# Patient Record
Sex: MALE | Race: WHITE | NOT HISPANIC OR LATINO
[De-identification: names, ages, dates, MRNs, and addresses within clinical notes are randomized per-mention and may not be internally consistent; named-entity substitution may affect disease eponyms.]

---

## 2017-02-06 ENCOUNTER — APPOINTMENT (OUTPATIENT)
Dept: RADIOLOGY | Facility: CLINIC | Age: 22
End: 2017-02-06

## 2017-02-06 ENCOUNTER — OUTPATIENT (OUTPATIENT)
Dept: OUTPATIENT SERVICES | Facility: HOSPITAL | Age: 22
LOS: 1 days | End: 2017-02-06
Payer: COMMERCIAL

## 2017-02-06 DIAGNOSIS — Z98.89 OTHER SPECIFIED POSTPROCEDURAL STATES: Chronic | ICD-10-CM

## 2017-02-06 DIAGNOSIS — Z00.8 ENCOUNTER FOR OTHER GENERAL EXAMINATION: ICD-10-CM

## 2017-02-06 PROCEDURE — 71046 X-RAY EXAM CHEST 2 VIEWS: CPT

## 2017-02-06 PROCEDURE — 71100 X-RAY EXAM RIBS UNI 2 VIEWS: CPT

## 2017-02-10 ENCOUNTER — OUTPATIENT (OUTPATIENT)
Dept: OUTPATIENT SERVICES | Age: 22
LOS: 1 days | Discharge: HOME | End: 2017-02-10

## 2017-02-10 DIAGNOSIS — Z98.89 OTHER SPECIFIED POSTPROCEDURAL STATES: Chronic | ICD-10-CM

## 2017-02-13 ENCOUNTER — APPOINTMENT (OUTPATIENT)
Dept: PEDIATRIC CARDIOLOGY | Facility: CLINIC | Age: 22
End: 2017-02-13

## 2017-02-13 VITALS
SYSTOLIC BLOOD PRESSURE: 106 MMHG | DIASTOLIC BLOOD PRESSURE: 68 MMHG | OXYGEN SATURATION: 89 % | HEIGHT: 70.08 IN | RESPIRATION RATE: 16 BRPM | HEART RATE: 80 BPM | WEIGHT: 110.23 LBS | BODY MASS INDEX: 15.78 KG/M2

## 2017-02-23 ENCOUNTER — APPOINTMENT (OUTPATIENT)
Dept: DERMATOLOGY | Facility: CLINIC | Age: 22
End: 2017-02-23

## 2017-02-23 VITALS
WEIGHT: 110 LBS | HEIGHT: 70 IN | DIASTOLIC BLOOD PRESSURE: 70 MMHG | SYSTOLIC BLOOD PRESSURE: 110 MMHG | BODY MASS INDEX: 15.75 KG/M2

## 2017-02-23 DIAGNOSIS — Z84.0 FAMILY HISTORY OF DISEASES OF THE SKIN AND SUBCUTANEOUS TISSUE: ICD-10-CM

## 2017-02-23 DIAGNOSIS — L02.92 FURUNCLE, UNSPECIFIED: ICD-10-CM

## 2017-02-23 DIAGNOSIS — Z86.79 PERSONAL HISTORY OF OTHER DISEASES OF THE CIRCULATORY SYSTEM: ICD-10-CM

## 2017-02-23 DIAGNOSIS — Z09 ENCOUNTER FOR FOLLOW-UP EXAMINATION AFTER COMPLETED TREATMENT FOR CONDITIONS OTHER THAN MALIGNANT NEOPLASM: ICD-10-CM

## 2017-08-08 ENCOUNTER — OUTPATIENT (OUTPATIENT)
Dept: OUTPATIENT SERVICES | Age: 22
LOS: 1 days | Discharge: HOME | End: 2017-08-08

## 2017-08-08 DIAGNOSIS — Z98.89 OTHER SPECIFIED POSTPROCEDURAL STATES: Chronic | ICD-10-CM

## 2017-08-14 ENCOUNTER — APPOINTMENT (OUTPATIENT)
Dept: PEDIATRIC CARDIOLOGY | Facility: CLINIC | Age: 22
End: 2017-08-14
Payer: COMMERCIAL

## 2017-08-14 VITALS
DIASTOLIC BLOOD PRESSURE: 70 MMHG | OXYGEN SATURATION: 91 % | BODY MASS INDEX: 15.47 KG/M2 | HEIGHT: 70.08 IN | WEIGHT: 108.03 LBS | RESPIRATION RATE: 16 BRPM | SYSTOLIC BLOOD PRESSURE: 122 MMHG | HEART RATE: 84 BPM

## 2017-08-14 PROCEDURE — 99215 OFFICE O/P EST HI 40 MIN: CPT | Mod: 25

## 2017-08-14 PROCEDURE — 93000 ELECTROCARDIOGRAM COMPLETE: CPT

## 2017-09-05 ENCOUNTER — LABORATORY RESULT (OUTPATIENT)
Age: 22
End: 2017-09-05

## 2017-11-21 ENCOUNTER — FORM ENCOUNTER (OUTPATIENT)
Age: 22
End: 2017-11-21

## 2017-11-22 ENCOUNTER — APPOINTMENT (OUTPATIENT)
Dept: CARDIOLOGY | Facility: CLINIC | Age: 22
End: 2017-11-22

## 2017-11-22 ENCOUNTER — OUTPATIENT (OUTPATIENT)
Dept: OUTPATIENT SERVICES | Facility: HOSPITAL | Age: 22
LOS: 1 days | End: 2017-11-22
Payer: COMMERCIAL

## 2017-11-22 DIAGNOSIS — Q24.8 OTHER SPECIFIED CONGENITAL MALFORMATIONS OF HEART: ICD-10-CM

## 2017-11-22 DIAGNOSIS — R07.9 CHEST PAIN, UNSPECIFIED: ICD-10-CM

## 2017-11-22 DIAGNOSIS — Z98.89 OTHER SPECIFIED POSTPROCEDURAL STATES: Chronic | ICD-10-CM

## 2017-11-22 DIAGNOSIS — Q21.1 ATRIAL SEPTAL DEFECT: ICD-10-CM

## 2017-11-22 DIAGNOSIS — Q26.2 TOTAL ANOMALOUS PULMONARY VENOUS CONNECTION: ICD-10-CM

## 2017-11-22 DIAGNOSIS — Q20.1 DOUBLE OUTLET RIGHT VENTRICLE: ICD-10-CM

## 2017-11-22 PROCEDURE — 75574 CT ANGIO HRT W/3D IMAGE: CPT

## 2017-11-22 PROCEDURE — 75574 CT ANGIO HRT W/3D IMAGE: CPT | Mod: 26

## 2017-12-07 ENCOUNTER — APPOINTMENT (OUTPATIENT)
Dept: VASCULAR SURGERY | Facility: CLINIC | Age: 22
End: 2017-12-07
Payer: COMMERCIAL

## 2017-12-07 VITALS
TEMPERATURE: 97.5 F | HEIGHT: 70 IN | SYSTOLIC BLOOD PRESSURE: 110 MMHG | DIASTOLIC BLOOD PRESSURE: 73 MMHG | BODY MASS INDEX: 15.75 KG/M2 | HEART RATE: 83 BPM | WEIGHT: 110 LBS

## 2017-12-07 VITALS — SYSTOLIC BLOOD PRESSURE: 114 MMHG | HEART RATE: 87 BPM | DIASTOLIC BLOOD PRESSURE: 77 MMHG

## 2017-12-07 DIAGNOSIS — I83.891 VARICOSE VEINS OF RIGHT LOWER EXTREMITY WITH OTHER COMPLICATIONS: ICD-10-CM

## 2017-12-07 PROCEDURE — 93970 EXTREMITY STUDY: CPT

## 2017-12-07 PROCEDURE — 99212 OFFICE O/P EST SF 10 MIN: CPT | Mod: 25

## 2017-12-11 ENCOUNTER — OTHER (OUTPATIENT)
Age: 22
End: 2017-12-11

## 2017-12-22 ENCOUNTER — MESSAGE (OUTPATIENT)
Age: 22
End: 2017-12-22

## 2017-12-26 ENCOUNTER — OTHER (OUTPATIENT)
Age: 22
End: 2017-12-26

## 2018-02-08 ENCOUNTER — OUTPATIENT (OUTPATIENT)
Dept: OUTPATIENT SERVICES | Age: 23
LOS: 1 days | Discharge: HOME | End: 2018-02-08

## 2018-02-08 DIAGNOSIS — Z98.89 OTHER SPECIFIED POSTPROCEDURAL STATES: Chronic | ICD-10-CM

## 2018-02-12 ENCOUNTER — APPOINTMENT (OUTPATIENT)
Dept: PEDIATRIC CARDIOLOGY | Facility: CLINIC | Age: 23
End: 2018-02-12
Payer: COMMERCIAL

## 2018-02-12 VITALS
HEART RATE: 92 BPM | RESPIRATION RATE: 20 BRPM | SYSTOLIC BLOOD PRESSURE: 112 MMHG | OXYGEN SATURATION: 92 % | BODY MASS INDEX: 15.96 KG/M2 | HEIGHT: 69.69 IN | WEIGHT: 110.23 LBS | DIASTOLIC BLOOD PRESSURE: 72 MMHG

## 2018-02-12 PROCEDURE — 99215 OFFICE O/P EST HI 40 MIN: CPT | Mod: 25

## 2018-02-12 PROCEDURE — 93000 ELECTROCARDIOGRAM COMPLETE: CPT

## 2018-03-06 PROBLEM — I83.219: Status: ACTIVE | Noted: 2018-03-06

## 2018-03-06 RX ORDER — SODIUM BICARBONATE 84 MG/ML
8.4 INJECTION, SOLUTION INTRAVENOUS
Qty: 5 | Refills: 0 | Status: COMPLETED | COMMUNITY
Start: 2018-03-06 | End: 2018-03-10

## 2018-03-06 RX ORDER — LIDOCAINE HYDROCHLORIDE 10 MG/ML
1 INJECTION, SOLUTION INFILTRATION; PERINEURAL
Qty: 50 | Refills: 0 | Status: COMPLETED | COMMUNITY
Start: 2018-03-06 | End: 2018-03-10

## 2018-03-06 RX ORDER — SODIUM CHLORIDE 9 G/ML
0.9 INJECTION, SOLUTION INTRAVENOUS
Qty: 500 | Refills: 0 | Status: COMPLETED | COMMUNITY
Start: 2018-03-06 | End: 2018-03-10

## 2018-03-06 RX ORDER — ALPRAZOLAM 0.5 MG/1
0.5 TABLET ORAL
Qty: 1 | Refills: 0 | Status: COMPLETED | COMMUNITY
Start: 2018-03-06 | End: 2018-03-07

## 2018-03-08 ENCOUNTER — INPATIENT (INPATIENT)
Facility: HOSPITAL | Age: 23
LOS: 2 days | Discharge: HOME | End: 2018-03-11
Attending: INTERNAL MEDICINE | Admitting: INTERNAL MEDICINE
Payer: COMMERCIAL

## 2018-03-08 VITALS
TEMPERATURE: 98 F | HEART RATE: 94 BPM | OXYGEN SATURATION: 100 % | DIASTOLIC BLOOD PRESSURE: 73 MMHG | SYSTOLIC BLOOD PRESSURE: 118 MMHG | RESPIRATION RATE: 18 BRPM

## 2018-03-08 DIAGNOSIS — Q89.3 SITUS INVERSUS: ICD-10-CM

## 2018-03-08 DIAGNOSIS — Z29.9 ENCOUNTER FOR PROPHYLACTIC MEASURES, UNSPECIFIED: ICD-10-CM

## 2018-03-08 DIAGNOSIS — R04.2 HEMOPTYSIS: ICD-10-CM

## 2018-03-08 DIAGNOSIS — Z98.89 OTHER SPECIFIED POSTPROCEDURAL STATES: Chronic | ICD-10-CM

## 2018-03-08 DIAGNOSIS — Q26.2 TOTAL ANOMALOUS PULMONARY VENOUS CONNECTION: ICD-10-CM

## 2018-03-08 DIAGNOSIS — E80.6 OTHER DISORDERS OF BILIRUBIN METABOLISM: ICD-10-CM

## 2018-03-08 LAB
ALBUMIN SERPL ELPH-MCNC: 4.7 G/DL — SIGNIFICANT CHANGE UP (ref 3.3–5)
ALP SERPL-CCNC: 109 U/L — SIGNIFICANT CHANGE UP (ref 40–120)
ALT FLD-CCNC: 30 U/L — SIGNIFICANT CHANGE UP (ref 4–41)
ANISOCYTOSIS BLD QL: SIGNIFICANT CHANGE UP
APTT BLD: 36.6 SEC — SIGNIFICANT CHANGE UP (ref 27.5–37.4)
AST SERPL-CCNC: 33 U/L — SIGNIFICANT CHANGE UP (ref 4–40)
BASOPHILS # BLD AUTO: 0.17 K/UL — SIGNIFICANT CHANGE UP (ref 0–0.2)
BASOPHILS NFR BLD AUTO: 2.8 % — HIGH (ref 0–2)
BASOPHILS NFR SPEC: 6.3 % — HIGH (ref 0–2)
BILIRUB SERPL-MCNC: 2.7 MG/DL — HIGH (ref 0.2–1.2)
BUN SERPL-MCNC: 15 MG/DL — SIGNIFICANT CHANGE UP (ref 7–23)
CALCIUM SERPL-MCNC: 9.7 MG/DL — SIGNIFICANT CHANGE UP (ref 8.4–10.5)
CHLORIDE SERPL-SCNC: 100 MMOL/L — SIGNIFICANT CHANGE UP (ref 98–107)
CO2 SERPL-SCNC: 24 MMOL/L — SIGNIFICANT CHANGE UP (ref 22–31)
CREAT SERPL-MCNC: 1.01 MG/DL — SIGNIFICANT CHANGE UP (ref 0.5–1.3)
ELLIPTOCYTES BLD QL SMEAR: SLIGHT — SIGNIFICANT CHANGE UP
EOSINOPHIL # BLD AUTO: 0.63 K/UL — HIGH (ref 0–0.5)
EOSINOPHIL NFR BLD AUTO: 10.6 % — HIGH (ref 0–6)
EOSINOPHIL NFR FLD: 4.5 % — SIGNIFICANT CHANGE UP (ref 0–6)
GIANT PLATELETS BLD QL SMEAR: PRESENT — SIGNIFICANT CHANGE UP
GLUCOSE SERPL-MCNC: 81 MG/DL — SIGNIFICANT CHANGE UP (ref 70–99)
HCT VFR BLD CALC: 49.6 % — SIGNIFICANT CHANGE UP (ref 39–50)
HGB BLD-MCNC: 16.2 G/DL — SIGNIFICANT CHANGE UP (ref 13–17)
IMM GRANULOCYTES # BLD AUTO: 0.01 # — SIGNIFICANT CHANGE UP
IMM GRANULOCYTES NFR BLD AUTO: 0.2 % — SIGNIFICANT CHANGE UP (ref 0–1.5)
INR BLD: 1.15 — SIGNIFICANT CHANGE UP (ref 0.88–1.17)
LYMPHOCYTES # BLD AUTO: 1.72 K/UL — SIGNIFICANT CHANGE UP (ref 1–3.3)
LYMPHOCYTES # BLD AUTO: 28.8 % — SIGNIFICANT CHANGE UP (ref 13–44)
LYMPHOCYTES NFR SPEC AUTO: 23.4 % — SIGNIFICANT CHANGE UP (ref 13–44)
MACROCYTES BLD QL: SLIGHT — SIGNIFICANT CHANGE UP
MAGNESIUM SERPL-MCNC: 2 MG/DL — SIGNIFICANT CHANGE UP (ref 1.6–2.6)
MCHC RBC-ENTMCNC: 27.1 PG — SIGNIFICANT CHANGE UP (ref 27–34)
MCHC RBC-ENTMCNC: 32.7 % — SIGNIFICANT CHANGE UP (ref 32–36)
MCV RBC AUTO: 82.9 FL — SIGNIFICANT CHANGE UP (ref 80–100)
MONOCYTES # BLD AUTO: 0.83 K/UL — SIGNIFICANT CHANGE UP (ref 0–0.9)
MONOCYTES NFR BLD AUTO: 13.9 % — SIGNIFICANT CHANGE UP (ref 2–14)
MONOCYTES NFR BLD: 8.1 % — SIGNIFICANT CHANGE UP (ref 2–9)
NEUTROPHIL AB SER-ACNC: 48.7 % — SIGNIFICANT CHANGE UP (ref 43–77)
NEUTROPHILS # BLD AUTO: 2.61 K/UL — SIGNIFICANT CHANGE UP (ref 1.8–7.4)
NEUTROPHILS NFR BLD AUTO: 43.7 % — SIGNIFICANT CHANGE UP (ref 43–77)
NRBC # FLD: 0 — SIGNIFICANT CHANGE UP
PHOSPHATE SERPL-MCNC: 4.5 MG/DL — SIGNIFICANT CHANGE UP (ref 2.5–4.5)
PLATELET # BLD AUTO: 210 K/UL — SIGNIFICANT CHANGE UP (ref 150–400)
PLATELET COUNT - ESTIMATE: NORMAL — SIGNIFICANT CHANGE UP
PMV BLD: SIGNIFICANT CHANGE UP FL (ref 7–13)
POIKILOCYTOSIS BLD QL AUTO: SIGNIFICANT CHANGE UP
POTASSIUM SERPL-MCNC: 4.5 MMOL/L — SIGNIFICANT CHANGE UP (ref 3.5–5.3)
POTASSIUM SERPL-SCNC: 4.5 MMOL/L — SIGNIFICANT CHANGE UP (ref 3.5–5.3)
PROT SERPL-MCNC: 7.9 G/DL — SIGNIFICANT CHANGE UP (ref 6–8.3)
PROTHROM AB SERPL-ACNC: 13.3 SEC — HIGH (ref 9.8–13.1)
RBC # BLD: 5.98 M/UL — HIGH (ref 4.2–5.8)
RBC # FLD: 22.2 % — HIGH (ref 10.3–14.5)
SODIUM SERPL-SCNC: 139 MMOL/L — SIGNIFICANT CHANGE UP (ref 135–145)
TARGETS BLD QL SMEAR: SLIGHT — SIGNIFICANT CHANGE UP
VARIANT LYMPHS # BLD: 9 % — SIGNIFICANT CHANGE UP
WBC # BLD: 5.97 K/UL — SIGNIFICANT CHANGE UP (ref 3.8–10.5)
WBC # FLD AUTO: 5.97 K/UL — SIGNIFICANT CHANGE UP (ref 3.8–10.5)

## 2018-03-08 PROCEDURE — 71046 X-RAY EXAM CHEST 2 VIEWS: CPT | Mod: 26

## 2018-03-08 RX ORDER — DIGOXIN 250 MCG
0.25 TABLET ORAL DAILY
Qty: 0 | Refills: 0 | Status: DISCONTINUED | OUTPATIENT
Start: 2018-03-08 | End: 2018-03-11

## 2018-03-08 RX ORDER — DIGOXIN 250 MCG
0.25 TABLET ORAL DAILY
Qty: 0 | Refills: 0 | Status: DISCONTINUED | OUTPATIENT
Start: 2018-03-08 | End: 2018-03-08

## 2018-03-08 NOTE — H&P ADULT - NSHPREVIEWOFSYSTEMS_GEN_ALL_CORE
CONSTITUTIONAL: No fever, no chills  EYES: No eye pain, no visual disturbance  Mouth: no pain in mouth, no cuts  RESPIRATORY: hemoptysis+, no sob  CARDIOVASCULAR: CP only with cough, no palpitations  GASTROINTESTINAL: no abdominal pain, no n/v/d  GENITOURINARY: No dysuria, no hematuria  NEUROLOGICAL: No headaches, no weakness  SKIN: No itching, no rashes  MUSCULOSKELETAL: No joint pain, no joint swelling

## 2018-03-08 NOTE — ED ADULT NURSE NOTE - OBJECTIVE STATEMENT
Pt c/o hemoptysis for the past 2 days, congenital defect. Pt sent in for possible bronchoscopy. Pt denies sob, breathing even and unlabored. Pt denies cp/discomfort, denies headache/dizziness. Abdomen soft, non-tender, non-distended. Skin is cool dry and intact. IVL placed, labs sent. Will continue to monitor.

## 2018-03-08 NOTE — ED ADULT TRIAGE NOTE - CHIEF COMPLAINT QUOTE
pt arrives c/o coughing up large amounts of blood, pt has congential heart defect and is followed by congential specialist, told to come to ED, pt on ASA. pt arrives c/o coughing up large amounts of blood, pt has congential heart defect and is followed by congential specialist, told to come to ED, pt on ASA sent in for bronchoscopy.

## 2018-03-08 NOTE — ED PROVIDER NOTE - MEDICAL DECISION MAKING DETAILS
22yom complex congenital heart hx p/w hemoptysis, no acute distress, protecting airway, saturating well. Will check basic labs, CXR, pulm consult for admission.

## 2018-03-08 NOTE — H&P ADULT - PROBLEM SELECTOR PLAN 2
Noted extensive surgical intervention as noted in HPI  - Ped Cards Fellow called and made aware of patient's admission  - will continue with home digoxin dosing, aspirin held for active hemoptysis

## 2018-03-08 NOTE — ED ADULT NURSE REASSESSMENT NOTE - NS ED NURSE REASSESS COMMENT FT1
report received at shift change, pt remains AAOx3, VS as noted, pt in NAD, pt assigned bed, awaiting PCU to take report, will continue to monitor pt.

## 2018-03-08 NOTE — ED PROVIDER NOTE - PROGRESS NOTE DETAILS
Gabi: Pulm fellow recommending admission to PCU for observation and quantification of hemoptysis overnight. Possible bronch tomorrow. Pt and mother agreeable to plan. Gabi: Had approx 40ml flori hemoptysis. Pulm fellow updated. Admitted to PCU but may need upgrade to ICU for more emergent bronchoscopy. Pulm fellow will make MICU aware.

## 2018-03-08 NOTE — ED PROVIDER NOTE - ATTENDING CONTRIBUTION TO CARE
I performed a face-to-face evaluation of the patient and performed a history and physical examination. I agree with the history and physical examination.    22 M, single ventricle, f/b [facility] and [facility]. C/o hemoptysis x yrs. Appears well. Appears well. NAD. Afebrile. Vital signs unremarkable. Strong pulse. Respirations unlabored. No LE edema. Will discuss w/ Pulm regarding admission for bronch.

## 2018-03-08 NOTE — ED PROVIDER NOTE - OBJECTIVE STATEMENT
22yom w/ single ventricle, heterotaxy w/ asplenia p/w hemoptysis. Has been having episodic hemoptysis on and off for years. Was told if it happens again, to go to the ED for bronchoscopy. Had onset of hemoptysis 2 days ago, approximately tablespoon sized clots. Endorses pain with coughing but denies any pain at rest or shortness of breath. No other bleeding. No fevers, chills or other infectious symptoms. No oxygen use at baseline.

## 2018-03-08 NOTE — H&P ADULT - PROBLEM SELECTOR PLAN 1
Hemoptysis x2d, active in the ED  - MICU consulted and discussed that patient will be NPO after midnight. If patient worsens or has continued hemoptysis will inform MICU  - Type and screened, consent for transfusion obtained. monitor clinically and w/ cbc daily.  - Requires urgent bronchoscopy for evaluation.  - Holding Aspirin 81mg given active bleed

## 2018-03-08 NOTE — H&P ADULT - HISTORY OF PRESENT ILLNESS
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, Right AV valve atresia, b/l SVC(non-communicating), pulmonary valve atresia & TAPVR who is s/p classic BT shunt and repair of TAPVR at birth followed by bilateral bi-directional Narinder shunts, who underwent re-repair of the TAPVR secondary to stenosis of the anastomosis at the time of the BDG shunts, underwent takedown of the Narinder shunt and underwent a fenestrated Fontan procedure which was subsequently managed by attempted cath lab closure of the fenestration, hx of thrombosis of the right femoral vein and subsequent development of venous stasis in that leg presents to [facility] with 2d of hemoptysis. Patient reports that he has had coughed up blood before in periods of 2-3d >6mo ago but this time there is more volume and reports 5 episodes in last 2d. Per outpatient notation there was suspicion that enalapril may have led to hemoptysis and therefore it was discontinued w/ coughing resolved at that time. He was recently seen by his congenital heart cardiologist who instructed to be evaluated in hospital for bronchoscopy should hemoptysis be persistent which has led to his presentation today. On review he has chest pain only with coughing and not with deep inspiration, no palpitations, no sob, no fever/chills no n/v/d or abdominal pain, no dysuria, or rash.

## 2018-03-08 NOTE — ED ADULT NURSE NOTE - CHIEF COMPLAINT QUOTE
pt arrives c/o coughing up large amounts of blood, pt has congential heart defect and is followed by congential specialist, told to come to ED, pt on ASA sent in for bronchoscopy.

## 2018-03-08 NOTE — H&P ADULT - NSHPLABSRESULTS_GEN_ALL_CORE
Personally reviewed available labs, imaging and ekg     Labs  CBC Full  -  ( 08 Mar 2018 17:27 )  WBC Count : 5.97 K/uL  Hemoglobin : 16.2 g/dL  Hematocrit : 49.6 %  Platelet Count - Automated : 210 K/uL  Mean Cell Volume : 82.9 fL  Mean Cell Hemoglobin : 27.1 pg  Mean Cell Hemoglobin Concentration : 32.7 %  Auto Neutrophil # : 2.61 K/uL  Auto Lymphocyte # : 1.72 K/uL  Auto Monocyte # : 0.83 K/uL  Auto Eosinophil # : 0.63 K/uL  Auto Basophil # : 0.17 K/uL  Auto Neutrophil % : 43.7 %  Auto Lymphocyte % : 28.8 %  Auto Monocyte % : 13.9 %  Auto Eosinophil % : 10.6 %  Auto Basophil % : 2.8 %    03-08    139  |  100  |  15  ----------------------------<  81  4.5   |  24  |  1.01    Ca    9.7      08 Mar 2018 17:27  Phos  4.5     03-08  Mg     2.0     03-08    TPro  7.9  /  Alb  4.7  /  TBili  2.7<H>  /  DBili  x   /  AST  33  /  ALT  30  /  AlkPhos  109  03-08  PT/INR - ( 08 Mar 2018 17:27 )   PT: 13.3 SEC;   INR: 1.15      PTT - ( 08 Mar 2018 17:27 )  PTT:36.6 SEC  CAPILLARY BLOOD GLUCOSE    Imaging  < from: Xray Chest 2 Views PA/Lat (03.08.18 @ 17:52) >  IMPRESSION:  Clear lungs. No pleural effusions or pneumothorax.  Stable fragmented sternal wire configuration, mediastinal surgical clips,   and cardiac and mediastinal silhouettes.   Trachea midline.  Bell-shaped chest wall deformity and anterior chest wall pectus deformity   again noted.  < end of copied text >

## 2018-03-08 NOTE — H&P ADULT - PROBLEM SELECTOR PLAN 3
Per chart patient no longer requires daily antibiotic prophylaxis  - will need coverage during bronchoscpy

## 2018-03-08 NOTE — H&P ADULT - NSHPPHYSICALEXAM_GEN_ALL_CORE
Vital Signs Last 24 Hrs  T(C): 36.9 (08 Mar 2018 20:33), Max: 36.9 (08 Mar 2018 20:33)  T(F): 98.4 (08 Mar 2018 20:33), Max: 98.4 (08 Mar 2018 20:33)  HR: 92 (08 Mar 2018 20:33) (88 - 94)  BP: 119/70 (08 Mar 2018 20:33) (118/73 - 120/75)  BP(mean): --  RR: 16 (08 Mar 2018 20:33) (16 - 18)  SpO2: 100% (08 Mar 2018 20:33) (100% - 100%)    GENERAL: NAD, thin, well-developed  HEAD:  Atraumatic, Normocephalic  EYES: EOMI, PERRLA, conjunctiva and sclera clear  Mouth: MMM, no lesions  NECK: Supple, no appreciable masses  Lung: normal work of breathing, cta b/l  Chest: S1&S2+, rrr, systolic murmur over LMSB w/ radiation to left back (III/VI)  ABDOMEN: bs+, soft, nt, nd, no appreciable masses  : No lion catheter, no CVA tenderness  EXTREMITIES:  radial pulse present b/l, PT present b/l, no pitting edema present  Neuro: A&Ox3, no focal deficits  SKIN: warm and dry, no visible rashes

## 2018-03-08 NOTE — ED ADULT NURSE NOTE - PMH
Heterotaxy syndrome with asplenia    Single ventricle    Spleen absent    TAPVR (total anomalous pulmonary venous return)

## 2018-03-08 NOTE — H&P ADULT - ASSESSMENT
22M w/ congenital heart disease w/ noted heterotaxy, Right AV valve atresia, b/l SVC(non-communicating), pulmonary valve atresia & TAPVR who is s/p surgical repair including Fontan procedure presents to [facility] w/ hemoptysis and is admitted to PCU for further management and evaluation with plan for bronchoscopy

## 2018-03-09 ENCOUNTER — APPOINTMENT (OUTPATIENT)
Dept: VASCULAR SURGERY | Facility: CLINIC | Age: 23
End: 2018-03-09

## 2018-03-09 DIAGNOSIS — Q89.01 ASPLENIA (CONGENITAL): ICD-10-CM

## 2018-03-09 DIAGNOSIS — I83.219 VARICOSE VEINS OF RIGHT LOWER EXTREMITY WITH BOTH ULCER OF UNSPECIFIED SITE AND INFLAMMATION: ICD-10-CM

## 2018-03-09 LAB
ALBUMIN SERPL ELPH-MCNC: 4 G/DL — SIGNIFICANT CHANGE UP (ref 3.3–5)
ALP SERPL-CCNC: 93 U/L — SIGNIFICANT CHANGE UP (ref 40–120)
ALT FLD-CCNC: 26 U/L — SIGNIFICANT CHANGE UP (ref 4–41)
APTT BLD: 36.5 SEC — SIGNIFICANT CHANGE UP (ref 27.5–37.4)
AST SERPL-CCNC: 29 U/L — SIGNIFICANT CHANGE UP (ref 4–40)
BASOPHILS # BLD AUTO: 0.25 K/UL — HIGH (ref 0–0.2)
BASOPHILS NFR BLD AUTO: 2.9 % — HIGH (ref 0–2)
BILIRUB SERPL-MCNC: 2.4 MG/DL — HIGH (ref 0.2–1.2)
BUN SERPL-MCNC: 18 MG/DL — SIGNIFICANT CHANGE UP (ref 7–23)
CALCIUM SERPL-MCNC: 9.4 MG/DL — SIGNIFICANT CHANGE UP (ref 8.4–10.5)
CHLORIDE SERPL-SCNC: 107 MMOL/L — SIGNIFICANT CHANGE UP (ref 98–107)
CO2 SERPL-SCNC: 24 MMOL/L — SIGNIFICANT CHANGE UP (ref 22–31)
CREAT SERPL-MCNC: 1.03 MG/DL — SIGNIFICANT CHANGE UP (ref 0.5–1.3)
EOSINOPHIL # BLD AUTO: 1.31 K/UL — HIGH (ref 0–0.5)
EOSINOPHIL NFR BLD AUTO: 15.2 % — HIGH (ref 0–6)
GLUCOSE SERPL-MCNC: 95 MG/DL — SIGNIFICANT CHANGE UP (ref 70–99)
HCT VFR BLD CALC: 47.5 % — SIGNIFICANT CHANGE UP (ref 39–50)
HGB BLD-MCNC: 15.1 G/DL — SIGNIFICANT CHANGE UP (ref 13–17)
IMM GRANULOCYTES # BLD AUTO: 0.02 # — SIGNIFICANT CHANGE UP
IMM GRANULOCYTES NFR BLD AUTO: 0.2 % — SIGNIFICANT CHANGE UP (ref 0–1.5)
INR BLD: 1.26 — HIGH (ref 0.88–1.17)
LYMPHOCYTES # BLD AUTO: 2.65 K/UL — SIGNIFICANT CHANGE UP (ref 1–3.3)
LYMPHOCYTES # BLD AUTO: 30.7 % — SIGNIFICANT CHANGE UP (ref 13–44)
MAGNESIUM SERPL-MCNC: 2 MG/DL — SIGNIFICANT CHANGE UP (ref 1.6–2.6)
MCHC RBC-ENTMCNC: 25.9 PG — LOW (ref 27–34)
MCHC RBC-ENTMCNC: 31.8 % — LOW (ref 32–36)
MCV RBC AUTO: 81.6 FL — SIGNIFICANT CHANGE UP (ref 80–100)
MONOCYTES # BLD AUTO: 1.27 K/UL — HIGH (ref 0–0.9)
MONOCYTES NFR BLD AUTO: 14.7 % — HIGH (ref 2–14)
NEUTROPHILS # BLD AUTO: 3.13 K/UL — SIGNIFICANT CHANGE UP (ref 1.8–7.4)
NEUTROPHILS NFR BLD AUTO: 36.3 % — LOW (ref 43–77)
NRBC # FLD: 0 — SIGNIFICANT CHANGE UP
PHOSPHATE SERPL-MCNC: 5.6 MG/DL — HIGH (ref 2.5–4.5)
PLATELET # BLD AUTO: 187 K/UL — SIGNIFICANT CHANGE UP (ref 150–400)
PMV BLD: SIGNIFICANT CHANGE UP FL (ref 7–13)
POTASSIUM SERPL-MCNC: 5 MMOL/L — SIGNIFICANT CHANGE UP (ref 3.5–5.3)
POTASSIUM SERPL-SCNC: 5 MMOL/L — SIGNIFICANT CHANGE UP (ref 3.5–5.3)
PROT SERPL-MCNC: 6.8 G/DL — SIGNIFICANT CHANGE UP (ref 6–8.3)
PROTHROM AB SERPL-ACNC: 14.5 SEC — HIGH (ref 9.8–13.1)
RBC # BLD: 5.82 M/UL — HIGH (ref 4.2–5.8)
RBC # FLD: 21.9 % — HIGH (ref 10.3–14.5)
RH IG SCN BLD-IMP: NEGATIVE — SIGNIFICANT CHANGE UP
SODIUM SERPL-SCNC: 144 MMOL/L — SIGNIFICANT CHANGE UP (ref 135–145)
WBC # BLD: 8.63 K/UL — SIGNIFICANT CHANGE UP (ref 3.8–10.5)
WBC # FLD AUTO: 8.63 K/UL — SIGNIFICANT CHANGE UP (ref 3.8–10.5)

## 2018-03-09 PROCEDURE — 99255 IP/OBS CONSLTJ NEW/EST HI 80: CPT

## 2018-03-09 PROCEDURE — 99291 CRITICAL CARE FIRST HOUR: CPT | Mod: 25

## 2018-03-09 PROCEDURE — 99152 MOD SED SAME PHYS/QHP 5/>YRS: CPT | Mod: GC

## 2018-03-09 PROCEDURE — 71275 CT ANGIOGRAPHY CHEST: CPT | Mod: 26

## 2018-03-09 PROCEDURE — 31624 DX BRONCHOSCOPE/LAVAGE: CPT | Mod: GC

## 2018-03-09 RX ORDER — CHLORHEXIDINE GLUCONATE 213 G/1000ML
1 SOLUTION TOPICAL DAILY
Qty: 0 | Refills: 0 | Status: DISCONTINUED | OUTPATIENT
Start: 2018-03-09 | End: 2018-03-11

## 2018-03-09 RX ORDER — SODIUM CHLORIDE 9 MG/ML
1000 INJECTION, SOLUTION INTRAVENOUS
Qty: 0 | Refills: 0 | Status: DISCONTINUED | OUTPATIENT
Start: 2018-03-09 | End: 2018-03-11

## 2018-03-09 RX ORDER — MIDAZOLAM HYDROCHLORIDE 1 MG/ML
1 INJECTION, SOLUTION INTRAMUSCULAR; INTRAVENOUS ONCE
Qty: 0 | Refills: 0 | Status: DISCONTINUED | OUTPATIENT
Start: 2018-03-09 | End: 2018-03-09

## 2018-03-09 RX ORDER — LIDOCAINE HCL 20 MG/ML
20 VIAL (ML) INJECTION ONCE
Qty: 0 | Refills: 0 | Status: COMPLETED | OUTPATIENT
Start: 2018-03-09 | End: 2018-03-09

## 2018-03-09 RX ORDER — MIDAZOLAM HYDROCHLORIDE 1 MG/ML
2 INJECTION, SOLUTION INTRAMUSCULAR; INTRAVENOUS ONCE
Qty: 0 | Refills: 0 | Status: DISCONTINUED | OUTPATIENT
Start: 2018-03-09 | End: 2018-03-09

## 2018-03-09 RX ORDER — CEFAZOLIN SODIUM 1 G
1000 VIAL (EA) INJECTION ONCE
Qty: 0 | Refills: 0 | Status: COMPLETED | OUTPATIENT
Start: 2018-03-09 | End: 2018-03-09

## 2018-03-09 RX ORDER — MIDAZOLAM HYDROCHLORIDE 1 MG/ML
12 INJECTION, SOLUTION INTRAMUSCULAR; INTRAVENOUS ONCE
Qty: 0 | Refills: 0 | Status: DISCONTINUED | OUTPATIENT
Start: 2018-03-09 | End: 2018-03-09

## 2018-03-09 RX ORDER — PROPOFOL 10 MG/ML
15 INJECTION, EMULSION INTRAVENOUS ONCE
Qty: 0 | Refills: 0 | Status: DISCONTINUED | OUTPATIENT
Start: 2018-03-09 | End: 2018-03-09

## 2018-03-09 RX ORDER — FENTANYL CITRATE 50 UG/ML
50 INJECTION INTRAVENOUS ONCE
Qty: 0 | Refills: 0 | Status: DISCONTINUED | OUTPATIENT
Start: 2018-03-09 | End: 2018-03-09

## 2018-03-09 RX ORDER — KETAMINE HYDROCHLORIDE 100 MG/ML
100 INJECTION INTRAMUSCULAR; INTRAVENOUS ONCE
Qty: 0 | Refills: 0 | Status: DISCONTINUED | OUTPATIENT
Start: 2018-03-09 | End: 2018-03-09

## 2018-03-09 RX ADMIN — FENTANYL CITRATE 50 MICROGRAM(S): 50 INJECTION INTRAVENOUS at 11:27

## 2018-03-09 RX ADMIN — Medication 20 MILLILITER(S): at 11:28

## 2018-03-09 RX ADMIN — CHLORHEXIDINE GLUCONATE 1 APPLICATION(S): 213 SOLUTION TOPICAL at 20:18

## 2018-03-09 RX ADMIN — MIDAZOLAM HYDROCHLORIDE 1 MILLIGRAM(S): 1 INJECTION, SOLUTION INTRAMUSCULAR; INTRAVENOUS at 12:29

## 2018-03-09 RX ADMIN — SODIUM CHLORIDE 75 MILLILITER(S): 9 INJECTION, SOLUTION INTRAVENOUS at 20:18

## 2018-03-09 RX ADMIN — MIDAZOLAM HYDROCHLORIDE 1 MILLIGRAM(S): 1 INJECTION, SOLUTION INTRAMUSCULAR; INTRAVENOUS at 11:28

## 2018-03-09 RX ADMIN — SODIUM CHLORIDE 75 MILLILITER(S): 9 INJECTION, SOLUTION INTRAVENOUS at 17:46

## 2018-03-09 RX ADMIN — FENTANYL CITRATE 50 MICROGRAM(S): 50 INJECTION INTRAVENOUS at 11:28

## 2018-03-09 RX ADMIN — MIDAZOLAM HYDROCHLORIDE 2 MILLIGRAM(S): 1 INJECTION, SOLUTION INTRAMUSCULAR; INTRAVENOUS at 11:29

## 2018-03-09 RX ADMIN — Medication 0.25 MILLIGRAM(S): at 05:41

## 2018-03-09 RX ADMIN — Medication 100 MILLIGRAM(S): at 13:24

## 2018-03-09 NOTE — CONSULT NOTE ADULT - ASSESSMENT
23 yo old male with complex congenital heart disease with heterotaxy and single ventricle physiology s/p Fontan completion now admitted with concerns for hemoptysis. ENT and pulmonary evaluations without clear etiology thus far. Pending GI work up. Baseline desaturation likely secondary to venovenous collaterals and/or residual Fontan fenestration flow that we cannot clearly visualize on transthoracic imaging. At this point, with no clear ENT or pulmonary source of bleeding, prudent to explore potential upper GI sources of bleeding in light of the change in frequency and quantity of blood with this episode. If this were bleed related to bleeding aortopulmonary collateral vessels, would have expected blood on bronchoscopy.  As discussed with the MICU team, patients with Fontan physiology are preload dependent so would maintain adequate hydration i.e. start IVFs when NPO for prolonged periods.      - start maintenance IVFs when NPO  - okay to hold aspirin with active bleeding  - f/u GI recommendations re endoscopy  - okay to hold digoxin while NPO, monitor on telemetry  - okay for baseline saturations to be >85% on room air  - requires SBE prophylaxis for indicated procedures given cyanosis

## 2018-03-09 NOTE — PROCEDURE NOTE - ADDITIONAL PROCEDURE DETAILS
Indication: Hemoptysis  Preop medications: 3 mg Versed, 100 microgram Fentanyl. Lidocaine was used to anesthetize the pharyngeal and nasal mucosa  Findings:  Bronchoscope inserted through left naris. Small area of mucosal denudation in inferior turbinate. Vocal cords normal. Small amount of blood in upper trachea. No tracheal lesion. Minimal dried blood in RUL and BI without overt bleeding from any bronchopulmonary segment. No washings taken.

## 2018-03-09 NOTE — PROGRESS NOTE ADULT - ASSESSMENT
23M with hx of heart disease and hemoptysis.  - no source of bleeding on upper aerodigestive exam  - normal larynx   - no further ENT needs  - consider GI or pulmonary source  - will discuss with attending

## 2018-03-09 NOTE — CONSULT NOTE ADULT - SUBJECTIVE AND OBJECTIVE BOX
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, R AV atresia, B/L SVC (non-communicating), pulmonary valve atresia & TAPVR who is s/p classic BT shunt and repair of TAPVR at birth followed by bilateral bi-directional Narinder shunts, who underwent re-repair of the TAPVR secondary to stenosis of the anastomosis at the time of the BDG shunts, underwent takedown of the Narinder shunt and underwent a fenestrated Fontan procedure which was subsequently managed by attempted cath lab closure of the fenestration, hx of thrombosis of the right femoral vein and subsequent development of venous stasis in that leg presents to [facility] with 2d of hemoptysis. Patient has had intermittent hemoptysis lasting 2-3d in the past over 6mo ago but this episode with increased volume and 5 episodes in last 2d.  In outpatient EMR, there was suspicion that enalapril may have contributed to hemoptysis and so was subsequently discontinued.    Patient was admitted initially to the RCU last night and was kept NPO per MICU in the [facility] patient had continued hemoptysis and required bronchoscopy.  ASA81 was held.  As patient with continued hemoptysis, he was transferred to the MICU for bronchoscopy.    Outpatient providers:  Card: Dr. Mega Rojas  Pulm: Dr. 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  Recent Travel:  Country of Birth: [place]  Advance Directives:    Allergies  No Known Allergies    Intolerances    HOME MEDICATIONS:  ASA81 QD  digoxin 250mcg QD    REVIEW OF SYSTEMS:  Constitutional: [ ] negative [ ] fevers [ ] chills [ ] weight loss [ ] weight gain  HEENT: [ ] negative [ ] dry eyes [ ] eye irritation [ ] postnasal drip [ ] nasal congestion  CV: [ ] negative  [ ] chest pain [ ] orthopnea [ ] palpitations [ ] murmur  Resp: [ ] negative [ ] cough [ ] shortness of breath [ ] dyspnea [ ] wheezing [ ] sputum [ ] hemoptysis  GI: [ ] negative [ ] nausea [ ] vomiting [ ] diarrhea [ ] constipation [ ] abd pain [ ] dysphagia   : [ ] negative [ ] dysuria [ ] nocturia [ ] hematuria [ ] increased urinary frequency  Musculoskeletal: [ ] negative [ ] back pain [ ] myalgias [ ] arthralgias [ ] fracture  Skin: [ ] negative [ ] rash [ ] itch  Neurological: [ ] negative [ ] headache [ ] dizziness [ ] syncope [ ] weakness [ ] numbness  Psychiatric: [ ] negative [ ] anxiety [ ] depression  Endocrine: [ ] negative [ ] diabetes [ ] thyroid problem  Hematologic/Lymphatic: [ ] negative [ ] anemia [ ] bleeding problem  Allergic/Immunologic: [ ] negative [ ] itchy eyes [ ] nasal discharge [ ] hives [ ] angioedema  [ ] All other systems negative  [ ] Unable to assess ROS because ________    OBJECTIVE:  ICU Vital Signs Last 24 Hrs  T(C): 35.8 (09 Mar 2018 10:30), Max: 37 (09 Mar 2018 01:56)  T(F): 96.5 (09 Mar 2018 10:30), Max: 98.6 (09 Mar 2018 01:56)  HR: 94 (09 Mar 2018 11:00) (85 - 99)  BP: 128/78 (09 Mar 2018 10:30) (108/70 - 129/82)  BP(mean): 88 (09 Mar 2018 10:30) (88 - 88)  ABP: --  ABP(mean): --  RR: 19 (09 Mar 2018 11:00) (16 - 24)  SpO2: 86% (09 Mar 2018 11:00) (86% - 100%)    CAPILLARY BLOOD GLUCOSE    PHYSICAL EXAM:  General:   HEENT:   Lymph Nodes:  Neck:   Respiratory:   Cardiovascular:   Abdomen:   Extremities:   Skin:   Neurological:  Psychiatry:    LINES:   [facility] MEDICATIONS:  digoxin     Tablet 0.25 milliGRAM(s) Oral daily    LABS:                        15.1   8.63  )-----------( 187      ( 09 Mar 2018 05:56 )             47.5     Hgb Trend: 15.1<--, 16.2<--  03-09    144  |  107  |  18  ----------------------------<  95  5.0   |  24  |  1.03    Ca    9.4      09 Mar 2018 05:56  Phos  5.6     03-09  Mg     2.0     03-09    TPro  6.8  /  Alb  4.0  /  TBili  2.4<H>  /  DBili  x   /  AST  29  /  ALT  26  /  AlkPhos  93  03-09    Creatinine Trend: 1.03<--, 1.01<--  PT/INR - ( 09 Mar 2018 05:56 )   PT: 14.5 SEC;   INR: 1.26     PTT - ( 09 Mar 2018 05:56 )  PTT:36.5 SEC    MICROBIOLOGY:     RADIOLOGY:  [ ] Reviewed and interpreted by me    EKG: CHIEF COMPLAINT: hemoptysis    HPI:   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, R AV atresia, B/L SVC (non-communicating), pulmonary valve atresia & TAPVR who is s/p classic BT shunt and repair of TAPVR at birth followed by bilateral bi-directional Narinder shunts, who underwent re-repair of the TAPVR secondary to stenosis of the anastomosis at the time of the BDG shunts, underwent takedown of the Narinder shunt and underwent a fenestrated Fontan procedure which was subsequently managed by attempted cath lab closure of the fenestration, hx of thrombosis of the right femoral vein and subsequent development of venous stasis in that leg presents to [facility] with 2d of hemoptysis. Patient has had intermittent hemoptysis lasting 2-3d in the past over 6mo ago but this episode with increased volume and 5 episodes in last 2d.  In outpatient EMR, there was suspicion that enalapril may have contributed to hemoptysis and so was subsequently discontinued.    Patient was admitted initially to the RCU last night and was kept NPO per MICU in the [facility] patient had continued hemoptysis and required bronchoscopy.  ASA81 was held.  As patient with continued hemoptysis, he was transferred to the MICU for bronchoscopy.    Outpatient providers:  Card: Dr. Mega Rojas  Pulm: Dr. 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  Recent Travel:  Country of Birth: [place]  Advance Directives:    Allergies  No Known Allergies    Intolerances    HOME MEDICATIONS:  ASA81 QD  digoxin 250mcg QD    REVIEW OF SYSTEMS:  CONSTITUTIONAL: No fever, no chills  EYES: No eye pain, no visual disturbance  NOSE: No epistaxis, runny/stuffy nose  MOUTH: No pain in mouth, no cuts  RESPIRATORY: +Hemoptysis, no sob, wheezing  CARDIOVASCULAR: CP only with cough, no palpitations  GASTROINTESTINAL: No abdominal pain, no n/v/d  GENITOURINARY: No dysuria, no hematuria  NEUROLOGICAL: No headaches, no weakness  SKIN: No itching, no rashes  MUSCULOSKELETAL: No joint pain, no joint swelling    OBJECTIVE:  ICU Vital Signs Last 24 Hrs  T(C): 35.8 (09 Mar 2018 10:30), Max: 37 (09 Mar 2018 01:56)  T(F): 96.5 (09 Mar 2018 10:30), Max: 98.6 (09 Mar 2018 01:56)  HR: 94 (09 Mar 2018 11:00) (85 - 99)  BP: 128/78 (09 Mar 2018 10:30) (108/70 - 129/82)  BP(mean): 88 (09 Mar 2018 10:30) (88 - 88)  ABP: --  ABP(mean): --  RR: 19 (09 Mar 2018 11:00) (16 - 24)  SpO2: 86% (09 Mar 2018 11:00) (86% - 100%)    CAPILLARY BLOOD GLUCOSE    PHYSICAL EXAM:  General: NAD, thin  HEENT: PERRL, EOMI, MMM  Lymph Nodes: no LAD  Neck: supple  Respiratory: CTA B  Cardiovascular: RRR, NL S1 and S2, no m/r/g  Abdomen: soft, NT/ND, +BS  Extremities: no c/c/e  Skin: no rashes/lesions  Neurological: AAOx3, grossly nonfocal  Psychiatry: affect WNL    LINES:   [facility] MEDICATIONS:  digoxin     Tablet 0.25 milliGRAM(s) Oral daily    LABS:                        15.1   8.63  )-----------( 187      ( 09 Mar 2018 05:56 )             47.5     Hgb Trend: 15.1<--, 16.2<--  03-09    144  |  107  |  18  ----------------------------<  95  5.0   |  24  |  1.03    Ca    9.4      09 Mar 2018 05:56  Phos  5.6     03-09  Mg     2.0     03-09    TPro  6.8  /  Alb  4.0  /  TBili  2.4<H>  /  DBili  x   /  AST  29  /  ALT  26  /  AlkPhos  93  03-09    Creatinine Trend: 1.03<--, 1.01<--  PT/INR - ( 09 Mar 2018 05:56 )   PT: 14.5 SEC;   INR: 1.26     PTT - ( 09 Mar 2018 05:56 )  PTT:36.5 SEC    MICROBIOLOGY:     RADIOLOGY:  [x] Reviewed and interpreted by me  < from: Xray Chest 2 Views PA/Lat (03.08.18 @ 17:52) >  	IMPRESSION:  	Clear lungs. No pleural effusions or pneumothorax.  	Stable fragmented sternal wire configuration, mediastinal surgical clips,   	and cardiac and mediastinal silhouettes.   	Trachea midline.  	Bell-shaped chest wall deformity and anterior chest wall pectus deformity   	again noted.  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, R AV atresia, B/L SVC (non-communicating), pulmonary valve atresia & TAPVR who is s/p classic BT shunt and repair of TAPVR at birth followed by bilateral bi-directional Narinder shunts, who underwent re-repair of the TAPVR secondary to stenosis of the anastomosis at the time of the BDG shunts, underwent takedown of the Narinder shunt and underwent a fenestrated Fontan procedure which was subsequently managed by attempted cath lab closure of the fenestration, hx of thrombosis of the right femoral vein and subsequent development of venous stasis in that leg presents to [facility] with 2d of hemoptysis. Patient has had intermittent hemoptysis lasting 2-3d in the past over 6mo ago but this episode with increased volume and 5 episodes in last 2d.  In outpatient EMR, there was suspicion that enalapril may have contributed to hemoptysis and so was subsequently discontinued. Endorses melena but no dizziness/lightheadedness, CP, SOB, orthostasis, N/V/D.    Patient was admitted initially to the RCU last night and was kept NPO per MICU in the [facility] patient had continued hemoptysis and required bronchoscopy.  ASA81 was held.  As patient with continued hemoptysis, he was transferred to the MICU for bronchoscopy.    Outpatient providers:  Card: Dr. Mega Rojas  Pulm: Dr. 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  Recent Travel:  Country of Birth: [place]  Advance Directives:    Allergies  No Known Allergies    Intolerances    HOME MEDICATIONS:  ASA81 QD  digoxin 250mcg QD    REVIEW OF SYSTEMS:  CONSTITUTIONAL: No fever, no chills  EYES: No eye pain, no visual disturbance  NOSE: No epistaxis, runny/stuffy nose  MOUTH: No pain in mouth, no cuts  RESPIRATORY: +Hemoptysis, no sob, wheezing  CARDIOVASCULAR: CP only with cough, no palpitations  GASTROINTESTINAL: +Melena; No abdominal pain, no n/v/d  GENITOURINARY: No dysuria, no hematuria  NEUROLOGICAL: No headaches, no weakness  SKIN: No itching, no rashes  MUSCULOSKELETAL: No joint pain, no joint swelling    OBJECTIVE:  ICU Vital Signs Last 24 Hrs  T(C): 35.8 (09 Mar 2018 10:30), Max: 37 (09 Mar 2018 01:56)  T(F): 96.5 (09 Mar 2018 10:30), Max: 98.6 (09 Mar 2018 01:56)  HR: 94 (09 Mar 2018 11:00) (85 - 99)  BP: 128/78 (09 Mar 2018 10:30) (108/70 - 129/82)  BP(mean): 88 (09 Mar 2018 10:30) (88 - 88)  ABP: --  ABP(mean): --  RR: 19 (09 Mar 2018 11:00) (16 - 24)  SpO2: 86% (09 Mar 2018 11:00) (86% - 100%)    CAPILLARY BLOOD GLUCOSE    PHYSICAL EXAM:  General: NAD, thin  HEENT: PERRL, EOMI, MMM  Lymph Nodes: no LAD  Neck: supple  Respiratory: CTA B  Cardiovascular: RRR, NL S1 and S2, no m/r/g  Abdomen: soft, NT/ND, +BS  Extremities: no c/c/e  Skin: no rashes/lesions  Neurological: AAOx3, grossly nonfocal  Psychiatry: affect WNL    LINES:   [facility]    HOSPITAL MEDICATIONS:  digoxin     Tablet 0.25 milliGRAM(s) Oral daily    LABS:                        15.1   8.63  )-----------( 187      ( 09 Mar 2018 05:56 )             47.5     Hgb Trend: 15.1<--, 16.2<--  03-09    144  |  107  |  18  ----------------------------<  95  5.0   |  24  |  1.03    Ca    9.4      09 Mar 2018 05:56  Phos  5.6     03-09  Mg     2.0     03-09    TPro  6.8  /  Alb  4.0  /  TBili  2.4<H>  /  DBili  x   /  AST  29  /  ALT  26  /  AlkPhos  93  03-09    Creatinine Trend: 1.03<--, 1.01<--  PT/INR - ( 09 Mar 2018 05:56 )   PT: 14.5 SEC;   INR: 1.26     PTT - ( 09 Mar 2018 05:56 )  PTT:36.5 SEC    MICROBIOLOGY:     RADIOLOGY:  [x] Reviewed and interpreted by me  < from: Xray Chest 2 Views PA/Lat (03.08.18 @ 17:52) >  	IMPRESSION:  	Clear lungs. No pleural effusions or pneumothorax.  	Stable fragmented sternal wire configuration, mediastinal surgical clips,   	and cardiac and mediastinal silhouettes.   	Trachea midline.  	Bell-shaped chest wall deformity and anterior chest wall pectus deformity   	again noted.  < end of copied text >    EKG:

## 2018-03-09 NOTE — CONSULT NOTE ADULT - SUBJECTIVE AND OBJECTIVE BOX
[facility] Consult Note    CHIEF COMPLAINT: hemoptysis    HISTORY OF PRESENT ILLNESS:   Kang is a 22 year old male with complex congenital heart disease well known to our group presenting with hemoptysis. His cardiac history is as follows:    Heterotaxy syndrome with asplenia, right AV valve atresia, common atrium, total anomalous pulmonary venous return, bilateral SVCs who underwent staged palliation ultimately culminating in a fenestrated Fontan procedure.      His ongoing residual cardiac disease consists of chronic desaturation, moderate left atrioventricular valve regurgitation, and systemic ventricle dysfunction. He has had no significant documented arrhythmia issues in the recent past.  he has significant venous insufficiency related to a right femoral venous occlusion related to a catheterization to close his Fontan fenestration in the past.     He has had intermittent episodes of hemoptysis for now over 2 years.  The episodes were typically self resolved and infrequent, with intervals of months between episodes.  He now has had 2 days of coughing up blood which has progressed to more frequently. Given the chronicity of his symptoms and now more blood than before, he was advised to present to the ER for evaluation.  Now s/p scope by ENT and bronchoscopy by pulmonary. no clear source was identified of bleeding. Still bringing up blood. Pulmonary/critical care team requested upper endoscopy by GI team.    He tolerated bronchoscopy with conscious sedation. Had some bronchospasm and hypopnea post procedure so was placed on NIPPV.       REVIEW OF SYSTEMS:  Constitutional - no fevers, no recent weight loss   Eyes - no vision changes  Ears / Nose / Mouth / Throat - sore s/p scope, no recent gum bleeding  Respiratory - no new cough, no shortness of breath as an outpatient  Cardiovascular - denies chest pain, denies palpitations; baseline sats upper 80s on room air  Gastrointestinal - + melena for last 2-3 days, no abdominal pain  Genitourinary - no hematuria  Integumentary - was due for RLE venous intervention but canceled secondary to bleeding  Musculoskeletal - no joint swelling  Endocrine - no heat or cold intolerance  Hematologic / Lymphatic - as above  Neurological - no seizures, change in activity level    PAST MEDICAL/SURGICAL HISTORY:  as above s/p classic BT shunt, s/p TAPVR repair and re-repair, s/p bilateral bidirectional sparkle, s/p fenestrated fontan, s/p Fontan fenestration closure  chronic venous insufficiency 2/2 right femoral venous occlusion    MEDICATIONS:  digoxin     Tablet 0.25 milliGRAM(s) Oral daily  lactated ringers. 1000 milliLiter(s) IV Continuous <Continuous>    FAMILY HISTORY:  There is *no history of congenital heart disease, arrhythmias, or sudden cardiac death in family members.    SOCIAL HISTORY:  The patient lives with *mother and father.    PHYSICAL EXAMINATION:  Vital signs - Weight (kg): 49 ( @ 10:30)    General - non-dysmorphic appearance, well-developed, in no distress.  Skin - no rash, no desquamation, no cyanosis.  Eyes / ENT - no conjunctival injection, sclerae anicteric, external ears & nares normal, mucous membranes moist.  Pulmonary - normal inspiratory effort, no retractions, lungs clear to auscultation bilaterally, no wheezes or rales.  Cardiovascular - normal rate, regular rhythm, normal S1 & S2, no murmurs, rubs, gallops, capillary refill < 2sec, normal pulses.  Gastrointestinal - soft, non-distended, non-tender, no hepatosplenomegaly (liver palpable *cm below right costal margin).  Musculoskeletal - no joint swelling, no clubbing, no edema.  Neurologic / Psychiatric - alert, oriented as age-appropriate, affect appropriate, moves all extremities, normal tone.    LABORATORY TESTS:                          15.1  CBC:   8.63 )-----------( 187   (18 @ 05:56)                          47.5               144   |  107   |  18                 Ca: 9.4    BMP:   ----------------------------< 95     M.0   (18 @ 05:56)             5.0    |  24    | 1.03               Ph: 5.6      LFT:     TPro: 6.8 / Alb: 4.0 / TBili: 2.4 / DBili: x / AST: 29 / ALT: 26 / AlkPhos: 93   (18 @ 05:56)    COAG: PT: 14.5 / PTT: 36.5 / INR: 1.26   (18 @ 05:56)             IMAGING STUDIES:  Electrocardiogram - (*date)     Telemetry - (*dates) normal sinus rhythm, no ectopy, no arrhythmias.    Chest x-ray - (*date)     Echocardiogram - (*date)     Other - (*date) [facility] Consult Note    CHIEF COMPLAINT: hemoptysis    HISTORY OF PRESENT ILLNESS:   Kang is a 22 year old male with complex congenital heart disease well known to our group presenting with hemoptysis. His cardiac history is as follows:    Heterotaxy syndrome with asplenia, right AV valve atresia, common atrium, total anomalous pulmonary venous return, bilateral SVCs who underwent staged palliation ultimately culminating in a fenestrated Fontan procedure.      His ongoing residual cardiac disease consists of chronic desaturation, moderate left atrioventricular valve regurgitation, and systemic ventricle dysfunction. He has had no significant documented arrhythmia issues in the recent past.  he has significant venous insufficiency related to a right femoral venous occlusion related to a catheterization to close his Fontan fenestration in the past.     He has had intermittent episodes of hemoptysis for now over 2 years.  The episodes were typically self resolved and infrequent, with intervals of months between episodes.  He now has had 2 days of coughing up blood which has progressed to more frequently. Given the chronicity of his symptoms and now more blood than before, he was advised to present to the ER for evaluation.  Now s/p scope by ENT and bronchoscopy by pulmonary. no clear source was identified of bleeding. Still bringing up blood. Pulmonary/critical care team requested upper endoscopy by GI team.    He tolerated bronchoscopy with conscious sedation. Had some bronchospasm and hypopnea post procedure so was placed on NIPPV.     REVIEW OF SYSTEMS:  Constitutional - no fevers, no recent weight loss   Eyes - no vision changes  Ears / Nose / Mouth / Throat - sore s/p scope, no recent gum bleeding  Respiratory - no new cough, no shortness of breath as an outpatient  Cardiovascular - denies chest pain, denies palpitations; baseline sats upper 80s on room air  Gastrointestinal - + melena for last 2-3 days, no abdominal pain  Genitourinary - no hematuria  Integumentary - was due for RLE venous intervention but canceled secondary to bleeding  Musculoskeletal - no joint swelling  Endocrine - no heat or cold intolerance  Hematologic / Lymphatic - as above  Neurological - no seizures, change in activity level    PAST MEDICAL/SURGICAL HISTORY:  as above s/p classic BT shunt, s/p TAPVR repair and re-repair, s/p bilateral bidirectional sparkle, s/p fenestrated fontan, s/p Fontan fenestration closure  chronic venous insufficiency 2/2 right femoral venous occlusion    MEDICATIONS:  digoxin     Tablet 0.25 milliGRAM(s) Oral daily  lactated ringers. 1000 milliLiter(s) IV Continuous <Continuous>    FAMILY HISTORY:  There is no history of congenital heart disease, arrhythmias, or sudden cardiac death in family members.    SOCIAL HISTORY:  The patient lives with mother and father.    PHYSICAL EXAMINATION:  Vital signs - Weight (kg): 49 ( @ 10:30)  Sp02 upper 80s on room air  RR 16  SBPs 110s  HR 90s    General - non-dysmorphic appearance, well-developed thin  Skin - b/l LE varicose veins  Eyes / ENT - dry blood at nares, mouth  Pulmonary - normal inspiratory effort, no retractions, lungs clear to auscultation bilaterally, no wheezes or rales.  Cardiovascular - s1 single s2, III/VI HSM at left lower border towards apex; pectus  Gastrointestinal - soft, non-distended, non-tender, liver down 2-3 cm below RCM  Musculoskeletal - no joint swelling, no clubbing, no edema, RLE elastic sock in place  Neurologic / Psychiatric - alert, oriented as age-appropriate, affect appropriate, moves all extremities, normal tone.    LABORATORY TESTS:                          15.1  CBC:   8.63 )-----------( 187   (18 @ 05:56)                          47.5               144   |  107   |  18                 Ca: 9.4    BMP:   ----------------------------< 95     M.0   (18 @ 05:56)             5.0    |  24    | 1.03               Ph: 5.6      LFT:     TPro: 6.8 / Alb: 4.0 / TBili: 2.4 / DBili: x / AST: 29 / ALT: 26 / AlkPhos: 93   (18 @ 05:56)    COAG: PT: 14.5 / PTT: 36.5 / INR: 1.26   (18 @ 05:56)     IMAGING STUDIES:  CT angio- reviewed- ground glass opacities in lungs c/w hemorrhage

## 2018-03-09 NOTE — PROCEDURE NOTE - SUPERVISORY STATEMENT
At bedside for the entire procedure. Conscious sedation, tolerated procedure well. No source of hemoptysis seen. Will see what ENT scope and GI scope show. Supportive care.

## 2018-03-09 NOTE — CONSULT NOTE ADULT - ASSESSMENT
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, R AV atresia, B/L SVC (non-communicating), pulmonary valve atresia & TAPVR who is s/p classic BT shunt and repair of TAPVR at birth followed by bilateral bi-directional Narinder shunts, who underwent re-repair of the TAPVR secondary to stenosis of the anastomosis at the time of the BDG shunts, underwent takedown of the Narinder shunt and underwent a fenestrated Fontan procedure which was subsequently managed by attempted cath lab closure of the fenestration, hx of thrombosis of the right femoral vein and subsequent development of venous stasis in that leg presents to [facility] with 2d of hemoptysis.  Transferred to MICU for persistent hemoptysis.    #. Neuro:  - AAOx3 without acute issues    #. CV:    #. Pulm:  - hemoptysis    #. GI:    #. Renal:    #. Heme:  - Hb decreased 1g since admission but WNL  - keep T&S active and transfuse with goal Hb>8    #. ID:    #. DVT ppx:  - hold pharmacologic ppx in setting of acute bleed    #. Ethics:  - FULL CODE 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, R AV atresia, B/L SVC (non-communicating), pulmonary valve atresia & TAPVR who is s/p classic BT shunt and repair of TAPVR at birth followed by bilateral bi-directional Narinder shunts, who underwent re-repair of the TAPVR secondary to stenosis of the anastomosis at the time of the BDG shunts, underwent takedown of the Narinder shunt and underwent a fenestrated Fontan procedure which was subsequently managed by attempted cath lab closure of the fenestration, hx of thrombosis of the right femoral vein and subsequent development of venous stasis in that leg presents to [facility] with 2d of hemoptysis.  Transferred to MICU for persistent hemoptysis.    #. Neuro:  - AAOx3 without acute issues    #. CV:  - complicated cardiovascular h/o in setting of congenital heart disease s/p multiple surgeries  - patient's Pediatric Cardiologist, Dr Rojas, on board and aware    #. Pulm:  - persistent hemoptysis s/p bronchoscopy under MAC with NRB (please see formal report) with ulceration just inferior to vocal cords and will consult GI and ENT for further evaluation to aid in achievement of hemostasis  - currently satting at baseline without supplemental O2    #. GI:  - NPO for now in anticipation of further endoscopic procedures    #. Renal:  - no acute issues    #. Heme:  - Hb decreased 1g since admission but WNL  - keep T&S active and transfuse with goal Hb>8    #. ID:  - no acute issues  - will give Ancef x1 for ppx in setting of recent procedure    #. DVT ppx:  - hold pharmacologic ppx in setting of acute bleed    #. Ethics:  - FULL CODE 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, R AV atresia, B/L SVC (non-communicating), pulmonary valve atresia & TAPVR who is s/p classic BT shunt and repair of TAPVR at birth followed by bilateral bi-directional Narinder shunts, who underwent re-repair of the TAPVR secondary to stenosis of the anastomosis at the time of the BDG shunts, underwent takedown of the Narinder shunt and underwent a fenestrated Fontan procedure which was subsequently managed by attempted cath lab closure of the fenestration, hx of thrombosis of the right femoral vein and subsequent development of venous stasis in that leg presents to [facility] with 2d of hemoptysis.  Transferred to MICU for persistent hemoptysis.    #. Neuro:  - AAOx3 without acute issues    #. CV:  - complicated cardiovascular h/o in setting of congenital heart disease s/p multiple surgeries  - patient's Pediatric Cardiologist, Dr Rojas, on board and aware    #. Pulm:  - persistent hemoptysis s/p bronchoscopy under MAC with NRB (please see formal report) with small area of mucosal denudation in inferior turbinate and will consult GI and ENT for further evaluation to aid identification of source of bleed  - currently satting at baseline without supplemental O2    #. GI:  - NPO for now in anticipation of further endoscopic procedures    #. Renal:  - no acute issues    #. Heme:  - Hb decreased 1g since admission but WNL  - keep T&S active and transfuse with goal Hb>8    #. ID:  - no acute issues  - will give Ancef x1 for ppx in setting of recent procedure    #. DVT ppx:  - hold pharmacologic ppx in setting of acute bleed    #. Ethics:  - FULL CODE

## 2018-03-09 NOTE — CONSULT NOTE ADULT - ATTENDING COMMENTS
Patient seen/examined. History as noted-episodic cough and hemoptysis. Experiences feeling of raspy sensation in his throat followed by cough with hemoptysis. Denies any complaints of dysphagia, heartburn, nausea, vomiting or hematemesis. Describes passage of dark stools but feels he swallows some blood. PE/labs as noted. Was requested by MICU and pediatric cardiology team to proceed with EGD for further evaluation. See full EGD report-noted for somewhat prominent veins in the cervical esophagus equivocal for small varices. However, no red color signs, large varices, bleeding or any indication of upper GI bleeding was noted. Gastric erythema  with prepyloric erosions as well as duodenal bulbar erosions were noted consistent with aspirin gastropathy. Again, no indication of upper GI bleeding as source of current clinical presentation. Further evaluation as per MICU and pediatric cardiology. Monitor serial hemoglobin/hematocrit. While in hospital advise pantoprazole 40 mg q.d. in view of erosive gastropathy and bulbar duodenitis that was detected.
Agree with above. Seen and examined with resident. Unclear etiology of hemoptysis. Bronchoscopy to rule out lung source, ENT to scope as well. Will speak with GI regarding endoscopy. Hemodynamically stable with small drop in Hgb and baseline hypoxemia.

## 2018-03-09 NOTE — CONSULT NOTE ADULT - SUBJECTIVE AND OBJECTIVE BOX
Chief Complaint:  Patient is a 22y old  Male who presents with a chief complaint of 22M w/ 2d of hemoptysis (08 Mar 2018 20:31)      HPI:  This is a 22 year old man with a history of a Fontan procedure who presented with 2 days of hemoptysis. The patient has been having these episodes every few mo for the past 2 years. The episodes begin with a rasping of his voice, followed by a coughing fit and then he spits up blood. The patient had 2 such episodes for 2 days prior to admission. He also has melena. He also has had a small amount of epistaxis. He denies emesis, abdominal pain nausea vomiting dysphagia. He does take a ASA 81mg but no other NSAIDs. He has never had an endoscopy before. He had 1 witnessed episode in the hospital and produced 3 small jars of bright red blood.    His blood count has been normal.  The patient underwent ENT endoscopy which showed only blood pooled in the pharynx.  Bronchoscopy showed a R nare erosion and some dried blood in the L lung but no active bleeding.  a CTA on admission showed evidence of blood in both the R and L lungs.      Allergies:  No Known Allergies      Home Medications:    Hospital Medications:  chlorhexidine 4% Liquid 1 Application(s) Topical daily  digoxin     Tablet 0.25 milliGRAM(s) Oral daily  lactated ringers. 1000 milliLiter(s) IV Continuous <Continuous>      PMHX/PSHX:  Spleen absent  TAPVR (total anomalous pulmonary venous return)  Heterotaxy syndrome with asplenia  Single ventricle  H/O heart surgery      Family history:  No fhx of GI isues      Social History:   nonsmoker nondrinker    ROS:     General:  No wt loss, fevers, chills, night sweats, fatigue,   Eyes:  Good vision, no reported pain  ENT:  No sore throat, pain, runny nose, dysphagia      GI:  See HPI  :  No pain, bleeding, incontinence, nocturia  Muscle:  No pain, weakness  Neuro:  No weakness, tingling, memory problems  Psych:  No fatigue, insomnia, mood problems, depression  Endocrine:  No polyuria, polydipsia, cold/heat intolerance  Heme:  No petechiae, ecchymosis, easy bruisability  Skin:  No rash, edema      PHYSICAL EXAM:     GENERAL:  Appears stated age, well-groomed, well-nourished, no distress  HEENT:  NC/AT,  conjunctivae clear and pink,  no JVD  CHEST:  Full & symmetric excursion, no increased effort, breath sounds clear, sternotomy scar  HEART:  Regular rhythm, S1, S2, no murmur/rub/S3/S4, no abdominal bruit, no edema  ABDOMEN:  Soft, non-tender, non-distended, normoactive bowel sounds,  no masses , no hepatosplenomegaly  EXTREMITIES: chronic vascular insufficiency    NEURO:  Alert, oriented    Vital Signs:  Vital Signs Last 24 Hrs  T(C): 36 (10 Mar 2018 08:00), Max: 36.4 (09 Mar 2018 16:00)  T(F): 96.8 (10 Mar 2018 08:00), Max: 97.5 (09 Mar 2018 16:00)  HR: 91 (10 Mar 2018 11:00) (77 - 107)  BP: 115/76 (10 Mar 2018 10:00) (102/59 - 134/120)  BP(mean): 82 (10 Mar 2018 10:00) (53 - 124)  RR: 23 (10 Mar 2018 11:00) (12 - 27)  SpO2: 86% (10 Mar 2018 11:00) (79% - 97%)  Daily     Daily Weight in k.3 (10 Mar 2018 05:02)    LABS:                        14.5   11.67 )-----------( 196      ( 10 Mar 2018 03:30 )             44.5     03-10    140  |  100  |  15  ----------------------------<  91  4.4   |  24  |  0.96    Ca    10.1      10 Mar 2018 03:26  Phos  5.4     03-10  Mg     1.7     03-10    TPro  6.8  /  Alb  4.0  /  TBili  3.1<H>  /  DBili  x   /  AST  25  /  ALT  22  /  AlkPhos  93  03-10    LIVER FUNCTIONS - ( 10 Mar 2018 03:26 )  Alb: 4.0 g/dL / Pro: 6.8 g/dL / ALK PHOS: 93 u/L / ALT: 22 u/L / AST: 25 u/L / GGT: x           PT/INR - ( 10 Mar 2018 03:30 )   PT: 15.1 SEC;   INR: 1.35          PTT - ( 10 Mar 2018 03:30 )  PTT:37.1 SEC        Imaging:

## 2018-03-09 NOTE — CONSULT NOTE ADULT - ASSESSMENT
Impression:    1. Hemoptysis    2. Hyperbilirubinemia Impression:    1. Hemoptysis: low suspicion for a GI etiology given lack of vomiting, lack of hemoglobin drop. However the pulmonary and cardiology team wish to definitively exclude a GI etiology prior to proceeding to ablation of collateral pulmonary  vessels.    2. Hyperbilirubinemia    Recommendation:  -check a direct bilirubin  -will perform EGD to definitively rule out a GI etiology.

## 2018-03-09 NOTE — PROGRESS NOTE ADULT - SUBJECTIVE AND OBJECTIVE BOX
Please see PA consult note for details.  In brief patient with hx of single ventricle admitted for workup of hemoptysis.  Dad reports that he has had this multiple times in the past and that it stops after a few days.  He reports it usually lasts several months between episodes.   Father also reports melena.  No active hemoptysis since last night.    Vital Signs Last 24 Hrs  T(C): 35.8 (09 Mar 2018 10:30), Max: 37 (09 Mar 2018 01:56)  T(F): 96.5 (09 Mar 2018 10:30), Max: 98.6 (09 Mar 2018 01:56)  HR: 94 (09 Mar 2018 16:00) (85 - 107)  BP: 117/75 (09 Mar 2018 16:00) (108/70 - 136/81)  BP(mean): 85 (09 Mar 2018 16:00) (76 - 97)  RR: 18 (09 Mar 2018 16:00) (11 - 27)  SpO2: 86% (09 Mar 2018 16:00) (85% - 100%)    awake, alert  breathing comfortably in CPAP  face symmetric  NC some blood crusting at anterior nares			  Oral Cavity:  pink moist mucosa, tongue midline, no lesions   Neck: Soft, flat, No palpable masses      Fiberoptic Laryngoscopy  NC and NP normal  OP normal  BOT and vallecula normal  epiglottis and AE folds sharp  arytenoids normal  bilateral vocal cord clearly visible and fully mobile bilaterally  minimal blood staining in hypopharynx  Summary: normal larynx, some blood staining, no source of bleeding                            15.1   8.63  )-----------( 187      ( 09 Mar 2018 05:56 )             47.5     03-09    144  |  107  |  18  ----------------------------<  95  5.0   |  24  |  1.03    Ca    9.4      09 Mar 2018 05:56  Phos  5.6     03-09  Mg     2.0     03-09    TPro  6.8  /  Alb  4.0  /  TBili  2.4<H>  /  DBili  x   /  AST  29  /  ALT  26  /  AlkPhos  93  03-09

## 2018-03-09 NOTE — CONSULT NOTE ADULT - SUBJECTIVE AND OBJECTIVE BOX
22 year old male with congenital heart disease that has been having hemoptysis.  S/P Bronchoscopy that showed possible vocal cord bleeding. Patient slightly sedated   but Mother and Father state he has been coughing up bright red blood intermittently.    AVSS. P02 100% on CPAP  HEENT:  Mouth: + dried blood throughout oral cavity with no obvious signs of bleeding.  tongue midline. No Floor of mouth masses.     Scope:  + blood posterior pharynx with no obvious signs of bleeding.   Epiglottis sharp  Vocal cords mobile and patent, no edema. no masses  with blood pooling around cords and into trachea.   Unable to identify bleeding source.

## 2018-03-09 NOTE — CONSULT NOTE ADULT - PROBLEM SELECTOR RECOMMENDATION 9
1. No bleeding source was identified form mouth or on scope exam. Will Discuss with ENT resident and case will then  be presented to  attending.

## 2018-03-10 LAB
ALBUMIN SERPL ELPH-MCNC: 4 G/DL — SIGNIFICANT CHANGE UP (ref 3.3–5)
ALP SERPL-CCNC: 93 U/L — SIGNIFICANT CHANGE UP (ref 40–120)
ALT FLD-CCNC: 22 U/L — SIGNIFICANT CHANGE UP (ref 4–41)
APTT BLD: 37.1 SEC — SIGNIFICANT CHANGE UP (ref 27.5–37.4)
AST SERPL-CCNC: 25 U/L — SIGNIFICANT CHANGE UP (ref 4–40)
BASOPHILS # BLD AUTO: 0.2 K/UL — SIGNIFICANT CHANGE UP (ref 0–0.2)
BASOPHILS NFR BLD AUTO: 1.7 % — SIGNIFICANT CHANGE UP (ref 0–2)
BILIRUB SERPL-MCNC: 3.1 MG/DL — HIGH (ref 0.2–1.2)
BUN SERPL-MCNC: 15 MG/DL — SIGNIFICANT CHANGE UP (ref 7–23)
CALCIUM SERPL-MCNC: 10.1 MG/DL — SIGNIFICANT CHANGE UP (ref 8.4–10.5)
CHLORIDE SERPL-SCNC: 100 MMOL/L — SIGNIFICANT CHANGE UP (ref 98–107)
CO2 SERPL-SCNC: 24 MMOL/L — SIGNIFICANT CHANGE UP (ref 22–31)
CREAT SERPL-MCNC: 0.96 MG/DL — SIGNIFICANT CHANGE UP (ref 0.5–1.3)
EOSINOPHIL # BLD AUTO: 1.43 K/UL — HIGH (ref 0–0.5)
EOSINOPHIL NFR BLD AUTO: 12.3 % — HIGH (ref 0–6)
GLUCOSE SERPL-MCNC: 91 MG/DL — SIGNIFICANT CHANGE UP (ref 70–99)
HCT VFR BLD CALC: 44.5 % — SIGNIFICANT CHANGE UP (ref 39–50)
HGB BLD-MCNC: 14.5 G/DL — SIGNIFICANT CHANGE UP (ref 13–17)
IMM GRANULOCYTES # BLD AUTO: 0.03 # — SIGNIFICANT CHANGE UP
IMM GRANULOCYTES NFR BLD AUTO: 0.3 % — SIGNIFICANT CHANGE UP (ref 0–1.5)
INR BLD: 1.35 — HIGH (ref 0.88–1.17)
LYMPHOCYTES # BLD AUTO: 2.75 K/UL — SIGNIFICANT CHANGE UP (ref 1–3.3)
LYMPHOCYTES # BLD AUTO: 23.6 % — SIGNIFICANT CHANGE UP (ref 13–44)
MAGNESIUM SERPL-MCNC: 1.7 MG/DL — SIGNIFICANT CHANGE UP (ref 1.6–2.6)
MCHC RBC-ENTMCNC: 26.3 PG — LOW (ref 27–34)
MCHC RBC-ENTMCNC: 32.6 % — SIGNIFICANT CHANGE UP (ref 32–36)
MCV RBC AUTO: 80.6 FL — SIGNIFICANT CHANGE UP (ref 80–100)
MONOCYTES # BLD AUTO: 1.22 K/UL — HIGH (ref 0–0.9)
MONOCYTES NFR BLD AUTO: 10.5 % — SIGNIFICANT CHANGE UP (ref 2–14)
NEUTROPHILS # BLD AUTO: 6.04 K/UL — SIGNIFICANT CHANGE UP (ref 1.8–7.4)
NEUTROPHILS NFR BLD AUTO: 51.6 % — SIGNIFICANT CHANGE UP (ref 43–77)
NRBC # FLD: 0 — SIGNIFICANT CHANGE UP
PHOSPHATE SERPL-MCNC: 5.4 MG/DL — HIGH (ref 2.5–4.5)
PLATELET # BLD AUTO: 196 K/UL — SIGNIFICANT CHANGE UP (ref 150–400)
PMV BLD: SIGNIFICANT CHANGE UP FL (ref 7–13)
POTASSIUM SERPL-MCNC: 4.4 MMOL/L — SIGNIFICANT CHANGE UP (ref 3.5–5.3)
POTASSIUM SERPL-SCNC: 4.4 MMOL/L — SIGNIFICANT CHANGE UP (ref 3.5–5.3)
PROT SERPL-MCNC: 6.8 G/DL — SIGNIFICANT CHANGE UP (ref 6–8.3)
PROTHROM AB SERPL-ACNC: 15.1 SEC — HIGH (ref 9.8–13.1)
RBC # BLD: 5.52 M/UL — SIGNIFICANT CHANGE UP (ref 4.2–5.8)
RBC # FLD: 21.4 % — HIGH (ref 10.3–14.5)
SODIUM SERPL-SCNC: 140 MMOL/L — SIGNIFICANT CHANGE UP (ref 135–145)
WBC # BLD: 11.67 K/UL — HIGH (ref 3.8–10.5)
WBC # FLD AUTO: 11.67 K/UL — HIGH (ref 3.8–10.5)

## 2018-03-10 PROCEDURE — 99222 1ST HOSP IP/OBS MODERATE 55: CPT | Mod: 25,GC

## 2018-03-10 PROCEDURE — 99223 1ST HOSP IP/OBS HIGH 75: CPT | Mod: 25,GC

## 2018-03-10 PROCEDURE — 31575 DIAGNOSTIC LARYNGOSCOPY: CPT | Mod: GC

## 2018-03-10 PROCEDURE — 43235 EGD DIAGNOSTIC BRUSH WASH: CPT | Mod: GC

## 2018-03-10 PROCEDURE — 99291 CRITICAL CARE FIRST HOUR: CPT

## 2018-03-10 RX ORDER — MAGNESIUM SULFATE 500 MG/ML
2 VIAL (ML) INJECTION ONCE
Qty: 0 | Refills: 0 | Status: COMPLETED | OUTPATIENT
Start: 2018-03-10 | End: 2018-03-10

## 2018-03-10 RX ORDER — FENTANYL CITRATE 50 UG/ML
200 INJECTION INTRAVENOUS ONCE
Qty: 0 | Refills: 0 | Status: DISCONTINUED | OUTPATIENT
Start: 2018-03-10 | End: 2018-03-10

## 2018-03-10 RX ORDER — FENTANYL CITRATE 50 UG/ML
150 INJECTION INTRAVENOUS ONCE
Qty: 0 | Refills: 0 | Status: DISCONTINUED | OUTPATIENT
Start: 2018-03-10 | End: 2018-03-10

## 2018-03-10 RX ORDER — MIDAZOLAM HYDROCHLORIDE 1 MG/ML
5 INJECTION, SOLUTION INTRAMUSCULAR; INTRAVENOUS ONCE
Qty: 0 | Refills: 0 | Status: DISCONTINUED | OUTPATIENT
Start: 2018-03-10 | End: 2018-03-10

## 2018-03-10 RX ORDER — MIDAZOLAM HYDROCHLORIDE 1 MG/ML
3 INJECTION, SOLUTION INTRAMUSCULAR; INTRAVENOUS ONCE
Qty: 0 | Refills: 0 | Status: DISCONTINUED | OUTPATIENT
Start: 2018-03-10 | End: 2018-03-10

## 2018-03-10 RX ORDER — CEFAZOLIN SODIUM 1 G
1000 VIAL (EA) INJECTION ONCE
Qty: 0 | Refills: 0 | Status: COMPLETED | OUTPATIENT
Start: 2018-03-10 | End: 2018-03-10

## 2018-03-10 RX ADMIN — MIDAZOLAM HYDROCHLORIDE 3 MILLIGRAM(S): 1 INJECTION, SOLUTION INTRAMUSCULAR; INTRAVENOUS at 15:00

## 2018-03-10 RX ADMIN — SODIUM CHLORIDE 75 MILLILITER(S): 9 INJECTION, SOLUTION INTRAVENOUS at 06:22

## 2018-03-10 RX ADMIN — FENTANYL CITRATE 150 MICROGRAM(S): 50 INJECTION INTRAVENOUS at 15:00

## 2018-03-10 RX ADMIN — Medication 50 GRAM(S): at 08:28

## 2018-03-10 RX ADMIN — FENTANYL CITRATE 150 MICROGRAM(S): 50 INJECTION INTRAVENOUS at 15:26

## 2018-03-10 RX ADMIN — Medication 100 MILLIGRAM(S): at 15:03

## 2018-03-10 RX ADMIN — SODIUM CHLORIDE 75 MILLILITER(S): 9 INJECTION, SOLUTION INTRAVENOUS at 08:26

## 2018-03-10 RX ADMIN — Medication 0.25 MILLIGRAM(S): at 06:21

## 2018-03-10 RX ADMIN — CHLORHEXIDINE GLUCONATE 1 APPLICATION(S): 213 SOLUTION TOPICAL at 13:13

## 2018-03-10 RX ADMIN — SODIUM CHLORIDE 75 MILLILITER(S): 9 INJECTION, SOLUTION INTRAVENOUS at 20:08

## 2018-03-10 NOTE — PROGRESS NOTE ADULT - SUBJECTIVE AND OBJECTIVE BOX
CHIEF COMPLAINT: Patient is a 22y old  Male who presents with a chief complaint of 22M w/ 2d of hemoptysis (08 Mar 2018 20:31)    Interval Events: No acute events overnight. Pt continuing to have hemoptysis, unchanged from past 2 days.    REVIEW OF SYSTEMS:  Constitutional: [X] negative [ ] fevers [ ] chills [ ] weight loss [ ] weight gain  HEENT: [X] negative [ ] dry eyes [ ] eye irritation [ ] postnasal drip [ ] nasal congestion  CV: [X] negative  [ ] chest pain [ ] orthopnea [ ] palpitations [ ] murmur  Resp: [ ] negative [ ] cough [ ] shortness of breath [ ] dyspnea [ ] wheezing [ ] sputum [X] hemoptysis  GI: [X] negative [ ] nausea [ ] vomiting [ ] diarrhea [ ] constipation [ ] abd pain [ ] dysphagia   : [ ] negative [ ] dysuria [ ] nocturia [ ] hematuria [ ] increased urinary frequency  Musculoskeletal: [X] negative [ ] back pain [ ] myalgias [ ] arthralgias [ ] fracture  Skin: [X] negative [ ] rash [ ] itch  Neurological: [ ] negative [ ] headache [ ] dizziness [ ] syncope [ ] weakness [ ] numbness  Psychiatric: [X] negative [ ] anxiety [ ] depression  Endocrine: [ ] negative [ ] diabetes [ ] thyroid problem  Hematologic/Lymphatic: [ ] negative [ ] anemia [ ] bleeding problem  Allergic/Immunologic: [ ] negative [ ] itchy eyes [ ] nasal discharge [ ] hives [ ] angioedema  [ ] All other systems negative  [ ] Unable to assess ROS because ________    OBJECTIVE:  ICU Vital Signs Last 24 Hrs  T(C): 35.4 (10 Mar 2018 04:00), Max: 36.4 (09 Mar 2018 16:00)  T(F): 95.7 (10 Mar 2018 04:00), Max: 97.5 (09 Mar 2018 16:00)  HR: 83 (10 Mar 2018 07:00) (77 - 107)  BP: 102/59 (10 Mar 2018 07:00) (102/59 - 136/81)  BP(mean): 69 (10 Mar 2018 07:00) (53 - 124)  ABP: --  ABP(mean): --  RR: 13 (10 Mar 2018 07:00) (11 - 27)  SpO2: 82% (10 Mar 2018 07:00) (79% - 98%)    03-09 @ 07:01  -  03-10 @ 07:00  --------------------------------------------------------  IN: 1465 mL / OUT: 830 mL / NET: 635 mL      PHYSICAL EXAM:  General: Young pale man seen lying in bed in NAD  Neck: Supple  Respiratory: CTAB, no crackles or wheezes  Cardiovascular: Normal S1, III/VI holosystolic murmur  Abdomen: Soft, NT/ND  Extremities: No LE edema  Skin: Warm, dry  Neurological: AAOx3  Psychiatry: Appropriate affect and mood    LINES:    HOSPITAL MEDICATIONS:  Standing Meds:  chlorhexidine 4% Liquid 1 Application(s) Topical daily  digoxin     Tablet 0.25 milliGRAM(s) Oral daily  lactated ringers. 1000 milliLiter(s) IV Continuous <Continuous>  magnesium sulfate  IVPB 2 Gram(s) IV Intermittent once    LABS:                        14.5   11.67 )-----------( 196      ( 10 Mar 2018 03:30 )             44.5     Hgb Trend: 14.5<--, 15.1<--, 16.2<--  03-10    140  |  100  |  15  ----------------------------<  91  4.4   |  24  |  0.96    Ca    10.1      10 Mar 2018 03:26  Phos  5.4     03-10  Mg     1.7     03-10    TPro  6.8  /  Alb  4.0  /  TBili  3.1<H>  /  DBili  x   /  AST  25  /  ALT  22  /  AlkPhos  93  03-10    Creatinine Trend: 0.96<--, 1.03<--, 1.01<--    PT/INR - ( 10 Mar 2018 03:30 )   PT: 15.1 SEC;   INR: 1.35     PTT - ( 10 Mar 2018 03:30 )  PTT:37.1 SEC

## 2018-03-10 NOTE — PROGRESS NOTE ADULT - SUBJECTIVE AND OBJECTIVE BOX
Pt seen and examined with attending this morning.   No acute events. Had additional episode of hemoptysis at 3AM    NAD, awake and alert  No difficulty breathing on RA, no active bleeding  OC/OP: clear, no blood  Neck soft and flat    Flexible fiberoptic laryngoscopy -   NC and NP normal  OP normal  BOT and vallecula normal  epiglottis and AE folds sharp  arytenoids normal  bilateral vocal cord clearly visible and fully mobile bilaterally  No active source of bleeding identified in hypopharynx     A/P:   23M with hx of heart disease and hemoptysis.  - no source of bleeding on upper aerodigestive exam  - normal larynx   - no further ENT needs  - seen and scoped by attending Dr. Stevens this AM  - ENT will sign off, reconsult as needed

## 2018-03-10 NOTE — PROGRESS NOTE ADULT - ASSESSMENT
21 yo M w/ PMH congenital heart disease w/ heterotaxy, R AV atresia, B/L SVC (non-communicating), pulmonary valve atresia & TAPVR who is s/p classic BT shunt and repair of TAPVR at birth followed by bilateral bi-directional Narinder shunts, who underwent re-repair of the TAPVR secondary to stenosis of the anastomosis at the time of the BDG shunts, underwent takedown of the Narinder shunt and underwent a fenestrated Fontan procedure which was subsequently managed by attempted cath lab closure of the fenestration, hx of thrombosis of the right femoral vein and subsequent development of venous stasis in that leg presents to [facility] with 2d of hemoptysis.  Transferred to MICU for persistent hemoptysis.    #. Neuro:  - AAOx3 without acute issues    #. CV:  - complicated cardiovascular h/o in setting of congenital heart disease s/p multiple surgeries  - patient's Pediatric Cardiologist, Dr Rojas, on board and aware    #. Pulm:  - persistent hemoptysis s/p bronchoscopy under MAC with NRB (please see formal report) with small area of mucosal denudation in inferior turbinate and will consult GI and ENT for further evaluation to aid identification of source of bleed  - currently satting at baseline without supplemental O2    #. GI:  - NPO for now in anticipation of further endoscopic procedures  - GI c/s for scope    #. Renal:  - no acute issues    #. Heme:  - Hb decreased 1g since admission but WNL  - keep T&S active and transfuse with goal Hb>8    #. ID:  - no acute issues  - will give Ancef x1 for ppx in setting of recent procedure    #. DVT ppx:  - hold pharmacologic ppx in setting of acute bleed    #. Ethics:  - FULL CODE 21 yo M w/ PMH congenital heart disease w/ heterotaxy, R AV atresia, B/L SVC (non-communicating), pulmonary valve atresia & TAPVR who is s/p classic BT shunt and repair of TAPVR at birth followed by bilateral bi-directional Narinder shunts, who underwent re-repair of the TAPVR secondary to stenosis of the anastomosis at the time of the BDG shunts, underwent takedown of the Narinder shunt and underwent a fenestrated Fontan procedure which was subsequently managed by attempted cath lab closure of the fenestration, hx of thrombosis of the right femoral vein and subsequent development of venous stasis in that leg presents to [facility] with 2d of hemoptysis.  Transferred to MICU for persistent hemoptysis.    # Neuro:  - AAOx3 without acute issues    # CV:  - Complicated cardiovascular h/o in setting of congenital heart disease s/p multiple surgeries including Fontan procedure.  - Patient's Pediatric Cardiologist, Dr Rojas, on board and aware    # Pulm:  - Persistent hemoptysis similar to previous episodes. S/p bronchoscopy under MAC with NRB (please see formal report) with small area of mucosal denudation in inferior turbinate and will consult GI and ENT for further evaluation to aid identification of source of bleed  - currently satting at baseline without supplemental O2    #. GI:  - NPO for now in anticipation of further endoscopic procedures  - GI c/s for scope    #. Renal:  - no acute issues    #. Heme:  - Hb decreased 1g since admission but WNL  - keep T&S active and transfuse with goal Hb>8    #. ID:  - no acute issues  - will give Ancef x1 for ppx in setting of recent procedure    #. DVT ppx:  - hold pharmacologic ppx in setting of acute bleed    #. Ethics:  - FULL CODE 21 yo M w/ PMH congenital heart disease w/ heterotaxy, R AV atresia, B/L SVC (non-communicating), pulmonary valve atresia & TAPVR who is s/p classic BT shunt and repair of TAPVR at birth followed by bilateral bi-directional Narinder shunts, who underwent re-repair of the TAPVR secondary to stenosis of the anastomosis at the time of the BDG shunts, underwent takedown of the Narinder shunt and underwent a fenestrated Fontan procedure which was subsequently managed by attempted cath lab closure of the fenestration, hx of thrombosis of the right femoral vein and subsequent development of venous stasis in that leg presents to [facility] with 2d of hemoptysis.  Transferred to MICU for persistent hemoptysis.    # Neuro:  - AAOx3 without acute issues    # CV:  - Complicated cardiovascular h/o in setting of congenital heart disease s/p multiple surgeries including Fontan procedure.  - Patient's Pediatric Cardiologist, Dr Rojas, on board and aware    # Pulm:  - Persistent hemoptysis similar to previous episodes. S/p bronchoscopy under MAC with NRB with small area of mucosal denudation in inferior turbinate.  - ENT consulted, s/p scope without finding any source of bleeding.  - GI consulted. They will assess this AM whether wyman scope or not.  - Currently satting at baseline without supplemental O2    # GI:  - NPO for now in anticipation of further endoscopic procedures  - F/u GI c/s for scope    # Renal:  - o acute issues    # Heme:  - Hb decreased 1.7g since admission but still WNL  - Keep T&S active and transfuse with goal Hb>8    # ID:  - No acute issues  - Will give Ancef x1 for ppx for procedure    # DVT ppx:  - Hold pharmacologic ppx in setting of acute bleed    # Ethics:  - FULL CODE

## 2018-03-11 ENCOUNTER — TRANSCRIPTION ENCOUNTER (OUTPATIENT)
Age: 23
End: 2018-03-11

## 2018-03-11 VITALS — HEART RATE: 93 BPM | RESPIRATION RATE: 22 BRPM | OXYGEN SATURATION: 89 %

## 2018-03-11 LAB
ALBUMIN SERPL ELPH-MCNC: 3.6 G/DL — SIGNIFICANT CHANGE UP (ref 3.3–5)
ALP SERPL-CCNC: 84 U/L — SIGNIFICANT CHANGE UP (ref 40–120)
ALT FLD-CCNC: 16 U/L — SIGNIFICANT CHANGE UP (ref 4–41)
AST SERPL-CCNC: 27 U/L — SIGNIFICANT CHANGE UP (ref 4–40)
BASOPHILS # BLD AUTO: 0.16 K/UL — SIGNIFICANT CHANGE UP (ref 0–0.2)
BASOPHILS NFR BLD AUTO: 1.8 % — SIGNIFICANT CHANGE UP (ref 0–2)
BILIRUB SERPL-MCNC: 2.6 MG/DL — HIGH (ref 0.2–1.2)
BLD GP AB SCN SERPL QL: NEGATIVE — SIGNIFICANT CHANGE UP
BUN SERPL-MCNC: 18 MG/DL — SIGNIFICANT CHANGE UP (ref 7–23)
CALCIUM SERPL-MCNC: 8.8 MG/DL — SIGNIFICANT CHANGE UP (ref 8.4–10.5)
CHLORIDE SERPL-SCNC: 102 MMOL/L — SIGNIFICANT CHANGE UP (ref 98–107)
CO2 SERPL-SCNC: 23 MMOL/L — SIGNIFICANT CHANGE UP (ref 22–31)
CREAT SERPL-MCNC: 0.88 MG/DL — SIGNIFICANT CHANGE UP (ref 0.5–1.3)
EOSINOPHIL # BLD AUTO: 1.39 K/UL — HIGH (ref 0–0.5)
EOSINOPHIL NFR BLD AUTO: 15.8 % — HIGH (ref 0–6)
GLUCOSE SERPL-MCNC: 97 MG/DL — SIGNIFICANT CHANGE UP (ref 70–99)
HCT VFR BLD CALC: 42.8 % — SIGNIFICANT CHANGE UP (ref 39–50)
HGB BLD-MCNC: 13.9 G/DL — SIGNIFICANT CHANGE UP (ref 13–17)
IMM GRANULOCYTES # BLD AUTO: 0.01 # — SIGNIFICANT CHANGE UP
IMM GRANULOCYTES NFR BLD AUTO: 0.1 % — SIGNIFICANT CHANGE UP (ref 0–1.5)
LYMPHOCYTES # BLD AUTO: 2.21 K/UL — SIGNIFICANT CHANGE UP (ref 1–3.3)
LYMPHOCYTES # BLD AUTO: 25.2 % — SIGNIFICANT CHANGE UP (ref 13–44)
MAGNESIUM SERPL-MCNC: 1.9 MG/DL — SIGNIFICANT CHANGE UP (ref 1.6–2.6)
MCHC RBC-ENTMCNC: 26.4 PG — LOW (ref 27–34)
MCHC RBC-ENTMCNC: 32.5 % — SIGNIFICANT CHANGE UP (ref 32–36)
MCV RBC AUTO: 81.2 FL — SIGNIFICANT CHANGE UP (ref 80–100)
MONOCYTES # BLD AUTO: 1.18 K/UL — HIGH (ref 0–0.9)
MONOCYTES NFR BLD AUTO: 13.4 % — SIGNIFICANT CHANGE UP (ref 2–14)
NEUTROPHILS # BLD AUTO: 3.83 K/UL — SIGNIFICANT CHANGE UP (ref 1.8–7.4)
NEUTROPHILS NFR BLD AUTO: 43.7 % — SIGNIFICANT CHANGE UP (ref 43–77)
NRBC # FLD: 0 — SIGNIFICANT CHANGE UP
PHOSPHATE SERPL-MCNC: 3.8 MG/DL — SIGNIFICANT CHANGE UP (ref 2.5–4.5)
PLATELET # BLD AUTO: 196 K/UL — SIGNIFICANT CHANGE UP (ref 150–400)
PMV BLD: SIGNIFICANT CHANGE UP FL (ref 7–13)
POTASSIUM SERPL-MCNC: 4.3 MMOL/L — SIGNIFICANT CHANGE UP (ref 3.5–5.3)
POTASSIUM SERPL-SCNC: 4.3 MMOL/L — SIGNIFICANT CHANGE UP (ref 3.5–5.3)
PROT SERPL-MCNC: 6.1 G/DL — SIGNIFICANT CHANGE UP (ref 6–8.3)
RBC # BLD: 5.27 M/UL — SIGNIFICANT CHANGE UP (ref 4.2–5.8)
RBC # FLD: 21.4 % — HIGH (ref 10.3–14.5)
RH IG SCN BLD-IMP: NEGATIVE — SIGNIFICANT CHANGE UP
SODIUM SERPL-SCNC: 139 MMOL/L — SIGNIFICANT CHANGE UP (ref 135–145)
WBC # BLD: 8.78 K/UL — SIGNIFICANT CHANGE UP (ref 3.8–10.5)
WBC # FLD AUTO: 8.78 K/UL — SIGNIFICANT CHANGE UP (ref 3.8–10.5)

## 2018-03-11 PROCEDURE — 99291 CRITICAL CARE FIRST HOUR: CPT

## 2018-03-11 PROCEDURE — 99356: CPT

## 2018-03-11 PROCEDURE — 99233 SBSQ HOSP IP/OBS HIGH 50: CPT

## 2018-03-11 RX ADMIN — Medication 0.25 MILLIGRAM(S): at 06:03

## 2018-03-11 NOTE — PROGRESS NOTE ADULT - ASSESSMENT
23 yo M w/ PMH congenital heart disease w/ heterotaxy, R AV atresia, B/L SVC (non-communicating), pulmonary valve atresia & TAPVR who is s/p classic BT shunt and repair of TAPVR at birth followed by bilateral bi-directional Narinder shunts, who underwent re-repair of the TAPVR secondary to stenosis of the anastomosis at the time of the BDG shunts, underwent takedown of the Narinder shunt and underwent a fenestrated Fontan procedure which was subsequently managed by attempted cath lab closure of the fenestration, hx of thrombosis of the right femoral vein and subsequent development of venous stasis in that leg presents to [facility] with 2d of hemoptysis.  Transferred to MICU for persistent hemoptysis.    # Neuro:  - AAOx3 without acute issues    # CV:  - Complicated cardiovascular h/o in setting of congenital heart disease s/p multiple surgeries including Fontan procedure.  - Patient's Pediatric Cardiologist, Dr Rojas, on board and aware    # Pulm:  - Persistent hemoptysis similar to previous episodes. S/p bronchoscopy under MAC with NRB with small area of mucosal denudation in inferior turbinate.  - ENT consulted, s/p scope without finding any source of bleeding.  - GI consulted. S/p EGD yesterday without any evidence of active bleeding.  - Currently satting at baseline without supplemental O2  - F/u with peds cards re: further workup    # GI:  - NPO for now in anticipation of further endoscopic procedures  - S/p EGD with GI as above    # Renal:  - No acute issues    # Heme:  - Hb decreased 1.7g since admission but still WNL  - Keep T&S active and transfuse with goal Hb>8    # ID:  - No acute issues  - S/p Ancef for ppx for procedure    # DVT ppx:  - Hold pharmacologic ppx in setting of acute bleed    # Ethics:  - FULL CODE

## 2018-03-11 NOTE — DISCHARGE NOTE ADULT - CARE PROVIDERS DIRECT ADDRESSES
[email or web address]

## 2018-03-11 NOTE — PROGRESS NOTE ADULT - SUBJECTIVE AND OBJECTIVE BOX
CHIEF COMPLAINT: Patient is a 22y old  Male who presents with a chief complaint of 22M w/ 2d of hemoptysis (08 Mar 2018 20:31)    Interval Events: Pt with about 30cc hemoptysis overnight.    REVIEW OF SYSTEMS:  Constitutional: [X] negative [ ] fevers [ ] chills [ ] weight loss [ ] weight gain  HEENT: [X] negative [ ] dry eyes [ ] eye irritation [ ] postnasal drip [ ] nasal congestion  CV: [X] negative  [ ] chest pain [ ] orthopnea [ ] palpitations [ ] murmur  Resp: [ ] negative [ ] cough [ ] shortness of breath [ ] dyspnea [ ] wheezing [ ] sputum [X] hemoptysis  GI: [X] negative [ ] nausea [ ] vomiting [ ] diarrhea [ ] constipation [ ] abd pain [ ] dysphagia   : [X] negative [ ] dysuria [ ] nocturia [ ] hematuria [ ] increased urinary frequency  Musculoskeletal: [ ] negative [ ] back pain [ ] myalgias [ ] arthralgias [ ] fracture  Skin: [ ] negative [ ] rash [ ] itch  Neurological: [X] negative [ ] headache [ ] dizziness [ ] syncope [ ] weakness [ ] numbness  Psychiatric: [ ] negative [ ] anxiety [ ] depression  Endocrine: [ ] negative [ ] diabetes [ ] thyroid problem  Hematologic/Lymphatic: [ ] negative [ ] anemia [ ] bleeding problem  Allergic/Immunologic: [X] negative [ ] itchy eyes [ ] nasal discharge [ ] hives [ ] angioedema  [ ] All other systems negative  [ ] Unable to assess ROS because ________    OBJECTIVE:  ICU Vital Signs Last 24 Hrs  T(C): 35.6 (11 Mar 2018 08:00), Max: 36.4 (10 Mar 2018 16:00)  T(F): 96 (11 Mar 2018 08:00), Max: 97.6 (10 Mar 2018 16:00)  HR: 82 (11 Mar 2018 09:00) (78 - 100)  BP: 104/69 (11 Mar 2018 08:00) (87/47 - 121/95)  BP(mean): 75 (11 Mar 2018 08:00) (54 - 102)  ABP: --  ABP(mean): --  RR: 18 (11 Mar 2018 09:00) (9 - 22)  SpO2: 85% (11 Mar 2018 09:00) (81% - 97%)        03-10 @ 06:01  -  03-11 @ 07:00  --------------------------------------------------------  IN: 1475 mL / OUT: 0 mL / NET: 1475 mL      PHYSICAL EXAM:  General: Young pale man lying in bed in NAD  Neck: Supple  Respiratory: CTAB  Cardiovascular: Normal S1, III/VI holosystolic murmur  Abdomen: Soft, NT/ND  Extremities: No LE edema  Skin: Warm, dry  Neurological: AAOx3  Psychiatry: Appropriate affect and mood    LINES:    HOSPITAL MEDICATIONS:  Standing Meds:  chlorhexidine 4% Liquid 1 Application(s) Topical daily  digoxin     Tablet 0.25 milliGRAM(s) Oral daily      PRN Meds:      LABS:                        13.9   8.78  )-----------( 196      ( 11 Mar 2018 03:52 )             42.8     Hgb Trend: 13.9<--, 14.5<--, 15.1<--, 16.2<--  03-11    139  |  102  |  18  ----------------------------<  97  4.3   |  23  |  0.88    Ca    8.8      11 Mar 2018 03:52  Phos  3.8     03-11  Mg     1.9     03-11    TPro  6.1  /  Alb  3.6  /  TBili  2.6<H>  /  DBili  x   /  AST  27  /  ALT  16  /  AlkPhos  84  03-11    Creatinine Trend: 0.88<--, 0.96<--, 1.03<--, 1.01<--    PT/INR - ( 10 Mar 2018 03:30 )   PT: 15.1 SEC;   INR: 1.35     PTT - ( 10 Mar 2018 03:30 )  PTT:37.1 SEC

## 2018-03-11 NOTE — DISCHARGE NOTE ADULT - PATIENT PORTAL LINK FT
You can access the [facility] Patient Portal, offered by [facility], by registering with the following website: [email or web address]

## 2018-03-11 NOTE — DISCHARGE NOTE ADULT - PLAN OF CARE
Supportive care You came to the hospital because you were coughing up blood which improved while you were in the hospital. This bloody cough is most likely from a source in the nose or sinuses which can be exacerbated by dry air making your nasal passages dry. You can follow-up with ENT by calling [phone] and in the meantime, use supportive measures such as a neti pot, saline spray for your nose, room humidifier or Afrin spray for bleeding.

## 2018-03-11 NOTE — DISCHARGE NOTE ADULT - ADDITIONAL INSTRUCTIONS
1) Dr. Rojas's office will call you to set-up a follow-up appointment.  2) You can follow-up with ENT in their clinic by calling Dr. Stevens's office at [phone] or the general ENT referral number [phone].  3) Return to the ER if you have worsening bloody cough or new symptoms such as worsening shortness of breath, dizziness. 1) Dr. Rojas's office will call you to set-up a follow-up appointment.  2) You can follow-up with ENT in their clinic by calling Dr. Stevens's office at [phone] or the general ENT referral number [phone].  3) Return to the ER if you have worsening bloody cough or new symptoms such as worsening shortness of breath, dizziness or you pass out.

## 2018-03-11 NOTE — DISCHARGE NOTE ADULT - HOSPITAL COURSE
23 yo M w/ PMH congenital heart disease w/ noted heterotaxy, R AV valve atresia, b/l SVC(non-communicating), pulmonary valve atresia & TAPVR who is s/p surgical repair including Fontan procedure presents to [facility] w/ hemoptysis. Pt has a 4 year h/o intermittent hemoptysis, usually lasting 2-6 days each and self-resolving. On this presentation, pt’s hemoptysis was worse than usual with 5 episodes over 2 days. Pt was admitted to RCU for management and evaluation with plan for bronchoscopy and transferred to MICU for closer monitoring given his complex cardiac history. Pt was hemodynamically stable on admission with saturations in the high 80s, his baseline. CT chest showed areas of blood in the alveoli and bronchoscopy was performed which did not show any active bleeding. Study only showed a small area of mucosal denudation in the inferior turbinate, with a small amount of blood in the upper trachea and minimal dried blood in the RUL without any overt bleeding in any bronchopulmonary segment. GI and ENT were both consulted and both rhinolaryngoscopy and EGD were performed w/o finding source of bleed. Pt’s pediatric cardiologist was also consulted and given negative workup and mild drop in Hgb of 2 pts with improvement in hemoptysis, pt was stable for discharge with follow-up outpatient. Per cardiology, pt likely having intermittent, small volume nasopharyngeal bleed and should be discharged off his home ASA.

## 2018-03-11 NOTE — PROGRESS NOTE ADULT - SUBJECTIVE AND OBJECTIVE BOX
Adult Congenital Heart Disease    Met with Kang, his mother and father today at bedside to review clinical course thus far.  Presented with hemoptysis which has been off and on for > 4 years.  Our team asked him to come to the ER since the frequency of episodes seemed to be increased.  Scope by ENT unrevealing for clear source. Bronchoscopy with dried blood but no clear bleeding segments. EGD yesterday demonstrated no clear bleeding source- he has very mild gastropathy related to chronic aspirin use.    I discussed, at length, the usual differential diagnosis of hemoptysis and the added considerations given that he has a Fontan circulation.  His mother has searched on the internet and has become very worried about bleeding from aortopulmonary collateral vessels. While Fontan patients (or cyanotic patients) are at risk for the development of these vessels, I would consider the relatively clear bronchoscopy findings (absence of blood) to make this less likely. Patients with bleeding from aortopulmonary collateral erosion into the bronchial tree would likely have evidence of active or recent bleeding at the time of bronchoscopy. Additionally, in my experience, these patients are considerably sicker and more tenuous.     I suspect a nasopharyngeal source of his intermittent, smaller volume bleeding. I discussed using supportive measures such as saline rinses, saline gel, humidified air as long as ENT doesn't have any objection.  He should stop aspirin until we get a handle on the bleeding. Trial of PPI as GI recommended . He should follow up with ENT as an outpatient. We reviewed return indications and we will set up outpatient follow up in our clinic. In the absence of significant, brisk bleeding, I would defer cardiac catheterization for a later date or if another indication for catheterization comes up.  Will screen for PLE as an outpatient. It does not sound like he is bringing up casts consistent with plastic bronchitis nor was that visualized at catheterization.   Okay to discharge home from my perspective.

## 2018-03-11 NOTE — DISCHARGE NOTE ADULT - CARE PROVIDER_API CALL
Chan Stevens), Otolaryngology  [address] [place] [address]  Phone: [phone]  Fax: [phone]    Geoffrey Plata), Internal Medicine; Pediatric Cardiology; Pediatrics  [address] [place] [address] [address]  [place] [address]  Phone: [phone]  Fax: [phone]    Mega Rojas), Adult Congenital Heart Disease; Pediatric Cardiology; Pediatrics  [address] [place] [address]  Phone: [phone]  Fax: [phone]

## 2018-03-11 NOTE — DISCHARGE NOTE ADULT - MEDICATION SUMMARY - MEDICATIONS TO TAKE
I will START or STAY ON the medications listed below when I get home from the hospital:    digoxin 250 mcg (0.25 mg) oral tablet  -- 1 tab(s) by mouth once a day  -- Indication: For S/p fontan procedure

## 2018-03-11 NOTE — DISCHARGE NOTE ADULT - CARE PLAN
Principal Discharge DX:	Hemoptysis  Goal:	Supportive care  Assessment and plan of treatment:	You came to the hospital because you were coughing up blood which improved while you were in the hospital. This bloody cough is most likely from a source in the nose or sinuses which can be exacerbated by dry air making your nasal passages dry. You can follow-up with ENT by calling [phone] and in the meantime, use supportive measures such as a neti pot, saline spray for your nose, room humidifier or Afrin spray for bleeding.

## 2018-03-11 NOTE — DISCHARGE NOTE ADULT - MEDICATION SUMMARY - MEDICATIONS TO STOP TAKING
I will STOP taking the medications listed below when I get home from the hospital:    enalapril 5 mg oral tablet  -- 3 tab(s) by mouth 2 times a day    amoxicillin 250 mg oral capsule  -- 1 cap(s) by mouth once a day    apixaban 5 mg oral tablet  -- 2 tab(s) by mouth 2 times a day for 7 days (until 1/4/15)    Then, 1 tab(s) by mouth 2 times a day    Aspirin Low Dose 81 mg oral delayed release tablet  -- 1 tab(s) by mouth once a day    digoxin 250 mcg (0.25 mg) oral tablet  -- 1 tab(s) by mouth once a day    Vasotec  -- 15 milligram(s) by mouth 2 times a day    amoxicillin 250 mg oral capsule  --  by mouth once a day

## 2018-03-11 NOTE — PROGRESS NOTE ADULT - ATTENDING COMMENTS
Agree with above. Still having hemoptysis. Will discuss with pediatric cardiology next steps in regards to work up.
Agree with above. Seen and examined with residents. Bronch negative for acute bleeding yesterday, ENT scope also negative. Awaiting GI endoscopy today, H/H slowly trailing down.  Chronic hypoxemia, otherwise respiratory status stable.

## 2018-03-15 ENCOUNTER — APPOINTMENT (OUTPATIENT)
Dept: OTOLARYNGOLOGY | Facility: CLINIC | Age: 23
End: 2018-03-15
Payer: COMMERCIAL

## 2018-03-15 VITALS
HEIGHT: 69 IN | BODY MASS INDEX: 16.29 KG/M2 | HEART RATE: 104 BPM | DIASTOLIC BLOOD PRESSURE: 74 MMHG | SYSTOLIC BLOOD PRESSURE: 119 MMHG | WEIGHT: 110 LBS

## 2018-03-15 DIAGNOSIS — Z80.9 FAMILY HISTORY OF MALIGNANT NEOPLASM, UNSPECIFIED: ICD-10-CM

## 2018-03-15 DIAGNOSIS — T16.1XXA FOREIGN BODY IN RIGHT EAR, INITIAL ENCOUNTER: ICD-10-CM

## 2018-03-15 DIAGNOSIS — H61.23 IMPACTED CERUMEN, BILATERAL: ICD-10-CM

## 2018-03-15 DIAGNOSIS — Z83.3 FAMILY HISTORY OF DIABETES MELLITUS: ICD-10-CM

## 2018-03-15 DIAGNOSIS — T16.2XXA FOREIGN BODY IN RIGHT EAR, INITIAL ENCOUNTER: ICD-10-CM

## 2018-03-15 PROCEDURE — 69210 REMOVE IMPACTED EAR WAX UNI: CPT

## 2018-03-15 PROCEDURE — 31231 NASAL ENDOSCOPY DX: CPT

## 2018-03-15 PROCEDURE — 99214 OFFICE O/P EST MOD 30 MIN: CPT | Mod: 25

## 2018-03-21 ENCOUNTER — RX RENEWAL (OUTPATIENT)
Age: 23
End: 2018-03-21

## 2018-03-23 ENCOUNTER — LABORATORY RESULT (OUTPATIENT)
Age: 23
End: 2018-03-23

## 2018-03-23 ENCOUNTER — OTHER (OUTPATIENT)
Age: 23
End: 2018-03-23

## 2018-03-23 LAB
BASOPHILS # BLD AUTO: 0.18 K/UL
BASOPHILS NFR BLD AUTO: 2 %
EOSINOPHIL # BLD AUTO: 0.82 K/UL
EOSINOPHIL NFR BLD AUTO: 9 %
HCT VFR BLD CALC: 34 %
HGB BLD-MCNC: 10.5 G/DL
LYMPHOCYTES # BLD AUTO: 2.45 K/UL
LYMPHOCYTES NFR BLD AUTO: 27 %
MAN DIFF?: NORMAL
MCHC RBC-ENTMCNC: 26.5 PG
MCHC RBC-ENTMCNC: 30.9 GM/DL
MCV RBC AUTO: 85.9 FL
MONOCYTES # BLD AUTO: 1.81 K/UL
MONOCYTES NFR BLD AUTO: 20 %
NEUTROPHILS # BLD AUTO: 3.81 K/UL
NEUTROPHILS NFR BLD AUTO: 42 %
PLATELET # BLD AUTO: 207 K/UL
RBC # BLD: 3.96 M/UL
RBC # FLD: 19.6 %
WBC # FLD AUTO: 9.07 K/UL

## 2018-03-26 ENCOUNTER — APPOINTMENT (OUTPATIENT)
Dept: OTOLARYNGOLOGY | Facility: CLINIC | Age: 23
End: 2018-03-26

## 2018-03-28 ENCOUNTER — APPOINTMENT (OUTPATIENT)
Dept: OTOLARYNGOLOGY | Facility: CLINIC | Age: 23
End: 2018-03-28
Payer: COMMERCIAL

## 2018-03-28 ENCOUNTER — FORM ENCOUNTER (OUTPATIENT)
Age: 23
End: 2018-03-28

## 2018-03-28 VITALS
WEIGHT: 107 LBS | HEART RATE: 104 BPM | BODY MASS INDEX: 15.85 KG/M2 | DIASTOLIC BLOOD PRESSURE: 64 MMHG | HEIGHT: 69 IN | SYSTOLIC BLOOD PRESSURE: 99 MMHG

## 2018-03-28 DIAGNOSIS — R04.0 EPISTAXIS: ICD-10-CM

## 2018-03-28 PROCEDURE — 31231 NASAL ENDOSCOPY DX: CPT

## 2018-03-28 PROCEDURE — 99214 OFFICE O/P EST MOD 30 MIN: CPT | Mod: 25

## 2018-03-29 ENCOUNTER — APPOINTMENT (OUTPATIENT)
Dept: CT IMAGING | Facility: CLINIC | Age: 23
End: 2018-03-29
Payer: COMMERCIAL

## 2018-03-29 ENCOUNTER — OUTPATIENT (OUTPATIENT)
Dept: OUTPATIENT SERVICES | Facility: HOSPITAL | Age: 23
LOS: 1 days | End: 2018-03-29
Payer: COMMERCIAL

## 2018-03-29 DIAGNOSIS — R04.0 EPISTAXIS: ICD-10-CM

## 2018-03-29 DIAGNOSIS — Z98.89 OTHER SPECIFIED POSTPROCEDURAL STATES: Chronic | ICD-10-CM

## 2018-03-29 PROCEDURE — 70496 CT ANGIOGRAPHY HEAD: CPT | Mod: 26

## 2018-03-29 PROCEDURE — 70498 CT ANGIOGRAPHY NECK: CPT

## 2018-03-29 PROCEDURE — 70496 CT ANGIOGRAPHY HEAD: CPT

## 2018-03-29 PROCEDURE — 70498 CT ANGIOGRAPHY NECK: CPT | Mod: 26

## 2018-03-30 ENCOUNTER — INPATIENT (INPATIENT)
Facility: HOSPITAL | Age: 23
LOS: 6 days | Discharge: HOME | End: 2018-04-06
Attending: STUDENT IN AN ORGANIZED HEALTH CARE EDUCATION/TRAINING PROGRAM | Admitting: STUDENT IN AN ORGANIZED HEALTH CARE EDUCATION/TRAINING PROGRAM
Payer: COMMERCIAL

## 2018-03-30 VITALS
TEMPERATURE: 98 F | HEART RATE: 87 BPM | RESPIRATION RATE: 16 BRPM | DIASTOLIC BLOOD PRESSURE: 66 MMHG | SYSTOLIC BLOOD PRESSURE: 110 MMHG | OXYGEN SATURATION: 88 % | WEIGHT: 108.03 LBS

## 2018-03-30 DIAGNOSIS — Q26.2 TOTAL ANOMALOUS PULMONARY VENOUS CONNECTION: ICD-10-CM

## 2018-03-30 DIAGNOSIS — R09.02 HYPOXEMIA: ICD-10-CM

## 2018-03-30 DIAGNOSIS — Q89.3 SITUS INVERSUS: ICD-10-CM

## 2018-03-30 DIAGNOSIS — I82.411 ACUTE EMBOLISM AND THROMBOSIS OF RIGHT FEMORAL VEIN: ICD-10-CM

## 2018-03-30 DIAGNOSIS — Z98.89 OTHER SPECIFIED POSTPROCEDURAL STATES: Chronic | ICD-10-CM

## 2018-03-30 DIAGNOSIS — Q20.4 DOUBLE INLET VENTRICLE: ICD-10-CM

## 2018-03-30 DIAGNOSIS — I82.409 ACUTE EMBOLISM AND THROMBOSIS OF UNSPECIFIED DEEP VEINS OF UNSPECIFIED LOWER EXTREMITY: ICD-10-CM

## 2018-03-30 LAB
ALBUMIN SERPL ELPH-MCNC: 3.4 G/DL — SIGNIFICANT CHANGE UP (ref 3.3–5)
ALP SERPL-CCNC: 140 U/L — HIGH (ref 40–120)
ALT FLD-CCNC: 18 U/L — SIGNIFICANT CHANGE UP (ref 4–41)
ANISOCYTOSIS BLD QL: SIGNIFICANT CHANGE UP
APTT BLD: 30.3 SEC — SIGNIFICANT CHANGE UP (ref 27.5–37.4)
AST SERPL-CCNC: 21 U/L — SIGNIFICANT CHANGE UP (ref 4–40)
BASE EXCESS BLDV CALC-SCNC: 3.3 MMOL/L — SIGNIFICANT CHANGE UP
BASOPHILS # BLD AUTO: 0.07 K/UL — SIGNIFICANT CHANGE UP (ref 0–0.2)
BASOPHILS NFR BLD AUTO: 0.5 % — SIGNIFICANT CHANGE UP (ref 0–2)
BASOPHILS NFR SPEC: 0.9 % — SIGNIFICANT CHANGE UP (ref 0–2)
BILIRUB SERPL-MCNC: 1.8 MG/DL — HIGH (ref 0.2–1.2)
BLOOD GAS VENOUS - CREATININE: 0.81 MG/DL — SIGNIFICANT CHANGE UP (ref 0.5–1.3)
BUN SERPL-MCNC: 22 MG/DL — SIGNIFICANT CHANGE UP (ref 7–23)
BURR CELLS BLD QL SMEAR: PRESENT — SIGNIFICANT CHANGE UP
CALCIUM SERPL-MCNC: 9.4 MG/DL — SIGNIFICANT CHANGE UP (ref 8.4–10.5)
CHLORIDE BLDV-SCNC: 103 MMOL/L — SIGNIFICANT CHANGE UP (ref 96–108)
CHLORIDE SERPL-SCNC: 100 MMOL/L — SIGNIFICANT CHANGE UP (ref 98–107)
CK SERPL-CCNC: 28 U/L — LOW (ref 30–200)
CO2 SERPL-SCNC: 24 MMOL/L — SIGNIFICANT CHANGE UP (ref 22–31)
CREAT SERPL-MCNC: 0.82 MG/DL — SIGNIFICANT CHANGE UP (ref 0.5–1.3)
ELLIPTOCYTES BLD QL SMEAR: SLIGHT — SIGNIFICANT CHANGE UP
EOSINOPHIL # BLD AUTO: 0.14 K/UL — SIGNIFICANT CHANGE UP (ref 0–0.5)
EOSINOPHIL NFR BLD AUTO: 1.1 % — SIGNIFICANT CHANGE UP (ref 0–6)
EOSINOPHIL NFR FLD: 1.8 % — SIGNIFICANT CHANGE UP (ref 0–6)
GAS PNL BLDV: 134 MMOL/L — LOW (ref 136–146)
GIANT PLATELETS BLD QL SMEAR: PRESENT — SIGNIFICANT CHANGE UP
GLUCOSE BLDV-MCNC: 102 — HIGH (ref 70–99)
GLUCOSE SERPL-MCNC: 96 MG/DL — SIGNIFICANT CHANGE UP (ref 70–99)
HCO3 BLDV-SCNC: 26 MMOL/L — SIGNIFICANT CHANGE UP (ref 20–27)
HCT VFR BLD CALC: 30.2 % — LOW (ref 39–50)
HCT VFR BLD CALC: 32.9 % — LOW (ref 39–50)
HCT VFR BLDV CALC: 31.5 % — LOW (ref 39–51)
HGB BLD-MCNC: 10.1 G/DL — LOW (ref 13–17)
HGB BLD-MCNC: 9.2 G/DL — LOW (ref 13–17)
HGB BLDV-MCNC: 10.2 G/DL — LOW (ref 13–17)
HYPOCHROMIA BLD QL: SIGNIFICANT CHANGE UP
IMM GRANULOCYTES # BLD AUTO: 0.05 # — SIGNIFICANT CHANGE UP
IMM GRANULOCYTES NFR BLD AUTO: 0.4 % — SIGNIFICANT CHANGE UP (ref 0–1.5)
INR BLD: 1.32 — HIGH (ref 0.88–1.17)
LACTATE BLDV-MCNC: 1.3 MMOL/L — SIGNIFICANT CHANGE UP (ref 0.5–2)
LYMPHOCYTES # BLD AUTO: 1.48 K/UL — SIGNIFICANT CHANGE UP (ref 1–3.3)
LYMPHOCYTES # BLD AUTO: 11.5 % — LOW (ref 13–44)
LYMPHOCYTES NFR SPEC AUTO: 7.1 % — LOW (ref 13–44)
MACROCYTES BLD QL: SIGNIFICANT CHANGE UP
MAGNESIUM SERPL-MCNC: 2.3 MG/DL — SIGNIFICANT CHANGE UP (ref 1.6–2.6)
MCHC RBC-ENTMCNC: 25.5 PG — LOW (ref 27–34)
MCHC RBC-ENTMCNC: 25.7 PG — LOW (ref 27–34)
MCHC RBC-ENTMCNC: 30.5 % — LOW (ref 32–36)
MCHC RBC-ENTMCNC: 30.7 % — LOW (ref 32–36)
MCV RBC AUTO: 83.7 FL — SIGNIFICANT CHANGE UP (ref 80–100)
MCV RBC AUTO: 83.7 FL — SIGNIFICANT CHANGE UP (ref 80–100)
MONOCYTES # BLD AUTO: 2.62 K/UL — HIGH (ref 0–0.9)
MONOCYTES NFR BLD AUTO: 20.4 % — HIGH (ref 2–14)
MONOCYTES NFR BLD: 9.7 % — HIGH (ref 2–9)
NEUTROPHIL AB SER-ACNC: 80.5 % — HIGH (ref 43–77)
NEUTROPHILS # BLD AUTO: 8.46 K/UL — HIGH (ref 1.8–7.4)
NEUTROPHILS NFR BLD AUTO: 66.1 % — SIGNIFICANT CHANGE UP (ref 43–77)
NRBC # FLD: 0.09 — SIGNIFICANT CHANGE UP
NRBC # FLD: 0.13 — SIGNIFICANT CHANGE UP
NRBC FLD-RTO: 1 — SIGNIFICANT CHANGE UP
PCO2 BLDV: 42 MMHG — SIGNIFICANT CHANGE UP (ref 41–51)
PH BLDV: 7.43 PH — SIGNIFICANT CHANGE UP (ref 7.32–7.43)
PHOSPHATE SERPL-MCNC: 3.9 MG/DL — SIGNIFICANT CHANGE UP (ref 2.5–4.5)
PLATELET # BLD AUTO: 371 K/UL — SIGNIFICANT CHANGE UP (ref 150–400)
PLATELET # BLD AUTO: 397 K/UL — SIGNIFICANT CHANGE UP (ref 150–400)
PLATELET COUNT - ESTIMATE: NORMAL — SIGNIFICANT CHANGE UP
PMV BLD: 12.9 FL — SIGNIFICANT CHANGE UP (ref 7–13)
PMV BLD: 13 FL — SIGNIFICANT CHANGE UP (ref 7–13)
PO2 BLDV: < 24 MMHG — LOW (ref 35–40)
POIKILOCYTOSIS BLD QL AUTO: SIGNIFICANT CHANGE UP
POLYCHROMASIA BLD QL SMEAR: SLIGHT — SIGNIFICANT CHANGE UP
POTASSIUM BLDV-SCNC: 4.3 MMOL/L — SIGNIFICANT CHANGE UP (ref 3.4–4.5)
POTASSIUM SERPL-MCNC: 4.7 MMOL/L — SIGNIFICANT CHANGE UP (ref 3.5–5.3)
POTASSIUM SERPL-SCNC: 4.7 MMOL/L — SIGNIFICANT CHANGE UP (ref 3.5–5.3)
PROT SERPL-MCNC: 7 G/DL — SIGNIFICANT CHANGE UP (ref 6–8.3)
PROTHROM AB SERPL-ACNC: 15.3 SEC — HIGH (ref 9.8–13.1)
RBC # BLD: 3.61 M/UL — LOW (ref 4.2–5.8)
RBC # BLD: 3.93 M/UL — LOW (ref 4.2–5.8)
RBC # FLD: 19.1 % — HIGH (ref 10.3–14.5)
RBC # FLD: 19.4 % — HIGH (ref 10.3–14.5)
SAO2 % BLDV: 27.4 % — LOW (ref 60–85)
SMUDGE CELLS # BLD: PRESENT — SIGNIFICANT CHANGE UP
SODIUM SERPL-SCNC: 137 MMOL/L — SIGNIFICANT CHANGE UP (ref 135–145)
TARGETS BLD QL SMEAR: SIGNIFICANT CHANGE UP
WBC # BLD: 12.38 K/UL — HIGH (ref 3.8–10.5)
WBC # BLD: 12.82 K/UL — HIGH (ref 3.8–10.5)
WBC # FLD AUTO: 12.38 K/UL — HIGH (ref 3.8–10.5)
WBC # FLD AUTO: 12.82 K/UL — HIGH (ref 3.8–10.5)

## 2018-03-30 PROCEDURE — 99356: CPT

## 2018-03-30 PROCEDURE — 74177 CT ABD & PELVIS W/CONTRAST: CPT | Mod: 26

## 2018-03-30 PROCEDURE — 93971 EXTREMITY STUDY: CPT | Mod: 26,RT

## 2018-03-30 PROCEDURE — 93926 LOWER EXTREMITY STUDY: CPT | Mod: 26,LT

## 2018-03-30 PROCEDURE — 99255 IP/OBS CONSLTJ NEW/EST HI 80: CPT | Mod: 25

## 2018-03-30 PROCEDURE — 76870 US EXAM SCROTUM: CPT | Mod: 26

## 2018-03-30 RX ORDER — OXYCODONE HYDROCHLORIDE 5 MG/1
5 TABLET ORAL EVERY 4 HOURS
Qty: 0 | Refills: 0 | Status: DISCONTINUED | OUTPATIENT
Start: 2018-03-30 | End: 2018-03-31

## 2018-03-30 RX ORDER — SODIUM CHLORIDE 9 MG/ML
1000 INJECTION INTRAMUSCULAR; INTRAVENOUS; SUBCUTANEOUS ONCE
Qty: 0 | Refills: 0 | Status: COMPLETED | OUTPATIENT
Start: 2018-03-30 | End: 2018-03-30

## 2018-03-30 RX ORDER — HEPARIN SODIUM 5000 [USP'U]/ML
4000 INJECTION INTRAVENOUS; SUBCUTANEOUS ONCE
Qty: 0 | Refills: 0 | Status: COMPLETED | OUTPATIENT
Start: 2018-03-30 | End: 2018-03-30

## 2018-03-30 RX ORDER — MORPHINE SULFATE 50 MG/1
4 CAPSULE, EXTENDED RELEASE ORAL ONCE
Qty: 0 | Refills: 0 | Status: DISCONTINUED | OUTPATIENT
Start: 2018-03-30 | End: 2018-03-30

## 2018-03-30 RX ORDER — SODIUM CHLORIDE 9 MG/ML
1000 INJECTION, SOLUTION INTRAVENOUS
Qty: 0 | Refills: 0 | Status: DISCONTINUED | OUTPATIENT
Start: 2018-03-30 | End: 2018-03-30

## 2018-03-30 RX ORDER — HEPARIN SODIUM 5000 [USP'U]/ML
4000 INJECTION INTRAVENOUS; SUBCUTANEOUS EVERY 6 HOURS
Qty: 0 | Refills: 0 | Status: DISCONTINUED | OUTPATIENT
Start: 2018-03-30 | End: 2018-03-31

## 2018-03-30 RX ORDER — DIGOXIN 250 MCG
0.25 TABLET ORAL DAILY
Qty: 0 | Refills: 0 | Status: DISCONTINUED | OUTPATIENT
Start: 2018-03-30 | End: 2018-03-30

## 2018-03-30 RX ORDER — SODIUM CHLORIDE 9 MG/ML
1000 INJECTION, SOLUTION INTRAVENOUS
Qty: 0 | Refills: 0 | Status: DISCONTINUED | OUTPATIENT
Start: 2018-03-30 | End: 2018-03-31

## 2018-03-30 RX ORDER — HEPARIN SODIUM 5000 [USP'U]/ML
2000 INJECTION INTRAVENOUS; SUBCUTANEOUS EVERY 6 HOURS
Qty: 0 | Refills: 0 | Status: DISCONTINUED | OUTPATIENT
Start: 2018-03-30 | End: 2018-03-31

## 2018-03-30 RX ORDER — HEPARIN SODIUM 5000 [USP'U]/ML
INJECTION INTRAVENOUS; SUBCUTANEOUS
Qty: 25000 | Refills: 0 | Status: DISCONTINUED | OUTPATIENT
Start: 2018-03-30 | End: 2018-03-31

## 2018-03-30 RX ORDER — DIGOXIN 250 MCG
0.25 TABLET ORAL DAILY
Qty: 0 | Refills: 0 | Status: DISCONTINUED | OUTPATIENT
Start: 2018-03-30 | End: 2018-04-06

## 2018-03-30 RX ORDER — OXYCODONE HYDROCHLORIDE 5 MG/1
5 TABLET ORAL ONCE
Qty: 0 | Refills: 0 | Status: DISCONTINUED | OUTPATIENT
Start: 2018-03-30 | End: 2018-03-30

## 2018-03-30 RX ADMIN — MORPHINE SULFATE 4 MILLIGRAM(S): 50 CAPSULE, EXTENDED RELEASE ORAL at 17:46

## 2018-03-30 RX ADMIN — MORPHINE SULFATE 4 MILLIGRAM(S): 50 CAPSULE, EXTENDED RELEASE ORAL at 20:15

## 2018-03-30 RX ADMIN — HEPARIN SODIUM 4000 UNIT(S): 5000 INJECTION INTRAVENOUS; SUBCUTANEOUS at 17:47

## 2018-03-30 RX ADMIN — SODIUM CHLORIDE 1000 MILLILITER(S): 9 INJECTION INTRAMUSCULAR; INTRAVENOUS; SUBCUTANEOUS at 14:26

## 2018-03-30 RX ADMIN — OXYCODONE HYDROCHLORIDE 5 MILLIGRAM(S): 5 TABLET ORAL at 22:32

## 2018-03-30 RX ADMIN — HEPARIN SODIUM 900 UNIT(S)/HR: 5000 INJECTION INTRAVENOUS; SUBCUTANEOUS at 17:47

## 2018-03-30 RX ADMIN — SODIUM CHLORIDE 2 MILLILITER(S): 9 INJECTION INTRAMUSCULAR; INTRAVENOUS; SUBCUTANEOUS at 21:24

## 2018-03-30 RX ADMIN — MORPHINE SULFATE 4 MILLIGRAM(S): 50 CAPSULE, EXTENDED RELEASE ORAL at 14:36

## 2018-03-30 RX ADMIN — OXYCODONE HYDROCHLORIDE 5 MILLIGRAM(S): 5 TABLET ORAL at 18:45

## 2018-03-30 RX ADMIN — OXYCODONE HYDROCHLORIDE 5 MILLIGRAM(S): 5 TABLET ORAL at 21:55

## 2018-03-30 RX ADMIN — OXYCODONE HYDROCHLORIDE 5 MILLIGRAM(S): 5 TABLET ORAL at 19:15

## 2018-03-30 RX ADMIN — MORPHINE SULFATE 4 MILLIGRAM(S): 50 CAPSULE, EXTENDED RELEASE ORAL at 20:10

## 2018-03-30 RX ADMIN — SODIUM CHLORIDE 75 MILLILITER(S): 9 INJECTION, SOLUTION INTRAVENOUS at 22:15

## 2018-03-30 NOTE — ED PROVIDER NOTE - GENITOURINARY, MLM
External genitalia is normal appearing. R testicle tenderness w/o swelling. Normal cremaster reflex b/l. Chaperone Dr. Jon Loyola

## 2018-03-30 NOTE — H&P ADULT - NSHPLABSRESULTS_GEN_ALL_CORE
Comprehensive Metabolic, Mg + Phosphorus (03.30.18 @ 14:26)    Phosphorus Level, Serum: 3.9 mg/dL    Sodium, Serum: 137 mmol/L    Potassium, Serum: 4.7 mmol/L    Chloride, Serum: 100 mmol/L    Carbon Dioxide, Serum: 24 mmol/L    Blood Urea Nitrogen, Serum: 22 mg/dL    Creatinine, Serum: 0.82 mg/dL    Glucose, Serum: 96 mg/dL    Calcium, Total Serum: 9.4 mg/dL    Protein Total, Serum: 7.0: SPECIMEN NOT HEMOLYZED g/dL    Albumin, Serum: 3.4 g/dL    Bilirubin Total, Serum: 1.8 mg/dL    Alkaline Phosphatase, Serum: 140 u/L    Aspartate Aminotransferase (AST/SGOT): 21: SPECIMEN NOT HEMOLYZED u/L    Alanine Aminotransferase (ALT/SGPT): 18: SPECIMEN NOT HEMOLYZED u/L    Magnesium, Serum: 2.3 mg/dL    Complete Blood Count + Automated Diff (03.30.18 @ 14:26)    Nucleated RBC #: 0.13    Nucleated RBC%: 1.0: NEW CBC INSTRUMENTATION AT THE [facility] WILL REPORT AN  AUTOMATED NRBC COUNT BASED ON FLUORESCENCE NUCLEAR STAIN AND  AUTOMATICALLY CORRECT THE WBC IN THE PRESENCE OF NRBC.    WBC Count: 12.82 K/uL    RBC Count: 3.93 M/uL    Hemoglobin: 10.1: Delta: 13.9 on 03/11/  Delta: 13.9 on 03/11/ g/dL    Hematocrit: 32.9 %    Mean Cell Volume: 83.7 fL    Mean Cell Hemoglobin: 25.7 pg    Mean Cell Hemoglobin Conc: 30.7 %    Red Cell Distrib Width: 19.4 %    Platelet Count - Automated: 397: Delta: 196 on 03/11/  Delta: 196 on 03/11/ K/uL    MPV: 12.9 fl    Auto Neutrophil #: 8.46 K/uL    Auto Lymphocyte #: 1.48 K/uL    Auto Monocyte #: 2.62 K/uL    Auto Eosinophil #: 0.14 K/uL    Auto Basophil #: 0.07 K/uL    Auto Immature Granulocyte #: 0.05: (Includes meta, myelo and promyelocytes) #    Auto Neutrophil %: 66.1 %    Auto Lymphocyte %: 11.5 %    Auto Monocyte %: 20.4 %    Auto Eosinophil %: 1.1 %    Auto Basophil %: 0.5 %    Auto Immature Granulocyte %: 0.4: (Includes meta, myelo and promyelocytes) %    Neutrophils %: 80.5 %    Lymphocytes %: 7.1 %    Monocytes %: 9.7 %    Eosinophils %: 1.8 %    Basophils %: 0.9 %    Blood Gas Venous Comprehensive (03.30.18 @ 14:22)    Blood Gas Venous - Lactate: 1.3: Please note updated reference range. mmol/L    Blood Gas Venous - Chloride: 103 mmol/L    Blood Gas Venous - Creatinine: 0.81 mg/dL    pH, Venous: 7.43 pH    pCO2, Venous: 42 mmHg    pO2, Venous: < 24 mmHg    HCO3, Venous: 26 mmol/L    Base Excess, Venous: 3.3    Oxygen Saturation, Venous: 27.4 %    Blood Gas Venous - Sodium: 134 mmol/L    Blood Gas Venous - Potassium: 4.3 mmol/L    Blood Gas Venous - Glucose: 102    Blood Gas Venous - Hemoglobin: 10.2 g/dL    Blood Gas Venous - Hematocrit: 31.5 %    US Duplex Venous Lower Ext Ltd, Right (03.30.18 @ 15:29)    EXAM:  US SCROTUM AND CONTENTS      EXAM:  US DPLX LWR EXT VEINS LTD RT      PROCEDURE DATE:  Mar 30 2018     INTERPRETATION:  CLINICAL INFORMATION: 22-year-old man with history of   single ventricle and severe right groin pain.    COMPARISON: None available.    TECHNIQUE: Duplex sonography of the RIGHT LOWER extremity with color and   spectral Doppler, with and without compression.      FINDINGS:    Occlusive thrombosis of the distal right external iliac, common femoral,   and proximal femoral veins was visualized. The thrombus also involved the   greater saphenous vein. The mid to distal femoral vein and calf veins   were unremarkable.    The contralateral common femoral vein is patent with normal spontaneous   and phasic flow.    IMPRESSION:     Extensive DVT right leg. Upper extent not identified. Suggest further   imaging with CT or MRI venography.

## 2018-03-30 NOTE — H&P ADULT - ATTENDING COMMENTS
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

## 2018-03-30 NOTE — ED PROVIDER NOTE - ATTENDING CONTRIBUTION TO CARE
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 Pt state was moving, felt "pop." Pain worsening with time. Pt unable to ambulate due to pain. Pt denies testicular swelling, dysuria, hematuria, penile discharge. Does have discomfort to lateral aspect R testicle. Denies n/v/d, f/c. Pt recently had extensive workup for hemoptysis, was admitted, denies any x 1 week.     PE: NAD, NCAT, MMM, Trachea midline, Normal conjunctiva, lungs CTAB, S1/S2 RRR, Normal perfusion, 2+ radial pulses bilat, 2+ DP pulses bilat LE, Abdomen Soft, +ttp R inguinal region, R inner thigh, RLQ abd, No rebound/guarding, Testicles with normal lie, +cremasteric reflex bilat, minimal ttp lateral aspect R testicle. No LE edema, No deformity of extremities, bilat LE warm, well perfused, cap refill <2 sec. No rashes,  No focal motor or sensory deficits.     22M with severe R inner thigh pain x 3 days. Concern for potential vascular pathology including thrombosis, although pt without signs arterial occlusion on exam. PLan to obtain RLE duplex US. +R testicular pain, plan to obtain testicular US, low suspicion torsion. R inguinal pain, to obtain ct a/p. Check CBC eval for anemia, cmp eval for metabolic derangement, coags. UA/culture. Analgesia as required. Re-assess. - Jon Loyola MD

## 2018-03-30 NOTE — ED PROVIDER NOTE - CONSTITUTIONAL, MLM
normal... Well appearing, well nourished, awake, alert, oriented to person, place, time/situation in severe distress, crying on exam.

## 2018-03-30 NOTE — H&P ADULT - NSHPPHYSICALEXAM_GEN_ALL_CORE
General: NAD  Neurology: A&Ox3, nonfocal  Head:  Normocephalic, atraumatic  ENT:  Mucosa moist, no ulcerations  Neck:  Supple, no sinuses or palpable masses  Lymphatic:  No palpable cervical, supraclavicular adenopathy  Respiratory: CTA B/L  CV: RRR, S1S2, III/VI holosystolic murmur  Abdominal: Soft, NT, ND no palpable mass  MSK: No edema, + peripheral pulses, +R thigh tenderness General: NAD  Neurology: A&Ox3, nonfocal  Head:  Normocephalic, atraumatic  ENT:  Mucosa moist, no ulcerations  Neck:  Supple, no sinuses or palpable masses  Lymphatic:  No palpable cervical, supraclavicular adenopathy  Respiratory: CTA B/L  CV: RRR, S1S2, III/VI holosystolic murmur  Abdominal: Soft, NT, ND no palpable mass  MSK: No edema, + peripheral pulses, +R thigh tenderness  Extremities: General: NAD  Neurology: A&Ox3, nonfocal  Head:  Normocephalic, atraumatic  ENT:  Mucosa moist, no ulcerations  Neck:  Supple, no sinuses or palpable masses  Lymphatic:  No palpable cervical, supraclavicular adenopathy  Respiratory: CTA B/L  CV: RRR, S1S2, III/VI holosystolic murmur  Abdominal: Soft, NT, ND no palpable mass  MSK: No edema, + peripheral pulses, +R thigh tenderness  Extremities: RLE w/ bulging vein slight increase in size of RLE>LLE. +right thigh and gin tenderness no erythema and warmth. No LE wounds; old RLE medial ankle wound scar with no evidence of infection.

## 2018-03-30 NOTE — H&P ADULT - NSHPREVIEWOFSYSTEMS_GEN_ALL_CORE
REVIEW OF SYSTEMS:    CONSTITUTIONAL: No weakness, fevers or chills  EYES/ENT: No visual changes;  No vertigo or throat pain   NECK: No pain or stiffness  RESPIRATORY: No cough, wheezing; No shortness of breath +hemoptysis  CARDIOVASCULAR: No chest pain or palpitations  GASTROINTESTINAL: No abdominal or epigastric pain. No nausea, vomiting, or hematemesis; No diarrhea or constipation. No melena or hematochezia.  GENITOURINARY: No dysuria, frequency or hematuria +R testicle pain  NEUROLOGICAL: No numbness or weakness  EXTREMITIES: +R anneliese pain  SKIN: No itching, burning, rashes, or lesions   All other review of systems is negative unless indicated above.

## 2018-03-30 NOTE — ED PROVIDER NOTE - OBJECTIVE STATEMENT
23 y/o male hx of single cardiac ventricle presents with 3 days of severe R groin, R thigh pain. Patient states felt a "pop" 3 days ago and pain been getting worse. When asked where pain is, points to R thigh. States cant walk 2/2 to the pain. Denies any bulging in scrotum. Not sexually active. No dysuria or hematuria. Does have a hx of a R femoral artery complication from multiple cath procedures w/ some subsequent varicose veins on the R. taking motrin for pain with mild relief. Patient is crying on exam in severe pain afraid to remove his boxer shorts. Of note, patient had some hemoptysis that resolved on Monday - worked up in the past.

## 2018-03-30 NOTE — H&P ADULT - HISTORY OF PRESENT ILLNESS
21 yo M w/ PMH congenital heart disease w/ heterotaxy, R AV atresia, B/L SVC (non-communicating), pulmonary valve atresia & TAPVR (who is s/p classic BT shunt and repair of TAPVR at birth followed by bilateral bi-directional Narinder shunts, who underwent re-repair of the TAPVR secondary to stenosis of the anastomosis at the time of the BDG shunts, underwent takedown of the Narinder shunt and underwent a fenestrated Fontan procedure which was subsequently managed by attempted cath lab closure of the fenestration), hx of thrombosis of the right femoral vein, PE in 2014 (s/p eliquis), [facility], presents to [facility] with R thigh pain. Pt unable to ambulate due to pain. Does have a hx of a R femoral artery complication from multiple cath procedures w/ some subsequent varicose veins on the R. Taking motrin for pain with mild relief.    In the ED, pt afebrile, hemodynamically stable, hypoxic to 88 but seems to be his baseline as per pediatric cards. Found to be newly anemic to b 10.1 and leukocytosis to 12.8 w/ L shift. RLE doppler showing extensive RLE DVT with no upper limit, pending CT angio to extend to determine upper limit of DVT extension. Admitted to MICU. Pt is a 21 y/o M w/ a PMHx of congenital heart disease w/ heterotaxy, R AV atresia, B/L SVC (non-communicating), pulmonary valve atresia & TAPVR (who is s/p classic BT shunt and repair of TAPVR at birth followed by bilateral bi-directional Narinder shunts, who underwent re-repair of the TAPVR secondary to stenosis of the anastomosis at the time of the BDG shunts, underwent takedown of the Narinder shunt and underwent a fenestrated Fontan procedure which was subsequently managed by attempted cath lab closure of the fenestration), hx of thrombosis of the right femoral vein, PE in 2014 (s/p eliquis), [facility], presents to [facility] with R thigh pain. According to patient and family patient started to have some discomfort after on Monday 3/26/18 in his back which then progressed over the next three days to his LLE around the calf region to his thigh and then excruciating pain in his right anneliese since Wednesday Pt with extremely difficulty with ambulation and intermittently unable to ambulate due to pain since Wednesday. Pt does have a hx of a R femoral artery complication from multiple cath procedures w/ some subsequent varicose veins on the R. Taking motrin for pain with mild relief.    Of note patient was followed outpt and Hgb has noted to be trending down (last measured on allscripts of 10.5) and had stopped having hemoptysis on Monday and was seen by ENT yesterday and had CT head and neck to r/o nasopharyngeal source of bleeding with not source of bleeding elucidated on read by radiology.    Pt Denies fevers, chills, CP, Abdominal pain, rhinorrhea, new rashes or wounds to his RLE and no trauma, new visual changes, confusion, headaches, blood in stools, N/V, dysuria, and hematuria, +RLE>LLE.     In the ED, pt afebrile, hemodynamically stable, hypoxic to 88 but seems to be his baseline as per pediatric cards. Found to be newly anemic to b 10.1 and leukocytosis to 12.8 w/ L shift. RLE doppler showing extensive RLE DVT with no upper limit, pending CT angio to extend to determine upper limit of DVT extension. Admitted to MICU.

## 2018-03-30 NOTE — ED PROVIDER NOTE - MEDICAL DECISION MAKING DETAILS
Edward Dee (Resident): 21 y/o severe R calf pain, no swelling, good pulses, right lower quadrant tenderness - ddx includes appy, testicular torsion/epididymitis, colitis, DVt - will check labs, imaging

## 2018-03-30 NOTE — ED PROVIDER NOTE - GASTROINTESTINAL, MLM
Abdomen soft, thin. Severe tenderness in very inferior Abdomen soft, thin. Severe tenderness in very inferior right lower quadrant

## 2018-03-30 NOTE — ED PROVIDER NOTE - SHIFT CHANGE DETAILS
Pt signed out pending CT a/p, with plan to begin heparin once patient returns from u/s and admit. - Jon Loyola MD

## 2018-03-30 NOTE — H&P ADULT - ASSESSMENT
23 yo M w/ PMH congenital heart disease s/p multiple cardiac surgeries, hx of thrombosis of the right femoral vein, PE in 2014 s/p eliquis course, hemoptysis with recent workup at [facility] found to be negative, p/w R thigh pain found to have RLE DVT.    # Neuro:  - AAOx3 without active issues    # CV:  - complicated cardiovascular h/o in setting of congenital heart disease s/p multiple surgeries including Fontan procedure.  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, however unlikely cause of hemoptysis  - oxygen therapy to titrate sat to >88%  - RLE doppler showing DVT without any obvious upper border  - f/u CT A/P to determine extent of DVT  - starting full AC with heparin gtt    # GI:  - continue w/ regular diet    # Renal:  - no acute issues    # Heme:  - RLE doppler showing DVT without any obvious upper border  - f/u CT A/P to determine extent of DVT  - starting full AC with heparin gtt  - hemoglobin 10.1, outside records w/ hemoglobin 10.5, cont to monitor    # ID:  - leukocytosis without any other signs of infection, continue to monitor    # DVT ppx:  - starting heparin for full AC    # Ethics:  - full code 23 yo M w/ PMH congenital heart disease s/p multiple cardiac surgeries, hx of thrombosis of the right femoral vein, PE in 2014 s/p eliquis course, hemoptysis with recent workup at [facility] found to be negative, p/w R thigh pain found to have RLE DVT.    Neuro:  - AAOx3 without active issues    CV:  - Complicated cardiovascular h/o in setting of congenital heart disease s/p multiple surgeries including Fontan procedure.  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, however unlikely cause of hemoptysis  - Oxygen therapy to titrate sat to >88%  - RLE doppler showing DVT without any obvious upper border  - F/u CT A/P to determine extent of DVT  - Starting full AC with heparin gtt and given high risk for bleeding will need closer monitoring    GI:  - Continue w/ regular diet    Renal:  - No acute issues    Heme:  - RLE doppler showing DVT without any obvious upper border  - F/u CT A/P to determine extent of DVT  - Starting full AC with heparin gtt  - Hemoglobin 10.1, outside records w/ hemoglobin 10.5, cont to monitor - likely 2/2 chronic blood loss from hemoptysis    ID:  - Leukocytosis without any other signs of infection, continue to monitor - likely reactive due to current DVT  - Currently no evidence of active infection    DVT ppx:  - starting heparin for full AC    Ethics:  - full code 21 yo M w/ PMH congenital heart disease s/p multiple cardiac surgeries, hx of thrombosis of the right femoral vein, PE in 2014 s/p Eliquis course, hemoptysis with recent workup at [facility] found to be negative, p/w R thigh pain found to have RLE DVT.    Neuro:  - AAOx3 without active issues    CV:  - Complicated cardiovascular h/o in setting of congenital heart disease s/p multiple surgeries including Fontan procedure.  - Patient's Pediatric Cardiologist is Dr Rojas who has been consulted and will follow  - Ov note patient pre-load dependent and at high risk for massive PE given IVC is directly anastomosed with Pulmonary artery  - 100mg of tPA at bedside if becomes unstable  - Patient preload dependent   - F/U CT angio A/P and evaluate for extent of clot burden. Depending on extent of clot burden will consult Vascular (Has been seen by dr. Cadet in past) and/or IR for input on need for intervention and will discuss with pediatric cardiology.     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, however unlikely cause of hemoptysis  - Oxygen therapy to titrate sat to >88%  - RLE doppler showing DVT without any obvious upper border  - F/u CT A/P to determine extent of DVT  - Starting full AC with heparin gtt and given high risk for bleeding will need closer monitoring    GI:  - Continue w/ regular diet    Renal:  - No acute issues    Heme:  - RLE doppler showing DVT without any obvious upper border  - F/u CT A/P to determine extent of DVT  - Starting full AC with heparin gtt  - Hemoglobin 10.1, outside records w/ hemoglobin 10.5, cont to monitor - likely 2/2 chronic blood loss from hemoptysis    ID:  - Leukocytosis without any other signs of infection, continue to monitor - likely reactive due to current DVT  - Currently no evidence of active infection    DVT ppx:  - starting heparin for full AC    Ethics:  - full code

## 2018-03-30 NOTE — ED PROVIDER NOTE - PROGRESS NOTE DETAILS
Edward Dee (Resident): spoke with rads resident after Ultrasound report - will protocol the CT A/P for a CT venogram to see the extent of the clot - cards CARLOS Carpenter recommending a MICU [id number] b/c patient just stopped having hemoptysis on MOnday and now will need AC and worried will start bleeding again

## 2018-03-30 NOTE — ED PROVIDER NOTE - CARDIAC, MLM
Normal rate, regular rhythm.  Heart sounds S1, S2. Blowing holosystolic murmur over L chest 4th intercostal Normal rate, regular rhythm.  Heart sounds S1, S2. Blowing holosystolic murmur over L chest 4th intercostal. +2 DP Pulses b/l

## 2018-03-30 NOTE — CONSULT NOTE ADULT - SUBJECTIVE AND OBJECTIVE BOX
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  He had a Fontan fenestration closure complicated by right femoral venous injury resulting in chronic RLE venous insufficiency and ultimately ulcearation.  His ongoing residual cardiac diseaes consists of chronic desatruation, moderate single AV valve regurgitation and systemic ventricular dysfunction.    Sabiha was recently admitted from 3/9/18 thru 3/11/18 for worsening hemoptysis in the setting of 2-3 years of intermittent symptoms.  He underwent an extensive workup including EGD, bronchoscopy and laryngscopy which did not reveal an active bleeding source.  My working diagnosis was a nasopharyngeal source of bleeding as bronchscopy did not reveal a significant amount of blood in the airway consistent with a primary pulmonary source of bleeding.  He continued with intermittent hemoptysis as an outpatient with a steady decline in his H/H.  He was scoped as an outpatient by ENT without a clear source of bleeding  shortly after discharge and again 2 days ago in ENT clinic.  A CT of the head and neck did not reveal a source of bleeding either.  After his last hospitalization, I stopped his Aspirin given ongoing bleeding.     Kang reports his cough and hemoptysis stopped on Monday.  He no longer has dark stools and he is able to eat better since his cough stopped.  Beginning 3 days ago, he developed lower back pain which progressed to right groin pain, pain with ambulation and right lower extremity pain at rest over the last 2 days.  He denies any testicular pain or swelling.  He denies any antecedent trauma.  He has been relatively sedentary since hospital discharge due to chronic cough, generalized malaise and fatigue.      His mother called our office with complaints of significant right groin and right leg pain.  My NP asked him to come to the ER.  U/S revealed RLE DVT (see report) and confirmed by CT abdomen/pelvis. Vascular surgery consulting. MICU consulted and Sabiha is to be admitted to the MICU. Heparin has been started.    REVIEW OF SYSTEMS:  Constitutional - no fevers, no chills, poor appetite improving earlier this week  Eyes - no conjunctivitis or discharge.  Ears / Nose / Mouth / Throat - no nose bleeds, no sore throat  Respiratory - states cough resolved on monday   Cardiovascular -denies chest pain, denies palpitations.  Gastrointestinal - no diarrhea, no vomiting, no melena or bright red blood  Genitourinary - no change in urination or hematuria.  Integumentary - no rash, jaundice, pallor, or color change.  Musculoskeletal - no joint swelling or stiffness.  Endocrine - no heat or cold intolerance, jitteriness, or failure to thrive.  Hematologic / Lymphatic - no easy bruising, bleeding, or lymphadenopathy.  Neurological - no seizures, change in activity level, or developmental delay.  All Other Systems - reviewed, negative.    PAST MEDICAL HISTORY:  Birth History - The patient was born at  weeks gestation, with *no pregnancy or  complications.  Medical Problems - The patient has *no significant medical problems.  Hospitalizations - The patient has had no prior hospitalizations.  Allergies - .    PAST SURGICAL HISTORY:  The patient has had *no prior surgeries.    MEDICATIONS:  digoxin     Tablet 0.25 milliGRAM(s) Oral daily  heparin  Infusion.  Unit(s)/Hr IV Continuous <Continuous>    FAMILY HISTORY:  There is *no history of congenital heart disease, arrhythmias, or sudden cardiac death in family members.    SOCIAL HISTORY:  The patient lives with *mother and father.    PHYSICAL EXAMINATION:  Vital signs - Weight (kg): 50 ( @ 19:38)    General - non-dysmorphic appearance, well-developed, in no distress.  Skin - no rash, no desquamation, no cyanosis.  Eyes / ENT - no conjunctival injection, sclerae anicteric, external ears & nares normal, mucous membranes moist.  Pulmonary - normal inspiratory effort, no retractions, lungs clear to auscultation bilaterally, no wheezes or rales.  Cardiovascular - normal rate, regular rhythm, normal S1 & S2, no murmurs, rubs, gallops, capillary refill < 2sec, normal pulses.  Gastrointestinal - soft, non-distended, non-tender, no hepatosplenomegaly (liver palpable *cm below right costal margin).  Musculoskeletal - no joint swelling, no clubbing, no edema.  Neurologic / Psychiatric - alert, oriented as age-appropriate, affect appropriate, moves all extremities, normal tone.    LABORATORY TESTS:                          10.1  CBC:   12.82 )-----------( 397   (18 @ 14:26)                          32.9               137   |  100   |  22                 Ca: 9.4    BMP:   ----------------------------< 96     M.3   (18 @ 14:26)             4.7    |  24    | 0.82               Ph: 3.9      LFT:     TPro: 7.0 / Alb: 3.4 / TBili: 1.8 / DBili: x / AST: 21 / ALT: 18 / AlkPhos: 140   (18 @ 14:26)    COAG: PT: 15.3 / PTT: 30.3 / INR: 1.32   (18 @ 18:00)     CARDIAC MARKERS:             Trop I: x / Trop T: x / CK: 28 / CKMB: x   (18 @ 14:26)             Pro-BNP: x   (18 @ 14:26)        VBG:   pH: 7.43 / pCO2: 42 / pO2: < 24 / HCO3: 26 / Base Excess: 3.3 / SaO2: 27.4   (18 @ 14:22)    IMAGING STUDIES:  Electrocardiogram - (*date)     Telemetry - (*dates) normal sinus rhythm, no ectopy, no arrhythmias.    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  He had a Fontan fenestration closure complicated by right femoral venous injury resulting in chronic RLE venous insufficiency and ultimately ulcearation.  His ongoing residual cardiac diseaes consists of chronic desatruation, moderate single AV valve regurgitation and systemic ventricular dysfunction.    Sabiha was recently admitted from 3/9/18 thru 3/11/18 for worsening hemoptysis in the setting of 2-3 years of intermittent symptoms.  He underwent an extensive workup including EGD, bronchoscopy and laryngscopy which did not reveal an active bleeding source.  My working diagnosis was a nasopharyngeal source of bleeding as bronchscopy did not reveal a significant amount of blood in the airway consistent with a primary pulmonary source of bleeding.  He continued with intermittent hemoptysis as an outpatient with a steady decline in his H/H.  He was scoped as an outpatient by ENT without a clear source of bleeding  shortly after discharge and again 2 days ago in ENT clinic.  A CT of the head and neck did not reveal a source of bleeding either.  After his last hospitalization, I stopped his Aspirin given ongoing bleeding.     Kang reports his cough and hemoptysis stopped on Monday.  He no longer has dark stools and he is able to eat better since his cough stopped.  Beginning 3 days ago, he developed lower back pain which progressed to right groin pain, pain with ambulation and right lower extremity pain at rest over the last 2 days.  He denies any testicular pain or swelling.  He denies any antecedent trauma.  He has been relatively sedentary since hospital discharge due to chronic cough, generalized malaise and fatigue.      His mother called our office with complaints of significant right groin and right leg pain.  My NP asked him to come to the ER.  U/S revealed RLE DVT (see report) and confirmed by CT abdomen/pelvis. Vascular surgery consulting. MICU consulted and Sabiha is to be admitted to the MICU. Heparin has been started.    REVIEW OF SYSTEMS:  Constitutional - no fevers, no chills, poor appetite improving earlier this week  Eyes - no conjunctivitis or discharge.  Ears / Nose / Mouth / Throat - no nose bleeds, no sore throat  Respiratory - states cough resolved on monday   Cardiovascular -denies chest pain, denies palpitations.  Gastrointestinal - no diarrhea, no vomiting, no melena or bright red blood  Genitourinary - no change in urination or hematuria.  Integumentary - no rash, jaundice, pallor, or color change.  Musculoskeletal - + RLE swelling and pain  Endocrine - no heat or cold intolerance, jitteriness, or failure to thrive.  Hematologic / Lymphatic - as above, s/p hemoptysis   Neurological - no seizures, no stroke like symptoms     PAST MEDICA/Surgical HISTORY:  Birth History - The patient was born at  weeks gestation, with *no pregnancy or  complications.  Medical Problems - The patient has *no significant medical problems.  Hospitalizations - The patient has had no prior hospitalizations.  Allergies - .    PAST SURGICAL HISTORY:  The patient has had *no prior surgeries.    MEDICATIONS:  digoxin     Tablet 0.25 milliGRAM(s) Oral daily  heparin  Infusion.  Unit(s)/Hr IV Continuous <Continuous>    FAMILY HISTORY:  There is *no history of congenital heart disease, arrhythmias, or sudden cardiac death in family members.    SOCIAL HISTORY:  The patient lives with *mother and father.    PHYSICAL EXAMINATION:  Vital signs - Weight (kg): 50 ( @ 19:38)    General - non-dysmorphic appearance, well-developed, in no distress.  Skin - no rash, no desquamation, no cyanosis.  Eyes / ENT - no conjunctival injection, sclerae anicteric, external ears & nares normal, mucous membranes moist.  Pulmonary - normal inspiratory effort, no retractions, lungs clear to auscultation bilaterally, no wheezes or rales.  Cardiovascular - normal rate, regular rhythm, normal S1 & S2, no murmurs, rubs, gallops, capillary refill < 2sec, normal pulses.  Gastrointestinal - soft, non-distended, non-tender, no hepatosplenomegaly (liver palpable *cm below right costal margin).  Musculoskeletal - no joint swelling, no clubbing, no edema.  Neurologic / Psychiatric - alert, oriented as age-appropriate, affect appropriate, moves all extremities, normal tone.    LABORATORY TESTS:                          10.1  CBC:   12.82 )-----------( 397   (18 @ 14:26)                          32.9               137   |  100   |  22                 Ca: 9.4    BMP:   ----------------------------< 96     M.3   (18 @ 14:26)             4.7    |  24    | 0.82               Ph: 3.9      LFT:     TPro: 7.0 / Alb: 3.4 / TBili: 1.8 / DBili: x / AST: 21 / ALT: 18 / AlkPhos: 140   (18 @ 14:26)    COAG: PT: 15.3 / PTT: 30.3 / INR: 1.32   (18 @ 18:00)     CARDIAC MARKERS:             Trop I: x / Trop T: x / CK: 28 / CKMB: x   (18 @ 14:26)             Pro-BNP: x   (18 @ 14:26)        VBG:   pH: 7.43 / pCO2: 42 / pO2: < 24 / HCO3: 26 / Base Excess: 3.3 / SaO2: 27.4   (18 @ 14:22)    IMAGING STUDIES:  Electrocardiogram - (*date)     Telemetry - (*dates) normal sinus rhythm, no ectopy, no arrhythmias.    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  He had a Fontan fenestration closure complicated by right femoral venous injury resulting in chronic RLE venous insufficiency and ultimately ulcearation.  His ongoing residual cardiac diseaes consists of chronic desatruation, moderate single AV valve regurgitation and systemic ventricular dysfunction.    Sabiha was recently admitted from 3/9/18 thru 3/11/18 for worsening hemoptysis in the setting of 2-3 years of intermittent symptoms.  He underwent an extensive workup including EGD, bronchoscopy and laryngscopy which did not reveal an active bleeding source.  My working diagnosis was a nasopharyngeal source of bleeding as bronchscopy did not reveal a significant amount of blood in the airway consistent with a primary pulmonary source of bleeding.  He continued with intermittent hemoptysis as an outpatient with a steady decline in his H/H.  He was scoped as an outpatient by ENT without a clear source of bleeding  shortly after discharge and again 2 days ago in ENT clinic.  A CT of the head and neck did not reveal a source of bleeding either.  After his last hospitalization, I stopped his Aspirin given ongoing bleeding.     Kang reports his cough and hemoptysis stopped on Monday.  He no longer has dark stools and he is able to eat better since his cough stopped.  Beginning 3 days ago, he developed lower back pain which progressed to right groin pain, pain with ambulation and right lower extremity pain at rest over the last 2 days.  He denies any testicular pain or swelling.  He denies any antecedent trauma.  He has been relatively sedentary since hospital discharge due to chronic cough, generalized malaise and fatigue.      His mother called our office with complaints of significant right groin and right leg pain.  My NP asked him to come to the ER.  U/S revealed RLE DVT (see report) and confirmed by CT abdomen/pelvis. Vascular surgery consulting. MICU consulted and Sabiha is to be admitted to the MICU. Heparin has been started.    REVIEW OF SYSTEMS:  Constitutional - no fevers, no chills, poor appetite improving earlier this week  Eyes - no conjunctivitis or discharge.  Ears / Nose / Mouth / Throat - no nose bleeds, no sore throat  Respiratory - states cough resolved on monday   Cardiovascular -denies chest pain, denies palpitations.  Gastrointestinal - no diarrhea, no vomiting, no melena or bright red blood  Genitourinary - no change in urination or hematuria.  Integumentary - no rash, jaundice, pallor, or color change.  Musculoskeletal - + RLE swelling and pain  Endocrine - no heat or cold intolerance, jitteriness, or failure to thrive.  Hematologic / Lymphatic - as above, s/p hemoptysis   Neurological - no seizures, no stroke like symptoms     PAST MEDICA/Surgical HISTORY:  as above s/p BT shunt, s/p TAPVR repair and re-repair, s/p bilateral bidirectional sparkle, s/p fenestrated fontan, s/p Fontan fenestration closure  chronic venous insufficiency 2/2 right femoral venous occlusion    MEDICATIONS:  digoxin     Tablet 0.25 milliGRAM(s) Oral daily  heparin  Infusion.  Unit(s)/Hr IV Continuous <Continuous>    FAMILY HISTORY:  There is no history of congenital heart disease, arrhythmias, or sudden cardiac death in family members.    SOCIAL HISTORY:  The patient lives with *mother and father.    PHYSICAL EXAMINATION:  Vital signs - Weight (kg): 50 ( @ 19:38)  afebrile  HR 80s  BPs 120s/60s  RR 16  Pox 90-92% on RA    General - non-dysmorphic appearance, laying in bed, some discomfort from RLE pain  Skin - pallor, no rash  Eyes / ENT - mmm, neck supple  Pulmonary - normal inspiratory effort, no retractions, lungs clear to auscultation bilaterally, no wheezes or rales.  Cardiovascular - normal rate, regular rhythm, s1 single s2 III/VI HSM at LMSB/LLSB, cap refil 2 seconds, mildly clubbed, short nails, pectus  Gastrointestinal - soft, mild distension  Musculoskeletal - tenderness to palpation of right medial thigh, pain with movement of RLE, visibily more swollen right calf > left calf; right calf venous stasis changes, + varicosties  Neurologic / Psychiatric - awake, alert    LABORATORY TESTS:                          10.1  CBC:   12.82 )-----------( 397   (18 @ 14:26)                          32.9               137   |  100   |  22                 Ca: 9.4    BMP:   ----------------------------< 96     M.3   (18 @ 14:26)             4.7    |  24    | 0.82               Ph: 3.9      LFT:     TPro: 7.0 / Alb: 3.4 / TBili: 1.8 / DBili: x / AST: 21 / ALT: 18 / AlkPhos: 140   (18 @ 14:26)    COAG: PT: 15.3 / PTT: 30.3 / INR: 1.32   (18 @ 18:00)     CARDIAC MARKERS:             Trop I: x / Trop T: x / CK: 28 / CKMB: x   (18 @ 14:26)    VBG:   pH: 7.43 / pCO2: 42 / pO2: < 24 / HCO3: 26 / Base Excess: 3.3 / SaO2: 27.4   (18 @ 14:22)    IMAGING STUDIES:  RLE doppler:    Right Findings: No evidence of pseudoaneurysm visualized  in the right inguinal region.  The right common femoral, superficial femoral, and  proximal deep femoral arteries are otherwise patent, and  demonstrate normal velocities with triphasic waveforms. No  hemodynamically significant stenosis.  Acute, occlusive, deep vein thrombosis noted in the right  external iliac and common femoral veins.  ------------------------------------------------------------------------  Summary/Impressions:  1. No pseudoaneurysm noted in the right groin.  2. Acute, occlusive, deep vein thrombosis noted in the  right external iliac and common femoral veins.  Results communicated to CARLOS Morrell on 18 at 4:30  pm.      CT abdomen/pelvis:  The IVC is left-sided. The abdominal aorta is right-sided.     Venous thrombus involves the right common femoral vein, right external   iliac vein, right common iliac vein and infrarenal IVC. There is also   venous thrombus within the right internal iliac vein and its branches.     Filling defect within the nonexpanded left external iliac and left common   femoral veins likely representing mixing artifact. The left internal   iliac vein is however also noted to be expanded with thrombus.    RETROPERITONEUM: Enlarged mesenteric lymph nodes of uncertain etiology a   reference mesenteric lymph node measures 2.0 x 0.8 cm (series 2/image   47).    ABDOMINAL WALL: Within normal limits.  BONES: Within normal limits.    IMPRESSION:   1.  Extensive venous thrombus extending from the right femoral vein to   the infrarenal inferior vena cava.  2.  Venous thrombus also involves the bilateral internal iliac veins.  3.  Heterotaxy syndrome with right-sided isomerism.  4.  Mesenteric adenopathy of uncertain etiology.

## 2018-03-30 NOTE — ED ADULT NURSE NOTE - OBJECTIVE STATEMENT
Alert, awake, oriented x4.  Pt is in visible distress, guarded.  Breathing spontaneously, 98% on RA.  Seen by MD Martínez.  IV accessed.  Labs sent.  NS infusing bolus.  4 of morphin given.  Oral contrast given at 240pm, drinking.  Sent to US with parents.  UA to be sent, pt made aware.  Handoff given to primary RN Kristofer.

## 2018-03-30 NOTE — ED ADULT TRIAGE NOTE - CHIEF COMPLAINT QUOTE
Pt coming from home, here for evaluation of right sided groin pain worse with coughing. now unable to ambulate due to pain, hx of congential heart defect and hemoptysis being followed by ENT, spo2 on room 88%(baseline for pt)

## 2018-03-30 NOTE — ED ADULT NURSE NOTE - CHIEF COMPLAINT
(coverage RN)  The patient is a 22y Male complaining of R side groin area x 3 days. Pmh of heterotaxy syndrome, asplenia, single ventricle, been coughing up blood x one month on and off, did bronchoscopy and CT yesterday without abnormal findings.  Groin pain is new, pt thinking of hernia, due to chronic cough.

## 2018-03-31 DIAGNOSIS — I82.409 ACUTE EMBOLISM AND THROMBOSIS OF UNSPECIFIED DEEP VEINS OF UNSPECIFIED LOWER EXTREMITY: ICD-10-CM

## 2018-03-31 LAB
ALBUMIN SERPL ELPH-MCNC: 3 G/DL — LOW (ref 3.3–5)
ALP SERPL-CCNC: 115 U/L — SIGNIFICANT CHANGE UP (ref 40–120)
ALT FLD-CCNC: 22 U/L — SIGNIFICANT CHANGE UP (ref 4–41)
APPEARANCE UR: CLEAR — SIGNIFICANT CHANGE UP
APTT BLD: 37 SEC — SIGNIFICANT CHANGE UP (ref 27.5–37.4)
APTT BLD: 38.2 SEC — HIGH (ref 27.5–37.4)
APTT BLD: 44.1 SEC — HIGH (ref 27.5–37.4)
APTT BLD: 46.1 SEC — HIGH (ref 27.5–37.4)
APTT BLD: 46.4 SEC — HIGH (ref 27.5–37.4)
APTT BLD: 83.9 SEC — HIGH (ref 27.5–37.4)
AST SERPL-CCNC: 99 U/L — HIGH (ref 4–40)
BASOPHILS # BLD AUTO: 0.08 K/UL — SIGNIFICANT CHANGE UP (ref 0–0.2)
BASOPHILS NFR BLD AUTO: 0.7 % — SIGNIFICANT CHANGE UP (ref 0–2)
BILIRUB SERPL-MCNC: 1.5 MG/DL — HIGH (ref 0.2–1.2)
BILIRUB UR-MCNC: NEGATIVE — SIGNIFICANT CHANGE UP
BLD GP AB SCN SERPL QL: NEGATIVE — SIGNIFICANT CHANGE UP
BLOOD UR QL VISUAL: NEGATIVE — SIGNIFICANT CHANGE UP
BUN SERPL-MCNC: 14 MG/DL — SIGNIFICANT CHANGE UP (ref 7–23)
BUN SERPL-MCNC: 15 MG/DL — SIGNIFICANT CHANGE UP (ref 7–23)
CALCIUM SERPL-MCNC: 8.1 MG/DL — LOW (ref 8.4–10.5)
CALCIUM SERPL-MCNC: 8.1 MG/DL — LOW (ref 8.4–10.5)
CHLORIDE SERPL-SCNC: 96 MMOL/L — LOW (ref 98–107)
CHLORIDE SERPL-SCNC: 97 MMOL/L — LOW (ref 98–107)
CO2 SERPL-SCNC: 23 MMOL/L — SIGNIFICANT CHANGE UP (ref 22–31)
CO2 SERPL-SCNC: 25 MMOL/L — SIGNIFICANT CHANGE UP (ref 22–31)
COLOR SPEC: YELLOW — SIGNIFICANT CHANGE UP
CREAT SERPL-MCNC: 0.61 MG/DL — SIGNIFICANT CHANGE UP (ref 0.5–1.3)
CREAT SERPL-MCNC: 0.69 MG/DL — SIGNIFICANT CHANGE UP (ref 0.5–1.3)
DIGOXIN SERPL-MCNC: 1.1 NG/ML — SIGNIFICANT CHANGE UP (ref 0.8–2)
EOSINOPHIL # BLD AUTO: 0.15 K/UL — SIGNIFICANT CHANGE UP (ref 0–0.5)
EOSINOPHIL NFR BLD AUTO: 1.4 % — SIGNIFICANT CHANGE UP (ref 0–6)
GLUCOSE SERPL-MCNC: 102 MG/DL — HIGH (ref 70–99)
GLUCOSE SERPL-MCNC: 102 MG/DL — HIGH (ref 70–99)
GLUCOSE UR-MCNC: NEGATIVE — SIGNIFICANT CHANGE UP
HCT VFR BLD CALC: 28.9 % — LOW (ref 39–50)
HGB BLD-MCNC: 9.2 G/DL — LOW (ref 13–17)
IMM GRANULOCYTES # BLD AUTO: 0.04 # — SIGNIFICANT CHANGE UP
IMM GRANULOCYTES NFR BLD AUTO: 0.4 % — SIGNIFICANT CHANGE UP (ref 0–1.5)
INR BLD: 1.37 — HIGH (ref 0.88–1.17)
KETONES UR-MCNC: NEGATIVE — SIGNIFICANT CHANGE UP
LEUKOCYTE ESTERASE UR-ACNC: NEGATIVE — SIGNIFICANT CHANGE UP
LYMPHOCYTES # BLD AUTO: 1.34 K/UL — SIGNIFICANT CHANGE UP (ref 1–3.3)
LYMPHOCYTES # BLD AUTO: 12.1 % — LOW (ref 13–44)
MCHC RBC-ENTMCNC: 26.4 PG — LOW (ref 27–34)
MCHC RBC-ENTMCNC: 31.8 % — LOW (ref 32–36)
MCV RBC AUTO: 83 FL — SIGNIFICANT CHANGE UP (ref 80–100)
MONOCYTES # BLD AUTO: 1.9 K/UL — HIGH (ref 0–0.9)
MONOCYTES NFR BLD AUTO: 17.2 % — HIGH (ref 2–14)
MUCOUS THREADS # UR AUTO: SIGNIFICANT CHANGE UP
NEUTROPHILS # BLD AUTO: 7.52 K/UL — HIGH (ref 1.8–7.4)
NEUTROPHILS NFR BLD AUTO: 68.2 % — SIGNIFICANT CHANGE UP (ref 43–77)
NITRITE UR-MCNC: NEGATIVE — SIGNIFICANT CHANGE UP
NRBC # FLD: 0.11 — SIGNIFICANT CHANGE UP
NRBC FLD-RTO: 1 — SIGNIFICANT CHANGE UP
PH UR: 6.5 — SIGNIFICANT CHANGE UP (ref 4.6–8)
PLATELET # BLD AUTO: 381 K/UL — SIGNIFICANT CHANGE UP (ref 150–400)
PMV BLD: SIGNIFICANT CHANGE UP FL (ref 7–13)
POTASSIUM SERPL-MCNC: 4.7 MMOL/L — SIGNIFICANT CHANGE UP (ref 3.5–5.3)
POTASSIUM SERPL-MCNC: SIGNIFICANT CHANGE UP MMOL/L (ref 3.5–5.3)
POTASSIUM SERPL-SCNC: 4.7 MMOL/L — SIGNIFICANT CHANGE UP (ref 3.5–5.3)
POTASSIUM SERPL-SCNC: SIGNIFICANT CHANGE UP MMOL/L (ref 3.5–5.3)
PROT SERPL-MCNC: 6.5 G/DL — SIGNIFICANT CHANGE UP (ref 6–8.3)
PROT UR-MCNC: 30 MG/DL — HIGH
PROTHROM AB SERPL-ACNC: 15.3 SEC — HIGH (ref 9.8–13.1)
RBC # BLD: 3.48 M/UL — LOW (ref 4.2–5.8)
RBC # FLD: 19.7 % — HIGH (ref 10.3–14.5)
RBC CASTS # UR COMP ASSIST: SIGNIFICANT CHANGE UP (ref 0–?)
RETICS #: 127 K/UL — HIGH (ref 25–125)
RETICS/RBC NFR: 3.6 % — HIGH (ref 0.5–2.5)
RH IG SCN BLD-IMP: NEGATIVE — SIGNIFICANT CHANGE UP
SODIUM SERPL-SCNC: 130 MMOL/L — LOW (ref 135–145)
SODIUM SERPL-SCNC: 132 MMOL/L — LOW (ref 135–145)
SP GR SPEC: > 1.04 — HIGH (ref 1–1.04)
SQUAMOUS # UR AUTO: SIGNIFICANT CHANGE UP
UROBILINOGEN FLD QL: NORMAL MG/DL — SIGNIFICANT CHANGE UP
WBC # BLD: 11.03 K/UL — HIGH (ref 3.8–10.5)
WBC # FLD AUTO: 11.03 K/UL — HIGH (ref 3.8–10.5)
WBC UR QL: SIGNIFICANT CHANGE UP (ref 0–?)

## 2018-03-31 PROCEDURE — 99233 SBSQ HOSP IP/OBS HIGH 50: CPT

## 2018-03-31 RX ORDER — HYDROMORPHONE HYDROCHLORIDE 2 MG/ML
0.5 INJECTION INTRAMUSCULAR; INTRAVENOUS; SUBCUTANEOUS ONCE
Qty: 0 | Refills: 0 | Status: DISCONTINUED | OUTPATIENT
Start: 2018-03-31 | End: 2018-03-31

## 2018-03-31 RX ORDER — MORPHINE SULFATE 50 MG/1
2 CAPSULE, EXTENDED RELEASE ORAL ONCE
Qty: 0 | Refills: 0 | Status: DISCONTINUED | OUTPATIENT
Start: 2018-03-31 | End: 2018-03-31

## 2018-03-31 RX ORDER — SODIUM CHLORIDE 9 MG/ML
1000 INJECTION, SOLUTION INTRAVENOUS
Qty: 0 | Refills: 0 | Status: DISCONTINUED | OUTPATIENT
Start: 2018-03-31 | End: 2018-04-02

## 2018-03-31 RX ORDER — HEPARIN SODIUM 5000 [USP'U]/ML
1600 INJECTION INTRAVENOUS; SUBCUTANEOUS
Qty: 25000 | Refills: 0 | Status: DISCONTINUED | OUTPATIENT
Start: 2018-03-31 | End: 2018-04-01

## 2018-03-31 RX ORDER — MORPHINE SULFATE 50 MG/1
4 CAPSULE, EXTENDED RELEASE ORAL EVERY 4 HOURS
Qty: 0 | Refills: 0 | Status: DISCONTINUED | OUTPATIENT
Start: 2018-03-31 | End: 2018-03-31

## 2018-03-31 RX ORDER — HYDROMORPHONE HYDROCHLORIDE 2 MG/ML
0.5 INJECTION INTRAMUSCULAR; INTRAVENOUS; SUBCUTANEOUS EVERY 4 HOURS
Qty: 0 | Refills: 0 | Status: DISCONTINUED | OUTPATIENT
Start: 2018-03-31 | End: 2018-04-01

## 2018-03-31 RX ORDER — MORPHINE SULFATE 50 MG/1
4 CAPSULE, EXTENDED RELEASE ORAL ONCE
Qty: 0 | Refills: 0 | Status: DISCONTINUED | OUTPATIENT
Start: 2018-03-31 | End: 2018-03-31

## 2018-03-31 RX ORDER — HEPARIN SODIUM 5000 [USP'U]/ML
1600 INJECTION INTRAVENOUS; SUBCUTANEOUS
Qty: 25000 | Refills: 0 | Status: DISCONTINUED | OUTPATIENT
Start: 2018-03-31 | End: 2018-03-31

## 2018-03-31 RX ORDER — MORPHINE SULFATE 50 MG/1
2 CAPSULE, EXTENDED RELEASE ORAL EVERY 4 HOURS
Qty: 0 | Refills: 0 | Status: DISCONTINUED | OUTPATIENT
Start: 2018-03-31 | End: 2018-03-31

## 2018-03-31 RX ADMIN — OXYCODONE HYDROCHLORIDE 5 MILLIGRAM(S): 5 TABLET ORAL at 10:00

## 2018-03-31 RX ADMIN — HYDROMORPHONE HYDROCHLORIDE 0.5 MILLIGRAM(S): 2 INJECTION INTRAMUSCULAR; INTRAVENOUS; SUBCUTANEOUS at 19:22

## 2018-03-31 RX ADMIN — HYDROMORPHONE HYDROCHLORIDE 0.5 MILLIGRAM(S): 2 INJECTION INTRAMUSCULAR; INTRAVENOUS; SUBCUTANEOUS at 20:22

## 2018-03-31 RX ADMIN — HEPARIN SODIUM 1100 UNIT(S)/HR: 5000 INJECTION INTRAVENOUS; SUBCUTANEOUS at 00:27

## 2018-03-31 RX ADMIN — HEPARIN SODIUM 20 UNIT(S)/HR: 5000 INJECTION INTRAVENOUS; SUBCUTANEOUS at 18:31

## 2018-03-31 RX ADMIN — OXYCODONE HYDROCHLORIDE 5 MILLIGRAM(S): 5 TABLET ORAL at 05:35

## 2018-03-31 RX ADMIN — HEPARIN SODIUM 1200 UNIT(S)/HR: 5000 INJECTION INTRAVENOUS; SUBCUTANEOUS at 06:40

## 2018-03-31 RX ADMIN — MORPHINE SULFATE 2 MILLIGRAM(S): 50 CAPSULE, EXTENDED RELEASE ORAL at 10:15

## 2018-03-31 RX ADMIN — HYDROMORPHONE HYDROCHLORIDE 0.5 MILLIGRAM(S): 2 INJECTION INTRAMUSCULAR; INTRAVENOUS; SUBCUTANEOUS at 05:43

## 2018-03-31 RX ADMIN — HYDROMORPHONE HYDROCHLORIDE 0.5 MILLIGRAM(S): 2 INJECTION INTRAMUSCULAR; INTRAVENOUS; SUBCUTANEOUS at 14:54

## 2018-03-31 RX ADMIN — HYDROMORPHONE HYDROCHLORIDE 0.5 MILLIGRAM(S): 2 INJECTION INTRAMUSCULAR; INTRAVENOUS; SUBCUTANEOUS at 20:37

## 2018-03-31 RX ADMIN — HYDROMORPHONE HYDROCHLORIDE 0.5 MILLIGRAM(S): 2 INJECTION INTRAMUSCULAR; INTRAVENOUS; SUBCUTANEOUS at 19:37

## 2018-03-31 RX ADMIN — OXYCODONE HYDROCHLORIDE 5 MILLIGRAM(S): 5 TABLET ORAL at 09:39

## 2018-03-31 RX ADMIN — HYDROMORPHONE HYDROCHLORIDE 0.5 MILLIGRAM(S): 2 INJECTION INTRAMUSCULAR; INTRAVENOUS; SUBCUTANEOUS at 05:55

## 2018-03-31 RX ADMIN — SODIUM CHLORIDE 75 MILLILITER(S): 9 INJECTION, SOLUTION INTRAVENOUS at 16:44

## 2018-03-31 RX ADMIN — MORPHINE SULFATE 4 MILLIGRAM(S): 50 CAPSULE, EXTENDED RELEASE ORAL at 08:10

## 2018-03-31 RX ADMIN — HYDROMORPHONE HYDROCHLORIDE 0.5 MILLIGRAM(S): 2 INJECTION INTRAMUSCULAR; INTRAVENOUS; SUBCUTANEOUS at 14:37

## 2018-03-31 RX ADMIN — MORPHINE SULFATE 4 MILLIGRAM(S): 50 CAPSULE, EXTENDED RELEASE ORAL at 08:30

## 2018-03-31 RX ADMIN — Medication 0.25 MILLIGRAM(S): at 05:43

## 2018-03-31 RX ADMIN — MORPHINE SULFATE 2 MILLIGRAM(S): 50 CAPSULE, EXTENDED RELEASE ORAL at 10:00

## 2018-03-31 RX ADMIN — HEPARIN SODIUM 1400 UNIT(S)/HR: 5000 INJECTION INTRAVENOUS; SUBCUTANEOUS at 12:55

## 2018-03-31 RX ADMIN — HEPARIN SODIUM 16 UNIT(S)/HR: 5000 INJECTION INTRAVENOUS; SUBCUTANEOUS at 16:44

## 2018-03-31 RX ADMIN — HEPARIN SODIUM 20 UNIT(S)/HR: 5000 INJECTION INTRAVENOUS; SUBCUTANEOUS at 23:26

## 2018-03-31 RX ADMIN — SODIUM CHLORIDE 75 MILLILITER(S): 9 INJECTION, SOLUTION INTRAVENOUS at 18:31

## 2018-03-31 RX ADMIN — SODIUM CHLORIDE 75 MILLILITER(S): 9 INJECTION, SOLUTION INTRAVENOUS at 05:44

## 2018-03-31 RX ADMIN — SODIUM CHLORIDE 75 MILLILITER(S): 9 INJECTION, SOLUTION INTRAVENOUS at 08:52

## 2018-03-31 RX ADMIN — OXYCODONE HYDROCHLORIDE 5 MILLIGRAM(S): 5 TABLET ORAL at 05:05

## 2018-03-31 RX ADMIN — HEPARIN SODIUM 18 UNIT(S)/HR: 5000 INJECTION INTRAVENOUS; SUBCUTANEOUS at 23:30

## 2018-03-31 NOTE — PROGRESS NOTE ADULT - ASSESSMENT
23 yo M w/ PMH congenital heart disease/heterotaxy s/p multiple cardiac surgeries, hx of thrombosis of the right femoral vein, PE in 2014 s/p Eliquis course, hemoptysis with recent workup at [facility] found to be negative, p/w R thigh pain found to have extensive RLE DVT.    Neuro:  - AAOx3 without active issues    CV:  - Complicated cardiovascular h/o in setting of heterotaxy/congenital heart disease s/p multiple surgeries including Fontan procedure.  - Patient's Pediatric Cardiologist is Dr Rojas who has been consulted and will follow. Appreciate their recs.   - Of note patient pre-load dependent and at high risk for massive PE given IVC is directly anastomosed with Pulmonary artery  - currently on heparin gtt  - Patient preload dependent   - Ongoing discussion between vascular surgery (Dr. Cadet), IR, and pediatric cardiology as to best approach to clot.      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 He experienced transient hypoxemia at baseline even prior to this.   -Extensive RLE DVT extending up to infrarenal IVC.   - On full AC with heparin gtt and given high risk for bleeding will need closer monitoring    GI:  - Continue w/ regular diet    Renal:  - No acute issues    Heme:  - extensive RLE DVT up to infrarenal IVC  - on heparin gtt  - Hemoglobin 10.1, outside records w/ hemoglobin 10.5, cont to monitor on A/C - likely 2/2 chronic blood loss from hemoptysis    ID:  - Leukocytosis without any other signs of infection, continue to monitor - likely reactive due to current DVT  - Currently no evidence of active infection    DVT ppx:  - starting heparin for full AC    Ethics:  - full code

## 2018-03-31 NOTE — PROGRESS NOTE ADULT - SUBJECTIVE AND OBJECTIVE BOX
Adult Congenital Heart Disease    INTERVAL HISTORY:  Continues on heparin drip, no bleeding. One episode of significant pain- improved with pain meds.  Denies chest pain or shortness of breath.      RESPIRATORY SUPPORT:     03-30 @ 07:01  -  03-31 @ 07:00  --------------------------------------------------------  IN: 1381 mL / OUT: 600 mL / NET: 781 mL      CHEST TUBE OUTPUT: * mL/24h, * mL/12h  INTRAVASCULAR ACCESS: *    MEDICATIONS:  digoxin     Tablet 0.25 milliGRAM(s) Oral daily  dextrose 5% + lactated ringers. 1000 milliLiter(s) IV Continuous <Continuous>  heparin  Infusion.  Unit(s)/Hr IV Continuous <Continuous>    PHYSICAL EXAMINATION:  Vital signs - Weight (kg): 50 (03-30 @ 19:38)  T(C): 36.8 (03-31-18 @ 04:00), Max: 36.8 (03-31-18 @ 04:00)  HR: 93 (03-31-18 @ 06:00) (73 - 100)  BP: 125/65 (03-31-18 @ 06:00) (104/57 - 129/74)  ABP: --  RR: 13 (03-31-18 @ 06:00) (13 - 26)  SpO2: 94% (03-31-18 @ 06:00) (88% - 99%)  CVP(mm Hg): --  General - non-dysmorphic appearance, thin, comfortale  Skin - pallor  Eyes / ENT - no conjunctival injection, sclerae anicteric, external ears & nares normal, mucous membranes moist.  Pulmonary - normal inspiratory effort, no retractions, lungs clear to auscultation bilaterally, no wheezes, no rales.  Cardiovascular - normal rate, regular rhythm, s1 single s2, 3/6 HSM at LMSB, pectus  Gastrointestinal - soft, non-distended, non-tender, + bowel sounds  Musculoskeletal - RLE edematous, tender to palpation at calf  Neurologic / Psychiatric - alert, oriented as age-appropriate    LABORATORY TESTS:                          9.2  CBC:   11.03 )-----------( 381   (03-31-18 @ 03:20)                          28.9               132   |  97    |  14                 Ca: 8.1    BMP:   ----------------------------< 102    Mg: x     (03-31-18 @ 05:45)             4.7    |  23    | 0.69               Ph: x        LFT:     TPro: 6.5 / Alb: 3.0 / TBili: 1.5 / DBili: x / AST: 99 / ALT: 22 / AlkPhos: 115   (03-31-18 @ 03:20)    COAG: PT: x / PTT: 44.1 / INR: x   (03-31-18 @ 06:10)     CARDIAC MARKERS:             Trop I: x / Trop T: x / CK: 28 / CKMB: x   (03-30-18 @ 14:26)             Pro-BNP: x   (03-30-18 @ 14:26)    VBG:   pH: 7.43 / pCO2: 42 / pO2: < 24 / HCO3: 26 / Base Excess: 3.3 / SaO2: 27.4   (03-30-18 @ 14:22)    IMAGING STUDIES:  telemetry: sinus rhythm, PVCs, rare ventricular couplets

## 2018-03-31 NOTE — PROGRESS NOTE ADULT - ASSESSMENT
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  No recurrence of hemoptysis while on therapeutic heparin- working on getting PTT therapeutic. Vascular surgery consulted and agree with heparinization at this point    - continue heparin drip  - continue digoxin  - monitor serial H/H, monitor for recurrence of hemoptysis  - goal sats > 85% on room air  - continue IVFs  - ongoing discussion about treatment about potential may sebleer variant

## 2018-03-31 NOTE — PROGRESS NOTE ADULT - SUBJECTIVE AND OBJECTIVE BOX
CHIEF COMPLAINT:    Interval Events:    REVIEW OF SYSTEMS:  Constitutional: [ ] negative [ ] fevers [ ] chills [ ] weight loss [ ] weight gain  HEENT: [ ] negative [ ] dry eyes [ ] eye irritation [ ] postnasal drip [ ] nasal congestion  CV: [ ] negative  [ ] chest pain [ ] orthopnea [ ] palpitations [ ] murmur  Resp: [ ] negative [ ] cough [ ] shortness of breath [ ] dyspnea [ ] wheezing [ ] sputum [ ] hemoptysis  GI: [ ] negative [ ] nausea [ ] vomiting [ ] diarrhea [ ] constipation [ ] abd pain [ ] dysphagia   : [ ] negative [ ] dysuria [ ] nocturia [ ] hematuria [ ] increased urinary frequency  Musculoskeletal: [ ] negative [ ] back pain [ ] myalgias [ ] arthralgias [ ] fracture  Skin: [ ] negative [ ] rash [ ] itch  Neurological: [ ] negative [ ] headache [ ] dizziness [ ] syncope [ ] weakness [ ] numbness  Psychiatric: [ ] negative [ ] anxiety [ ] depression  Endocrine: [ ] negative [ ] diabetes [ ] thyroid problem  Hematologic/Lymphatic: [ ] negative [ ] anemia [ ] bleeding problem  Allergic/Immunologic: [ ] negative [ ] itchy eyes [ ] nasal discharge [ ] hives [ ] angioedema  [ ] All other systems negative  [ ] Unable to assess ROS because ________    OBJECTIVE:  ICU Vital Signs Last 24 Hrs  T(C): 36.8 (31 Mar 2018 04:00), Max: 36.8 (31 Mar 2018 04:00)  T(F): 98.2 (31 Mar 2018 04:00), Max: 98.2 (31 Mar 2018 04:00)  HR: 91 (31 Mar 2018 07:00) (73 - 100)  BP: 116/61 (31 Mar 2018 07:00) (104/57 - 129/74)  BP(mean): 74 (31 Mar 2018 07:00) (69 - 83)  ABP: --  ABP(mean): --  RR: 14 (31 Mar 2018 07:00) (13 - 26)  SpO2: 91% (31 Mar 2018 07:00) (88% - 99%)    03-30 @ 07:01  -  03-31 @ 07:00  --------------------------------------------------------  IN: 1381 mL / OUT: 600 mL / NET: 781 mL    CAPILLARY BLOOD GLUCOSE      PHYSICAL EXAM:  General:   HEENT:   Lymph Nodes:  Neck:   Respiratory:   Cardiovascular:   Abdomen:   Extremities:   Skin:   Neurological:  Psychiatry:    LINES: [facility]    HOSPITAL MEDICATIONS:  heparin  Infusion.  Unit(s)/Hr IV Continuous <Continuous>  digoxin     Tablet 0.25 milliGRAM(s) Oral daily  oxyCODONE    IR 5 milliGRAM(s) Oral every 4 hours PRN  dextrose 5% + lactated ringers. 1000 milliLiter(s) IV Continuous <Continuous>    LABS:                        9.2    11.03 )-----------( 381      ( 31 Mar 2018 03:20 )             28.9     Hgb Trend: 9.2<--, 9.2<--, 10.1<--  03-31    132<L>  |  97<L>  |  14  ----------------------------<  102<H>  4.7   |  23  |  0.69    Ca    8.1<L>      31 Mar 2018 05:45  Phos  3.9     03-30  Mg     2.3     03-30    TPro  6.5  /  Alb  3.0<L>  /  TBili  1.5<H>  /  DBili  x   /  AST  99<H>  /  ALT  22  /  AlkPhos  115  03-31    Creatinine Trend: 0.69<--, 0.61<--, 0.82<--, 0.88<--, 0.96<--, 1.03<--  PT/INR - ( 31 Mar 2018 03:20 )   PT: 15.3 SEC;   INR: 1.37          PTT - ( 31 Mar 2018 06:10 )  PTT:44.1 SEC  Urinalysis Basic - ( 31 Mar 2018 02:30 )    Color: YELLOW / Appearance: CLEAR / SG: > 1.040 / pH: 6.5  Gluc: NEGATIVE / Ketone: NEGATIVE  / Bili: NEGATIVE / Urobili: NORMAL mg/dL   Blood: NEGATIVE / Protein: 30 mg/dL / Nitrite: NEGATIVE   Leuk Esterase: NEGATIVE / RBC: 2-5 / WBC 2-5   Sq Epi: OCC / Non Sq Epi: x / Bacteria: x    Venous Blood Gas:  03-30 @ 14:22  7.43/42/< 24/26/27.4  VBG Lactate: 1.3    MICROBIOLOGY: N/A    RADIOLOGY:  [X] Reviewed and interpreted by me    IMPRESSION:   1.  Extensive venous thrombus extending from the right femoral vein to   the infrarenal inferior vena cava.  2.  Venous thrombus also involves the bilateral internal iliac veins.  3.  Heterotaxy syndrome with right-sided isomerism.  4.  Mesenteric adenopathy of uncertain etiology.  5.  Dr. Morrow discussed the above findings with Dr. Dee at 6:45   PM on 3/30/2018 with read back.      EKG: CHIEF COMPLAINT: leg pain    Interval Events: Overnight with significant pain in the R leg and groin. Improved slightly with morphine and dilaudid.     REVIEW OF SYSTEMS:  Constitutional: [X ] negative [ ] fevers [ ] chills [ ] weight loss [ ] weight gain  HEENT: [X ] negative [ ] dry eyes [ ] eye irritation [ ] postnasal drip [ ] nasal congestion  CV: [X ] negative  [ ] chest pain [ ] orthopnea [ ] palpitations [ ] murmur  Resp: [X ] negative [ ] cough [ ] shortness of breath [ ] dyspnea [ ] wheezing [ ] sputum [ ] hemoptysis  GI: [X ] negative [ ] nausea [ ] vomiting [ ] diarrhea [ ] constipation [ ] abd pain [ ] dysphagia   : [ X] negative [ ] dysuria [ ] nocturia [ ] hematuria [ ] increased urinary frequency  Musculoskeletal: [ ] negative [X ] back pain [X ] myalgias [ ] arthralgias [ ] fracture  Skin: [X ] negative [ ] rash [ ] itch  Neurological: [X ] negative [ ] headache [ ] dizziness [ ] syncope [ ] weakness [ ] numbness  Psychiatric: [X ] negative [ ] anxiety [ ] depression  Endocrine: [ X] negative [ ] diabetes [ ] thyroid problem  Hematologic/Lymphatic: [ X] negative [ ] anemia [ ] bleeding problem  Allergic/Immunologic: [ X] negative [ ] itchy eyes [ ] nasal discharge [ ] hives [ ] angioedema  [X ] All other systems negative  [ ] Unable to assess ROS because ________    OBJECTIVE:  ICU Vital Signs Last 24 Hrs  T(C): 36.8 (31 Mar 2018 04:00), Max: 36.8 (31 Mar 2018 04:00)  T(F): 98.2 (31 Mar 2018 04:00), Max: 98.2 (31 Mar 2018 04:00)  HR: 91 (31 Mar 2018 07:00) (73 - 100)  BP: 116/61 (31 Mar 2018 07:00) (104/57 - 129/74)  BP(mean): 74 (31 Mar 2018 07:00) (69 - 83)  ABP: --  ABP(mean): --  RR: 14 (31 Mar 2018 07:00) (13 - 26)  SpO2: 91% (31 Mar 2018 07:00) (88% - 99%)    03-30 @ 07:01 - 03-31 @ 07:00  --------------------------------------------------------  IN: 1381 mL / OUT: 600 mL / NET: 781 mL    CAPILLARY BLOOD GLUCOSE    PHYSICAL EXAM:  General: NAD  Neurology: A&Ox3, nonfocal  HEENT: PERRL, EOMI, Mucosa moist, no ulcerations  Neck:  Supple, no sinuses or palpable masses  Lymphatic:  No palpable cervical, supraclavicular adenopathy  Respiratory: CTA B/L  CV: RRR, S1S2, III/VI holosystolic murmur  Abdominal: Soft, NT, ND no palpable mass  MSK: trace pitting edema RLE, + peripheral pulses, +R thigh tenderness  Extremities: RLE w/ bulging vein slight increase in size of RLE>LLE. +right thigh and gin tenderness no erythema and warmth. No LE wounds; old RLE medial ankle wound scar with no evidence of infection.  Psych: anxious and depressed affect    LINES: [facility] MEDICATIONS:  heparin  Infusion.  Unit(s)/Hr IV Continuous <Continuous>  digoxin     Tablet 0.25 milliGRAM(s) Oral daily  oxyCODONE    IR 5 milliGRAM(s) Oral every 4 hours PRN  dextrose 5% + lactated ringers. 1000 milliLiter(s) IV Continuous <Continuous>    LABS:                        9.2    11.03 )-----------( 381      ( 31 Mar 2018 03:20 )             28.9     Hgb Trend: 9.2<--, 9.2<--, 10.1<--  03-31    132<L>  |  97<L>  |  14  ----------------------------<  102<H>  4.7   |  23  |  0.69    Ca    8.1<L>      31 Mar 2018 05:45  Phos  3.9     03-30  Mg     2.3     03-30    TPro  6.5  /  Alb  3.0<L>  /  TBili  1.5<H>  /  DBili  x   /  AST  99<H>  /  ALT  22  /  AlkPhos  115  03-31    Creatinine Trend: 0.69<--, 0.61<--, 0.82<--, 0.88<--, 0.96<--, 1.03<--  PT/INR - ( 31 Mar 2018 03:20 )   PT: 15.3 SEC;   INR: 1.37          PTT - ( 31 Mar 2018 06:10 )  PTT:44.1 SEC  Urinalysis Basic - ( 31 Mar 2018 02:30 )    Color: YELLOW / Appearance: CLEAR / SG: > 1.040 / pH: 6.5  Gluc: NEGATIVE / Ketone: NEGATIVE  / Bili: NEGATIVE / Urobili: NORMAL mg/dL   Blood: NEGATIVE / Protein: 30 mg/dL / Nitrite: NEGATIVE   Leuk Esterase: NEGATIVE / RBC: 2-5 / WBC 2-5   Sq Epi: OCC / Non Sq Epi: x / Bacteria: x    Venous Blood Gas:  03-30 @ 14:22  7.43/42/< 24/26/27.4  VBG Lactate: 1.3    MICROBIOLOGY: N/A    RADIOLOGY:  [X] Reviewed and interpreted by me    IMPRESSION:   1.  Extensive venous thrombus extending from the right femoral vein to   the infrarenal inferior vena cava.  2.  Venous thrombus also involves the bilateral internal iliac veins.  3.  Heterotaxy syndrome with right-sided isomerism.  4.  Mesenteric adenopathy of uncertain etiology.  5.  Dr. Morrow discussed the above findings with Dr. Dee at 6:45   PM on 3/30/2018 with read back.    EKG: sinus tachycardia

## 2018-03-31 NOTE — CONSULT NOTE ADULT - SUBJECTIVE AND OBJECTIVE BOX
21yo M with extensive cardiac history including multiple corrective surgeries at an early age, now presents with acute onset R groin/upper thigh pain. Pt is usually followed as an outpatient by Dr. Cadet (vascular surgery) for his RLE varicose veins and chronic RLE wounds 2/2 venous stasis. He was recently admitted to the MICU for hemoptysis of unclear etiology; resolved on its own; full workup negative for source. He now presents to [facility] with R thigh/groin/hip pain since Wednesday (two days prior to presentation). Worse with ambulation. Of note, pt does have a history of multiple interventions involving catheterizing the R femoral artery. He has no history of any arterial insufficiency, no history of DVT (prior documentation notes a DVT treated with Eliquis but the family states this was misdiagnosed). Pt Denies fevers, chills, CP, Abdominal pain, rhinorrhea, new rashes or wounds to his RLE and no trauma, new visual changes, confusion, headaches, blood in stools, N/V, dysuria, and hematuria.    PMH  Spleen absent  TAPVR (total anomalous pulmonary venous return)  Heterotaxy syndrome with asplenia  Single ventricle    PSH  H/O heart surgery    Allergies  No Known Allergies    Physical Exam  T(C): 36.7 (03-31-18 @ 00:00), Max: 36.7 (03-31-18 @ 00:00)  HR: 90 (03-31-18 @ 00:00) (73 - 95)  BP: 112/61 (03-31-18 @ 00:00) (110/66 - 129/74)  RR: 16 (03-31-18 @ 00:00) (16 - 22)  SpO2: 94% (03-31-18 @ 00:00) (88% - 99%)  Wt(kg): --  Tmax: T(C): , Max: 36.7 (03-31-18 @ 00:00)  Wt(kg): --    Gen: NAD  HEENT: normocephalic, atraumatic, no scleral icterus  CV: S1, S2, RRR  Pulm: CTA B/L  Abd: Soft, ND, NTP, no rebound, no guarding, no palpable organomegaly/masses  Ext: warm, no edema, palp dp/pt  R groin: focal tenderness overlying R upper thigh and up onto the RLQ abd. No erythema, no skin changes, motorsensory intact in the RLE.    03-30-18  -  03-31-18  --------------------------------------------------------  IN:    dextrose 5% + sodium chloride 0.3%.: 225 mL    heparin  Infusion.: 54 mL    Sodium Chloride 0.9% IV Bolus: 500 mL  Total IN: 779 mL    OUT:  Total OUT: 0 mL    Total NET: 779 mL    Labs:                        9.2    12.38 )-----------( 371      ( 30 Mar 2018 23:30 )             30.2     03-30    137  |  100  |  22  ----------------------------<  96  4.7   |  24  |  0.82    Ca    9.4      30 Mar 2018 14:26  Phos  3.9     03-30  Mg     2.3     03-30    TPro  7.0  /  Alb  3.4  /  TBili  1.8<H>  /  DBili  x   /  AST  21  /  ALT  18  /  AlkPhos  140<H>  03-30    PT/INR - ( 30 Mar 2018 18:00 )   PT: 15.3 SEC;   INR: 1.32          PTT - ( 30 Mar 2018 23:30 )  PTT:37.0 SEC    Imaging    < from: CT Abdomen and Pelvis w/ Oral Cont and w/ IV Cont (03.30.18 @ 18:21) >  IMPRESSION:   1.  Extensive venous thrombus extending from the right femoral vein to   the infrarenal inferior vena cava.  2.  Venous thrombus also involves the bilateral internal iliac veins.  3.  Heterotaxy syndrome with right-sided isomerism.  4.  Mesenteric adenopathy of uncertain etiology.    < end of copied text >    < from: US Duplex Venous Lower Ext Ltd, Right (03.30.18 @ 15:29) >  IMPRESSION:     Extensive DVT right leg. Upper extent not identified. Suggest further   imaging with CT or MRI venography.    < end of copied text >

## 2018-03-31 NOTE — CONSULT NOTE ADULT - ASSESSMENT
21yo M with heterotaxy syndrome (R-sided isomerism), multiple congenital heart defects s/p repair, now with extensive DVT in the RLE from mid-thigh to the abdomen. 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 Potential risk factor for R-sided May-Thurner Syndrome (which would be unusual as MTS typically occurs on the left with the left iliac artery crossing over the left iliac vein.  - Agree with heparin gtt. Goal PTT 60-90  - Would hold on thrombolysis in the setting of hemoptysis of unclear etiology  - D/w vascular fellow on call    C team  [id number]
22 year old male with heterotaxy syndrome with asplenia and complex single ventricle anatomy with Fontan physiology now presented with acute right lower extremity pain and swelling and found to have extensive DVT on the right side as outlined above.  Earlier this month, he presented with worsening of chronic, intermittent hemoptysis leading to a significant drop in hemoglobin.  An extensive work up has not revealed an active bleeding source and based on symptoms, active bleeding may have stopped earlier this week, supported by the relatively stable H/H comparing today's and his most recent outpatient H/H.  In light of his active bleeding, we made the decision to hold his Aspirin.    Adult Fontan patients are at increased risk for thrombosis and thromboembolism related to a variety of factors including nonpulsatile flow, inherent disorder of pro and anti coagulants, prosthetic material, arrhythmia.  Kang also has the added risks of a prior right femoral vein injury related to a prior catheterization and more recently, a sedentary lifestyle related to ongoing hemoptysis.    This degree of thrombosis is worrisome and requires close monitoring and treatment.  Appreciate the MICU's evaluation and agreement to take him to the unit.  He will start systemic anticoagulation with heparin for now.  Will need to monitor closely for recurrence of hemoptysis. Awaiting recommendations from vascular surgery.      - maintain 2 large bore PIVs  - keep an active type & screen  - heparin drip for treatment of DVT  - monitor for bleeding given recent history  - Fontan patients are preload dependent- would give 500 ml fluid bolus given poor PO intake and keep on maintenance IV fluids given poor PO and 2 recent IV contrast exposures  - keep on telemetry, continue digoxin  - f/u Vascular surgery recommendations  - continue heparin for now; will discuss transition to oral agent over the next few days-  VKA vs. DOAC depending on clinical course  - okay for baseline saturations to be as low as 85% on room air  - embolism to the pulmonary bed would be life-threatening and manifest withsignificant respiratory distress, severe hypoxia, low cardiac output

## 2018-03-31 NOTE — CONSULT NOTE ADULT - PROVIDER SPECIALTY LIST ADULT
Per pt request, form completed and endorsed by Dr Hansen.  Please inform pt.  [phone].  Thank you.    Original to pt.  Copy to medical records.  
Vascular Surgery
Cardiology

## 2018-04-01 LAB
ALBUMIN SERPL ELPH-MCNC: 2.9 G/DL — LOW (ref 3.3–5)
ALP SERPL-CCNC: 139 U/L — HIGH (ref 40–120)
ALT FLD-CCNC: 17 U/L — SIGNIFICANT CHANGE UP (ref 4–41)
APTT BLD: 108.8 SEC — HIGH (ref 27.5–37.4)
APTT BLD: 49.7 SEC — HIGH (ref 27.5–37.4)
APTT BLD: 78.6 SEC — HIGH (ref 27.5–37.4)
AST SERPL-CCNC: 22 U/L — SIGNIFICANT CHANGE UP (ref 4–40)
BASOPHILS # BLD AUTO: 0.11 K/UL — SIGNIFICANT CHANGE UP (ref 0–0.2)
BASOPHILS NFR BLD AUTO: 1 % — SIGNIFICANT CHANGE UP (ref 0–2)
BILIRUB SERPL-MCNC: 1.5 MG/DL — HIGH (ref 0.2–1.2)
BUN SERPL-MCNC: 15 MG/DL — SIGNIFICANT CHANGE UP (ref 7–23)
CALCIUM SERPL-MCNC: 8.1 MG/DL — LOW (ref 8.4–10.5)
CHLORIDE SERPL-SCNC: 97 MMOL/L — LOW (ref 98–107)
CO2 SERPL-SCNC: 26 MMOL/L — SIGNIFICANT CHANGE UP (ref 22–31)
CREAT SERPL-MCNC: 0.75 MG/DL — SIGNIFICANT CHANGE UP (ref 0.5–1.3)
EOSINOPHIL # BLD AUTO: 0.32 K/UL — SIGNIFICANT CHANGE UP (ref 0–0.5)
EOSINOPHIL NFR BLD AUTO: 3 % — SIGNIFICANT CHANGE UP (ref 0–6)
GLUCOSE SERPL-MCNC: 116 MG/DL — HIGH (ref 70–99)
HCT VFR BLD CALC: 28.4 % — LOW (ref 39–50)
HGB BLD-MCNC: 9 G/DL — LOW (ref 13–17)
IMM GRANULOCYTES # BLD AUTO: 0.06 # — SIGNIFICANT CHANGE UP
IMM GRANULOCYTES NFR BLD AUTO: 0.6 % — SIGNIFICANT CHANGE UP (ref 0–1.5)
LYMPHOCYTES # BLD AUTO: 1.31 K/UL — SIGNIFICANT CHANGE UP (ref 1–3.3)
LYMPHOCYTES # BLD AUTO: 12.2 % — LOW (ref 13–44)
MAGNESIUM SERPL-MCNC: 2 MG/DL — SIGNIFICANT CHANGE UP (ref 1.6–2.6)
MCHC RBC-ENTMCNC: 26.1 PG — LOW (ref 27–34)
MCHC RBC-ENTMCNC: 31.7 % — LOW (ref 32–36)
MCV RBC AUTO: 82.3 FL — SIGNIFICANT CHANGE UP (ref 80–100)
MONOCYTES # BLD AUTO: 2.23 K/UL — HIGH (ref 0–0.9)
MONOCYTES NFR BLD AUTO: 20.8 % — HIGH (ref 2–14)
NEUTROPHILS # BLD AUTO: 6.67 K/UL — SIGNIFICANT CHANGE UP (ref 1.8–7.4)
NEUTROPHILS NFR BLD AUTO: 62.4 % — SIGNIFICANT CHANGE UP (ref 43–77)
NRBC # FLD: 0.17 — SIGNIFICANT CHANGE UP
NRBC FLD-RTO: 1.6 — SIGNIFICANT CHANGE UP
PHOSPHATE SERPL-MCNC: 3.7 MG/DL — SIGNIFICANT CHANGE UP (ref 2.5–4.5)
PLATELET # BLD AUTO: 381 K/UL — SIGNIFICANT CHANGE UP (ref 150–400)
PMV BLD: 13 FL — SIGNIFICANT CHANGE UP (ref 7–13)
POTASSIUM SERPL-MCNC: 4.4 MMOL/L — SIGNIFICANT CHANGE UP (ref 3.5–5.3)
POTASSIUM SERPL-SCNC: 4.4 MMOL/L — SIGNIFICANT CHANGE UP (ref 3.5–5.3)
PROT SERPL-MCNC: 6.1 G/DL — SIGNIFICANT CHANGE UP (ref 6–8.3)
RBC # BLD: 3.45 M/UL — LOW (ref 4.2–5.8)
RBC # FLD: 18.8 % — HIGH (ref 10.3–14.5)
SODIUM SERPL-SCNC: 133 MMOL/L — LOW (ref 135–145)
WBC # BLD: 10.7 K/UL — HIGH (ref 3.8–10.5)
WBC # FLD AUTO: 10.7 K/UL — HIGH (ref 3.8–10.5)

## 2018-04-01 RX ORDER — HYDROMORPHONE HYDROCHLORIDE 2 MG/ML
0.5 INJECTION INTRAMUSCULAR; INTRAVENOUS; SUBCUTANEOUS
Qty: 0 | Refills: 0 | Status: DISCONTINUED | OUTPATIENT
Start: 2018-04-01 | End: 2018-04-04

## 2018-04-01 RX ORDER — DOCUSATE SODIUM 100 MG
100 CAPSULE ORAL DAILY
Qty: 0 | Refills: 0 | Status: DISCONTINUED | OUTPATIENT
Start: 2018-04-01 | End: 2018-04-03

## 2018-04-01 RX ORDER — HEPARIN SODIUM 5000 [USP'U]/ML
2000 INJECTION INTRAVENOUS; SUBCUTANEOUS
Qty: 25000 | Refills: 0 | Status: DISCONTINUED | OUTPATIENT
Start: 2018-04-01 | End: 2018-04-02

## 2018-04-01 RX ORDER — ASCORBIC ACID 60 MG
500 TABLET,CHEWABLE ORAL DAILY
Qty: 0 | Refills: 0 | Status: DISCONTINUED | OUTPATIENT
Start: 2018-04-01 | End: 2018-04-03

## 2018-04-01 RX ORDER — HYDROMORPHONE HYDROCHLORIDE 2 MG/ML
0.5 INJECTION INTRAMUSCULAR; INTRAVENOUS; SUBCUTANEOUS ONCE
Qty: 0 | Refills: 0 | Status: DISCONTINUED | OUTPATIENT
Start: 2018-04-01 | End: 2018-04-01

## 2018-04-01 RX ORDER — HYDROMORPHONE HYDROCHLORIDE 2 MG/ML
1 INJECTION INTRAMUSCULAR; INTRAVENOUS; SUBCUTANEOUS ONCE
Qty: 0 | Refills: 0 | Status: DISCONTINUED | OUTPATIENT
Start: 2018-04-01 | End: 2018-04-01

## 2018-04-01 RX ORDER — SENNA PLUS 8.6 MG/1
1 TABLET ORAL DAILY
Qty: 0 | Refills: 0 | Status: DISCONTINUED | OUTPATIENT
Start: 2018-04-01 | End: 2018-04-03

## 2018-04-01 RX ORDER — HEPARIN SODIUM 5000 [USP'U]/ML
4000 INJECTION INTRAVENOUS; SUBCUTANEOUS EVERY 6 HOURS
Qty: 0 | Refills: 0 | Status: DISCONTINUED | OUTPATIENT
Start: 2018-04-01 | End: 2018-04-06

## 2018-04-01 RX ORDER — HEPARIN SODIUM 5000 [USP'U]/ML
2000 INJECTION INTRAVENOUS; SUBCUTANEOUS EVERY 6 HOURS
Qty: 0 | Refills: 0 | Status: DISCONTINUED | OUTPATIENT
Start: 2018-04-01 | End: 2018-04-06

## 2018-04-01 RX ORDER — FERROUS SULFATE 325(65) MG
325 TABLET ORAL DAILY
Qty: 0 | Refills: 0 | Status: DISCONTINUED | OUTPATIENT
Start: 2018-04-01 | End: 2018-04-06

## 2018-04-01 RX ADMIN — Medication 325 MILLIGRAM(S): at 11:26

## 2018-04-01 RX ADMIN — HEPARIN SODIUM 17 UNIT(S)/HR: 5000 INJECTION INTRAVENOUS; SUBCUTANEOUS at 16:26

## 2018-04-01 RX ADMIN — SODIUM CHLORIDE 75 MILLILITER(S): 9 INJECTION, SOLUTION INTRAVENOUS at 16:27

## 2018-04-01 RX ADMIN — HYDROMORPHONE HYDROCHLORIDE 1 MILLIGRAM(S): 2 INJECTION INTRAMUSCULAR; INTRAVENOUS; SUBCUTANEOUS at 12:30

## 2018-04-01 RX ADMIN — HEPARIN SODIUM 17 UNIT(S)/HR: 5000 INJECTION INTRAVENOUS; SUBCUTANEOUS at 14:27

## 2018-04-01 RX ADMIN — HYDROMORPHONE HYDROCHLORIDE 0.5 MILLIGRAM(S): 2 INJECTION INTRAMUSCULAR; INTRAVENOUS; SUBCUTANEOUS at 04:36

## 2018-04-01 RX ADMIN — HEPARIN SODIUM 2000 UNIT(S)/HR: 5000 INJECTION INTRAVENOUS; SUBCUTANEOUS at 21:15

## 2018-04-01 RX ADMIN — HYDROMORPHONE HYDROCHLORIDE 0.5 MILLIGRAM(S): 2 INJECTION INTRAMUSCULAR; INTRAVENOUS; SUBCUTANEOUS at 21:09

## 2018-04-01 RX ADMIN — HYDROMORPHONE HYDROCHLORIDE 0.5 MILLIGRAM(S): 2 INJECTION INTRAMUSCULAR; INTRAVENOUS; SUBCUTANEOUS at 07:35

## 2018-04-01 RX ADMIN — HYDROMORPHONE HYDROCHLORIDE 0.5 MILLIGRAM(S): 2 INJECTION INTRAMUSCULAR; INTRAVENOUS; SUBCUTANEOUS at 23:58

## 2018-04-01 RX ADMIN — HYDROMORPHONE HYDROCHLORIDE 0.5 MILLIGRAM(S): 2 INJECTION INTRAMUSCULAR; INTRAVENOUS; SUBCUTANEOUS at 19:00

## 2018-04-01 RX ADMIN — Medication 100 MILLIGRAM(S): at 11:26

## 2018-04-01 RX ADMIN — HYDROMORPHONE HYDROCHLORIDE 1 MILLIGRAM(S): 2 INJECTION INTRAMUSCULAR; INTRAVENOUS; SUBCUTANEOUS at 12:15

## 2018-04-01 RX ADMIN — HYDROMORPHONE HYDROCHLORIDE 0.5 MILLIGRAM(S): 2 INJECTION INTRAMUSCULAR; INTRAVENOUS; SUBCUTANEOUS at 10:25

## 2018-04-01 RX ADMIN — Medication 500 MILLIGRAM(S): at 11:26

## 2018-04-01 RX ADMIN — HYDROMORPHONE HYDROCHLORIDE 0.5 MILLIGRAM(S): 2 INJECTION INTRAMUSCULAR; INTRAVENOUS; SUBCUTANEOUS at 04:49

## 2018-04-01 RX ADMIN — HYDROMORPHONE HYDROCHLORIDE 0.5 MILLIGRAM(S): 2 INJECTION INTRAMUSCULAR; INTRAVENOUS; SUBCUTANEOUS at 21:26

## 2018-04-01 RX ADMIN — HYDROMORPHONE HYDROCHLORIDE 0.5 MILLIGRAM(S): 2 INJECTION INTRAMUSCULAR; INTRAVENOUS; SUBCUTANEOUS at 00:51

## 2018-04-01 RX ADMIN — HEPARIN SODIUM 18 UNIT(S)/HR: 5000 INJECTION INTRAVENOUS; SUBCUTANEOUS at 05:22

## 2018-04-01 RX ADMIN — SODIUM CHLORIDE 75 MILLILITER(S): 9 INJECTION, SOLUTION INTRAVENOUS at 05:21

## 2018-04-01 RX ADMIN — SENNA PLUS 1 TABLET(S): 8.6 TABLET ORAL at 11:26

## 2018-04-01 RX ADMIN — HYDROMORPHONE HYDROCHLORIDE 0.5 MILLIGRAM(S): 2 INJECTION INTRAMUSCULAR; INTRAVENOUS; SUBCUTANEOUS at 07:50

## 2018-04-01 RX ADMIN — HYDROMORPHONE HYDROCHLORIDE 0.5 MILLIGRAM(S): 2 INJECTION INTRAMUSCULAR; INTRAVENOUS; SUBCUTANEOUS at 18:47

## 2018-04-01 RX ADMIN — Medication 0.25 MILLIGRAM(S): at 06:12

## 2018-04-01 RX ADMIN — HYDROMORPHONE HYDROCHLORIDE 0.5 MILLIGRAM(S): 2 INJECTION INTRAMUSCULAR; INTRAVENOUS; SUBCUTANEOUS at 23:39

## 2018-04-01 RX ADMIN — HYDROMORPHONE HYDROCHLORIDE 0.5 MILLIGRAM(S): 2 INJECTION INTRAMUSCULAR; INTRAVENOUS; SUBCUTANEOUS at 14:57

## 2018-04-01 RX ADMIN — HYDROMORPHONE HYDROCHLORIDE 0.5 MILLIGRAM(S): 2 INJECTION INTRAMUSCULAR; INTRAVENOUS; SUBCUTANEOUS at 15:15

## 2018-04-01 RX ADMIN — HYDROMORPHONE HYDROCHLORIDE 0.5 MILLIGRAM(S): 2 INJECTION INTRAMUSCULAR; INTRAVENOUS; SUBCUTANEOUS at 10:10

## 2018-04-01 RX ADMIN — HYDROMORPHONE HYDROCHLORIDE 0.5 MILLIGRAM(S): 2 INJECTION INTRAMUSCULAR; INTRAVENOUS; SUBCUTANEOUS at 00:36

## 2018-04-01 NOTE — PROGRESS NOTE ADULT - SUBJECTIVE AND OBJECTIVE BOX
CHIEF COMPLAINT: Right hip and leg pain    Interval History:  Patient therapeutic on heparin gtt over night x 2. No evidence of bleeding or hemoptysis Patient desaturation to 77% on RA started on 2L NC with improvement. Pt denies fevers, chills, CP, Abdominal pain, rhinorrhea, new rashes, new LE edema, new visual changes, confusion, headaches, blood in stools, N/V, dysuria, and hematuria.     PAST MEDICAL & SURGICAL HISTORY:  Spleen absent  TAPVR (total anomalous pulmonary venous return)  Heterotaxy syndrome with asplenia  Single ventricle  H/O heart surgery    FAMILY HISTORY:  No pertinent family history in first degree relatives    Allergies  No Known Allergies    HOME MEDICATIONS:  Digoxin    HOSPITAL MEDICATIONS:  MEDICATIONS  (STANDING):  dextrose 5% + lactated ringers. 1000 milliLiter(s) (75 mL/Hr) IV Continuous <Continuous>  digoxin     Tablet 0.25 milliGRAM(s) Oral daily  heparin  Infusion 1600 Unit(s)/Hr (18 mL/Hr) IV Continuous <Continuous>    MEDICATIONS  (PRN):  HYDROmorphone  Injectable 0.5 milliGRAM(s) IV Push every 4 hours PRN moderate and severe pain    REVIEW OF SYSTEMS:  [x] All other systems negative    OBJECTIVE:  ICU Vital Signs Last 24 Hrs  T(C): 37.4 (01 Apr 2018 06:00), Max: 37.7 (01 Apr 2018 04:00)  T(F): 99.3 (01 Apr 2018 06:00), Max: 99.8 (01 Apr 2018 04:00)  HR: 96 (01 Apr 2018 07:00) (86 - 102)  BP: 118/59 (01 Apr 2018 07:00) (105/59 - 134/64)  BP(mean): 70 (01 Apr 2018 07:00) (64 - 88)  ABP: --  ABP(mean): --  RR: 14 (01 Apr 2018 07:00) (11 - 30)  SpO2: 89% (01 Apr 2018 07:00) (77% - 93%)    03-31 @ 07:01  -  04-01 @ 07:00  --------------------------------------------------------  IN: 2182 mL / OUT: 450 mL / NET: 1732 mL  CAPILLARY BLOOD GLUCOSE    PHYSICAL EXAM:  General: NAD  Neurology: A&Ox3, nonfocal  Head:  Normocephalic, atraumatic  ENT:  Mucosa moist, no ulcerations  Neck:  Supple, no sinuses or palpable masses  Lymphatic:  No palpable cervical, supraclavicular adenopathy  Respiratory: CTA B/L  CV: RRR, S1S2, III/VI holosystolic murmur  Abdominal: Soft, NT, ND no palpable mass  MSK: No edema, + peripheral pulses, +R thigh tenderness  Extremities: RLE w/ bulging vein slight increase in size of RLE>LLE. +right thigh and groin tenderness no erythema and warmth. No LE wounds; old RLE medial ankle wound scar with no evidence of infection.    LABS:  CBC Full  -  ( 04-01 @ 04:50 )                        9.0 g/dL  28.4 % )-----------( 31.7 %              26.1 pg  Mean Cell Volume : 82.3 fL  Auto Neutrophil # : 18.8 %  Auto Lymphocyte # : 10.70 K/uL  Auto Monocyte # : x  Auto Eosinophil # : x  Auto Basophil # : x  CBC Full  -  ( 03-31 @ 03:20 )                        9.2 g/dL  28.9 % )-----------( 31.8 %              26.4 pg  Mean Cell Volume : 83.0 fL  Auto Neutrophil # : 19.7 %  Auto Lymphocyte # : 11.03 K/uL  Auto Monocyte # : x  Auto Eosinophil # : x  Auto Basophil # : x  CBC Full  -  ( 03-30 @ 23:30 )                        9.2 g/dL  30.2 % )-----------( 30.5 %              25.5 pg  Mean Cell Volume : 83.7 fL  Auto Neutrophil # : 19.1 %  Auto Lymphocyte # : 12.38 K/uL  Auto Monocyte # : x  Auto Eosinophil # : x  Auto Basophil # : x  CBC Full  -  ( 03-30 @ 14:26 )                        10.1 g/dL  32.9 % )-----------( 30.7 %              25.7 pg  Mean Cell Volume : 83.7 fL  Auto Neutrophil # : 19.4 %  Auto Lymphocyte # : 12.82 K/uL  Auto Monocyte # : x  Auto Eosinophil # : x  Auto Basophil # : x    04-01    133<L>  |  97<L>  |  15  ----------------------------<  116<H>  4.4   |  26  |  0.75    Ca    8.1<L>      01 Apr 2018 04:50  Phos  3.7     04-01  Mg     2.0     04-01    TPro  6.1  /  Alb  2.9<L>  /  TBili  1.5<H>  /  DBili  x   /  AST  22  /  ALT  17  /  AlkPhos  139<H>  04-01    PT/INR - ( 31 Mar 2018 03:20 )   PT: 15.3 SEC;   INR: 1.37       PTT - ( 01 Apr 2018 04:50 )  PTT:78.6 SEC  Urinalysis Basic - ( 31 Mar 2018 02:30 )    Color: YELLOW / Appearance: CLEAR / SG: > 1.040 / pH: 6.5  Gluc: NEGATIVE / Ketone: NEGATIVE  / Bili: NEGATIVE / Urobili: NORMAL mg/dL   Blood: NEGATIVE / Protein: 30 mg/dL / Nitrite: NEGATIVE   Leuk Esterase: NEGATIVE / RBC: 2-5 / WBC 2-5   Sq Epi: OCC / Non Sq Epi: x / Bacteria: x    RADIOLOGY:  No new imaging CHIEF COMPLAINT: Right hip and leg pain    Interval History:  Patient therapeutic on heparin gtt over night x 2. No evidence of bleeding or hemoptysis Patient desaturation to 77% on RA started on 2L NC with improvement. Pt denies fevers, chills, CP, shortness of breath, Abdominal pain, rhinorrhea, new rashes, new LE edema, new visual changes, confusion, headaches, blood in stools, N/V, dysuria, and hematuria.     After discussion with Dr. Cadet, Vascular, IR, and Pediatric cardiology it was decided to continue with current heparin gtt and not to perform catheter directed thrombolysis given the risk of showering even small clots which could be catastrophic given patient's complex anatomy.     PAST MEDICAL & SURGICAL HISTORY:  Spleen absent  TAPVR (total anomalous pulmonary venous return)  Heterotaxy syndrome with asplenia  Single ventricle  H/O heart surgery    FAMILY HISTORY:  No pertinent family history in first degree relatives    Allergies  No Known Allergies    HOME MEDICATIONS:  Digoxin    HOSPITAL MEDICATIONS:  MEDICATIONS  (STANDING):  dextrose 5% + lactated ringers. 1000 milliLiter(s) (75 mL/Hr) IV Continuous <Continuous>  digoxin     Tablet 0.25 milliGRAM(s) Oral daily  heparin  Infusion 1600 Unit(s)/Hr (18 mL/Hr) IV Continuous <Continuous>    MEDICATIONS  (PRN):  HYDROmorphone  Injectable 0.5 milliGRAM(s) IV Push every 4 hours PRN moderate and severe pain    REVIEW OF SYSTEMS:  [x] All other systems negative    OBJECTIVE:  ICU Vital Signs Last 24 Hrs  T(C): 37.4 (01 Apr 2018 06:00), Max: 37.7 (01 Apr 2018 04:00)  T(F): 99.3 (01 Apr 2018 06:00), Max: 99.8 (01 Apr 2018 04:00)  HR: 96 (01 Apr 2018 07:00) (86 - 102)  BP: 118/59 (01 Apr 2018 07:00) (105/59 - 134/64)  BP(mean): 70 (01 Apr 2018 07:00) (64 - 88)  ABP: --  ABP(mean): --  RR: 14 (01 Apr 2018 07:00) (11 - 30)  SpO2: 89% (01 Apr 2018 07:00) (77% - 93%)    03-31 @ 07:01  -  04-01 @ 07:00  --------------------------------------------------------  IN: 2182 mL / OUT: 450 mL / NET: 1732 mL  CAPILLARY BLOOD GLUCOSE    PHYSICAL EXAM:  General: NAD  Neurology: A&Ox3, nonfocal  Head:  Normocephalic, atraumatic  ENT:  Mucosa moist, no ulcerations  Neck:  Supple, no sinuses or palpable masses  Lymphatic:  No palpable cervical, supraclavicular adenopathy  Respiratory: CTA B/L  CV: RRR, S1S2, III/VI holosystolic murmur  Abdominal: Soft, NT, ND no palpable mass  MSK: No edema, + peripheral pulses, +R thigh tenderness  Extremities: RLE w/ bulging vein slight increase in size of RLE>LLE. +right thigh and groin tenderness no erythema and warmth. No LE wounds; old RLE medial ankle wound scar with no evidence of infection.    LABS:  CBC Full  -  ( 04-01 @ 04:50 )                        9.0 g/dL  28.4 % )-----------( 31.7 %              26.1 pg  Mean Cell Volume : 82.3 fL  Auto Neutrophil # : 18.8 %  Auto Lymphocyte # : 10.70 K/uL  Auto Monocyte # : x  Auto Eosinophil # : x  Auto Basophil # : x  CBC Full  -  ( 03-31 @ 03:20 )                        9.2 g/dL  28.9 % )-----------( 31.8 %              26.4 pg  Mean Cell Volume : 83.0 fL  Auto Neutrophil # : 19.7 %  Auto Lymphocyte # : 11.03 K/uL  Auto Monocyte # : x  Auto Eosinophil # : x  Auto Basophil # : x  CBC Full  -  ( 03-30 @ 23:30 )                        9.2 g/dL  30.2 % )-----------( 30.5 %              25.5 pg  Mean Cell Volume : 83.7 fL  Auto Neutrophil # : 19.1 %  Auto Lymphocyte # : 12.38 K/uL  Auto Monocyte # : x  Auto Eosinophil # : x  Auto Basophil # : x  CBC Full  -  ( 03-30 @ 14:26 )                        10.1 g/dL  32.9 % )-----------( 30.7 %              25.7 pg  Mean Cell Volume : 83.7 fL  Auto Neutrophil # : 19.4 %  Auto Lymphocyte # : 12.82 K/uL  Auto Monocyte # : x  Auto Eosinophil # : x  Auto Basophil # : x    04-01    133<L>  |  97<L>  |  15  ----------------------------<  116<H>  4.4   |  26  |  0.75    Ca    8.1<L>      01 Apr 2018 04:50  Phos  3.7     04-01  Mg     2.0     04-01    TPro  6.1  /  Alb  2.9<L>  /  TBili  1.5<H>  /  DBili  x   /  AST  22  /  ALT  17  /  AlkPhos  139<H>  04-01    PT/INR - ( 31 Mar 2018 03:20 )   PT: 15.3 SEC;   INR: 1.37       PTT - ( 01 Apr 2018 04:50 )  PTT:78.6 SEC  Urinalysis Basic - ( 31 Mar 2018 02:30 )    Color: YELLOW / Appearance: CLEAR / SG: > 1.040 / pH: 6.5  Gluc: NEGATIVE / Ketone: NEGATIVE  / Bili: NEGATIVE / Urobili: NORMAL mg/dL   Blood: NEGATIVE / Protein: 30 mg/dL / Nitrite: NEGATIVE   Leuk Esterase: NEGATIVE / RBC: 2-5 / WBC 2-5   Sq Epi: OCC / Non Sq Epi: x / Bacteria: x    RADIOLOGY:  No new imaging

## 2018-04-01 NOTE — PROGRESS NOTE ADULT - ASSESSMENT
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    Neuro:  - AAOx3 without active issues    CV:  - Complicated cardiovascular hx in the setting of heterotaxy/congenital heart disease s/p multiple surgeries including Fontan procedure.  - Patient's Pediatric Cardiologist is Dr Rojas who has been consulted and will follow. Appreciate their recs.   - Of note patient pre-load dependent and at high risk for massive PE given IVC is directly anastomosed with Pulmonary artery  - Currently on heparin gtt and therapeutic will discuss bridge to coumadin  - Patient preload dependent  - Ongoing discussion between vascular surgery (Dr. 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 He experienced transient hypoxemia at baseline even prior to this.   - Extensive RLE DVT extending up to infrarenal IVC.   - On full AC with heparin gtt and given high risk for bleeding will need closer monitoring    GI:  - Continue w/ regular diet    Renal:  - No acute issues    Heme:  - extensive RLE DVT up to infrarenal IVC  - On heparin gtt will discuss bridging to coumadin   - Hemoglobin 10.1, outside records w/ hemoglobin 10.5, cont to monitor on A/C - likely 2/2 chronic blood loss from hemoptysis    ID:  - Leukocytosis without any other signs of infection, continue to monitor - likely reactive due to current DVT  - Currently no evidence of active infection    DVT ppx:  - starting heparin for full AC    Ethics:  - full code    -Cecilio Kilpatrick PGY3 EMIM Pager#[phone]/Spectra#[phone] Pt is a 21 y/o M w/ a PMHx of congenital heart disease/heterotaxy s/p multiple cardiac surgeries, hx of thrombosis of the right femoral vein, PE in 2014 s/p Eliquis course, hemoptysis with recent workup at [facility] found to be negative, p/w R thigh pain found to have 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    Neuro:  - AAOx3 without active issues  - Pain control will give Dilaudid 05mg Q3H PRN for pain    CV:  - Complicated cardiovascular hx in the setting of heterotaxy/congenital heart disease s/p multiple surgeries including Fontan procedure.  - Patient's Pediatric Cardiologist is Dr Rojas who has been consulted and will follow. Appreciate their recs.   - Of note patient pre-load dependent and at high risk for massive PE given IVC is directly anastomosed with Pulmonary artery  - Currently on heparin gtt and therapeutic will discuss bridge to coumadin  - Patient preload dependent  - Ongoing discussion between vascular surgery (Dr. 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 He experienced transient hypoxemia at baseline even prior to this.   - Extensive RLE DVT extending up to infrarenal IVC.   - On full AC with heparin gtt and given high risk for bleeding will need closer monitoring    GI:  - Continue w/ regular diet    Renal:  - No acute issues    Heme:  - extensive RLE DVT up to infrarenal IVC  - On heparin gtt will discuss bridging to coumadin   - Hemoglobin 10.1, outside records w/ hemoglobin 10.5, cont to monitor on A/C - likely 2/2 chronic blood loss from hemoptysis    ID:  - Leukocytosis without any other signs of infection, continue to monitor - likely reactive due to current DVT  - Currently no evidence of active infection    DVT ppx:  - starting heparin for full AC    Ethics:  - full code    -Cecilio Kilpatrick PGY3 EMIM Pager#[phone]/Spectra#[phone] Pt is a 21 y/o M w/ a 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    Neuro:  - AAOx3 without active issues  - Pain control will give Dilaudid 05mg Q3H PRN for pain    CV:  - Complicated cardiovascular hx in the setting of heterotaxy/congenital heart disease s/p multiple surgeries including Fontan procedure.  - Patient's Pediatric Cardiologist is Dr Rojas who has been consulted and will follow. Appreciate their recs.   - Of note patient pre-load dependent and at high risk for massive PE given IVC is directly anastomosed with Pulmonary artery  - Currently on heparin gtt and therapeutic will discuss bridge to coumadin  - Patient preload dependent and will continue IVFs for now; if able to take in adequate fluids will d/c IVFs  - Ongoing discussion between vascular surgery (Dr. 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 He experienced transient hypoxemia at baseline even prior to this.   - Extensive RLE DVT extending up to infrarenal IVC.   - On full AC with heparin gtt and given high risk for bleeding will need closer monitoring    GI:  - Continue w/ regular diet    Renal:  - No acute issues    Heme:  - extensive RLE DVT up to infrarenal IVC  - On heparin gtt will discuss bridging to coumadin   - Hemoglobin 10.1, outside records w/ hemoglobin 9 today (likely a component of dilution from IVFs), cont to monitor on A/C - likely 2/2 chronic blood loss from hemoptysis  - Start iron 325mg BID and Vitamin C    ID:  - Leukocytosis without any other signs of infection, continue to monitor - likely reactive due to current DVT  - Currently no evidence of active infection    DVT ppx:  - starting heparin for full AC    Ethics:  - full code    -Cecilio Kilpatrick PGY3 EMIM Pager#[phone]/Spectra#[phone]

## 2018-04-02 LAB
ALBUMIN SERPL ELPH-MCNC: 2.7 G/DL — LOW (ref 3.3–5)
ALP SERPL-CCNC: 135 U/L — HIGH (ref 40–120)
ALT FLD-CCNC: 18 U/L — SIGNIFICANT CHANGE UP (ref 4–41)
APTT BLD: 130 SEC — CRITICAL HIGH (ref 27.5–37.4)
APTT BLD: 71.4 SEC — HIGH (ref 27.5–37.4)
APTT BLD: 73.2 SEC — HIGH (ref 27.5–37.4)
APTT BLD: 97.9 SEC — HIGH (ref 27.5–37.4)
AST SERPL-CCNC: 25 U/L — SIGNIFICANT CHANGE UP (ref 4–40)
BASOPHILS # BLD AUTO: 0.12 K/UL — SIGNIFICANT CHANGE UP (ref 0–0.2)
BASOPHILS NFR BLD AUTO: 1 % — SIGNIFICANT CHANGE UP (ref 0–2)
BILIRUB SERPL-MCNC: 1.4 MG/DL — HIGH (ref 0.2–1.2)
BUN SERPL-MCNC: 11 MG/DL — SIGNIFICANT CHANGE UP (ref 7–23)
CALCIUM SERPL-MCNC: 8 MG/DL — LOW (ref 8.4–10.5)
CHLORIDE SERPL-SCNC: 96 MMOL/L — LOW (ref 98–107)
CO2 SERPL-SCNC: 25 MMOL/L — SIGNIFICANT CHANGE UP (ref 22–31)
CREAT SERPL-MCNC: 0.7 MG/DL — SIGNIFICANT CHANGE UP (ref 0.5–1.3)
EOSINOPHIL # BLD AUTO: 0.52 K/UL — HIGH (ref 0–0.5)
EOSINOPHIL NFR BLD AUTO: 4.5 % — SIGNIFICANT CHANGE UP (ref 0–6)
GLUCOSE SERPL-MCNC: 109 MG/DL — HIGH (ref 70–99)
HCT VFR BLD CALC: 28.5 % — LOW (ref 39–50)
HGB BLD-MCNC: 8.9 G/DL — LOW (ref 13–17)
IMM GRANULOCYTES # BLD AUTO: 0.07 # — SIGNIFICANT CHANGE UP
IMM GRANULOCYTES NFR BLD AUTO: 0.6 % — SIGNIFICANT CHANGE UP (ref 0–1.5)
LACTATE SERPL-SCNC: 0.9 MMOL/L — SIGNIFICANT CHANGE UP (ref 0.5–2)
LYMPHOCYTES # BLD AUTO: 1.42 K/UL — SIGNIFICANT CHANGE UP (ref 1–3.3)
LYMPHOCYTES # BLD AUTO: 12.4 % — LOW (ref 13–44)
MAGNESIUM SERPL-MCNC: 2.1 MG/DL — SIGNIFICANT CHANGE UP (ref 1.6–2.6)
MCHC RBC-ENTMCNC: 26.1 PG — LOW (ref 27–34)
MCHC RBC-ENTMCNC: 31.2 % — LOW (ref 32–36)
MCV RBC AUTO: 83.6 FL — SIGNIFICANT CHANGE UP (ref 80–100)
MONOCYTES # BLD AUTO: 2.21 K/UL — HIGH (ref 0–0.9)
MONOCYTES NFR BLD AUTO: 19.3 % — HIGH (ref 2–14)
NEUTROPHILS # BLD AUTO: 7.1 K/UL — SIGNIFICANT CHANGE UP (ref 1.8–7.4)
NEUTROPHILS NFR BLD AUTO: 62.2 % — SIGNIFICANT CHANGE UP (ref 43–77)
NRBC # FLD: 0.2 — SIGNIFICANT CHANGE UP
NRBC FLD-RTO: 1.7 — SIGNIFICANT CHANGE UP
PHOSPHATE SERPL-MCNC: 3.3 MG/DL — SIGNIFICANT CHANGE UP (ref 2.5–4.5)
PLATELET # BLD AUTO: 390 K/UL — SIGNIFICANT CHANGE UP (ref 150–400)
PMV BLD: 13.1 FL — HIGH (ref 7–13)
POTASSIUM SERPL-MCNC: 4.6 MMOL/L — SIGNIFICANT CHANGE UP (ref 3.5–5.3)
POTASSIUM SERPL-SCNC: 4.6 MMOL/L — SIGNIFICANT CHANGE UP (ref 3.5–5.3)
PROT SERPL-MCNC: 6 G/DL — SIGNIFICANT CHANGE UP (ref 6–8.3)
RBC # BLD: 3.41 M/UL — LOW (ref 4.2–5.8)
RBC # FLD: 18.9 % — HIGH (ref 10.3–14.5)
SODIUM SERPL-SCNC: 132 MMOL/L — LOW (ref 135–145)
WBC # BLD: 11.44 K/UL — HIGH (ref 3.8–10.5)
WBC # FLD AUTO: 11.44 K/UL — HIGH (ref 3.8–10.5)

## 2018-04-02 PROCEDURE — 99233 SBSQ HOSP IP/OBS HIGH 50: CPT | Mod: GC

## 2018-04-02 RX ORDER — HEPARIN SODIUM 5000 [USP'U]/ML
1800 INJECTION INTRAVENOUS; SUBCUTANEOUS
Qty: 25000 | Refills: 0 | Status: DISCONTINUED | OUTPATIENT
Start: 2018-04-02 | End: 2018-04-02

## 2018-04-02 RX ORDER — ACETAMINOPHEN 500 MG
650 TABLET ORAL EVERY 6 HOURS
Qty: 0 | Refills: 0 | Status: DISCONTINUED | OUTPATIENT
Start: 2018-04-02 | End: 2018-04-05

## 2018-04-02 RX ORDER — HEPARIN SODIUM 5000 [USP'U]/ML
1600 INJECTION INTRAVENOUS; SUBCUTANEOUS
Qty: 25000 | Refills: 0 | Status: DISCONTINUED | OUTPATIENT
Start: 2018-04-02 | End: 2018-04-03

## 2018-04-02 RX ORDER — ACETAMINOPHEN 500 MG
1000 TABLET ORAL ONCE
Qty: 0 | Refills: 0 | Status: DISCONTINUED | OUTPATIENT
Start: 2018-04-02 | End: 2018-04-02

## 2018-04-02 RX ORDER — POLYETHYLENE GLYCOL 3350 17 G/17G
17 POWDER, FOR SOLUTION ORAL DAILY
Qty: 0 | Refills: 0 | Status: DISCONTINUED | OUTPATIENT
Start: 2018-04-02 | End: 2018-04-03

## 2018-04-02 RX ORDER — ACETAMINOPHEN 500 MG
1000 TABLET ORAL ONCE
Qty: 0 | Refills: 0 | Status: COMPLETED | OUTPATIENT
Start: 2018-04-02 | End: 2018-04-02

## 2018-04-02 RX ORDER — KETOROLAC TROMETHAMINE 30 MG/ML
30 SYRINGE (ML) INJECTION ONCE
Qty: 0 | Refills: 0 | Status: DISCONTINUED | OUTPATIENT
Start: 2018-04-02 | End: 2018-04-02

## 2018-04-02 RX ORDER — HYDROMORPHONE HYDROCHLORIDE 2 MG/ML
0.5 INJECTION INTRAMUSCULAR; INTRAVENOUS; SUBCUTANEOUS ONCE
Qty: 0 | Refills: 0 | Status: DISCONTINUED | OUTPATIENT
Start: 2018-04-02 | End: 2018-04-02

## 2018-04-02 RX ADMIN — HYDROMORPHONE HYDROCHLORIDE 0.5 MILLIGRAM(S): 2 INJECTION INTRAMUSCULAR; INTRAVENOUS; SUBCUTANEOUS at 01:45

## 2018-04-02 RX ADMIN — SENNA PLUS 1 TABLET(S): 8.6 TABLET ORAL at 11:45

## 2018-04-02 RX ADMIN — HYDROMORPHONE HYDROCHLORIDE 0.5 MILLIGRAM(S): 2 INJECTION INTRAMUSCULAR; INTRAVENOUS; SUBCUTANEOUS at 02:00

## 2018-04-02 RX ADMIN — HYDROMORPHONE HYDROCHLORIDE 0.5 MILLIGRAM(S): 2 INJECTION INTRAMUSCULAR; INTRAVENOUS; SUBCUTANEOUS at 03:46

## 2018-04-02 RX ADMIN — Medication 100 MILLIGRAM(S): at 11:45

## 2018-04-02 RX ADMIN — HYDROMORPHONE HYDROCHLORIDE 0.5 MILLIGRAM(S): 2 INJECTION INTRAMUSCULAR; INTRAVENOUS; SUBCUTANEOUS at 05:24

## 2018-04-02 RX ADMIN — POLYETHYLENE GLYCOL 3350 17 GRAM(S): 17 POWDER, FOR SOLUTION ORAL at 11:45

## 2018-04-02 RX ADMIN — Medication 500 MILLIGRAM(S): at 11:45

## 2018-04-02 RX ADMIN — HEPARIN SODIUM 1600 UNIT(S)/HR: 5000 INJECTION INTRAVENOUS; SUBCUTANEOUS at 17:42

## 2018-04-02 RX ADMIN — HEPARIN SODIUM 1800 UNIT(S)/HR: 5000 INJECTION INTRAVENOUS; SUBCUTANEOUS at 04:56

## 2018-04-02 RX ADMIN — Medication 400 MILLIGRAM(S): at 20:44

## 2018-04-02 RX ADMIN — Medication 1000 MILLIGRAM(S): at 21:05

## 2018-04-02 RX ADMIN — HYDROMORPHONE HYDROCHLORIDE 0.5 MILLIGRAM(S): 2 INJECTION INTRAMUSCULAR; INTRAVENOUS; SUBCUTANEOUS at 20:04

## 2018-04-02 RX ADMIN — Medication 30 MILLIGRAM(S): at 04:54

## 2018-04-02 RX ADMIN — Medication 30 MILLIGRAM(S): at 04:26

## 2018-04-02 RX ADMIN — HYDROMORPHONE HYDROCHLORIDE 0.5 MILLIGRAM(S): 2 INJECTION INTRAMUSCULAR; INTRAVENOUS; SUBCUTANEOUS at 15:35

## 2018-04-02 RX ADMIN — Medication 325 MILLIGRAM(S): at 11:45

## 2018-04-02 RX ADMIN — Medication 0.25 MILLIGRAM(S): at 05:59

## 2018-04-02 RX ADMIN — Medication 1000 MILLIGRAM(S): at 05:23

## 2018-04-02 RX ADMIN — HYDROMORPHONE HYDROCHLORIDE 0.5 MILLIGRAM(S): 2 INJECTION INTRAMUSCULAR; INTRAVENOUS; SUBCUTANEOUS at 20:35

## 2018-04-02 RX ADMIN — HEPARIN SODIUM 1600 UNIT(S)/HR: 5000 INJECTION INTRAVENOUS; SUBCUTANEOUS at 10:42

## 2018-04-02 RX ADMIN — Medication 400 MILLIGRAM(S): at 04:54

## 2018-04-02 RX ADMIN — HYDROMORPHONE HYDROCHLORIDE 0.5 MILLIGRAM(S): 2 INJECTION INTRAMUSCULAR; INTRAVENOUS; SUBCUTANEOUS at 15:50

## 2018-04-02 NOTE — PROGRESS NOTE ADULT - SUBJECTIVE AND OBJECTIVE BOX
Events of weekend noted.  Patient known to me from outpatient workup of venous disease of LE.  Now with extensive thrombus in femoral and iliac vein  Has been having hemoptysis for several weeks  Etiol of hemoptysis unclear - concern for source from prior cardiac procedures    right leg edematous but no compartment pressures   prob area of localized superficial thrombophlebitis in medial thigh  lower leg mildly edematous    agree with heparinization and 6-12 mo of anticoagulation  elevate leg  warm compresses to phlebitic area of thigh    I think thrombolysis is contraindicated given significant hemoptysis (mulu since etiol unclear)  Would not perform thrombectomy - altered venous architecture  no indication for IVC filter at present - no evidence of PE

## 2018-04-02 NOTE — PROGRESS NOTE ADULT - ASSESSMENT
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    Neuro:  - AAOx3 without active issues  - Pain control will give Dilaudid 05mg Q3H PRN for pain  - Consider adding standing NSAIDs    CV:  - Complicated cardiovascular hx in the setting of heterotaxy/congenital heart disease s/p multiple surgeries including Fontan procedure.  - Patient's Pediatric Cardiologist is Dr Rojas who has been consulted and will follow. Appreciate their recs.   - Of note patient pre-load dependent and at high risk for massive PE given IVC is directly anastomosed with Pulmonary artery  - Currently on heparin gtt and therapeutic will discuss bridge to coumadin  - Patient preload dependent and will continue IVFs for now; if able to take in adequate fluids will d/c IVFs  - Ongoing discussion between vascular surgery (Dr. 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 He experienced transient hypoxemia at baseline even prior to this.   - Extensive RLE DVT extending up to infrarenal IVC.   - On full AC with heparin gtt and given high risk for bleeding will need closer monitoring    GI:  - Continue w/ regular diet    Renal:  - No acute issues    Heme:  - extensive RLE DVT up to infrarenal IVC  - On heparin gtt will discuss bridging to coumadin   - Hemoglobin 10.1, outside records w/ hemoglobin 9 today (likely a component of dilution from IVFs), cont to monitor on A/C - likely 2/2 chronic blood loss from hemoptysis  - Start iron 325mg BID and Vitamin C    ID:  - Leukocytosis without any other signs of infection, continue to monitor - likely reactive due to current DVT  - Currently no evidence of active infection    DVT ppx:  - starting heparin for full AC    Ethics:  - full code    -Cecilio Kilpatrick PGY3 EMIM Pager#[phone]/Spectra#[phone] Pt is a 21 y/o M w/ a PMHx of congenital heart disease/heterotaxy s/p multiple cardiac surgeries, hx of thrombosis of the right femoral vein, PE in 2014 s/p Eliquis course, hemoptysis with recent workup at [facility] found to be negative, p/w R thigh pain found to have 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    Neuro:  - AAOx3 without active issues  - Pain control will give Dilaudid 05mg Q3H PRN for pain  - Consider adding standing NSAIDs such as tylenol and ibuprofen for pain control    CV:  - Complicated cardiovascular hx in the setting of heterotaxy/congenital heart disease s/p multiple surgeries including Fontan procedure.  - Patient's Pediatric Cardiologist is Dr Rojas who has been consulted and will follow. Appreciate their recs.   - Of note patient pre-load dependent and at high risk for massive PE given IVC is directly anastomosed with Pulmonary artery  - Currently on heparin gtt and therapeutic will discuss bridge to coumadin  - Patient preload dependent and will continue IVFs for now; if able to take in adequate fluids will d/c IVFs  - Ongoing discussion between vascular surgery (Dr. 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 He experienced transient hypoxemia at baseline even prior to this.   - Extensive RLE DVT extending up to infrarenal IVC.   - On full AC with heparin gtt and given high risk for bleeding will need closer monitoring    GI:  - Continue w/ regular diet  - Add Miralax to Senna and Colace for constipation ppx    Renal:  - No acute issues    Heme:  - Extensive RLE DVT up to infrarenal IVC w/ thrombophlebitis  - On heparin gtt will discuss bridging to coumadin   - Hemoglobin 10.1, outside records w/ hemoglobin 9 today (likely a component of dilution from IVFs), cont to monitor on A/C - likely 2/2 chronic blood loss from hemoptysis  - Start iron 325mg BID and Vitamin C    ID:  - Leukocytosis without any other signs of infection, continue to monitor - likely reactive due to current DVT  - Currently no evidence of active infection    DVT ppx:  - On heparin for full AC    Ethics:  - full code    -Cecilio Kilpatrick PGY3 EMIM Pager#[phone]/Spectra#[phone]

## 2018-04-02 NOTE — PROGRESS NOTE ADULT - SUBJECTIVE AND OBJECTIVE BOX
CHIEF COMPLAINT: Right groin and thigh pain    Interval history:  Pt with no acute events over night     PAST MEDICAL & SURGICAL HISTORY:  Spleen absent  TAPVR (total anomalous pulmonary venous return)  Heterotaxy syndrome with asplenia  Single ventricle  H/O heart surgery    FAMILY HISTORY:  No pertinent family history in first degree relatives    Allergies  No Known Allergies    HOSPITAL MEDICATIONS:  MEDICATIONS  (STANDING):  acetaminophen  IVPB. 1000 milliGRAM(s) IV Intermittent once  ascorbic acid 500 milliGRAM(s) Oral daily  digoxin     Tablet 0.25 milliGRAM(s) Oral daily  docusate sodium 100 milliGRAM(s) Oral daily  ferrous    sulfate 325 milliGRAM(s) Oral daily  heparin  Infusion. 1800 Unit(s)/Hr (18 mL/Hr) IV Continuous <Continuous>  senna 1 Tablet(s) Oral daily    MEDICATIONS  (PRN):  heparin  Injectable 4000 Unit(s) IV Push every 6 hours PRN For aPTT less than 40  heparin  Injectable 2000 Unit(s) IV Push every 6 hours PRN For aPTT between 40 - 57  HYDROmorphone  Injectable 0.5 milliGRAM(s) IV Push every 3 hours PRN Moderate to severe pain      REVIEW OF SYSTEMS: See Interval history  [x] All other systems negative    OBJECTIVE:  ICU Vital Signs Last 24 Hrs  T(C): 37.6 (02 Apr 2018 04:00), Max: 38.1 (01 Apr 2018 20:00)  T(F): 99.6 (02 Apr 2018 04:00), Max: 100.5 (01 Apr 2018 20:00)  HR: 74 (02 Apr 2018 07:00) (74 - 102)  BP: 102/56 (02 Apr 2018 07:00) (99/49 - 133/77)  BP(mean): 66 (02 Apr 2018 07:00) (61 - 90)  ABP: --  ABP(mean): --  RR: 15 (02 Apr 2018 07:00) (10 - 28)  SpO2: 90% (02 Apr 2018 07:00) (86% - 100%)    04-01 @ 07:01  -  04-02 @ 07:00  --------------------------------------------------------  IN: 1364 mL / OUT: 500 mL / NET: 864 mL  CAPILLARY BLOOD GLUCOSE    PHYSICAL EXAM:  General:   HEENT:   Lymph Nodes:  Neck:   Respiratory:   Cardiovascular:   Abdomen:   Extremities:   Skin:   Neurological:   Psychiatry:     LABS:  CBC Full  -  ( 04-02 @ 03:00 )                        8.9 g/dL  28.5 % )-----------( 31.2 %              26.1 pg  Mean Cell Volume : 83.6 fL  Auto Neutrophil # : 18.9 %  Auto Lymphocyte # : 11.44 K/uL  Auto Monocyte # : x  Auto Eosinophil # : x  Auto Basophil # : x  CBC Full  -  ( 04-01 @ 04:50 )                        9.0 g/dL  28.4 % )-----------( 31.7 %              26.1 pg  Mean Cell Volume : 82.3 fL  Auto Neutrophil # : 18.8 %  Auto Lymphocyte # : 10.70 K/uL  Auto Monocyte # : x  Auto Eosinophil # : x  Auto Basophil # : x  CBC Full  -  ( 03-31 @ 03:20 )                        9.2 g/dL  28.9 % )-----------( 31.8 %              26.4 pg  Mean Cell Volume : 83.0 fL  Auto Neutrophil # : 19.7 %  Auto Lymphocyte # : 11.03 K/uL  Auto Monocyte # : x  Auto Eosinophil # : x  Auto Basophil # : x  CBC Full  -  ( 03-30 @ 23:30 )                        9.2 g/dL  30.2 % )-----------( 30.5 %              25.5 pg  Mean Cell Volume : 83.7 fL  Auto Neutrophil # : 19.1 %  Auto Lymphocyte # : 12.38 K/uL  Auto Monocyte # : x  Auto Eosinophil # : x  Auto Basophil # : x    04-02    132<L>  |  96<L>  |  11  ----------------------------<  109<H>  4.6   |  25  |  0.70    Ca    8.0<L>      02 Apr 2018 03:00  Phos  3.3     04-02  Mg     2.1     04-02    TPro  6.0  /  Alb  2.7<L>  /  TBili  1.4<H>  /  DBili  x   /  AST  25  /  ALT  18  /  AlkPhos  135<H>  04-02  PTT - ( 02 Apr 2018 03:00 )  PTT:97.9 SEC    MICROBIOLOGY:   None new  RADIOLOGY:  No new imaging    EKG: CHIEF COMPLAINT: Right groin and thigh pain    Interval history:  Pt with no acute events over night. No bleeding or coughing.   Pt denies fevers, chills, CP, Abdominal pain, rhinorrhea, new rashes, new LE edema, new visual changes, confusion, headaches, blood in stools, N/V, dysuria, and hematuria.     PAST MEDICAL & SURGICAL HISTORY:  Spleen absent  TAPVR (total anomalous pulmonary venous return)  Heterotaxy syndrome with asplenia  Single ventricle  H/O heart surgery    FAMILY HISTORY:  No pertinent family history in first degree relatives    Allergies  No Known Allergies    HOSPITAL MEDICATIONS:  MEDICATIONS  (STANDING):  acetaminophen  IVPB. 1000 milliGRAM(s) IV Intermittent once  ascorbic acid 500 milliGRAM(s) Oral daily  digoxin     Tablet 0.25 milliGRAM(s) Oral daily  docusate sodium 100 milliGRAM(s) Oral daily  ferrous    sulfate 325 milliGRAM(s) Oral daily  heparin  Infusion. 1800 Unit(s)/Hr (18 mL/Hr) IV Continuous <Continuous>  senna 1 Tablet(s) Oral daily    MEDICATIONS  (PRN):  heparin  Injectable 4000 Unit(s) IV Push every 6 hours PRN For aPTT less than 40  heparin  Injectable 2000 Unit(s) IV Push every 6 hours PRN For aPTT between 40 - 57  HYDROmorphone  Injectable 0.5 milliGRAM(s) IV Push every 3 hours PRN Moderate to severe pain    REVIEW OF SYSTEMS: See Interval history  [x] All other systems negative    OBJECTIVE:  ICU Vital Signs Last 24 Hrs  T(C): 37.6 (02 Apr 2018 04:00), Max: 38.1 (01 Apr 2018 20:00)  T(F): 99.6 (02 Apr 2018 04:00), Max: 100.5 (01 Apr 2018 20:00)  HR: 74 (02 Apr 2018 07:00) (74 - 102)  BP: 102/56 (02 Apr 2018 07:00) (99/49 - 133/77)  BP(mean): 66 (02 Apr 2018 07:00) (61 - 90)  ABP: --  ABP(mean): --  RR: 15 (02 Apr 2018 07:00) (10 - 28)  SpO2: 90% (02 Apr 2018 07:00) (86% - 100%)    04-01 @ 07:01  -  04-02 @ 07:00  --------------------------------------------------------  IN: 1364 mL / OUT: 500 mL / NET: 864 mL  CAPILLARY BLOOD GLUCOSE    PHYSICAL EXAM:  General: NAD  Neurology: A&Ox3, nonfocal  Head:  Normocephalic, atraumatic  ENT:  Mucosa moist, no ulcerations  Neck:  Supple, no sinuses or palpable masses  Lymphatic:  No palpable cervical, supraclavicular adenopathy  Respiratory: CTA B/L  CV: RRR, S1S2, III/VI holosystolic murmur  Abdominal: Soft, NT, ND no palpable mass  MSK: No edema, + peripheral pulses, +R thigh tenderness  Extremities: RLE w/ bulging vein slight increase in size of RLE>LLE. +right thigh and groin tenderness no erythema and warmth. No LE wounds; old RLE medial ankle wound scar with no evidence of infection. +RLE erythema and tendernessof medial knee but no open wound    LABS:  CBC Full  -  ( 04-02 @ 03:00 )                        8.9 g/dL  28.5 % )-----------( 31.2 %              26.1 pg  Mean Cell Volume : 83.6 fL  Auto Neutrophil # : 18.9 %  Auto Lymphocyte # : 11.44 K/uL  Auto Monocyte # : x  Auto Eosinophil # : x  Auto Basophil # : x  CBC Full  -  ( 04-01 @ 04:50 )                        9.0 g/dL  28.4 % )-----------( 31.7 %              26.1 pg  Mean Cell Volume : 82.3 fL  Auto Neutrophil # : 18.8 %  Auto Lymphocyte # : 10.70 K/uL  Auto Monocyte # : x  Auto Eosinophil # : x  Auto Basophil # : x  CBC Full  -  ( 03-31 @ 03:20 )                        9.2 g/dL  28.9 % )-----------( 31.8 %              26.4 pg  Mean Cell Volume : 83.0 fL  Auto Neutrophil # : 19.7 %  Auto Lymphocyte # : 11.03 K/uL  Auto Monocyte # : x  Auto Eosinophil # : x  Auto Basophil # : x  CBC Full  -  ( 03-30 @ 23:30 )                        9.2 g/dL  30.2 % )-----------( 30.5 %              25.5 pg  Mean Cell Volume : 83.7 fL  Auto Neutrophil # : 19.1 %  Auto Lymphocyte # : 12.38 K/uL  Auto Monocyte # : x  Auto Eosinophil # : x  Auto Basophil # : x    04-02    132<L>  |  96<L>  |  11  ----------------------------<  109<H>  4.6   |  25  |  0.70    Ca    8.0<L>      02 Apr 2018 03:00  Phos  3.3     04-02  Mg     2.1     04-02    TPro  6.0  /  Alb  2.7<L>  /  TBili  1.4<H>  /  DBili  x   /  AST  25  /  ALT  18  /  AlkPhos  135<H>  04-02  PTT - ( 02 Apr 2018 03:00 )  PTT:97.9 SEC    MICROBIOLOGY:   None new  RADIOLOGY:  No new imaging

## 2018-04-03 DIAGNOSIS — I82.421 ACUTE EMBOLISM AND THROMBOSIS OF RIGHT ILIAC VEIN: ICD-10-CM

## 2018-04-03 LAB
ACANTHOCYTES BLD QL SMEAR: SLIGHT — SIGNIFICANT CHANGE UP
ANISOCYTOSIS BLD QL: SIGNIFICANT CHANGE UP
APTT BLD: 117.7 SEC — HIGH (ref 27.5–37.4)
APTT BLD: 61.5 SEC — HIGH (ref 27.5–37.4)
APTT BLD: 96.6 SEC — HIGH (ref 27.5–37.4)
AT III ACT/NOR PPP CHRO: 55 % — LOW (ref 76–140)
BASOPHILS # BLD AUTO: 0.12 K/UL — SIGNIFICANT CHANGE UP (ref 0–0.2)
BASOPHILS NFR BLD AUTO: 1.1 % — SIGNIFICANT CHANGE UP (ref 0–2)
BASOPHILS NFR SPEC: 2.6 % — HIGH (ref 0–2)
BUN SERPL-MCNC: 12 MG/DL — SIGNIFICANT CHANGE UP (ref 7–23)
CALCIUM SERPL-MCNC: 8.3 MG/DL — LOW (ref 8.4–10.5)
CHLORIDE SERPL-SCNC: 100 MMOL/L — SIGNIFICANT CHANGE UP (ref 98–107)
CO2 SERPL-SCNC: 26 MMOL/L — SIGNIFICANT CHANGE UP (ref 22–31)
CREAT SERPL-MCNC: 0.71 MG/DL — SIGNIFICANT CHANGE UP (ref 0.5–1.3)
EOSINOPHIL # BLD AUTO: 0.49 K/UL — SIGNIFICANT CHANGE UP (ref 0–0.5)
EOSINOPHIL NFR BLD AUTO: 4.5 % — SIGNIFICANT CHANGE UP (ref 0–6)
EOSINOPHIL NFR FLD: 7.7 % — HIGH (ref 0–6)
FACT II INHIB PPP-ACNC: 81 % — SIGNIFICANT CHANGE UP (ref 65–135)
FACT V ACT/NOR PPP: 72 % — SIGNIFICANT CHANGE UP (ref 50–150)
FACT X ACT/NOR PPP: 83 % — SIGNIFICANT CHANGE UP (ref 50–150)
GIANT PLATELETS BLD QL SMEAR: PRESENT — SIGNIFICANT CHANGE UP
GLUCOSE SERPL-MCNC: 96 MG/DL — SIGNIFICANT CHANGE UP (ref 70–99)
HCT VFR BLD CALC: 26.8 % — LOW (ref 39–50)
HCT VFR BLD CALC: 29 % — LOW (ref 39–50)
HGB BLD-MCNC: 8.1 G/DL — LOW (ref 13–17)
HGB BLD-MCNC: 9.1 G/DL — LOW (ref 13–17)
HYPOCHROMIA BLD QL: SLIGHT — SIGNIFICANT CHANGE UP
IMM GRANULOCYTES # BLD AUTO: 0.07 # — SIGNIFICANT CHANGE UP
IMM GRANULOCYTES NFR BLD AUTO: 0.6 % — SIGNIFICANT CHANGE UP (ref 0–1.5)
INR BLD: 1.47 — HIGH (ref 0.88–1.17)
LYMPHOCYTES # BLD AUTO: 1.66 K/UL — SIGNIFICANT CHANGE UP (ref 1–3.3)
LYMPHOCYTES # BLD AUTO: 15.4 % — SIGNIFICANT CHANGE UP (ref 13–44)
LYMPHOCYTES NFR SPEC AUTO: 7.8 % — LOW (ref 13–44)
MACROCYTES BLD QL: SIGNIFICANT CHANGE UP
MAGNESIUM SERPL-MCNC: 2.1 MG/DL — SIGNIFICANT CHANGE UP (ref 1.6–2.6)
MCHC RBC-ENTMCNC: 25.6 PG — LOW (ref 27–34)
MCHC RBC-ENTMCNC: 25.6 PG — LOW (ref 27–34)
MCHC RBC-ENTMCNC: 30.2 % — LOW (ref 32–36)
MCHC RBC-ENTMCNC: 31.4 % — LOW (ref 32–36)
MCV RBC AUTO: 81.7 FL — SIGNIFICANT CHANGE UP (ref 80–100)
MCV RBC AUTO: 84.5 FL — SIGNIFICANT CHANGE UP (ref 80–100)
MONOCYTES # BLD AUTO: 1.8 K/UL — HIGH (ref 0–0.9)
MONOCYTES NFR BLD AUTO: 16.7 % — HIGH (ref 2–14)
MONOCYTES NFR BLD: 12.1 % — HIGH (ref 2–9)
NEUTROPHIL AB SER-ACNC: 68.1 % — SIGNIFICANT CHANGE UP (ref 43–77)
NEUTROPHILS # BLD AUTO: 6.63 K/UL — SIGNIFICANT CHANGE UP (ref 1.8–7.4)
NEUTROPHILS NFR BLD AUTO: 61.7 % — SIGNIFICANT CHANGE UP (ref 43–77)
NRBC # FLD: 0.08 — SIGNIFICANT CHANGE UP
NRBC # FLD: 0.12 — SIGNIFICANT CHANGE UP
NRBC FLD-RTO: 1.1 — SIGNIFICANT CHANGE UP
NRBC FLD-RTO: 1.1 — SIGNIFICANT CHANGE UP
PHOSPHATE SERPL-MCNC: 3.7 MG/DL — SIGNIFICANT CHANGE UP (ref 2.5–4.5)
PLATELET # BLD AUTO: 407 K/UL — HIGH (ref 150–400)
PLATELET # BLD AUTO: 440 K/UL — HIGH (ref 150–400)
PLATELET COUNT - ESTIMATE: NORMAL — SIGNIFICANT CHANGE UP
PMV BLD: 13 FL — SIGNIFICANT CHANGE UP (ref 7–13)
PMV BLD: 13.1 FL — HIGH (ref 7–13)
POIKILOCYTOSIS BLD QL AUTO: SLIGHT — SIGNIFICANT CHANGE UP
POLYCHROMASIA BLD QL SMEAR: SLIGHT — SIGNIFICANT CHANGE UP
POTASSIUM SERPL-MCNC: 4.2 MMOL/L — SIGNIFICANT CHANGE UP (ref 3.5–5.3)
POTASSIUM SERPL-SCNC: 4.2 MMOL/L — SIGNIFICANT CHANGE UP (ref 3.5–5.3)
PROT C ACT/NOR PPP: 40 % — LOW (ref 74–150)
PROTHROM AB SERPL-ACNC: 16.4 SEC — HIGH (ref 9.8–13.1)
RBC # BLD: 3.17 M/UL — LOW (ref 4.2–5.8)
RBC # BLD: 3.55 M/UL — LOW (ref 4.2–5.8)
RBC # FLD: 18.4 % — HIGH (ref 10.3–14.5)
RBC # FLD: 18.9 % — HIGH (ref 10.3–14.5)
SMUDGE CELLS # BLD: PRESENT — SIGNIFICANT CHANGE UP
SODIUM SERPL-SCNC: 135 MMOL/L — SIGNIFICANT CHANGE UP (ref 135–145)
TARGETS BLD QL SMEAR: SLIGHT — SIGNIFICANT CHANGE UP
VARIANT LYMPHS # BLD: 1.7 % — SIGNIFICANT CHANGE UP
WBC # BLD: 10.77 K/UL — HIGH (ref 3.8–10.5)
WBC # BLD: 7.44 K/UL — SIGNIFICANT CHANGE UP (ref 3.8–10.5)
WBC # FLD AUTO: 10.77 K/UL — HIGH (ref 3.8–10.5)
WBC # FLD AUTO: 7.44 K/UL — SIGNIFICANT CHANGE UP (ref 3.8–10.5)

## 2018-04-03 PROCEDURE — 99233 SBSQ HOSP IP/OBS HIGH 50: CPT | Mod: GC

## 2018-04-03 PROCEDURE — 99233 SBSQ HOSP IP/OBS HIGH 50: CPT

## 2018-04-03 RX ORDER — HYDROMORPHONE HYDROCHLORIDE 2 MG/ML
0.5 INJECTION INTRAMUSCULAR; INTRAVENOUS; SUBCUTANEOUS ONCE
Qty: 0 | Refills: 0 | Status: DISCONTINUED | OUTPATIENT
Start: 2018-04-03 | End: 2018-04-03

## 2018-04-03 RX ORDER — ACETAMINOPHEN 500 MG
1000 TABLET ORAL ONCE
Qty: 0 | Refills: 0 | Status: COMPLETED | OUTPATIENT
Start: 2018-04-03 | End: 2018-04-03

## 2018-04-03 RX ORDER — HEPARIN SODIUM 5000 [USP'U]/ML
1700 INJECTION INTRAVENOUS; SUBCUTANEOUS
Qty: 25000 | Refills: 0 | Status: DISCONTINUED | OUTPATIENT
Start: 2018-04-03 | End: 2018-04-06

## 2018-04-03 RX ORDER — HEPARIN SODIUM 5000 [USP'U]/ML
1800 INJECTION INTRAVENOUS; SUBCUTANEOUS
Qty: 25000 | Refills: 0 | Status: DISCONTINUED | OUTPATIENT
Start: 2018-04-03 | End: 2018-04-03

## 2018-04-03 RX ORDER — WARFARIN SODIUM 2.5 MG/1
5 TABLET ORAL ONCE
Qty: 0 | Refills: 0 | Status: COMPLETED | OUTPATIENT
Start: 2018-04-03 | End: 2018-04-03

## 2018-04-03 RX ADMIN — HYDROMORPHONE HYDROCHLORIDE 0.5 MILLIGRAM(S): 2 INJECTION INTRAMUSCULAR; INTRAVENOUS; SUBCUTANEOUS at 15:18

## 2018-04-03 RX ADMIN — HYDROMORPHONE HYDROCHLORIDE 0.5 MILLIGRAM(S): 2 INJECTION INTRAMUSCULAR; INTRAVENOUS; SUBCUTANEOUS at 03:59

## 2018-04-03 RX ADMIN — Medication 400 MILLIGRAM(S): at 05:33

## 2018-04-03 RX ADMIN — HYDROMORPHONE HYDROCHLORIDE 0.5 MILLIGRAM(S): 2 INJECTION INTRAMUSCULAR; INTRAVENOUS; SUBCUTANEOUS at 05:56

## 2018-04-03 RX ADMIN — WARFARIN SODIUM 5 MILLIGRAM(S): 2.5 TABLET ORAL at 18:23

## 2018-04-03 RX ADMIN — HYDROMORPHONE HYDROCHLORIDE 0.5 MILLIGRAM(S): 2 INJECTION INTRAMUSCULAR; INTRAVENOUS; SUBCUTANEOUS at 18:23

## 2018-04-03 RX ADMIN — Medication 650 MILLIGRAM(S): at 01:59

## 2018-04-03 RX ADMIN — Medication 650 MILLIGRAM(S): at 12:15

## 2018-04-03 RX ADMIN — HEPARIN SODIUM 1800 UNIT(S)/HR: 5000 INJECTION INTRAVENOUS; SUBCUTANEOUS at 06:40

## 2018-04-03 RX ADMIN — Medication 0.25 MILLIGRAM(S): at 05:39

## 2018-04-03 RX ADMIN — HYDROMORPHONE HYDROCHLORIDE 0.5 MILLIGRAM(S): 2 INJECTION INTRAMUSCULAR; INTRAVENOUS; SUBCUTANEOUS at 15:35

## 2018-04-03 RX ADMIN — HYDROMORPHONE HYDROCHLORIDE 0.5 MILLIGRAM(S): 2 INJECTION INTRAMUSCULAR; INTRAVENOUS; SUBCUTANEOUS at 10:21

## 2018-04-03 RX ADMIN — HYDROMORPHONE HYDROCHLORIDE 0.5 MILLIGRAM(S): 2 INJECTION INTRAMUSCULAR; INTRAVENOUS; SUBCUTANEOUS at 18:38

## 2018-04-03 RX ADMIN — HYDROMORPHONE HYDROCHLORIDE 0.5 MILLIGRAM(S): 2 INJECTION INTRAMUSCULAR; INTRAVENOUS; SUBCUTANEOUS at 21:27

## 2018-04-03 RX ADMIN — HYDROMORPHONE HYDROCHLORIDE 0.5 MILLIGRAM(S): 2 INJECTION INTRAMUSCULAR; INTRAVENOUS; SUBCUTANEOUS at 10:40

## 2018-04-03 RX ADMIN — HEPARIN SODIUM 17 UNIT(S)/HR: 5000 INJECTION INTRAVENOUS; SUBCUTANEOUS at 20:39

## 2018-04-03 RX ADMIN — Medication 1000 MILLIGRAM(S): at 06:00

## 2018-04-03 RX ADMIN — Medication 650 MILLIGRAM(S): at 02:00

## 2018-04-03 RX ADMIN — HYDROMORPHONE HYDROCHLORIDE 0.5 MILLIGRAM(S): 2 INJECTION INTRAMUSCULAR; INTRAVENOUS; SUBCUTANEOUS at 03:41

## 2018-04-03 RX ADMIN — HYDROMORPHONE HYDROCHLORIDE 0.5 MILLIGRAM(S): 2 INJECTION INTRAMUSCULAR; INTRAVENOUS; SUBCUTANEOUS at 06:43

## 2018-04-03 RX ADMIN — Medication 650 MILLIGRAM(S): at 13:15

## 2018-04-03 RX ADMIN — Medication 325 MILLIGRAM(S): at 12:01

## 2018-04-03 RX ADMIN — HYDROMORPHONE HYDROCHLORIDE 0.5 MILLIGRAM(S): 2 INJECTION INTRAMUSCULAR; INTRAVENOUS; SUBCUTANEOUS at 21:50

## 2018-04-03 NOTE — PROGRESS NOTE ADULT - SUBJECTIVE AND OBJECTIVE BOX
CHIEF COMPLAINT:    Interval Events:    REVIEW OF SYSTEMS:  Constitutional:   Eyes:  ENT:  CV:  Resp:  GI:  :  MSK:  Integumentary:  Neurological:  Psychiatric:  Endocrine:  Hematologic/Lymphatic:  Allergic/Immunologic:  [ ] All other systems negative  [ ] Unable to assess ROS because ________    OBJECTIVE:  ICU Vital Signs Last 24 Hrs  T(C): 36.4 (03 Apr 2018 04:00), Max: 36.6 (02 Apr 2018 20:00)  T(F): 97.6 (03 Apr 2018 04:00), Max: 97.8 (02 Apr 2018 20:00)  HR: 74 (03 Apr 2018 07:00) (70 - 90)  BP: 105/63 (03 Apr 2018 07:00) (98/59 - 128/64)  BP(mean): 71 (03 Apr 2018 07:00) (63 - 82)  ABP: --  ABP(mean): --  RR: 12 (03 Apr 2018 07:00) (12 - 27)  SpO2: 93% (03 Apr 2018 07:00) (87% - 98%)        04-02 @ 07:01  -  04-03 @ 07:00  --------------------------------------------------------  IN: 578 mL / OUT: 1180 mL / NET: -602 mL      CAPILLARY BLOOD GLUCOSE          PHYSICAL EXAM:  General:   HEENT:   Lymph Nodes:  Neck:   Respiratory:   Cardiovascular:   Abdomen:   Extremities:   Skin:   Neurological:  Psychiatry:    HOSPITAL MEDICATIONS:  MEDICATIONS  (STANDING):  ascorbic acid 500 milliGRAM(s) Oral daily  digoxin     Tablet 0.25 milliGRAM(s) Oral daily  docusate sodium 100 milliGRAM(s) Oral daily  ferrous    sulfate 325 milliGRAM(s) Oral daily  heparin  Infusion. 1800 Unit(s)/Hr (18 mL/Hr) IV Continuous <Continuous>  polyethylene glycol 3350 17 Gram(s) Oral daily  senna 1 Tablet(s) Oral daily    MEDICATIONS  (PRN):  acetaminophen   Tablet. 650 milliGRAM(s) Oral every 6 hours PRN Mild, Moderate, or Severe pain  heparin  Injectable 4000 Unit(s) IV Push every 6 hours PRN For aPTT less than 40  heparin  Injectable 2000 Unit(s) IV Push every 6 hours PRN For aPTT between 40 - 57  HYDROmorphone  Injectable 0.5 milliGRAM(s) IV Push every 3 hours PRN Moderate to severe pain      LABS:                        9.1    10.77 )-----------( 440      ( 03 Apr 2018 05:41 )             29.0     04-03    135  |  100  |  12  ----------------------------<  96  4.2   |  26  |  0.71    Ca    8.3<L>      03 Apr 2018 05:41  Phos  3.7     04-03  Mg     2.1     04-03    TPro  6.0  /  Alb  2.7<L>  /  TBili  1.4<H>  /  DBili  x   /  AST  25  /  ALT  18  /  AlkPhos  135<H>  04-02    PTT - ( 03 Apr 2018 05:41 )  PTT:61.5 SEC          MICROBIOLOGY:     RADIOLOGY:  [ ] Reviewed and interpreted by me    EKG: CHIEF COMPLAINT:    Interval Events: No acute events overnight. Patient received 1 dose of Dilaudid for RLE pain which resolved thereafter. Denies fever, chills CP, SOB, abdominal pain, dysuria.     REVIEW OF SYSTEMS:  Constitutional:   Eyes:  ENT:  CV:  Resp:  GI:  :  MSK:  Integumentary:  Neurological:  Psychiatric:  Endocrine:  Hematologic/Lymphatic:  Allergic/Immunologic:  [X ] All other systems negative except stated in HPI  [ ] Unable to assess ROS because ________    OBJECTIVE:  ICU Vital Signs Last 24 Hrs  T(C): 36.4 (03 Apr 2018 04:00), Max: 36.6 (02 Apr 2018 20:00)  T(F): 97.6 (03 Apr 2018 04:00), Max: 97.8 (02 Apr 2018 20:00)  HR: 74 (03 Apr 2018 07:00) (70 - 90)  BP: 105/63 (03 Apr 2018 07:00) (98/59 - 128/64)  BP(mean): 71 (03 Apr 2018 07:00) (63 - 82)  ABP: --  ABP(mean): --  RR: 12 (03 Apr 2018 07:00) (12 - 27)  SpO2: 93% (03 Apr 2018 07:00) (87% - 98%)        04-02 @ 07:01  -  04-03 @ 07:00  --------------------------------------------------------  IN: 578 mL / OUT: 1180 mL / NET: -602 mL        PHYSICAL EXAM:  General: NAD   HEENT: PERRLA  Lymph Nodes: no LAD  Neck: Supple  Respiratory: CTAB  Cardiovascular: S1S2+, RRR   Abdomen: doft, NT/ND  Extremities: RLE swelling noted with bruising compared to LLE  Skin: No rashes, bruising of RLE as noted above  Neurological: No focal deficits, AAOx3      HOSPITAL MEDICATIONS:  MEDICATIONS  (STANDING):  ascorbic acid 500 milliGRAM(s) Oral daily  digoxin     Tablet 0.25 milliGRAM(s) Oral daily  docusate sodium 100 milliGRAM(s) Oral daily  ferrous    sulfate 325 milliGRAM(s) Oral daily  heparin  Infusion. 1800 Unit(s)/Hr (18 mL/Hr) IV Continuous <Continuous>  polyethylene glycol 3350 17 Gram(s) Oral daily  senna 1 Tablet(s) Oral daily    MEDICATIONS  (PRN):  acetaminophen   Tablet. 650 milliGRAM(s) Oral every 6 hours PRN Mild, Moderate, or Severe pain  heparin  Injectable 4000 Unit(s) IV Push every 6 hours PRN For aPTT less than 40  heparin  Injectable 2000 Unit(s) IV Push every 6 hours PRN For aPTT between 40 - 57  HYDROmorphone  Injectable 0.5 milliGRAM(s) IV Push every 3 hours PRN Moderate to severe pain      LABS:                        9.1    10.77 )-----------( 440      ( 03 Apr 2018 05:41 )             29.0     04-03    135  |  100  |  12  ----------------------------<  96  4.2   |  26  |  0.71    Ca    8.3<L>      03 Apr 2018 05:41  Phos  3.7     04-03  Mg     2.1     04-03    TPro  6.0  /  Alb  2.7<L>  /  TBili  1.4<H>  /  DBili  x   /  AST  25  /  ALT  18  /  AlkPhos  135<H>  04-02    PTT - ( 03 Apr 2018 05:41 )  PTT:61.5 SEC

## 2018-04-03 NOTE — PROGRESS NOTE ADULT - SUBJECTIVE AND OBJECTIVE BOX
Adult Congenital Heart Disease  Progress Note    Subjective: Able to ambulate to commode. Pain is improving. No chest pain, persistent desaturation, palpitations.    Objective:  T(C): 36.4 (04-03-18 @ 04:00), Max: 36.6 (04-02-18 @ 20:00)  HR: 76 (04-03-18 @ 10:00) (70 - 90)  BP: 121/74 (-03-18 @ 10:00) (98/59 - 121/74)  RR: 16 (04-03-18 @ 10:00) (11 - 27)  SpO2: 87% (04-03-18 @ 10:00) (87% - 98%)    PHYSICAL EXAMINATION:  Vital signs - Weight (kg): 50 (03-30 @ 19:38)  T(C): 36.4 (04-03-18 @ 04:00), Max: 36.6 (04-02-18 @ 20:00)  HR: 76 (04-03-18 @ 10:00) (70 - 90)  BP: 121/74 (04-03-18 @ 10:00) (98/59 - 121/74)  ABP: --  RR: 16 (-03-18 @ 10:00) (11 - 27)  SpO2: 87% (04-03-18 @ 10:00) (87% - 98%)  CVP(mm Hg): --    General - thin, resting comfortably  Skin - RLE venous stasis changes  Eyes / ENT - no conjunctival injection, sclerae anicteric, external ears & nares normal, mucous membranes moist.  Pulmonary - normal inspiratory effort, no retractions, lungs clear to auscultation bilaterally, no wheezes, no rales.  Cardiovascular - pectus, healed median sternotomy, s1 single s2, II/VI HSM at LLSB, warm distally  Gastrointestinal - soft, non-distended, non-tender  Musculoskeletal - RLE swollen from calf downwards compared to LLE, equally warm to left leg  Neurologic / Psychiatric - alert, oriented as age-appropriate, flat affect    MEDICATIONS:  digoxin     Tablet 0.25 milliGRAM(s) Oral daily  ferrous    sulfate 325 milliGRAM(s) Oral daily  heparin  Infusion. 1800 Unit(s)/Hr IV Continuous <Continuous>  warfarin 5 milliGRAM(s) Oral once    LABORATORY TESTS:                          9.1  CBC:   10.77 )-----------( 440   (18 @ 05:41)                          29.0               135   |  100   |  12                 Ca: 8.3    BMP:   ----------------------------< 96     M.1   (18 @ 05:41)             4.2    |  26    | 0.71               Ph: 3.7      LFT:     TPro: 6.0 / Alb: 2.7 / TBili: 1.4 / DBili: x / AST: 25 / ALT: 18 / AlkPhos: 135   (18 @ 03:00)    COAG: PT: x / PTT: 61.5 / INR: x   (18 @ 05:41)

## 2018-04-03 NOTE — PROGRESS NOTE ADULT - SUBJECTIVE AND OBJECTIVE BOX
Clinically stable  No hemoptysis  Decreased leg edema  Localized tender GSV superficial thrombophlebitis    Recommend convert to oral anticoagulant for 6-12 month course of therapy  Elevate leg  Warm compresses to phlebitic area  If no contraindication, motrin or anti-inflammatory for pain and phlebitis    F/U with me in 2 weeks

## 2018-04-03 NOTE — PROGRESS NOTE ADULT - ASSESSMENT
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    Neuro:  - AAOx3 without active issues  - Pain control will give Dilaudid 05mg Q3H PRN and acetaminophen 650 mg Q6H prn for pain    CV:  - Complicated cardiovascular hx in the setting of heterotaxy/congenital heart disease s/p multiple surgeries including Fontan procedure.  - Patient's Pediatric Cardiologist is Dr Rojas who has been consulted and continues following.  - Per discussion with cardiology today, will bridge patient to coumadin. Per note, DOACs have not been studied as extensively in Fontan physiology as in this patient, so coumadin is preferably for the time being. Will dose 5 mg Coumadin for next 3 days and d/c heparin gtt when INR therapeutic from 2-3.   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 He experienced transient hypoxemia at baseline even prior to this.   - Extensive RLE DVT extending up to infrarenal IVC.   - On full AC with heparin gtt w/o recurrence of hemoptysis    GI:  - Continue w/ regular diet    Renal:  - No acute issues    Heme:  - Extensive RLE DVT up to infrarenal IVC w/ thrombophlebitis  - On heparin gtt and will bridge to Coumadin as above   - patient w/ normocytic anemia and stable H/H  - c/w iron supplements      ID:  - Leukocytosis without any other signs of infection, continue to monitor - likely reactive due to current DVT  - Currently no evidence of active infection    DVT ppx:  - On heparin for full AC    Ethics:  - full code

## 2018-04-03 NOTE — PROGRESS NOTE ADULT - ASSESSMENT
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 Pain is improving and now weaning towards non-opiate based analgesia.  Therapeutic on heparin without recurrence of hemoptysis.  Maintains saturated at baseline without clinical concern for thromboembolism to Fontan circuit.     #Acute DVT with Fontan physiology.  Moving forward, will require likely lifelong anticoagulation given Fontan physiology and significant clot burden.  We discussed the risks and benefits of VKA vs. direct oral anticoagulant therapy in adult patients with Fontan physiology.  While DOACs carry a variety of benefits compared to coumadin in non congenital patients, their experience in patients with congenital heart disease, specifically Fontan physiology, is limited and only beginning to be discussed in the literature. In light of the recent hemopytsis of unclear etiology and the thrombus burden, would favor the use of coumadin at least in the short term and reevaluate the role of DOACs in the future.  - continue heparin drip, start coumadin today, goal INR 2-3  - supportive therapy as per vascular  - agree with weaning towards non-opiate based analgesia  - okay for baseline sats to be as low as 85% on room air    #Flat affect, likely untreated depression/adjustment disorder.  Well described issues with our patients with congenital heart disease transitioning into adulthood. With Kang, the recent hospitalizations have seemed to accelerate/worsen chronic depression.  - will discuss further with Kang and his parents and decide if amenable to formal psychiatric evaluation/treatment  - appreciate social work input

## 2018-04-04 LAB
APTT BLD: 72.8 SEC — HIGH (ref 27.5–37.4)
APTT BLD: 83.9 SEC — HIGH (ref 27.5–37.4)
BUN SERPL-MCNC: 13 MG/DL — SIGNIFICANT CHANGE UP (ref 7–23)
CALCIUM SERPL-MCNC: 8 MG/DL — LOW (ref 8.4–10.5)
CHLORIDE SERPL-SCNC: 100 MMOL/L — SIGNIFICANT CHANGE UP (ref 98–107)
CO2 SERPL-SCNC: 25 MMOL/L — SIGNIFICANT CHANGE UP (ref 22–31)
CREAT SERPL-MCNC: 0.68 MG/DL — SIGNIFICANT CHANGE UP (ref 0.5–1.3)
GLUCOSE SERPL-MCNC: 94 MG/DL — SIGNIFICANT CHANGE UP (ref 70–99)
HCT VFR BLD CALC: 27.6 % — LOW (ref 39–50)
HGB BLD-MCNC: 8.7 G/DL — LOW (ref 13–17)
INR BLD: 1.44 — HIGH (ref 0.88–1.17)
MAGNESIUM SERPL-MCNC: 2.1 MG/DL — SIGNIFICANT CHANGE UP (ref 1.6–2.6)
MCHC RBC-ENTMCNC: 25.8 PG — LOW (ref 27–34)
MCHC RBC-ENTMCNC: 31.5 % — LOW (ref 32–36)
MCV RBC AUTO: 81.9 FL — SIGNIFICANT CHANGE UP (ref 80–100)
NRBC # FLD: 0.11 — SIGNIFICANT CHANGE UP
NRBC FLD-RTO: 1.1 — SIGNIFICANT CHANGE UP
PHOSPHATE SERPL-MCNC: 3.7 MG/DL — SIGNIFICANT CHANGE UP (ref 2.5–4.5)
PLATELET # BLD AUTO: 421 K/UL — HIGH (ref 150–400)
PMV BLD: 13 FL — SIGNIFICANT CHANGE UP (ref 7–13)
POTASSIUM SERPL-MCNC: 4.4 MMOL/L — SIGNIFICANT CHANGE UP (ref 3.5–5.3)
POTASSIUM SERPL-SCNC: 4.4 MMOL/L — SIGNIFICANT CHANGE UP (ref 3.5–5.3)
PROTHROM AB SERPL-ACNC: 16.1 SEC — HIGH (ref 9.8–13.1)
RBC # BLD: 3.37 M/UL — LOW (ref 4.2–5.8)
RBC # FLD: 18.5 % — HIGH (ref 10.3–14.5)
SODIUM SERPL-SCNC: 135 MMOL/L — SIGNIFICANT CHANGE UP (ref 135–145)
WBC # BLD: 10.37 K/UL — SIGNIFICANT CHANGE UP (ref 3.8–10.5)
WBC # FLD AUTO: 10.37 K/UL — SIGNIFICANT CHANGE UP (ref 3.8–10.5)

## 2018-04-04 PROCEDURE — 99233 SBSQ HOSP IP/OBS HIGH 50: CPT

## 2018-04-04 PROCEDURE — 99233 SBSQ HOSP IP/OBS HIGH 50: CPT | Mod: GC

## 2018-04-04 RX ORDER — LANOLIN ALCOHOL/MO/W.PET/CERES
3 CREAM (GRAM) TOPICAL AT BEDTIME
Qty: 0 | Refills: 0 | Status: DISCONTINUED | OUTPATIENT
Start: 2018-04-04 | End: 2018-04-04

## 2018-04-04 RX ORDER — HYDROMORPHONE HYDROCHLORIDE 2 MG/ML
2 INJECTION INTRAMUSCULAR; INTRAVENOUS; SUBCUTANEOUS EVERY 6 HOURS
Qty: 0 | Refills: 0 | Status: DISCONTINUED | OUTPATIENT
Start: 2018-04-04 | End: 2018-04-06

## 2018-04-04 RX ORDER — HYDROMORPHONE HYDROCHLORIDE 2 MG/ML
0.5 INJECTION INTRAMUSCULAR; INTRAVENOUS; SUBCUTANEOUS ONCE
Qty: 0 | Refills: 0 | Status: DISCONTINUED | OUTPATIENT
Start: 2018-04-04 | End: 2018-04-04

## 2018-04-04 RX ORDER — WARFARIN SODIUM 2.5 MG/1
5 TABLET ORAL ONCE
Qty: 0 | Refills: 0 | Status: COMPLETED | OUTPATIENT
Start: 2018-04-04 | End: 2018-04-04

## 2018-04-04 RX ORDER — LANOLIN ALCOHOL/MO/W.PET/CERES
5 CREAM (GRAM) TOPICAL ONCE
Qty: 0 | Refills: 0 | Status: DISCONTINUED | OUTPATIENT
Start: 2018-04-04 | End: 2018-04-04

## 2018-04-04 RX ORDER — ACETAMINOPHEN 500 MG
1000 TABLET ORAL ONCE
Qty: 0 | Refills: 0 | Status: COMPLETED | OUTPATIENT
Start: 2018-04-04 | End: 2018-04-04

## 2018-04-04 RX ORDER — LANOLIN ALCOHOL/MO/W.PET/CERES
3 CREAM (GRAM) TOPICAL DAILY
Qty: 0 | Refills: 0 | Status: DISCONTINUED | OUTPATIENT
Start: 2018-04-04 | End: 2018-04-06

## 2018-04-04 RX ORDER — CHLORHEXIDINE GLUCONATE 213 G/1000ML
1 SOLUTION TOPICAL
Qty: 0 | Refills: 0 | Status: DISCONTINUED | OUTPATIENT
Start: 2018-04-04 | End: 2018-04-06

## 2018-04-04 RX ADMIN — HYDROMORPHONE HYDROCHLORIDE 0.5 MILLIGRAM(S): 2 INJECTION INTRAMUSCULAR; INTRAVENOUS; SUBCUTANEOUS at 02:40

## 2018-04-04 RX ADMIN — HYDROMORPHONE HYDROCHLORIDE 2 MILLIGRAM(S): 2 INJECTION INTRAMUSCULAR; INTRAVENOUS; SUBCUTANEOUS at 10:45

## 2018-04-04 RX ADMIN — HYDROMORPHONE HYDROCHLORIDE 0.5 MILLIGRAM(S): 2 INJECTION INTRAMUSCULAR; INTRAVENOUS; SUBCUTANEOUS at 07:49

## 2018-04-04 RX ADMIN — Medication 3 MILLIGRAM(S): at 05:11

## 2018-04-04 RX ADMIN — CHLORHEXIDINE GLUCONATE 1 APPLICATION(S): 213 SOLUTION TOPICAL at 13:08

## 2018-04-04 RX ADMIN — Medication 1000 MILLIGRAM(S): at 21:05

## 2018-04-04 RX ADMIN — HEPARIN SODIUM 17 UNIT(S)/HR: 5000 INJECTION INTRAVENOUS; SUBCUTANEOUS at 08:00

## 2018-04-04 RX ADMIN — HYDROMORPHONE HYDROCHLORIDE 0.5 MILLIGRAM(S): 2 INJECTION INTRAMUSCULAR; INTRAVENOUS; SUBCUTANEOUS at 08:05

## 2018-04-04 RX ADMIN — Medication 650 MILLIGRAM(S): at 00:49

## 2018-04-04 RX ADMIN — HYDROMORPHONE HYDROCHLORIDE 0.5 MILLIGRAM(S): 2 INJECTION INTRAMUSCULAR; INTRAVENOUS; SUBCUTANEOUS at 03:27

## 2018-04-04 RX ADMIN — Medication 3 MILLIGRAM(S): at 22:51

## 2018-04-04 RX ADMIN — Medication 325 MILLIGRAM(S): at 13:07

## 2018-04-04 RX ADMIN — HYDROMORPHONE HYDROCHLORIDE 0.5 MILLIGRAM(S): 2 INJECTION INTRAMUSCULAR; INTRAVENOUS; SUBCUTANEOUS at 05:29

## 2018-04-04 RX ADMIN — WARFARIN SODIUM 5 MILLIGRAM(S): 2.5 TABLET ORAL at 18:07

## 2018-04-04 RX ADMIN — Medication 650 MILLIGRAM(S): at 01:15

## 2018-04-04 RX ADMIN — Medication 400 MILLIGRAM(S): at 20:35

## 2018-04-04 RX ADMIN — Medication 0.25 MILLIGRAM(S): at 05:07

## 2018-04-04 RX ADMIN — HYDROMORPHONE HYDROCHLORIDE 0.5 MILLIGRAM(S): 2 INJECTION INTRAMUSCULAR; INTRAVENOUS; SUBCUTANEOUS at 06:00

## 2018-04-04 RX ADMIN — HYDROMORPHONE HYDROCHLORIDE 2 MILLIGRAM(S): 2 INJECTION INTRAMUSCULAR; INTRAVENOUS; SUBCUTANEOUS at 10:00

## 2018-04-04 NOTE — PROGRESS NOTE ADULT - SUBJECTIVE AND OBJECTIVE BOX
Vascular Surgery Progress Note  C team [phone]      Subjective/interval events:  -no acute events  -reports pain improving      Objective:  Vital Signs Last 24 Hrs  T(C): 36.6 (04 Apr 2018 08:00), Max: 36.6 (03 Apr 2018 16:00)  T(F): 97.9 (04 Apr 2018 08:00), Max: 97.9 (04 Apr 2018 08:00)  HR: 71 (04 Apr 2018 07:00) (71 - 88)  BP: 102/59 (04 Apr 2018 07:00) (95/64 - 128/71)  BP(mean): 69 (04 Apr 2018 07:00) (64 - 86)  RR: 20 (04 Apr 2018 07:00) (11 - 29)  SpO2: 89% (04 Apr 2018 07:00) (82% - 98%)        04-03-18 @ 07:01  -  04-04-18 @ 07:00  --------------------------------------------------------  IN: 981 mL / OUT: 1000 mL / NET: -19 mL          PE:  Gen: NAD, resting in bed  Pulm: breathing comfortably on RA  CV: regular  Ext: RLE w/ edema, mildly tender to palpation; dp palpable      MEDICATIONS  (STANDING):  chlorhexidine 4% Liquid 1 Application(s) Topical <User Schedule>  digoxin     Tablet 0.25 milliGRAM(s) Oral daily  ferrous    sulfate 325 milliGRAM(s) Oral daily  heparin  Infusion 1700 Unit(s)/Hr (17 mL/Hr) IV Continuous <Continuous>  melatonin 3 milliGRAM(s) Oral daily    MEDICATIONS  (PRN):  acetaminophen   Tablet. 650 milliGRAM(s) Oral every 6 hours PRN Mild, Moderate, or Severe pain  heparin  Injectable 4000 Unit(s) IV Push every 6 hours PRN For aPTT less than 40  heparin  Injectable 2000 Unit(s) IV Push every 6 hours PRN For aPTT between 40 - 57  HYDROmorphone  Injectable 0.5 milliGRAM(s) IV Push every 3 hours PRN Moderate to severe pain        Labs:                        8.7    10.37 )-----------( 421      ( 04 Apr 2018 06:19 )             27.6   04-04    135  |  100  |  13  ----------------------------<  94  4.4   |  25  |  0.68    Ca    8.0<L>      04 Apr 2018 06:19  Phos  3.7     04-04  Mg     2.1     04-04      PT/INR - ( 04 Apr 2018 06:19 )   PT: 16.1 SEC;   INR: 1.44     PTT - ( 04 Apr 2018 06:19 )  PTT:83.9 SEC          Imaging:  VA Duplex Arterial Lower Ext, Right. (03.30.18 @ 15:40) >  Right Findings: No evidence of pseudoaneurysm visualized  in the right inguinal region.  The right common femoral, superficial femoral, and  proximal deep femoral arteries are otherwise patent, and  demonstrate normal velocities with triphasic waveforms. No  hemodynamically significant stenosis.  Acute, occlusive, deep vein thrombosis noted in the right  external iliac and common femoral veins.  ------------------------------------------------------------------------  Summary/Impressions:  1. No pseudoaneurysm noted in the right groin.  2. Acute, occlusive, deep vein thrombosis noted in the  right external iliac and common femoral veins.        CT Angio Neck w/ IV Cont (03.29.18 @ 09:13) >  CT BRAIN:    There is no hydrocephalus, midline shift, mass effect, acute intracranial   hemorrhage, or evidence of acute infarct. The visualized paranasal   sinuses and mastoid air cells are clear.    CTA BRAIN:    There is good flow-related enhancement of the bilateral anterior, middle,   and posterior cerebral arteries, and within the basilar artery. The   intracranial internal carotid and vertebral arteries, and skull base   internal carotid arteries demonstrate normal flow-related enhancement.     There is no vascular aneurysm or flow-limiting stenosis.      Anterior communicating artery is visualized. Diminutive left posterior   communicating arteries present.    CTA NECK:     There is good flow-related enhancement of the bilateral common and   internal carotid arteries. The vertebral arteries are well visualized.     There is no flow-limiting stenosis or vascular aneurysm. No evidence for   arterial dissection. The carotid bifurcations and the origins of the   common carotid and vertebral arteries are unremarkable.    The bilateral external carotid arteries and their branches are grossly   unremarkable. No flori extravasation of contrast is noted.    Visualized soft tissues grossly unremarkable. No evidence for soft tissue   mass or AVM.    IMPRESSION:    CT BRAIN:    No acute intracranial hemorrhage, mass effect, or evidence of acute   territorial infarct.    CTA BRAIN:    No vascular aneurysm or flow-limiting stenosis.    CTA NECK:    No flow-limiting stenosis. No evidence of arterial dissection.    No flori extravasation of contrast noted.    Visualized soft tissues grossly unremarkable.      US Duplex Venous Lower Ext Ltd, Right (03.30.18 @ 15:29) >  RIGHT:    Right testis: 4 x 2 x 2.4  cm. Normal echogenicity and echotexture with   no masses or areas of architectural distortion. Normal blood flow pattern.    Right epididymis: Within normal limits.    Right hydrocele: None.    Right varicocele: None.      LEFT:     Left testis: 3.8 x 2.5 x 2.3 cm. Normal echogenicity and echotexture with   no masses or areas of architectural distortion. Normal blood flow pattern.    Left epididymis: 6 mm cyst epididymal head.    Left hydrocele: None.    Left varicocele: None.    IMPRESSION:     Normal scrotal sonogram.Occlusive thrombosis of the distal right external iliac, common femoral,   and proximal femoral veins was visualized. The thrombus also involved the   greater saphenous vein. The mid to distal femoral vein and calf veins   were unremarkable.    The contralateral common femoral vein is patent with normal spontaneous   and phasic flow.    IMPRESSION:     Extensive DVT right leg. Upper extent not identified. Suggest further   imaging with CT or MRI venography.

## 2018-04-04 NOTE — PROGRESS NOTE ADULT - SUBJECTIVE AND OBJECTIVE BOX
Adult Congenital Heart Disease  Progress Note    Subjective: States pain is improved. Denies chest pain or palpitations. No recurrence of hemoptysis    Objective:  T(C): 36.6 (18 @ 08:00), Max: 36.6 (18 @ 16:00)  HR: 73 (18 @ 09:00) (71 - 88)  BP: 111/65 (18 @ 09:00) (95/64 - 128/71)  RR: 18 (18 @ 09:00) (11 - 29)  SpO2: 88% (18 @ 09:00) (82% - 98%)  PHYSICAL EXAMINATION:  Vital signs - Weight (kg): 50 (30 @ 19:38)  T(C): 36.6 (18 @ 08:00), Max: 36.6 (18 @ 16:00)  HR: 73 (18 @ 09:00) (71 - 88)  BP: 111/65 (18 @ 09:00) (95/64 - 128/71)  ABP: --  RR: 18 (18 @ 09:00) ( - 29)  SpO2: 88% (18 @ 09:00) (82% - 98%)  CVP(mm Hg): --    General - thin, no distress  Skin - RLE chronic venous stasis changes  Eyes / ENT - no conjunctival injection, sclerae anicteric, external ears & nares normal, mucous membranes moist.  Gastrointestinal - soft, non-distended, non-tender  Musculoskeletal - RLE swollen compared to LLE, equal temp to touch  Neurologic / Psychiatric - alert, oriented as age-appropriate    MEDICATIONS:  digoxin     Tablet 0.25 milliGRAM(s) Oral daily  melatonin 3 milliGRAM(s) Oral daily  ferrous    sulfate 325 milliGRAM(s) Oral daily  heparin  Infusion 1700 Unit(s)/Hr IV Continuous <Continuous>    Labs:  LABORATORY TESTS:                          8.7  CBC:   10.37 )-----------( 421   (18 @ 06:19)                          27.6               135   |  100   |  13                 Ca: 8.0    BMP:   ----------------------------< 94     M.1   (18 @ 06:19)             4.4    |  25    | 0.68               Ph: 3.7      LFT:     TPro: 6.0 / Alb: 2.7 / TBili: 1.4 / DBili: x / AST: 25 / ALT: 18 / AlkPhos: 135   (18 @ 03:00)    COAG: PT: 16.1 / PTT: 83.9 / INR: 1.44   (18 @ 06:19)     CARDIAC MARKERS:             Trop I: x / Trop T: x / CK: 28 / CKMB: x   (18 @ 14:26)             Pro-BNP: x   (18 @ 14:26)    telemetry: sinus rhythm, isolated single PVCs, rare ventricular couplets

## 2018-04-04 NOTE — PROGRESS NOTE ADULT - ASSESSMENT
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    Neuro:  - AAOx3 without active issues  - Switching pain control to dilaudid 2mg PO and tylenol -> will try tylenol first for pain control before dilaudid    CV:  - Complicated cardiovascular hx in the setting of heterotaxy/congenital heart disease s/p multiple surgeries including Fontan procedure.  - Patient's Pediatric Cardiologist is Dr Rojas who has been consulted and continues following.  - Per discussion with cardiology, bridging patient to coumadin. Per note, DOACs have not been studied as extensively in Fontan physiology as in this patient, so coumadin is preferably for the time being.   - Will dose 5 mg Coumadin for next 3 days (day 2/3) and d/c heparin gtt when INR therapeutic from 2-3.   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 He experienced transient hypoxemia at baseline even prior to this.   - Extensive RLE DVT extending up to infrarenal IVC.   - On full AC with heparin gtt, being bridged to coumadin w/o recurrence of hemoptysis    GI:  - Continue w/ regular diet    Renal:  - No acute issues    Heme:  - Extensive RLE DVT up to infrarenal IVC w/ thrombophlebitis  - On heparin gtt currently bridging to coumadin, 5mg x3 days (day 2/3)  - patient w/ normocytic anemia and stable H/H  - c/w iron supplements    ID:  - No evidence of active infection    DVT ppx:  - On heparin, being bridged to coumadin for full AC    Mobility  - OOB today with the assistance of PT    Ethics:  - Full code

## 2018-04-04 NOTE — PROGRESS NOTE ADULT - ASSESSMENT
22M known to Vascular Surgery for outpatient workup of venous disease of LE, admitted w/ extensive thrombus in femoral and iliac vein, improving on anticoagulation.     -Recommend convert to oral anticoagulant for 6-12 month course of therapy  -continue leg elevation  -Warm compresses to phlebitic area  -If no contraindication, Motrin or anti-inflammatory for pain and phlebitis    F/U w/ Dr. Cdaet as outpatient 2 weeks from discharge.        (Please page [facility] C team Surgery, Vascular [phone] for questions/concerns.)

## 2018-04-04 NOTE — PROGRESS NOTE ADULT - ASSESSMENT
22 year old male with heterotaxy with asplenia and complex single ventricle cardiac anatomy s/p Fontan completion now with acute right lower extremity DVT.  Continues on heparin, started on coumadin yesterday. Still getting prn IV narcotics.    - ambulate  - switch to oral pain meds  - f/u daily INR- will see what tomorrow's INR is to determine coumadin  - okay for melatonin- please give QHS  - maintain day/night cycle- blinds open during day, out of bed  - continue digoxin

## 2018-04-04 NOTE — PROGRESS NOTE ADULT - SUBJECTIVE AND OBJECTIVE BOX
INTERVAL HPI/OVERNIGHT EVENTS:    SUBJECTIVE: Patient seen and examined at bedside. No overnight events. Pt got dilaudid 0.5mg x2 overnight. Explains that pain is improved. Hesitant to start walking but will try with PT today.    OBJECTIVE:    VITAL SIGNS:  ICU Vital Signs Last 24 Hrs  T(C): 36.4 (04 Apr 2018 12:00), Max: 36.6 (03 Apr 2018 16:00)  T(F): 97.6 (04 Apr 2018 12:00), Max: 97.9 (04 Apr 2018 08:00)  HR: 75 (04 Apr 2018 12:00) (71 - 88)  BP: 109/64 (04 Apr 2018 12:00) (95/64 - 128/71)  BP(mean): 74 (04 Apr 2018 12:00) (64 - 86)  ABP: --  ABP(mean): --  RR: 11 (04 Apr 2018 12:00) (11 - 29)  SpO2: 87% (04 Apr 2018 12:00) (82% - 98%)    04-03 @ 07:01 - 04-04 @ 07:00  --------------------------------------------------------  IN: 981 mL / OUT: 1000 mL / NET: -19 mL    04-04 @ 07:01  -  04-04 @ 12:21  --------------------------------------------------------  IN: 102 mL / OUT: 0 mL / NET: 102 mL    PHYSICAL EXAM:    General: NAD   HEENT: PERRLA  Lymph Nodes: no LAD  Neck: Supple  Respiratory: CTAB  Cardiovascular: S1S2+, RRR   Abdomen: doft, NT/ND  Extremities: RLE swelling improving with less bruising, FROM  Skin: No rashes, bruising of RLE as noted above  Neurological: No focal deficits, AAOx3    MEDICATIONS:  MEDICATIONS  (STANDING):  chlorhexidine 4% Liquid 1 Application(s) Topical <User Schedule>  digoxin     Tablet 0.25 milliGRAM(s) Oral daily  ferrous    sulfate 325 milliGRAM(s) Oral daily  heparin  Infusion 1700 Unit(s)/Hr (17 mL/Hr) IV Continuous <Continuous>  melatonin 3 milliGRAM(s) Oral daily  warfarin 5 milliGRAM(s) Oral once    MEDICATIONS  (PRN):  acetaminophen   Tablet. 650 milliGRAM(s) Oral every 6 hours PRN Mild, Moderate, or Severe pain  heparin  Injectable 4000 Unit(s) IV Push every 6 hours PRN For aPTT less than 40  heparin  Injectable 2000 Unit(s) IV Push every 6 hours PRN For aPTT between 40 - 57  HYDROmorphone   Tablet 2 milliGRAM(s) Oral every 6 hours PRN Moderate or Severe Pain    ALLERGIES:  Allergies    No Known Allergies    LABS:                        8.7    10.37 )-----------( 421      ( 04 Apr 2018 06:19 )             27.6     04-04    135  |  100  |  13  ----------------------------<  94  4.4   |  25  |  0.68    Ca    8.0<L>      04 Apr 2018 06:19  Phos  3.7     04-04  Mg     2.1     04-04    PT/INR - ( 04 Apr 2018 06:19 )   PT: 16.1 SEC;   INR: 1.44       PTT - ( 04 Apr 2018 06:19 )  PTT:83.9 SEC    RADIOLOGY & ADDITIONAL TESTS: Reviewed.

## 2018-04-04 NOTE — CHART NOTE - NSCHARTNOTEFT_GEN_A_CORE
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    MICU COURSE:          ASSESSMENT & PLAN:             For Followup:          Vital Signs Last 24 Hrs  T(C): 36.4 (04 Apr 2018 12:00), Max: 36.6 (03 Apr 2018 16:00)  T(F): 97.6 (04 Apr 2018 12:00), Max: 97.9 (04 Apr 2018 08:00)  HR: 82 (04 Apr 2018 13:00) (71 - 88)  BP: 110/54 (04 Apr 2018 13:00) (102/55 - 128/71)  BP(mean): 65 (04 Apr 2018 13:00) (64 - 86)  RR: 12 (04 Apr 2018 13:00) (11 - 29)  SpO2: 87% (04 Apr 2018 13:00) (82% - 98%)  I&O's Summary    03 Apr 2018 07:01  -  04 Apr 2018 07:00  --------------------------------------------------------  IN: 981 mL / OUT: 1000 mL / NET: -19 mL    04 Apr 2018 07:01  -  04 Apr 2018 13:37  --------------------------------------------------------  IN: 119 mL / OUT: 0 mL / NET: 119 mL    MEDICATIONS  (STANDING):  chlorhexidine 4% Liquid 1 Application(s) Topical <User Schedule>  digoxin     Tablet 0.25 milliGRAM(s) Oral daily  ferrous    sulfate 325 milliGRAM(s) Oral daily  heparin  Infusion 1700 Unit(s)/Hr (17 mL/Hr) IV Continuous <Continuous>  melatonin 3 milliGRAM(s) Oral daily  warfarin 5 milliGRAM(s) Oral once    MEDICATIONS  (PRN):  acetaminophen   Tablet. 650 milliGRAM(s) Oral every 6 hours PRN Mild, Moderate, or Severe pain  heparin  Injectable 4000 Unit(s) IV Push every 6 hours PRN For aPTT less than 40  heparin  Injectable 2000 Unit(s) IV Push every 6 hours PRN For aPTT between 40 - 57  HYDROmorphone   Tablet 2 milliGRAM(s) Oral every 6 hours PRN Moderate or Severe Pain      LABS                                            8.7                   Neurophils% (auto):   x      (04-04 @ 06:19):    10.37)-----------(421          Lymphocytes% (auto):  x                                             27.6                   Eosinphils% (auto):   x        Manual%: Neutrophils x    ; Lymphocytes x    ; Eosinophils x    ; Bands%: x    ; Blasts x                                    135    |  100    |  13                  Calcium: 8.0   / iCa: x      (04-04 @ 06:19)    ----------------------------<  94        Magnesium: 2.1                              4.4     |  25     |  0.68             Phosphorous: 3.7        ( 04-04 @ 06:19 )   PT: 16.1 SEC;   INR: 1.44   aPTT: 83.9 SEC MICU Transfer Note    Transfer from: MICU    Transfer to: (  ) Medicine    ( x ) Telemetry     (   ) RCU        (    ) Palliative         (   ) Stroke Unit          (   ) __________________    Accepting physician:    [facility] COURSE:    Pt is a 21 y/o M w/ a PMHx of congenital heart disease/heterotaxy s/p multiple cardiac surgeries including fontan's procedure, hx of thrombosis of the right femoral vein, PE in 2014 s/p Eliquis course (although mother denies that this was a real PE), hemoptysis with recent workup at [facility] found to be negative, p/w R thigh pain found to have extensive RLE DVT from the R femoral vein up to the infrarenal IVC, 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 Pt was started on a heparin gtt. Vascular, IR and pediatric cardiology consulted. Vascular and IR w/ no intervention. Pediatric cardiology explains continue to AC and transition to coumadin as DOACs were never studied in Fontan's procedure patients. Pt has RLE pain, initially on IV pain meds, now transitioned to oral meds. FYI: pt's baseline saturations in the high 80s.    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    Neuro:  - AAOx3 without active issues  - Switching pain control to dilaudid 2mg PO and tylenol -> try tylenol first for pain control before dilaudid    CV:  - Complicated cardiovascular hx in the setting of heterotaxy/congenital heart disease s/p multiple surgeries including Fontan procedure.  - Patient's Pediatric Cardiologist is Dr Rojas who has been consulted and continues following.  - Bridging patient to coumadin. Per cardiology note, DOACs have not been studied as extensively in Fontan physiology as in this patient, so coumadin is preferably for the time being.   - Will dose 5 mg Coumadin for 3 days (day 2/3) and d/c heparin gtt when INR therapeutic from 2-3.   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 He experienced transient hypoxemia at baseline even prior to this.   - Extensive RLE DVT extending up to infrarenal IVC.   - On full AC with heparin gtt, being bridged to coumadin w/o recurrence of hemoptysis    GI:  - Continue w/ regular diet    Renal:  - No acute issues    Heme:  - Extensive RLE DVT up to infrarenal IVC w/ thrombophlebitis  - On heparin gtt currently bridging to coumadin, 5mg x3 days (day 2/3)  - patient w/ normocytic anemia and stable H/H  - c/w iron supplements    ID:  - No evidence of active infection    DVT ppx:  - On heparin, being bridged to coumadin for full AC    Mobility  - OOB today with the assistance of PT    Ethics:  - Full code    For Followup:    [ ] f/u daily INR  [ ] dose coumadin 5mg x3 days ( up through 4/5)  [ ] pain control  [ ] PT -> OOB, ambulate as tolerated    Vital Signs Last 24 Hrs  T(C): 36.4 (04 Apr 2018 12:00), Max: 36.6 (03 Apr 2018 16:00)  T(F): 97.6 (04 Apr 2018 12:00), Max: 97.9 (04 Apr 2018 08:00)  HR: 82 (04 Apr 2018 13:00) (71 - 88)  BP: 110/54 (04 Apr 2018 13:00) (102/55 - 128/71)  BP(mean): 65 (04 Apr 2018 13:00) (64 - 86)  RR: 12 (04 Apr 2018 13:00) (11 - 29)  SpO2: 87% (04 Apr 2018 13:00) (82% - 98%)  I&O's Summary    03 Apr 2018 07:01  -  04 Apr 2018 07:00  --------------------------------------------------------  IN: 981 mL / OUT: 1000 mL / NET: -19 mL    04 Apr 2018 07:01  -  04 Apr 2018 13:37  --------------------------------------------------------  IN: 119 mL / OUT: 0 mL / NET: 119 mL    MEDICATIONS  (STANDING):  chlorhexidine 4% Liquid 1 Application(s) Topical <User Schedule>  digoxin     Tablet 0.25 milliGRAM(s) Oral daily  ferrous    sulfate 325 milliGRAM(s) Oral daily  heparin  Infusion 1700 Unit(s)/Hr (17 mL/Hr) IV Continuous <Continuous>  melatonin 3 milliGRAM(s) Oral daily  warfarin 5 milliGRAM(s) Oral once    MEDICATIONS  (PRN):  acetaminophen   Tablet. 650 milliGRAM(s) Oral every 6 hours PRN Mild, Moderate, or Severe pain  heparin  Injectable 4000 Unit(s) IV Push every 6 hours PRN For aPTT less than 40  heparin  Injectable 2000 Unit(s) IV Push every 6 hours PRN For aPTT between 40 - 57  HYDROmorphone   Tablet 2 milliGRAM(s) Oral every 6 hours PRN Moderate or Severe Pain      LABS                                            8.7                   Neurophils% (auto):   x      (04-04 @ 06:19):    10.37)-----------(421          Lymphocytes% (auto):  x                                             27.6                   Eosinphils% (auto):   x        Manual%: Neutrophils x    ; Lymphocytes x    ; Eosinophils x    ; Bands%: x    ; Blasts x                                    135    |  100    |  13                  Calcium: 8.0   / iCa: x      (04-04 @ 06:19)    ----------------------------<  94        Magnesium: 2.1                              4.4     |  25     |  0.68             Phosphorous: 3.7        ( 04-04 @ 06:19 )   PT: 16.1 SEC;   INR: 1.44   aPTT: 83.9 SEC MICU Transfer Note    Transfer from: MICU    Transfer to: ( x ) Medicine    (  ) Telemetry     (   ) RCU        (    ) Palliative         (   ) Stroke Unit          (   ) __________________    Accepting physician:    [facility] COURSE:    Pt is a 23 y/o M w/ a PMHx of congenital heart disease/heterotaxy s/p multiple cardiac surgeries including fontan's procedure, hx of thrombosis of the right femoral vein, PE in 2014 s/p Eliquis course (although mother denies that this was a real PE), hemoptysis with recent workup at [facility] found to be negative, p/w R thigh pain found to have extensive RLE DVT from the R femoral vein up to the infrarenal IVC, 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 Pt was started on a heparin gtt. Vascular, IR and pediatric cardiology consulted. Vascular and IR w/ no intervention. Pediatric cardiology explains continue to AC and transition to coumadin as DOACs were never studied in Fontan's procedure patients. Pt has RLE pain, initially on IV pain meds, now transitioned to oral meds. FYI: pt's baseline saturations in the high 80s.    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    Neuro:  - AAOx3 without active issues  - Switching pain control to dilaudid 2mg PO and tylenol -> try tylenol first for pain control before dilaudid    CV:  - Complicated cardiovascular hx in the setting of heterotaxy/congenital heart disease s/p multiple surgeries including Fontan procedure.  - Patient's Pediatric Cardiologist is Dr Rojas who has been consulted and continues following.  - Bridging patient to coumadin. Per cardiology note, DOACs have not been studied as extensively in Fontan physiology as in this patient, so coumadin is preferably for the time being.   - Will dose 5 mg Coumadin for 3 days (day 2/3) and d/c heparin gtt when INR therapeutic from 2-3.   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 He experienced transient hypoxemia at baseline even prior to this.   - Extensive RLE DVT extending up to infrarenal IVC.   - On full AC with heparin gtt, being bridged to coumadin w/o recurrence of hemoptysis    GI:  - Continue w/ regular diet    Renal:  - No acute issues    Heme:  - Extensive RLE DVT up to infrarenal IVC w/ thrombophlebitis  - On heparin gtt currently bridging to coumadin, 5mg x3 days (day 2/3)  - patient w/ normocytic anemia and stable H/H  - c/w iron supplements    ID:  - No evidence of active infection    DVT ppx:  - On heparin, being bridged to coumadin for full AC    Mobility  - OOB today with the assistance of PT    Ethics:  - Full code    For Followup:    [ ] f/u daily INR  [ ] dose coumadin 5mg x3 days ( up through 4/5)  [ ] pain control  [ ] PT -> OOB, ambulate as tolerated    Vital Signs Last 24 Hrs  T(C): 36.4 (04 Apr 2018 12:00), Max: 36.6 (03 Apr 2018 16:00)  T(F): 97.6 (04 Apr 2018 12:00), Max: 97.9 (04 Apr 2018 08:00)  HR: 82 (04 Apr 2018 13:00) (71 - 88)  BP: 110/54 (04 Apr 2018 13:00) (102/55 - 128/71)  BP(mean): 65 (04 Apr 2018 13:00) (64 - 86)  RR: 12 (04 Apr 2018 13:00) (11 - 29)  SpO2: 87% (04 Apr 2018 13:00) (82% - 98%)  I&O's Summary    03 Apr 2018 07:01  -  04 Apr 2018 07:00  --------------------------------------------------------  IN: 981 mL / OUT: 1000 mL / NET: -19 mL    04 Apr 2018 07:01  -  04 Apr 2018 13:37  --------------------------------------------------------  IN: 119 mL / OUT: 0 mL / NET: 119 mL    MEDICATIONS  (STANDING):  chlorhexidine 4% Liquid 1 Application(s) Topical <User Schedule>  digoxin     Tablet 0.25 milliGRAM(s) Oral daily  ferrous    sulfate 325 milliGRAM(s) Oral daily  heparin  Infusion 1700 Unit(s)/Hr (17 mL/Hr) IV Continuous <Continuous>  melatonin 3 milliGRAM(s) Oral daily  warfarin 5 milliGRAM(s) Oral once    MEDICATIONS  (PRN):  acetaminophen   Tablet. 650 milliGRAM(s) Oral every 6 hours PRN Mild, Moderate, or Severe pain  heparin  Injectable 4000 Unit(s) IV Push every 6 hours PRN For aPTT less than 40  heparin  Injectable 2000 Unit(s) IV Push every 6 hours PRN For aPTT between 40 - 57  HYDROmorphone   Tablet 2 milliGRAM(s) Oral every 6 hours PRN Moderate or Severe Pain      LABS                                            8.7                   Neurophils% (auto):   x      (04-04 @ 06:19):    10.37)-----------(421          Lymphocytes% (auto):  x                                             27.6                   Eosinphils% (auto):   x        Manual%: Neutrophils x    ; Lymphocytes x    ; Eosinophils x    ; Bands%: x    ; Blasts x                                    135    |  100    |  13                  Calcium: 8.0   / iCa: x      (04-04 @ 06:19)    ----------------------------<  94        Magnesium: 2.1                              4.4     |  25     |  0.68             Phosphorous: 3.7        ( 04-04 @ 06:19 )   PT: 16.1 SEC;   INR: 1.44   aPTT: 83.9 SEC MICU Transfer Note    Transfer from: MICU    Transfer to: ( x ) Medicine    (  ) Telemetry     (   ) RCU        (    ) Palliative         (   ) Stroke Unit          (   ) __________________    Accepting physician: Eneida Gracia    [facility]    Pt is a 23 y/o M w/ a PMHx of congenital heart disease/heterotaxy s/p multiple cardiac surgeries including fontan's procedure, hx of thrombosis of the right femoral vein, PE in 2014 s/p Eliquis course (although mother denies that this was a real PE), hemoptysis with recent workup at [facility] found to be negative, p/w R thigh pain found to have extensive RLE DVT from the R femoral vein up to the infrarenal IVC, 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 Pt was started on a heparin gtt. Vascular, IR and pediatric cardiology consulted. Vascular and IR w/ no intervention. Pediatric cardiology explains continue to AC and transition to coumadin as DOACs were never studied in Fontan's procedure patients. Pt has RLE pain, initially on IV pain meds, now transitioned to oral meds. FYI: pt's baseline saturations in the high 80s.    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    Neuro:  - AAOx3 without active issues  - Switching pain control to dilaudid 2mg PO and tylenol -> try tylenol first for pain control before dilaudid    CV:  - Complicated cardiovascular hx in the setting of heterotaxy/congenital heart disease s/p multiple surgeries including Fontan procedure.  - Patient's Pediatric Cardiologist is Dr Rojas who has been consulted and continues following.  - Bridging patient to coumadin. Per cardiology note, DOACs have not been studied as extensively in Fontan physiology as in this patient, so coumadin is preferably for the time being.   - Will dose 5 mg Coumadin for 3 days (day 2/3) and d/c heparin gtt when INR therapeutic from 2-3.   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 He experienced transient hypoxemia at baseline even prior to this.   - Extensive RLE DVT extending up to infrarenal IVC.   - On full AC with heparin gtt, being bridged to coumadin w/o recurrence of hemoptysis    GI:  - Continue w/ regular diet    Renal:  - No acute issues    Heme:  - Extensive RLE DVT up to infrarenal IVC w/ thrombophlebitis  - On heparin gtt currently bridging to coumadin, 5mg x3 days (day 2/3)  - patient w/ normocytic anemia and stable H/H  - c/w iron supplements    ID:  - No evidence of active infection    DVT ppx:  - On heparin, being bridged to coumadin for full AC    Mobility  - OOB today with the assistance of PT    Ethics:  - Full code    For Followup:    [ ] f/u daily INR  [ ] dose coumadin 5mg x3 days ( up through 4/5)  [ ] pain control  [ ] PT -> OOB, ambulate as tolerated    Vital Signs Last 24 Hrs  T(C): 36.4 (04 Apr 2018 12:00), Max: 36.6 (03 Apr 2018 16:00)  T(F): 97.6 (04 Apr 2018 12:00), Max: 97.9 (04 Apr 2018 08:00)  HR: 82 (04 Apr 2018 13:00) (71 - 88)  BP: 110/54 (04 Apr 2018 13:00) (102/55 - 128/71)  BP(mean): 65 (04 Apr 2018 13:00) (64 - 86)  RR: 12 (04 Apr 2018 13:00) (11 - 29)  SpO2: 87% (04 Apr 2018 13:00) (82% - 98%)  I&O's Summary    03 Apr 2018 07:01  -  04 Apr 2018 07:00  --------------------------------------------------------  IN: 981 mL / OUT: 1000 mL / NET: -19 mL    04 Apr 2018 07:01  -  04 Apr 2018 13:37  --------------------------------------------------------  IN: 119 mL / OUT: 0 mL / NET: 119 mL    MEDICATIONS  (STANDING):  chlorhexidine 4% Liquid 1 Application(s) Topical <User Schedule>  digoxin     Tablet 0.25 milliGRAM(s) Oral daily  ferrous    sulfate 325 milliGRAM(s) Oral daily  heparin  Infusion 1700 Unit(s)/Hr (17 mL/Hr) IV Continuous <Continuous>  melatonin 3 milliGRAM(s) Oral daily  warfarin 5 milliGRAM(s) Oral once    MEDICATIONS  (PRN):  acetaminophen   Tablet. 650 milliGRAM(s) Oral every 6 hours PRN Mild, Moderate, or Severe pain  heparin  Injectable 4000 Unit(s) IV Push every 6 hours PRN For aPTT less than 40  heparin  Injectable 2000 Unit(s) IV Push every 6 hours PRN For aPTT between 40 - 57  HYDROmorphone   Tablet 2 milliGRAM(s) Oral every 6 hours PRN Moderate or Severe Pain      LABS                                            8.7                   Neurophils% (auto):   x      (04-04 @ 06:19):    10.37)-----------(421          Lymphocytes% (auto):  x                                             27.6                   Eosinphils% (auto):   x        Manual%: Neutrophils x    ; Lymphocytes x    ; Eosinophils x    ; Bands%: x    ; Blasts x                                    135    |  100    |  13                  Calcium: 8.0   / iCa: x      (04-04 @ 06:19)    ----------------------------<  94        Magnesium: 2.1                              4.4     |  25     |  0.68             Phosphorous: 3.7        ( 04-04 @ 06:19 )   PT: 16.1 SEC;   INR: 1.44   aPTT: 83.9 SEC MICU Transfer Note    Transfer from: MICU    Transfer to: (  ) Medicine    ( x ) Telemetry     (   ) RCU        (    ) Palliative         (   ) Stroke Unit          (   ) __________________    Accepting physician:    [facility] COURSE:    Pt is a 23 y/o M w/ a PMHx of congenital heart disease/heterotaxy s/p multiple cardiac surgeries including fontan's procedure, hx of thrombosis of the right femoral vein, PE in 2014 s/p Eliquis course (although mother denies that this was a real PE), hemoptysis with recent workup at 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 Pt was started on a heparin gtt. Vascular, IR and pediatric cardiology consulted. Vascular and IR w/ no intervention. Pediatric cardiology explains continue to AC and transition to coumadin as DOACs were never studied in Fontan's procedure patients. Pt has RLE pain, initially on IV pain meds, now transitioned to oral meds. FYI: pt's baseline saturations in the high 80s.    ASSESSMENT & PLAN:     Pt is a 23 y/o M w/ a PMHx of congenital heart disease/heterotaxy s/p multiple cardiac surgeries, hx of thrombosis of the right femoral vein, PE in 2014 s/p Eliquis course, hemoptysis with recent workup at 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    Neuro:  - AAOx3 without active issues  - Switching pain control to dilaudid 2mg PO and tylenol -> try tylenol first for pain control before dilaudid    CV:  - Complicated cardiovascular hx in the setting of heterotaxy/congenital heart disease s/p multiple surgeries including Fontan procedure.  - Patient's Pediatric Cardiologist is Dr Rojas who has been consulted and continues following.  - Bridging patient to coumadin. Per cardiology note, DOACs have not been studied as extensively in Fontan physiology as in this patient, so coumadin is preferably for the time being.   - Will dose 5 mg Coumadin for 3 days (day 2/3) and d/c heparin gtt when INR therapeutic from 2-3.   - No surgical intervention per vascular surgery      Pulm:  - Multiple previous episodes of hemoptysis, most recently worked up in the [facility] 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 He experienced transient hypoxemia at baseline even prior to this.   - Extensive RLE DVT extending up to infrarenal IVC.   - On full AC with heparin gtt, being bridged to coumadin w/o recurrence of hemoptysis    GI:  - Continue w/ regular diet    Renal:  - No acute issues    Heme:  - Extensive RLE DVT up to infrarenal IVC w/ thrombophlebitis  - On heparin gtt currently bridging to coumadin, 5mg x3 days (day 2/3)  - patient w/ normocytic anemia and stable H/H  - c/w iron supplements    ID:  - No evidence of active infection    DVT ppx:  - On heparin, being bridged to coumadin for full AC    Mobility  - OOB today with the assistance of PT    Ethics:  - Full code    For Followup:    [ ] f/u daily INR  [ ] dose coumadin 5mg x3 days ( up through 4/5)  [ ] pain control  [ ] PT -> OOB, ambulate as tolerated    Vital Signs Last 24 Hrs  T(C): 36.4 (04 Apr 2018 12:00), Max: 36.6 (03 Apr 2018 16:00)  T(F): 97.6 (04 Apr 2018 12:00), Max: 97.9 (04 Apr 2018 08:00)  HR: 82 (04 Apr 2018 13:00) (71 - 88)  BP: 110/54 (04 Apr 2018 13:00) (102/55 - 128/71)  BP(mean): 65 (04 Apr 2018 13:00) (64 - 86)  RR: 12 (04 Apr 2018 13:00) (11 - 29)  SpO2: 87% (04 Apr 2018 13:00) (82% - 98%)  I&O's Summary    03 Apr 2018 07:01  -  04 Apr 2018 07:00  --------------------------------------------------------  IN: 981 mL / OUT: 1000 mL / NET: -19 mL    04 Apr 2018 07:01  -  04 Apr 2018 13:37  --------------------------------------------------------  IN: 119 mL / OUT: 0 mL / NET: 119 mL    MEDICATIONS  (STANDING):  chlorhexidine 4% Liquid 1 Application(s) Topical <User Schedule>  digoxin     Tablet 0.25 milliGRAM(s) Oral daily  ferrous    sulfate 325 milliGRAM(s) Oral daily  heparin  Infusion 1700 Unit(s)/Hr (17 mL/Hr) IV Continuous <Continuous>  melatonin 3 milliGRAM(s) Oral daily  warfarin 5 milliGRAM(s) Oral once    MEDICATIONS  (PRN):  acetaminophen   Tablet. 650 milliGRAM(s) Oral every 6 hours PRN Mild, Moderate, or Severe pain  heparin  Injectable 4000 Unit(s) IV Push every 6 hours PRN For aPTT less than 40  heparin  Injectable 2000 Unit(s) IV Push every 6 hours PRN For aPTT between 40 - 57  HYDROmorphone   Tablet 2 milliGRAM(s) Oral every 6 hours PRN Moderate or Severe Pain      LABS                                            8.7                   Neurophils% (auto):   x      (04-04 @ 06:19):    10.37)-----------(421          Lymphocytes% (auto):  x                                             27.6                   Eosinphils% (auto):   x        Manual%: Neutrophils x    ; Lymphocytes x    ; Eosinophils x    ; Bands%: x    ; Blasts x                                    135    |  100    |  13                  Calcium: 8.0   / iCa: x      (04-04 @ 06:19)    ----------------------------<  94        Magnesium: 2.1                              4.4     |  25     |  0.68             Phosphorous: 3.7        ( 04-04 @ 06:19 )   PT: 16.1 SEC;   INR: 1.44   aPTT: 83.9 SEC

## 2018-04-05 ENCOUNTER — RX RENEWAL (OUTPATIENT)
Age: 23
End: 2018-04-05

## 2018-04-05 DIAGNOSIS — Q89.01 ASPLENIA (CONGENITAL): ICD-10-CM

## 2018-04-05 LAB
APTT BLD: 139.1 SEC — CRITICAL HIGH (ref 27.5–37.4)
APTT BLD: 44.5 SEC — HIGH (ref 27.5–37.4)
INR BLD: 1.62 — HIGH (ref 0.88–1.17)
PROTHROM AB SERPL-ACNC: 18.2 SEC — HIGH (ref 9.8–13.1)

## 2018-04-05 PROCEDURE — 99233 SBSQ HOSP IP/OBS HIGH 50: CPT

## 2018-04-05 RX ORDER — ACETAMINOPHEN 500 MG
975 TABLET ORAL EVERY 6 HOURS
Qty: 0 | Refills: 0 | Status: DISCONTINUED | OUTPATIENT
Start: 2018-04-05 | End: 2018-04-06

## 2018-04-05 RX ORDER — WARFARIN SODIUM 2.5 MG/1
5 TABLET ORAL ONCE
Qty: 0 | Refills: 0 | Status: COMPLETED | OUTPATIENT
Start: 2018-04-05 | End: 2018-04-05

## 2018-04-05 RX ORDER — ACETAMINOPHEN 500 MG
975 TABLET ORAL EVERY 6 HOURS
Qty: 0 | Refills: 0 | Status: DISCONTINUED | OUTPATIENT
Start: 2018-04-05 | End: 2018-04-05

## 2018-04-05 RX ORDER — ACETAMINOPHEN 500 MG
1000 TABLET ORAL ONCE
Qty: 0 | Refills: 0 | Status: COMPLETED | OUTPATIENT
Start: 2018-04-05 | End: 2018-04-05

## 2018-04-05 RX ADMIN — HYDROMORPHONE HYDROCHLORIDE 2 MILLIGRAM(S): 2 INJECTION INTRAMUSCULAR; INTRAVENOUS; SUBCUTANEOUS at 01:45

## 2018-04-05 RX ADMIN — HEPARIN SODIUM 14 UNIT(S)/HR: 5000 INJECTION INTRAVENOUS; SUBCUTANEOUS at 06:11

## 2018-04-05 RX ADMIN — WARFARIN SODIUM 5 MILLIGRAM(S): 2.5 TABLET ORAL at 18:07

## 2018-04-05 RX ADMIN — HYDROMORPHONE HYDROCHLORIDE 2 MILLIGRAM(S): 2 INJECTION INTRAMUSCULAR; INTRAVENOUS; SUBCUTANEOUS at 01:15

## 2018-04-05 RX ADMIN — CHLORHEXIDINE GLUCONATE 1 APPLICATION(S): 213 SOLUTION TOPICAL at 11:59

## 2018-04-05 RX ADMIN — HEPARIN SODIUM 18 UNIT(S)/HR: 5000 INJECTION INTRAVENOUS; SUBCUTANEOUS at 23:54

## 2018-04-05 RX ADMIN — Medication 325 MILLIGRAM(S): at 13:22

## 2018-04-05 RX ADMIN — Medication 0.25 MILLIGRAM(S): at 05:50

## 2018-04-05 RX ADMIN — Medication 400 MILLIGRAM(S): at 11:58

## 2018-04-05 RX ADMIN — HEPARIN SODIUM 16 UNIT(S)/HR: 5000 INJECTION INTRAVENOUS; SUBCUTANEOUS at 23:59

## 2018-04-05 RX ADMIN — HEPARIN SODIUM 16 UNIT(S)/HR: 5000 INJECTION INTRAVENOUS; SUBCUTANEOUS at 16:42

## 2018-04-05 NOTE — PROGRESS NOTE ADULT - ASSESSMENT
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    Neuro:  - AAOx3 without active issues  - Continue with dilaudid 2mg PO and tylenol -> try tylenol first for pain control before dilaudid  - Continue with hot packs for pain    CV:  - Complicated cardiovascular hx in the setting of heterotaxy/congenital heart disease s/p multiple surgeries including Fontan procedure.  - Patient's Pediatric Cardiologist is Dr Rojas who has been consulted and continues following.  - Per discussion with cardiology, bridging patient to coumadin. Per note, DOACs have not been studied as extensively in Fontan physiology as in this patient, so coumadin is preferably for the time being.   - Will dose 5 mg Coumadin for next 3 days (day 3/3) and d/c heparin gtt when INR therapeutic from 2-3.   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 He experienced transient hypoxemia at baseline even prior to this.   - Extensive RLE DVT extending up to infrarenal IVC.   - On full AC with heparin gtt, being bridged to coumadin w/o recurrence of hemoptysis    GI:  - Continue w/ regular diet    Renal:  - No acute issues    Heme:  - Extensive RLE DVT up to infrarenal IVC w/ thrombophlebitis  - On heparin gtt currently bridging to coumadin, 5mg x3 days (day 3/3)  - patient w/ normocytic anemia and stable H/H  - c/w iron supplements    ID:  - No evidence of active infection    DVT ppx:  - On heparin, being bridged to coumadin for full AC    Mobility  - OOB today with the assistance of PT    Ethics:  - Full code

## 2018-04-05 NOTE — PROGRESS NOTE ADULT - ASSESSMENT
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      #Acute DVT. Therapeutic on heparin, working on therapeutic INR.  Discussed with Kang the need to ambulate and work through pain. The goal is to be able to perform ADLs with tolerable pain and not completely eradicate pain.  We had a long, flori discussion about depression and difficulty with adjustment in light of two admissions to the ICU in the last month.    - out of bed, PT consult; only in bed to sleep  - maintain day/night cycles- continue melatonin  - schedule acetaminophen around the clock, use non opiate meds for breakthrough   - continue heparin drip + coumadin; f/u tomorrow's INR; if up trending, consider discharge home with close INR monitoring  - [facility] NP will schedule outpatient coumadin monitoring, titrate towards goal    seen with [facility] NP, Pauline Morrell

## 2018-04-05 NOTE — PROGRESS NOTE ADULT - SUBJECTIVE AND OBJECTIVE BOX
Adult Congenital Heart Disease  Progress Note    Subjective: Complaining of increased right groin/thigh pain this morning- received PO dilaudid. Denies chest pain, denies palpitations. No further bleeding.    Objective:  T(C): 36.6 (04-05-18 @ 12:00), Max: 36.8 (04-05-18 @ 08:00)  HR: 79 (-05-18 @ 14:00) (74 - 88)  BP: 109/55 (-05-18 @ 14:00) (101/59 - 119/63)  RR: 20 (-05-18 @ 14:00) (13 - 26)  SpO2: 91% (--18 @ 14:00) (88% - 95%)  PHYSICAL EXAMINATION:  Vital signs - Weight (kg): 50 (03-30 @ 19:38)  T(C): 36.6 (04-05-18 @ 12:00), Max: 36.8 (04-05-18 @ 08:00)  HR: 79 (05-18 @ 14:00) (74 - 88)  BP: 109/55 (-05-18 @ 14:00) (101/59 - 119/63)  ABP: --  RR: 20 (-18 @ 14:00) (13 - 26)  SpO2: 91% (-05-18 @ 14:00) (88% - 95%)  CVP(mm Hg): --    General - seated in bed, comfortable when talking at length  Skin - no rash, RLE venous stasis changes  Eyes / ENT - no conjunctival injection, sclerae anicteric  Pulmonary - normal inspiratory effort, no retractions, lungs clear to auscultation bilaterally  Cardiovascular - normal rate, normal s1 single s2, III/VI HSM at LLSB, + pectus  Gastrointestinal - soft, non-distended, non-tender  Musculoskeletal - RLE swollen, equally warm as compared to LLE  Neurologic / Psychiatric - alert, oriented, flat affect    MEDICATIONS:  digoxin     Tablet 0.25 milliGRAM(s) Oral daily  melatonin 3 milliGRAM(s) Oral daily  ferrous    sulfate 325 milliGRAM(s) Oral daily  heparin  Infusion 1700 Unit(s)/Hr IV Continuous <Continuous>  warfarin 5 milliGRAM(s) Oral once    Labs:  LABORATORY TESTS:                          8.7  CBC:   10.37 )-----------( 421   (18 @ 06:19)                          27.6               135   |  100   |  13                 Ca: 8.0    BMP:   ----------------------------< 94     M.1   (18 @ 06:19)             4.4    |  25    | 0.68               Ph: 3.7      LFT:     TPro: 6.0 / Alb: 2.7 / TBili: 1.4 / DBili: x / AST: 25 / ALT: 18 / AlkPhos: 135   (18 @ 03:00)    COAG: PT: 18.2 / PTT: 139.1 / INR: 1.62   (18 @ 04:30)     CARDIAC MARKERS:             Trop I: x / Trop T: x / CK: 28 / CKMB: x   (18 @ 14:26)             Pro-BNP: x   (18 @ 14:26)    telemetry: sinus rhythm, rate 70s-90s, isolated PVCs, rare ventricular couplets

## 2018-04-05 NOTE — PROGRESS NOTE ADULT - SUBJECTIVE AND OBJECTIVE BOX
INTERVAL HPI/OVERNIGHT EVENTS:    SUBJECTIVE: Patient seen and examined at bedside. Received dilaudid and tylenol x1 overnight. Explained that it was very painful to try and stand.     OBJECTIVE:    VITAL SIGNS:  ICU Vital Signs Last 24 Hrs  T(C): 36.8 (05 Apr 2018 08:00), Max: 36.8 (04 Apr 2018 16:00)  T(F): 98.2 (05 Apr 2018 08:00), Max: 98.2 (04 Apr 2018 16:00)  HR: 80 (05 Apr 2018 10:00) (74 - 88)  BP: 116/94 (05 Apr 2018 10:00) (100/63 - 119/63)  BP(mean): 99 (05 Apr 2018 10:00) (64 - 99)  ABP: --  ABP(mean): --  RR: 22 (05 Apr 2018 10:00) (11 - 26)  SpO2: 92% (05 Apr 2018 10:00) (85% - 95%)    04-04 @ 07:01  -  04-05 @ 07:00  --------------------------------------------------------  IN: 485 mL / OUT: 1600 mL / NET: -1115 mL    04-05 @ 07:01  -  04-05 @ 11:01  --------------------------------------------------------  IN: 42 mL / OUT: 700 mL / NET: -658 mL    PHYSICAL EXAM:    General: NAD   HEENT: PERRLA  Lymph Nodes: no LAD  Neck: Supple  Respiratory: CTAB  Cardiovascular: S1S2+, RRR   Abdomen: soft, NT/ND  Extremities: RLE swelling improving with less bruising, R groin cord, tender  Skin: No rashes, bruising of RLE as noted above  Neurological: No focal deficits, AAOx3    MEDICATIONS:  MEDICATIONS  (STANDING):  chlorhexidine 4% Liquid 1 Application(s) Topical <User Schedule>  digoxin     Tablet 0.25 milliGRAM(s) Oral daily  ferrous    sulfate 325 milliGRAM(s) Oral daily  heparin  Infusion 1700 Unit(s)/Hr (14 mL/Hr) IV Continuous <Continuous>  melatonin 3 milliGRAM(s) Oral daily  warfarin 5 milliGRAM(s) Oral once    MEDICATIONS  (PRN):  heparin  Injectable 4000 Unit(s) IV Push every 6 hours PRN For aPTT less than 40  heparin  Injectable 2000 Unit(s) IV Push every 6 hours PRN For aPTT between 40 - 57  HYDROmorphone   Tablet 2 milliGRAM(s) Oral every 6 hours PRN Moderate or Severe Pain    ALLERGIES:  Allergies    No Known Allergies      LABS:                        8.7    10.37 )-----------( 421      ( 04 Apr 2018 06:19 )             27.6     04-04    135  |  100  |  13  ----------------------------<  94  4.4   |  25  |  0.68    Ca    8.0<L>      04 Apr 2018 06:19  Phos  3.7     04-04  Mg     2.1     04-04    PT/INR - ( 05 Apr 2018 04:30 )   PT: 18.2 SEC;   INR: 1.62        PTT - ( 05 Apr 2018 04:30 )  PTT:139.1 SEC    RADIOLOGY & ADDITIONAL TESTS:     No interval imaging.

## 2018-04-06 ENCOUNTER — TRANSCRIPTION ENCOUNTER (OUTPATIENT)
Age: 23
End: 2018-04-06

## 2018-04-06 VITALS
HEART RATE: 81 BPM | SYSTOLIC BLOOD PRESSURE: 118 MMHG | OXYGEN SATURATION: 89 % | RESPIRATION RATE: 25 BRPM | DIASTOLIC BLOOD PRESSURE: 67 MMHG

## 2018-04-06 LAB
APTT BLD: 44.4 SEC — HIGH (ref 27.5–37.4)
APTT BLD: 90 SEC — HIGH (ref 27.5–37.4)
BASOPHILS # BLD AUTO: 0.14 K/UL — SIGNIFICANT CHANGE UP (ref 0–0.2)
BASOPHILS NFR BLD AUTO: 1.7 % — SIGNIFICANT CHANGE UP (ref 0–2)
BUN SERPL-MCNC: 10 MG/DL — SIGNIFICANT CHANGE UP (ref 7–23)
CALCIUM SERPL-MCNC: 8.1 MG/DL — LOW (ref 8.4–10.5)
CHLORIDE SERPL-SCNC: 104 MMOL/L — SIGNIFICANT CHANGE UP (ref 98–107)
CO2 SERPL-SCNC: 24 MMOL/L — SIGNIFICANT CHANGE UP (ref 22–31)
CREAT SERPL-MCNC: 0.72 MG/DL — SIGNIFICANT CHANGE UP (ref 0.5–1.3)
EOSINOPHIL # BLD AUTO: 0.63 K/UL — HIGH (ref 0–0.5)
EOSINOPHIL NFR BLD AUTO: 7.9 % — HIGH (ref 0–6)
GLUCOSE SERPL-MCNC: 87 MG/DL — SIGNIFICANT CHANGE UP (ref 70–99)
HCT VFR BLD CALC: 28.6 % — LOW (ref 39–50)
HGB BLD-MCNC: 8.7 G/DL — LOW (ref 13–17)
IMM GRANULOCYTES # BLD AUTO: 0.05 # — SIGNIFICANT CHANGE UP
IMM GRANULOCYTES NFR BLD AUTO: 0.6 % — SIGNIFICANT CHANGE UP (ref 0–1.5)
INR BLD: 1.92 — HIGH (ref 0.88–1.17)
LYMPHOCYTES # BLD AUTO: 1.77 K/UL — SIGNIFICANT CHANGE UP (ref 1–3.3)
LYMPHOCYTES # BLD AUTO: 22.1 % — SIGNIFICANT CHANGE UP (ref 13–44)
MAGNESIUM SERPL-MCNC: 2.1 MG/DL — SIGNIFICANT CHANGE UP (ref 1.6–2.6)
MCHC RBC-ENTMCNC: 24.8 PG — LOW (ref 27–34)
MCHC RBC-ENTMCNC: 30.4 % — LOW (ref 32–36)
MCV RBC AUTO: 81.5 FL — SIGNIFICANT CHANGE UP (ref 80–100)
MONOCYTES # BLD AUTO: 1.75 K/UL — HIGH (ref 0–0.9)
MONOCYTES NFR BLD AUTO: 21.8 % — HIGH (ref 2–14)
NEUTROPHILS # BLD AUTO: 3.67 K/UL — SIGNIFICANT CHANGE UP (ref 1.8–7.4)
NEUTROPHILS NFR BLD AUTO: 45.9 % — SIGNIFICANT CHANGE UP (ref 43–77)
NRBC # FLD: 0.1 — SIGNIFICANT CHANGE UP
NRBC FLD-RTO: 1.2 — SIGNIFICANT CHANGE UP
PHOSPHATE SERPL-MCNC: 3.8 MG/DL — SIGNIFICANT CHANGE UP (ref 2.5–4.5)
PLATELET # BLD AUTO: 462 K/UL — HIGH (ref 150–400)
PMV BLD: 12.7 FL — SIGNIFICANT CHANGE UP (ref 7–13)
POTASSIUM SERPL-MCNC: 4.2 MMOL/L — SIGNIFICANT CHANGE UP (ref 3.5–5.3)
POTASSIUM SERPL-SCNC: 4.2 MMOL/L — SIGNIFICANT CHANGE UP (ref 3.5–5.3)
PROTHROM AB SERPL-ACNC: 22.4 SEC — HIGH (ref 9.8–13.1)
RBC # BLD: 3.51 M/UL — LOW (ref 4.2–5.8)
RBC # FLD: 18.6 % — HIGH (ref 10.3–14.5)
SODIUM SERPL-SCNC: 138 MMOL/L — SIGNIFICANT CHANGE UP (ref 135–145)
WBC # BLD: 8.01 K/UL — SIGNIFICANT CHANGE UP (ref 3.8–10.5)
WBC # FLD AUTO: 8.01 K/UL — SIGNIFICANT CHANGE UP (ref 3.8–10.5)

## 2018-04-06 PROCEDURE — 99233 SBSQ HOSP IP/OBS HIGH 50: CPT | Mod: GC

## 2018-04-06 RX ORDER — ACETAMINOPHEN 500 MG
3 TABLET ORAL
Qty: 0 | Refills: 0 | DISCHARGE
Start: 2018-04-06

## 2018-04-06 RX ORDER — LANOLIN ALCOHOL/MO/W.PET/CERES
1 CREAM (GRAM) TOPICAL
Qty: 14 | Refills: 0
Start: 2018-04-06 | End: 2018-04-19

## 2018-04-06 RX ORDER — WARFARIN SODIUM 2.5 MG/1
5 TABLET ORAL ONCE
Qty: 0 | Refills: 0 | Status: DISCONTINUED | OUTPATIENT
Start: 2018-04-06 | End: 2018-04-06

## 2018-04-06 RX ORDER — ACETAMINOPHEN 500 MG
3 TABLET ORAL
Qty: 0 | Refills: 0 | COMMUNITY
Start: 2018-04-06

## 2018-04-06 RX ORDER — WARFARIN SODIUM 2.5 MG/1
1 TABLET ORAL
Qty: 0 | Refills: 0 | DISCHARGE
Start: 2018-04-06

## 2018-04-06 RX ORDER — FERROUS SULFATE 325(65) MG
1 TABLET ORAL
Qty: 30 | Refills: 0
Start: 2018-04-06 | End: 2018-05-05

## 2018-04-06 RX ORDER — LANOLIN ALCOHOL/MO/W.PET/CERES
1 CREAM (GRAM) TOPICAL
Qty: 0 | Refills: 0 | COMMUNITY
Start: 2018-04-06

## 2018-04-06 RX ORDER — FERROUS SULFATE 325(65) MG
1 TABLET ORAL
Qty: 30 | Refills: 0 | OUTPATIENT
Start: 2018-04-06 | End: 2018-05-05

## 2018-04-06 RX ADMIN — Medication 3 MILLIGRAM(S): at 02:14

## 2018-04-06 RX ADMIN — HEPARIN SODIUM 17 UNIT(S)/HR: 5000 INJECTION INTRAVENOUS; SUBCUTANEOUS at 06:09

## 2018-04-06 RX ADMIN — HYDROMORPHONE HYDROCHLORIDE 2 MILLIGRAM(S): 2 INJECTION INTRAMUSCULAR; INTRAVENOUS; SUBCUTANEOUS at 03:57

## 2018-04-06 RX ADMIN — HEPARIN SODIUM 18 UNIT(S)/HR: 5000 INJECTION INTRAVENOUS; SUBCUTANEOUS at 00:24

## 2018-04-06 RX ADMIN — Medication 975 MILLIGRAM(S): at 02:14

## 2018-04-06 RX ADMIN — Medication 975 MILLIGRAM(S): at 10:06

## 2018-04-06 RX ADMIN — CHLORHEXIDINE GLUCONATE 1 APPLICATION(S): 213 SOLUTION TOPICAL at 11:38

## 2018-04-06 RX ADMIN — HYDROMORPHONE HYDROCHLORIDE 2 MILLIGRAM(S): 2 INJECTION INTRAMUSCULAR; INTRAVENOUS; SUBCUTANEOUS at 03:27

## 2018-04-06 RX ADMIN — Medication 0.25 MILLIGRAM(S): at 05:05

## 2018-04-06 RX ADMIN — Medication 325 MILLIGRAM(S): at 11:37

## 2018-04-06 NOTE — PROGRESS NOTE ADULT - PROBLEM SELECTOR PROBLEM 1
Acute deep vein thrombosis (DVT) of iliac vein of right lower extremity
Heterotaxy syndrome with asplenia
Spleen absent

## 2018-04-06 NOTE — PROGRESS NOTE ADULT - PROVIDER SPECIALTY LIST ADULT
Cardiology
MICU
Vascular Surgery
Cardiology

## 2018-04-06 NOTE — PROGRESS NOTE ADULT - SUBJECTIVE AND OBJECTIVE BOX
Adult Congenital Heart Disease  Progress Note    Subjective: States pain improved- only received tylenol overnight.  Denies chest pain, palpitations or shortness of breath. No bleeding.  Normal BM overnight- not black/tarry, no blood.    Objective:  T(C): 36.6 (04-06-18 @ 04:00), Max: 37.2 (04-06-18 @ 00:00)  HR: 67 (-06-18 @ 08:00) (65 - 86)  BP: 106/57 (-06-18 @ 08:00) (101/90 - 125/78)  RR: 13 (--18 @ 08:00) (11 - )  SpO2: 89% (--18 @ 08:00) (87% - 98%)  PHYSICAL EXAMINATION:  Vital signs - Weight (kg): 50 (03-30 @ 19:38)  T(C): 36.6 (04-06-18 @ 04:00), Max: 37.2 (04-06-18 @ 00:00)  HR: 67 (--18 @ 08:00) (65 - 86)  BP: 106/57 (-06-18 @ 08:00) (101/90 - 125/78)  ABP: --  RR: 13 (-18 @ 08:00) ( - )  SpO2: 89% (--18 @ 08:00) (87% - 98%)  CVP(mm Hg): --    General - thin, comfortable in bed  Skin - RLE venous stasis changes  Eyes / ENT - no conjunctival injection, sclerae anicteric, external ears & nares normal, mucous membranes moist.  Pulmonary - normal inspiratory effort, no retractions, lungs clear to auscultation bilaterally, no wheezes, no rales.  Cardiovascular - normal rate, regular rhythm, s1 single s2, III/VI HSM at LLSB, pectus  Gastrointestinal - soft, non-distended, non-tender  Musculoskeletal - right calf swollen but soft to palpation- markedly improved compared to admission exam  Neurologic / Psychiatric - alert, oriented as age-appropriate, flat affect but spirits seem improved this morning when discussing discharge    MEDICATIONS:  digoxin     Tablet 0.25 milliGRAM(s) Oral daily  melatonin 3 milliGRAM(s) Oral daily  ferrous    sulfate 325 milliGRAM(s) Oral daily  heparin  Infusion 1700 Unit(s)/Hr IV Continuous <Continuous>  warfarin 5 milliGRAM(s) Oral once      LABORATORY TESTS:                          8.7  CBC:   8.01 )-----------( 462   (18 @ 05:10)                          28.6               138   |  104   |  10                 Ca: 8.1    BMP:   ----------------------------< 87     M.1   (18 @ 05:10)             4.2    |  24    | 0.72               Ph: 3.8      LFT:     TPro: 6.0 / Alb: 2.7 / TBili: 1.4 / DBili: x / AST: 25 / ALT: 18 / AlkPhos: 135   (18 @ 03:00)    COAG: PT: 22.4 / PTT: 90.0 / INR: 1.92   (18 @ 05:10)     CARDIAC MARKERS:             Trop I: x / Trop T: x / CK: 28 / CKMB: x   (18 @ 14:26)             Pro-BNP: x   (18 @ 14:26)      VBG:   pH: 7.43 / pCO2: 42 / pO2: < 24 / HCO3: 26 / Base Excess: 3.3 / SaO2: 27.4   (18 @ 14:22)

## 2018-04-06 NOTE — DIETITIAN INITIAL EVALUATION ADULT. - NUTRITION INTERVENTION
Nutrition Education/Collaboration and Referral of Nutrition Care/Meals and Snack/Medical Food Supplements

## 2018-04-06 NOTE — DISCHARGE NOTE ADULT - PLAN OF CARE
Prevention of clot progression, PE You were found to have a significant DVT on RLE doppler and CTA abdomen. Please continue to take your warfarin 5mg daily at around 6pm. Avoid eating excessive leafy greens and continue to maintain a consistent diet. You will need to go for INR draws weekly, starting on Monday 4/9/18.  Please follow up with Dr. Rojsa and Paulien Morrell NP in [facility] clinic on 4/16/18 at 12:45 pm. Please follow up with Dr. Cadet for 4/19/18 at 2:30pm at [address] [place] [address] [address], [place] [address].    If you experience any shortness of breath, chest pain or have bleeding from your mouth, please seek medical attention immediately.

## 2018-04-06 NOTE — PROGRESS NOTE ADULT - PROBLEM SELECTOR PROBLEM 4
Hypoxia
Acute deep vein thrombosis (DVT) of femoral vein of right lower extremity
Hypoxia
Single ventricle
Single ventricle

## 2018-04-06 NOTE — PROGRESS NOTE ADULT - SUBJECTIVE AND OBJECTIVE BOX
INTERVAL HPI/OVERNIGHT EVENTS:    SUBJECTIVE: Patient seen and examined at bedside. Pt without any new complaints. Ambulated yesterday without pain medications.    OBJECTIVE:    VITAL SIGNS:  ICU Vital Signs Last 24 Hrs  T(C): 36.6 (06 Apr 2018 08:00), Max: 37.2 (06 Apr 2018 00:00)  T(F): 97.8 (06 Apr 2018 08:00), Max: 98.9 (06 Apr 2018 00:00)  HR: 62 (06 Apr 2018 09:00) (62 - 86)  BP: 105/58 (06 Apr 2018 09:00) (101/90 - 125/78)  BP(mean): 69 (06 Apr 2018 09:00) (63 - 88)  ABP: --  ABP(mean): --  RR: 14 (06 Apr 2018 09:00) (11 - 26)  SpO2: 90% (06 Apr 2018 09:00) (87% - 98%)    04-05 @ 07:01 - 04-06 @ 07:00  --------------------------------------------------------  IN: 360 mL / OUT: 1450 mL / NET: -1090 mL    04-06 @ 07:01 - 04-06 @ 10:07  --------------------------------------------------------  IN: 17 mL / OUT: 0 mL / NET: 17 mL    PHYSICAL EXAM:    General: NAD   HEENT: PERRLA  Lymph Nodes: no LAD  Neck: Supple  Respiratory: CTAB  Cardiovascular: S1S2+, RRR   Abdomen: soft, NT/ND  Extremities: RLE swelling improving with less bruising  Skin: No rashes, bruising of RLE as noted above  Neurological: No focal deficits, AAOx3    MEDICATIONS:  MEDICATIONS  (STANDING):  chlorhexidine 4% Liquid 1 Application(s) Topical <User Schedule>  digoxin     Tablet 0.25 milliGRAM(s) Oral daily  ferrous    sulfate 325 milliGRAM(s) Oral daily  heparin  Infusion 1700 Unit(s)/Hr (17 mL/Hr) IV Continuous <Continuous>  melatonin 3 milliGRAM(s) Oral daily  warfarin 5 milliGRAM(s) Oral once    MEDICATIONS  (PRN):  acetaminophen   Tablet 975 milliGRAM(s) Oral every 6 hours PRN for mild pain  heparin  Injectable 4000 Unit(s) IV Push every 6 hours PRN For aPTT less than 40  heparin  Injectable 2000 Unit(s) IV Push every 6 hours PRN For aPTT between 40 - 57  HYDROmorphone   Tablet 2 milliGRAM(s) Oral every 6 hours PRN Moderate or Severe Pain    ALLERGIES:  Allergies    No Known Allergies    LABS:                        8.7    8.01  )-----------( 462      ( 06 Apr 2018 05:10 )             28.6     04-06    138  |  104  |  10  ----------------------------<  87  4.2   |  24  |  0.72    Ca    8.1<L>      06 Apr 2018 05:10  Phos  3.8     04-06  Mg     2.1     04-06    PT/INR - ( 06 Apr 2018 05:10 )   PT: 22.4 SEC;   INR: 1.92       PTT - ( 06 Apr 2018 05:10 )  PTT:90.0 SEC    RADIOLOGY & ADDITIONAL TESTS: Reviewed.

## 2018-04-06 NOTE — DISCHARGE NOTE ADULT - PATIENT PORTAL LINK FT
You can access the [facility] Portal, offered by [facility], by registering with the following website: [email or web address]

## 2018-04-06 NOTE — DISCHARGE NOTE ADULT - CARE PROVIDER_API CALL
Mega Rojas (MD), Adult Congenital Heart Disease; Pediatric Cardiology; Pediatrics  [address] [address] [place] [address]  Phone: [phone]  Fax: [phone]    Christoph Cadet), Surgery; Vascular Surgery  [address] [place] [address]  Phone: [phone]  Fax: [phone]

## 2018-04-06 NOTE — DIETITIAN INITIAL EVALUATION ADULT. - OTHER INFO
Pt. endorses fairly good appetite & denies food allergies, nausea/vomiting/diarrhea/constipation, or issues with chewing/swallowing.  Occasionally consumes Ensure supplement at home.  Noted w 3+ edema right leg.  No pressure injuries.   Pt. new to Warfarin... provided verbal and printed education re: Vitamin K.

## 2018-04-06 NOTE — DISCHARGE NOTE ADULT - CARE PLAN
Principal Discharge DX:	DVT (deep venous thrombosis)  Goal:	Prevention of clot progression, PE  Assessment and plan of treatment:	You were found to have a significant DVT on RLE doppler and CTA abdomen. Please continue to take your warfarin 5mg daily at around 6pm. Avoid eating excessive leafy greens and continue to maintain a consistent diet. You will need to go for INR draws weekly, starting on Monday 4/9/18.  Please follow up with Dr. Rojas and Pauline Morrell NP in [facility] clinic on 4/16/18 at 12:45 pm. Please follow up with Dr. Cadet for 4/19/18 at 2:30pm at [address] [place] [address] [address], [place] [address].    If you experience any shortness of breath, chest pain or have bleeding from your mouth, please seek medical attention immediately.

## 2018-04-06 NOTE — PROGRESS NOTE ADULT - ASSESSMENT
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    Neuro:  - AAOx3 without active issues  - Continue with tylenol, dilaudid for pain medications  - Continue with hot packs for pain    CV:  - Complicated cardiovascular hx in the setting of heterotaxy/congenital heart disease s/p multiple surgeries including Fontan procedure.  - Patient's Pediatric Cardiologist is Dr Rojas who has been consulted and continues following.  - Per discussion with cardiology, bridging patient to coumadin. Per note, DOACs have not been studied as extensively in Fontan physiology as in this patient, so coumadin is preferably for the time being.   - pt s/p 3 days of 5mg coumadin, current INR 1.92, will continue with 5mg coumadin at home and INR check Monday 4/9  - f/u with Dr. Rojas and Pauline Morrell NP in [facility] clinic on 4/16/18 at 12:45 pm  - will need appt made w/ Dr. Cadet prior to discharge      Pulm:  - Oxygen therapy to titrate sat to >86%. He experienced transient hypoxemia at baseline even prior to this.   - Extensive RLE DVT extending up to infrarenal IVC.   - currently on coumadin 5mg daily, INR 1.92 today    GI:  - Continue w/ regular diet    Renal:  - No acute issues    Heme:  - extensive RLE DVT up to infrarenal IVC w/ thrombophlebitis  - continue coumadin 5mg daily, INR 1.92 today  - patient w/ normocytic anemia and stable H/H  - c/w iron supplements    ID:  - No evidence of active infection    DVT ppx:  - continue coumadin 5mg daily, INR 1.92 today    Mobility  - ambulated yesterday, continue to ambulate on discharge    Ethics:  - full code

## 2018-04-06 NOTE — PROGRESS NOTE ADULT - PROBLEM SELECTOR PROBLEM 2
TAPVR (total anomalous pulmonary venous return)
DVT (deep venous thrombosis)
Heterotaxy syndrome with asplenia
Single ventricle
Acute deep vein thrombosis (DVT) of iliac vein of right lower extremity

## 2018-04-06 NOTE — DISCHARGE NOTE ADULT - HOSPITAL COURSE
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 Pt was started on a heparin gtt. Vascular, IR and pediatric cardiology consulted. Vascular and IR w/ no intervention. Pediatric cardiology explains continue to AC and transition to coumadin as DOACs were never studied in Fontan's procedure patients. Pt has RLE pain, initially on IV pain meds, now transitioned to oral meds. Pt was able to ambulate without the assistance of pain medications. Pediatric cardiology recommends the use of tylenol, 500mg to 1000mg q8 standing for pain while awake through the weekend. Pt received 3 doses of coumadin 5mg while inpatient, with INR at 1.92 and planned repeat INR check in 2 days as an outpatient after discharge. Follow up appointment made with Dr. Rojas and Pauline Morrell NP in [facility] clinic on 4/16/18 at 12:45 pm. Follow up appointment made with Dr. Cadet for 4/19/18 at 2:30pm at [address] [place] [address] [address], [place] [address].

## 2018-04-06 NOTE — DISCHARGE NOTE ADULT - CARE PROVIDERS DIRECT ADDRESSES
[email or web address]

## 2018-04-06 NOTE — PROGRESS NOTE ADULT - PROBLEM SELECTOR PROBLEM 3
Heterotaxy syndrome with asplenia
Heterotaxy syndrome with asplenia
Hypoxia
Single ventricle
Heterotaxy syndrome with asplenia

## 2018-04-06 NOTE — DISCHARGE NOTE ADULT - APPOINTMENTS. PLEASE FOLLOW UP WITH YOUR DOCTOR WITHIN 3 DAYS OF DISCHARGE TO SCHEDULE YOUR NEXT BLOOD TEST.
Include Z78.9 (Other Specified Conditions Influencing Health Status) As An Associated Diagnosis?: No
Medical Necessity Clause: This procedure was medically necessary because the lesions that were treated were: contagious, infectious
Medical Necessity Information: It is in your best interest to select a reason for this procedure from the list below. All of these items fulfill various CMS LCD requirements except the new and changing color options.
Detail Level: Detailed
Consent: The patient's consent was obtained including but not limited to risks of crusting, scabbing, blistering, scarring, darker or lighter pigmentary change, recurrence, incomplete removal and infection.
Post-Care Instructions: I reviewed with the patient in detail post-care instructions. Patient is to wear sunprotection, and avoid picking at any of the treated lesions. Pt may apply Vaseline to crusted or scabbing areas.
Statement Selected

## 2018-04-06 NOTE — DIETITIAN INITIAL EVALUATION ADULT. - NS AS NUTRI INTERV COLLABORAT
1)Add Ensure Enlive 8oz PO 2x daily to regular diet.              2)Obtain daily weights              3)RDN remains available.  Radha Alvarez, MABEL, CDN  pager [phone]

## 2018-04-06 NOTE — PROGRESS NOTE ADULT - ATTENDING COMMENTS
Patient examined and case reviewed in detail on bedside rounds
21 yo man with hx of heteroxy syndrome, with Fontan cardiac physiology (preload dependent, baseline saturations as long as 85%, AV regurgitation, low EF), chronic hx of hemoptysis of unclear diagnosis, recent hospitalization for hemoptysis (March 2018) when ASA was stopped, with recent right lower extremity pain, found to have an extensive lower right and left DVT (right infrarenal IVC/common iliac/external and internal iliac/common femoral, left external iliac/common femoral), now on systemic heparin.  Pediatric cardiology, and vascular on board. Consider IVC filter ? (risk of migration in setting of cardiac anatomy?) versus thrombectomy ?    45 minutes critical time spent.
Patient examined and case reviewed in detail on bedside rounds
21 yo man with hx of heterotaxy syndrome (right isomerism), with Fontan cardiac physiology (preload dependent, baseline saturations as long as 85%, AV regurgitation, low EF), chronic hx of hemoptysis of unclear diagnosis, recent hospitalization for hemoptysis (March 2018) when ASA was stopped, with recent right lower extremity pain, found to have an extensive lower right and left DVT (right infrarenal IVC/common iliac/external and internal iliac/common femoral, left external iliac/common femoral), now on systemic heparin.  Heparin drip therapeutic; will transition to coumadin in the next few days. Pain control. Vascular and pediatric cardiology input appreciated.    45 minutes critical time spent.

## 2018-04-06 NOTE — DISCHARGE NOTE ADULT - MEDICATION SUMMARY - MEDICATIONS TO TAKE
I will START or STAY ON the medications listed below when I get home from the hospital:    acetaminophen 325 mg oral tablet  -- 3 tab(s) by mouth every 6 hours for pain  -- Indication: For Pain    digoxin 250 mcg (0.25 mg) oral tablet  -- 1 tab(s) by mouth once a day  -- Indication: For Cardiac function    warfarin 5 mg oral tablet  -- 1 tab(s) by mouth once a day  -- Indication: For DVT (deep venous thrombosis)    ferrous sulfate 325 mg (65 mg elemental iron) oral tablet  -- 1 tab(s) by mouth once a day   -- Indication: For Anemia    melatonin 3 mg oral tablet  -- 1 tab(s) by mouth once a day  -- Indication: For Insomnia

## 2018-04-06 NOTE — PROGRESS NOTE ADULT - ASSESSMENT
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  Over the course of his hospitalization, swelling and pain have improved.  He has not received opiate meds in >24 hours and has tolerated ambulation and has been evaluated by PT.  INR up to 1.9 this morning. CBC stable, CMP stable. 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  Over the course of his hospitalization, swelling and pain have improved.  He has not received opiate meds in >24 hours and has tolerated ambulation and has been evaluated by PT.  INR up to 1.9 this morning. CBC stable, CMP stable.      - okay to discharge home  - send home on coumadin 5 mg po daily. We will coordinate INR checks on Monday and as an outpatient. Kang was given a prescription for 5 mg and 1 mg tablets to facilitate outpatient titration  - send home on iron 325 mg po daily  - continue home digoxin on discharge  - okay to continue melatonin 3 mg po qhs for sleep  - I advised Kang to take Tylenol 500 to 1000 mg by mouth every 6 hours while awake thru the weekend and then can back down to as needed dosing  - f/u with Dr. Rojas and Pauline Morrell NP in [facility] clinic on 4/16/18 at 12:45 pm  - will need to schedule 2 week hospital follow up with Dr. Cadet  - reviewed return precautions with Kang- worsening pain, worsening swelling, shortness of breath, chest pain, palpitations, fevers, chills, cyanosis, etc

## 2018-04-06 NOTE — DISCHARGE NOTE ADULT - ADDITIONAL INSTRUCTIONS
Please follow up with your INR check on Monday 4/9/18 as per the cardiologist. Please follow up with Dr. Rojas and Pauline Morrell NP in [facility] clinic on 4/16/18 at 12:45 pm. Please follow up with Dr. Cadet for 4/19/18 at 2:30pm at [address] [place] [address] [address], [place] [address].

## 2018-04-06 NOTE — PROGRESS NOTE ADULT - PROBLEM SELECTOR PROBLEM 5
Single ventricle
Spleen absent
TAPVR (total anomalous pulmonary venous return)
TAPVR (total anomalous pulmonary venous return)
Hypoxia

## 2018-04-09 ENCOUNTER — MEDICATION RENEWAL (OUTPATIENT)
Age: 23
End: 2018-04-09

## 2018-04-09 ENCOUNTER — LABORATORY RESULT (OUTPATIENT)
Age: 23
End: 2018-04-09

## 2018-04-09 ENCOUNTER — RX RENEWAL (OUTPATIENT)
Age: 23
End: 2018-04-09

## 2018-04-09 DIAGNOSIS — R52 PAIN, UNSPECIFIED: ICD-10-CM

## 2018-04-10 LAB — PROT S FREE PPP-ACNC: 131 % — HIGH (ref 70–130)

## 2018-04-12 ENCOUNTER — LABORATORY RESULT (OUTPATIENT)
Age: 23
End: 2018-04-12

## 2018-04-12 ENCOUNTER — APPOINTMENT (OUTPATIENT)
Dept: VASCULAR SURGERY | Facility: CLINIC | Age: 23
End: 2018-04-12
Payer: COMMERCIAL

## 2018-04-12 VITALS
BODY MASS INDEX: 15.85 KG/M2 | WEIGHT: 107 LBS | DIASTOLIC BLOOD PRESSURE: 68 MMHG | SYSTOLIC BLOOD PRESSURE: 106 MMHG | TEMPERATURE: 97.8 F | HEIGHT: 69 IN | HEART RATE: 90 BPM

## 2018-04-12 VITALS — SYSTOLIC BLOOD PRESSURE: 96 MMHG | HEART RATE: 92 BPM | DIASTOLIC BLOOD PRESSURE: 61 MMHG

## 2018-04-12 PROCEDURE — 99212 OFFICE O/P EST SF 10 MIN: CPT

## 2018-04-12 PROCEDURE — 93971 EXTREMITY STUDY: CPT

## 2018-04-16 ENCOUNTER — LABORATORY RESULT (OUTPATIENT)
Age: 23
End: 2018-04-16

## 2018-04-16 ENCOUNTER — APPOINTMENT (OUTPATIENT)
Dept: PEDIATRIC CARDIOLOGY | Facility: CLINIC | Age: 23
End: 2018-04-16
Payer: COMMERCIAL

## 2018-04-16 ENCOUNTER — OUTPATIENT (OUTPATIENT)
Dept: OUTPATIENT SERVICES | Age: 23
LOS: 1 days | Discharge: HOME | End: 2018-04-16

## 2018-04-16 VITALS
OXYGEN SATURATION: 94 % | DIASTOLIC BLOOD PRESSURE: 70 MMHG | WEIGHT: 106.92 LBS | SYSTOLIC BLOOD PRESSURE: 113 MMHG | BODY MASS INDEX: 15.66 KG/M2 | HEART RATE: 91 BPM | HEIGHT: 69.29 IN

## 2018-04-16 DIAGNOSIS — Z98.89 OTHER SPECIFIED POSTPROCEDURAL STATES: Chronic | ICD-10-CM

## 2018-04-16 PROCEDURE — 93000 ELECTROCARDIOGRAM COMPLETE: CPT

## 2018-04-16 PROCEDURE — 99215 OFFICE O/P EST HI 40 MIN: CPT | Mod: 25

## 2018-04-16 PROCEDURE — 93303 ECHO TRANSTHORACIC: CPT

## 2018-04-16 PROCEDURE — 93320 DOPPLER ECHO COMPLETE: CPT

## 2018-04-16 PROCEDURE — 93325 DOPPLER ECHO COLOR FLOW MAPG: CPT

## 2018-04-16 RX ORDER — BENZONATATE 100 MG/1
100 CAPSULE ORAL
Qty: 90 | Refills: 2 | Status: DISCONTINUED | COMMUNITY
Start: 2018-03-21 | End: 2018-04-16

## 2018-04-16 RX ORDER — CHLORHEXIDINE GLUCONATE 213 G/1000ML
4 SOLUTION TOPICAL
Qty: 1 | Refills: 2 | Status: DISCONTINUED | COMMUNITY
Start: 2017-02-23 | End: 2018-04-16

## 2018-04-16 RX ORDER — CLINDAMYCIN PHOSPHATE 10 MG/ML
1 LOTION TOPICAL
Qty: 60 | Refills: 2 | Status: DISCONTINUED | COMMUNITY
Start: 2017-02-23 | End: 2018-04-16

## 2018-04-17 LAB
ALBUMIN SERPL ELPH-MCNC: 4 G/DL
ALP BLD-CCNC: 89 U/L
ALT SERPL-CCNC: 16 U/L
ANION GAP SERPL CALC-SCNC: 19 MMOL/L
APPEARANCE: CLEAR
AST SERPL-CCNC: 28 U/L
BASOPHILS # BLD AUTO: 0.14 K/UL
BASOPHILS NFR BLD AUTO: 2 %
BILIRUB SERPL-MCNC: 2.7 MG/DL
BILIRUBIN URINE: NEGATIVE
BLOOD URINE: NEGATIVE
BUN SERPL-MCNC: 11 MG/DL
CALCIUM SERPL-MCNC: 9.6 MG/DL
CHLORIDE SERPL-SCNC: 103 MMOL/L
CHOLEST SERPL-MCNC: 110 MG/DL
CHOLEST/HDLC SERPL: 2.9 RATIO
CO2 SERPL-SCNC: 21 MMOL/L
COLOR: ABNORMAL
CREAT SERPL-MCNC: 0.96 MG/DL
EOSINOPHIL # BLD AUTO: 0.75 K/UL
EOSINOPHIL NFR BLD AUTO: 10.6 %
GLUCOSE QUALITATIVE U: NORMAL MG/DL
GLUCOSE SERPL-MCNC: 80 MG/DL
HBA1C MFR BLD HPLC: 6 %
HCT VFR BLD CALC: 42.8 %
HDLC SERPL-MCNC: 38 MG/DL
HGB BLD-MCNC: 13.6 G/DL
IMM GRANULOCYTES NFR BLD AUTO: 0.6 %
KETONES URINE: NEGATIVE
LDLC SERPL CALC-MCNC: 64 MG/DL
LEUKOCYTE ESTERASE URINE: NEGATIVE
LYMPHOCYTES # BLD AUTO: 1.45 K/UL
LYMPHOCYTES NFR BLD AUTO: 20.5 %
MAN DIFF?: NORMAL
MCHC RBC-ENTMCNC: 25.9 PG
MCHC RBC-ENTMCNC: 31.8 GM/DL
MCV RBC AUTO: 81.4 FL
MONOCYTES # BLD AUTO: 0.96 K/UL
MONOCYTES NFR BLD AUTO: 13.6 %
NEUTROPHILS # BLD AUTO: 3.74 K/UL
NEUTROPHILS NFR BLD AUTO: 52.7 %
NITRITE URINE: NEGATIVE
NT-PROBNP SERPL-MCNC: 184 PG/ML
PH URINE: 5.5
PLATELET # BLD AUTO: 233 K/UL
POTASSIUM SERPL-SCNC: 4.3 MMOL/L
PROT SERPL-MCNC: 7.9 G/DL
PROTEIN URINE: 100 MG/DL
RBC # BLD: 5.26 M/UL
RBC # FLD: 17.3 %
SODIUM SERPL-SCNC: 143 MMOL/L
SPECIFIC GRAVITY URINE: 1.01
T3 SERPL-MCNC: 91 NG/DL
T4 FREE SERPL-MCNC: 1.6 NG/DL
TRIGL SERPL-MCNC: 41 MG/DL
TSH SERPL-ACNC: 1.93 UIU/ML
UROBILINOGEN URINE: 1 MG/DL
WBC # FLD AUTO: 7.08 K/UL

## 2018-04-23 ENCOUNTER — LABORATORY RESULT (OUTPATIENT)
Age: 23
End: 2018-04-23

## 2018-04-27 ENCOUNTER — APPOINTMENT (OUTPATIENT)
Dept: VASCULAR SURGERY | Facility: CLINIC | Age: 23
End: 2018-04-27

## 2018-04-30 ENCOUNTER — APPOINTMENT (OUTPATIENT)
Dept: VASCULAR SURGERY | Facility: CLINIC | Age: 23
End: 2018-04-30

## 2018-05-03 ENCOUNTER — RX RENEWAL (OUTPATIENT)
Age: 23
End: 2018-05-03

## 2018-05-07 ENCOUNTER — LABORATORY RESULT (OUTPATIENT)
Age: 23
End: 2018-05-07

## 2018-05-14 ENCOUNTER — APPOINTMENT (OUTPATIENT)
Dept: PEDIATRIC CARDIOLOGY | Facility: CLINIC | Age: 23
End: 2018-05-14
Payer: COMMERCIAL

## 2018-05-14 VITALS
RESPIRATION RATE: 18 BRPM | HEIGHT: 69.29 IN | OXYGEN SATURATION: 89 % | BODY MASS INDEX: 16.14 KG/M2 | SYSTOLIC BLOOD PRESSURE: 103 MMHG | WEIGHT: 110.23 LBS | DIASTOLIC BLOOD PRESSURE: 66 MMHG | HEART RATE: 92 BPM

## 2018-05-14 PROCEDURE — 99215 OFFICE O/P EST HI 40 MIN: CPT | Mod: 25

## 2018-05-14 PROCEDURE — 93000 ELECTROCARDIOGRAM COMPLETE: CPT

## 2018-05-14 RX ORDER — ACETAMINOPHEN AND CODEINE 300; 30 MG/1; MG/1
300-30 TABLET ORAL 3 TIMES DAILY
Qty: 9 | Refills: 0 | Status: DISCONTINUED | COMMUNITY
Start: 2018-04-09 | End: 2018-05-14

## 2018-05-15 ENCOUNTER — OTHER (OUTPATIENT)
Age: 23
End: 2018-05-15

## 2018-05-15 ENCOUNTER — LABORATORY RESULT (OUTPATIENT)
Age: 23
End: 2018-05-15

## 2018-05-24 ENCOUNTER — APPOINTMENT (OUTPATIENT)
Dept: VASCULAR SURGERY | Facility: CLINIC | Age: 23
End: 2018-05-24

## 2018-05-29 ENCOUNTER — LABORATORY RESULT (OUTPATIENT)
Age: 23
End: 2018-05-29

## 2018-05-30 ENCOUNTER — RX RENEWAL (OUTPATIENT)
Age: 23
End: 2018-05-30

## 2018-05-31 ENCOUNTER — APPOINTMENT (OUTPATIENT)
Dept: VASCULAR SURGERY | Facility: CLINIC | Age: 23
End: 2018-05-31
Payer: COMMERCIAL

## 2018-05-31 VITALS
TEMPERATURE: 98.3 F | WEIGHT: 110 LBS | SYSTOLIC BLOOD PRESSURE: 106 MMHG | HEIGHT: 69 IN | BODY MASS INDEX: 16.29 KG/M2 | HEART RATE: 96 BPM | DIASTOLIC BLOOD PRESSURE: 73 MMHG

## 2018-05-31 PROCEDURE — 99212 OFFICE O/P EST SF 10 MIN: CPT

## 2018-07-02 NOTE — CONSULT LETTER
[Name] : Name: [unfilled] [] : : ~~ [Dear  ___:] : Dear Dr. [unfilled]: [Consult] : I had the pleasure of evaluating your patient, [unfilled]. My full evaluation follows. [Consult - Multiple Provider] : Thank you very much for allowing us to participate in the care of this patient. If you have any questions, please do not hesitate to contact us. [Sincerely,] : Sincerely, [DrJose  ___] : Dr. MALDONADO [DrJose ___] : Dr. MALDONADO [FreeTextEntry9] : 2/12/18 [FreeTextEntry4] : Jace Hall MD [FreeTextEntry5] : [address] [FreeTextEntry6] : [place] [address] [FreeTextEntry1] : 2/12/18

## 2018-07-02 NOTE — REVIEW OF SYSTEMS
[Nl] : Genitourinary [Feeling Poorly] : feeling poorly (malaise) [Limping] : limping [Thigh Pain] : pain in the thigh [Lower Leg Pain] : leg pain [Sleep Disturbances] : ~T sleep disturbances [Depression] : depression [Fever] : no fever [Wgt Loss (___ Lbs)] : no recent weight loss [Pallor] : not pale [Eye Discharge] : no eye discharge [Redness] : no redness [Change in Vision] : no change in vision [Nasal Stuffiness] : no nasal congestion [Sore Throat] : no sore throat [Earache] : no earache [Loss Of Hearing] : no hearing loss [Cyanosis] : no cyanosis [Edema] : no edema [Diaphoresis] : not diaphoretic [Chest Pain] : no chest pain or discomfort [Exercise Intolerance] : no persistence of exercise intolerance [Palpitations] : no palpitations [Orthopnea] : no orthopnea [Fast HR] : no tachycardia [Tachypnea] : not tachypneic [Wheezing] : no wheezing [Cough] : no cough [Shortness Of Breath] : not expressed as feeling short of breath [Vomiting] : no vomiting [Diarrhea] : no diarrhea [Abdominal Pain] : no abdominal pain [Decrease In Appetite] : appetite not decreased [Fainting (Syncope)] : no fainting [Seizure] : no seizures [Headache] : no headache [Dizziness] : no dizziness [Joint Pains] : no arthralgias [Joint Swelling] : no joint swelling [Rash] : no rash [Wound problems] : no wound problems [Easy Bruising] : no tendency for easy bruising [Swollen Glands] : no lymphadenopathy [Easy Bleeding] : no ~M tendency for easy bleeding [Nosebleeds] : no epistaxis [Hyperactive] : no hyperactive behavior [Anxiety] : no anxiety [Failure To Thrive] : no failure to thrive [Short Stature] : short stature was not noted [Jitteriness] : no jitteriness [Heat/Cold Intolerance] : no temperature intolerance [Dec Urine Output] : no oliguria [FreeTextEntry3] : at times depressed, at times better. [FreeTextEntry2] : His depression seems to be intermittent in nature.  he is quite anxious about the progress of the dissolution of the DVT/pain. [FreeTextEntry1] : right calf pain

## 2018-07-02 NOTE — HISTORY OF PRESENT ILLNESS
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 He ultimately underwent takedown of the Narinder shunt and underwent a fenestrated Fontan procedure which was subsequently managed by attempted cath lab closure of the fenestration. As a result he had thrombosis of the right femoral vein and subsequent development of venous stasis in that leg and ultimately significant color change and varicosities for which he has been wearing compression stockings and planning surgical repair. These have been gradually healing though he has persistent edema, with improvement of his left lower extremity related to his varicose veins.\par \par He has had a couple of recent hospitalizations here at [facility] due to hemoptysis.  During the time we were trying to discover where the recurrent bleeding was coming from, we had discontinued his aspirin therapy.  In terms of trying to find the bleeding, he underwent two separate bronchoscopies while experiencing bleeding in the MICU at [facility] and both were negative for finding a source.  With continued bleeding, we consulted the adult GI service there and he underwent a gastroscopy with no evidence of erosion or bleeding.  We then asked ENT to reevaluate his nasopharynx which we felt, and still do believe, is the most likely source of his bleeding.  He underwent a head CT which was unremarkable and with the use of nasal vasoconstriction, his bleeding stopped.  \par \par Unfortunately, and likely because we needed to stop his aspirin therapy in order to control his bleeding, he developed a significant DVT for which we consulted vascular surgery.  The extensive DVT went from his calf up to his femoral vein and caused him considerable pain.  He experienced significant swelling and tenderness to the leg.  The plan was to anticoagulate him "mildly" with Coumadin to keep his INR between 2-3 which we have done successfully.  He has continued to experience pain while home and we have prescribed Tylenol with codeine as well as regular Tylenol and occasional ibuprofen to manage this.  Though the pain has recently begun to subside, going from continuous and severe to continuous and moderate, and now intermittent and rarely severe and mostly mild to moderate, he has been asking for "something stronger".  We have avoided prescribing any opioids because we felt this could become addictive for a problem that is gradually dissipating.  In fact, he went to see the vascular surgeon late last week who felt he was making excellent progress.\par \par Mom who accompanied him, noted that his appetite has improved greatly over the last week or so.  If he is distracted with something else to do, his pain seems manageable.  His sleeping has been interrupted by pain in the leg and mom says you can hear him "moaning" from time to time.

## 2018-07-02 NOTE — REASON FOR VISIT
[F/U - Hospitalization] : follow-up of a recent hospitalization for [Patient] : patient [Mother] : mother [FreeTextEntry3] : Complex CHD,multiple cardiac surgeries,s/p Fontan and s/p recent admission for DVT

## 2018-07-02 NOTE — CLINICAL NARRATIVE
[Up to Date] : Up to Date [FreeTextEntry2] : F/up admission 3/30-4/6/2018 for DVT RLE.\par On 5mg Coumadin.Last INR from 4/12/2018 =2.1.Lab slip provided for today.\par Did see  4/12.Appt. moved up due to increased pain.As per patient.US shows improvement.Leg swelling improved.Pain slowly improving.Varies throughout the day and depending on severity-will take tylenol,motrin or tylenol #3.Today took tylenol x2 this morning with some relief.Pain 3/10 at present.\par

## 2018-07-02 NOTE — PHYSICAL EXAM
[General Appearance - Alert] : alert [Attitude Uncooperative] : cooperative [Chronically Ill Appearing] : chronically ill appearing [Appearance Of Head] : the head was normocephalic [Facies] : there were no dysmorphic facial features [Sclera] : the conjunctiva were normal [Outer Ear] : the ears and nose were normal in appearance [Examination Of The Oral Cavity] : mucous membranes were moist and pink [Respiration, Rhythm And Depth] : normal respiratory rhythm and effort [Auscultation Breath Sounds / Voice Sounds] : breath sounds clear to auscultation bilaterally [No Cough] : no cough [Stridor] : no stridor was observed [Chest Surgical / Traumatic Scar] : chest incision well healed [Chest Palpation Tender Sternum] : no chest wall tenderness [No Sternal Instability] : no sternal instability [Pectus Excavatum] : a pectus excavatum was noted [Deformity] : a chest deformity was noted [Sternotomy] : sternotomy [Heart Rate And Rhythm] : normal heart rate and rhythm [Heart Sounds Gallop] : no gallops [Heart Sounds Pericardial Friction Rub] : no pericardial rub [Edema] : no edema [Capillary Refill Test] : normal capillary refill [Hyperdynamic] : There was a hyperdynamic precordium [S1 Accentuated] : was accentuated [S2 Single] : was single [A2 Accentuated] : had an accentuated A2 [Systolic] : systolic [III] : a grade 3/6   [LMSB] : LMSB  [Tuckercendo] : rogelio [Med] : medium pitched [Harsh] : harsh [Carotids] : the murmur was transmitted to the carotid arteries [Diastolic] : diastolic [II] : a grade 2/4  [Apical] : apex [Decrescendo] : decrescendo [Low] : low pitched [Early] : early [Bowel Sounds] : normal bowel sounds [Abdomen Soft] : soft [Nondistended] : nondistended [Abdomen Tenderness] : non-tender [] : no hepato-splenomegaly [Musculoskeletal Exam: Normal Movement Of All Extremities] : normal movements of all extremities [Musculoskeletal - Swelling] : no joint swelling or joint tenderness [Nail Clubbing] : clubbing of the fingers [Generalized Hypotonicity] : generalized hypotonicity was observed [Normal Station and Gait] : the gait and station were normal for the patient's age [Demonstrated Behavior - Infant Nonreactive To Parents] : interactive [Anxious] : anxious [Depressed] : depressed [Lethargic] : the patient was not lethargic [Normal S1] : abnormal  [Normal] : abnormal  [Joint Tenderenss] : pt not experiencing decreased ROM [Joint Swelling] : pt not experiencing joint swelling [FreeTextEntry1] : went from fetal postion and not very responsive to fully cooperative and interactive during the visit

## 2018-07-02 NOTE — DISCUSSION/SUMMARY
[Needs SBE Prophylaxis] : [unfilled]  needs bacterial endocarditis prophylaxis. SBE prophylaxis is indicated for dental and invasive ENT procedures. (Circulation. 2007; 116: 3921-3610) [Participate only in Mild PE activities] : [unfilled] may participate ONLY IN MILD physical education activities such as [place] games, golf, and badminton. [Influenza vaccine is recommended] : Influenza vaccine is recommended [FreeTextEntry1] : Kang is suffering a bit of PTSD from his recent episodes of bleeding with hemoptysis with no confirmed site of bleeding, though we are working with the assumption that this is coming from the upper airway.  Both he and his mother note that he usually is gurgling when and during the time of the episodes and that vascular constriction using a spray in the nostrils has seemed to corie the episodes.  His CXR has not changed and my belief is that with the frequency and amount of blood lost, if it was coming from the tracheobronchial tree, we would have found something indicating a potential source on at least one of the two bronchoscopies performed.  Unfortunately, in an attempt to control his bleeding, we had to stop his aspirin and that likely made his DVT development more likely.\par \par His recovery from that event is ongoing with good progress despite the pain he endures.  As noted above, we hesitated to put him on opioids as it appears his pain relief process would not be well served using such strong medication as he appears to be getting better each week.  I encouraged him to ambulate more as that will help the development of circulation in his leg and speed recovery.  I also asked him to start taking the iron therapy we prescribed if tolerated, so as to replace some of his iron stores depleted by the bleeding events.  \par \par Finally, I understand his frustration and anxiety about his recent setbacks and his enduring significant pain and discomfort while he recuperates.  Nonetheless, I believe he would benefit from speaking with a therapist who can listen to him objectively and help him through this very difficult time in his life.  I assured him that we are here for any interim problems and that our team is ready to do any and all things possible to help him.  However, in this difficult circumstance, I believe he would be well served with another party working with his mental state.  I assured him that his heart has not suffered any setbacks during this time and he should look forward to getting back to where he was when I first met him a couple of years ago.\par I would like to see him back in the clinic in one month.  Other than an ECG and continued monitoring of his INR, I do not anticipate requiring any other testing.\par

## 2018-07-05 ENCOUNTER — LABORATORY RESULT (OUTPATIENT)
Age: 23
End: 2018-07-05

## 2018-08-03 ENCOUNTER — LABORATORY RESULT (OUTPATIENT)
Age: 23
End: 2018-08-03

## 2018-08-06 ENCOUNTER — RX RENEWAL (OUTPATIENT)
Age: 23
End: 2018-08-06

## 2018-09-06 ENCOUNTER — RESULT REVIEW (OUTPATIENT)
Age: 23
End: 2018-09-06

## 2018-09-06 ENCOUNTER — LABORATORY RESULT (OUTPATIENT)
Age: 23
End: 2018-09-06

## 2018-10-03 ENCOUNTER — LABORATORY RESULT (OUTPATIENT)
Age: 23
End: 2018-10-03

## 2018-11-09 ENCOUNTER — LABORATORY RESULT (OUTPATIENT)
Age: 23
End: 2018-11-09

## 2018-11-12 ENCOUNTER — APPOINTMENT (OUTPATIENT)
Dept: PEDIATRIC CARDIOLOGY | Facility: CLINIC | Age: 23
End: 2018-11-12

## 2018-11-19 ENCOUNTER — APPOINTMENT (OUTPATIENT)
Dept: PEDIATRIC CARDIOLOGY | Facility: CLINIC | Age: 23
End: 2018-11-19

## 2018-11-22 ENCOUNTER — RESULT CHARGE (OUTPATIENT)
Age: 23
End: 2018-11-22

## 2018-11-23 ENCOUNTER — OUTPATIENT (OUTPATIENT)
Dept: OUTPATIENT SERVICES | Age: 23
LOS: 1 days | Discharge: HOME | End: 2018-11-23

## 2018-11-23 DIAGNOSIS — Z98.89 OTHER SPECIFIED POSTPROCEDURAL STATES: Chronic | ICD-10-CM

## 2018-11-26 ENCOUNTER — APPOINTMENT (OUTPATIENT)
Dept: PEDIATRIC CARDIOLOGY | Facility: CLINIC | Age: 23
End: 2018-11-26
Payer: COMMERCIAL

## 2018-11-26 VITALS
HEIGHT: 69.29 IN | OXYGEN SATURATION: 87 % | HEART RATE: 92 BPM | SYSTOLIC BLOOD PRESSURE: 115 MMHG | WEIGHT: 112.44 LBS | RESPIRATION RATE: 20 BRPM | BODY MASS INDEX: 16.46 KG/M2 | DIASTOLIC BLOOD PRESSURE: 72 MMHG

## 2018-11-26 PROCEDURE — 93000 ELECTROCARDIOGRAM COMPLETE: CPT

## 2018-11-26 PROCEDURE — 99215 OFFICE O/P EST HI 40 MIN: CPT | Mod: 25

## 2018-11-27 ENCOUNTER — CLINICAL ADVICE (OUTPATIENT)
Age: 23
End: 2018-11-27

## 2018-11-27 ENCOUNTER — LABORATORY RESULT (OUTPATIENT)
Age: 23
End: 2018-11-27

## 2018-11-29 ENCOUNTER — MOBILE ON CALL (OUTPATIENT)
Age: 23
End: 2018-11-29

## 2018-11-29 LAB
BASOPHILS # BLD AUTO: 0.37 K/UL
BASOPHILS NFR BLD AUTO: 6.7 %
EOSINOPHIL # BLD AUTO: 1.32 K/UL
EOSINOPHIL NFR BLD AUTO: 24 %
HCT VFR BLD CALC: 39 %
HGB BLD-MCNC: 12 G/DL
LYMPHOCYTES # BLD AUTO: 1.05 K/UL
LYMPHOCYTES NFR BLD AUTO: 19.2 %
MAN DIFF?: NORMAL
MCHC RBC-ENTMCNC: 24.7 PG
MCHC RBC-ENTMCNC: 30.8 GM/DL
MCV RBC AUTO: 80.4 FL
MONOCYTES # BLD AUTO: 0.85 K/UL
MONOCYTES NFR BLD AUTO: 15.4 %
NEUTROPHILS # BLD AUTO: 1.91 K/UL
NEUTROPHILS NFR BLD AUTO: 34.7 %
PLATELET # BLD AUTO: 282 K/UL
RBC # BLD: 4.85 M/UL
RBC # FLD: 24.7 %
WBC # FLD AUTO: 5.49 K/UL

## 2018-12-03 ENCOUNTER — APPOINTMENT (OUTPATIENT)
Dept: OPHTHALMOLOGY | Facility: CLINIC | Age: 23
End: 2018-12-03
Payer: COMMERCIAL

## 2018-12-03 DIAGNOSIS — Z09 ENCOUNTER FOR FOLLOW-UP EXAMINATION AFTER COMPLETED TREATMENT FOR CONDITIONS OTHER THAN MALIGNANT NEOPLASM: ICD-10-CM

## 2018-12-03 DIAGNOSIS — Q14.2 CONGENITAL MALFORMATION OF OPTIC DISC: ICD-10-CM

## 2018-12-03 PROCEDURE — 99204 OFFICE O/P NEW MOD 45 MIN: CPT

## 2018-12-03 PROCEDURE — 92083 EXTENDED VISUAL FIELD XM: CPT

## 2018-12-03 PROCEDURE — 92133 CPTRZD OPH DX IMG PST SGM ON: CPT

## 2018-12-03 NOTE — HISTORY OF PRESENT ILLNESS
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 He ultimately underwent takedown of the Narinder shunt and underwent a fenestrated Fontan procedure which was subsequently managed by attempted cath lab closure of the fenestration. As a result he had thrombosis of the right femoral vein and subsequent development of venous stasis in that leg and ultimately significant color change and varicosities for which he has been wearing compressing stockings and planning surgical repair. These have been gradually healing though he has persistent edema, though improved of his left lower extremity related to his varicose veins.\par \par He has had recent issues with hemoptysis, and despite bronchoscopy, gastroscopy and ENT evaluation we have be unable to identify the source of the bleeding. There is strong suspicion that his nasopharynx is the most likely source of his bleeding though we have not been able to confirm this. However with the use of nasal vasoconstriction, the bleeding stopped. \par \par Unfortunately, and likely because we needed to stop his aspirin therapy in order to control his bleeding, he developed a significant DVT for which we consulted vascular surgery. The extensive DVT went from his calf up to his femoral vein. He has been adequately anticoagulated with Coumadin with INR's between 2-3.\par \par He has been relatively well until the week prior to Thanksgiving when he had intermittent hemoptysis for 2-3 days which resolved spontaneously. He noted that the flow of blood started and stopped with position changes of his head. The bleeding stopped and restarted for another few days and has not returned since. During that time he felt tired as the bleeding and post nasal cough fatigued him. From a cardiovascular standpoint there are no complaints of chest pain, palpitations, shortness of breath, peripheral edema, dizziness or syncope. He has had no bruising or bleeding other than the episodes described above.\par \par \par \par \par

## 2018-12-03 NOTE — REASON FOR VISIT
[Follow-Up] : a follow-up visit for [Patient] : patient [FreeTextEntry3] : Complex CHD s/p Fontan,hemoptysis and h/o DVT (L) LE

## 2018-12-03 NOTE — REVIEW OF SYSTEMS
[Feeling Poorly] : feeling poorly (malaise) [Change in Vision] : change in vision [Edema] : edema [Exercise Intolerance] : persistence of exercise intolerance [Cough] : cough [Easy Bleeding] : a ~M tendency for easy bleeding [Heat/Cold Intolerance] : temperature intolerance [Fever] : no fever [Wgt Loss (___ Lbs)] : no recent weight loss [Eye Discharge] : no eye discharge [Redness] : no redness [Nasal Stuffiness] : no nasal congestion [Sore Throat] : no sore throat [Earache] : no earache [Loss Of Hearing] : no hearing loss [Diaphoresis] : not diaphoretic [Chest Pain] : no chest pain or discomfort [Palpitations] : no palpitations [Orthopnea] : no orthopnea [Fast HR] : no tachycardia [Tachypnea] : not tachypneic [Wheezing] : no wheezing [Shortness Of Breath] : not expressed as feeling short of breath [Vomiting] : no vomiting [Diarrhea] : no diarrhea [Abdominal Pain] : no abdominal pain [Decrease In Appetite] : appetite not decreased [Fainting (Syncope)] : no fainting [Seizure] : no seizures [Headache] : no headache [Dizziness] : no dizziness [Limping] : no limping [Joint Pains] : no arthralgias [Joint Swelling] : no joint swelling [Rash] : no rash [Easy Bruising] : no tendency for easy bruising [Swollen Glands] : no lymphadenopathy [Nosebleeds] : no epistaxis [Sleep Disturbances] : ~T no sleep disturbances [Hyperactive] : no hyperactive behavior [Depression] : no depression [Anxiety] : no anxiety [Jitteriness] : no jitteriness [Dec Urine Output] : no oliguria

## 2018-12-03 NOTE — CONSULT LETTER
[Name] : Name: [unfilled] [] : : ~~ [Dear  ___:] : Dear Dr. [unfilled]: [Consult] : I had the pleasure of evaluating your patient, [unfilled]. My full evaluation follows. [Consult - Multiple Provider] : Thank you very much for allowing us to participate in the care of this patient. If you have any questions, please do not hesitate to contact us. [Sincerely,] : Sincerely, [DrJose  ___] : Dr. MALDONADO [DrJose ___] : Dr. MALDONADO [FreeTextEntry9] : 2/12/18 [FreeTextEntry4] : Jace Hall MD [FreeTextEntry5] : [address] [FreeTextEntry6] : [place] [address] [FreeTextEntry1] : 2/12/18 [de-identified] : Mega Rojas M.D., TAYLOR\par Professor and Executive \par Department of Pediatrics\par [facility]\par Director, Adult Congenital Heart Disease Program\par [facility]\par \par \par \par Pauline Morrell, MSN, CPNP-AC, PC\par Pediatric Cardiology, Adult Congenital Cardiology\par [facility]\par

## 2018-12-03 NOTE — DISCUSSION/SUMMARY
[Needs SBE Prophylaxis] : [unfilled]  needs bacterial endocarditis prophylaxis. SBE prophylaxis is indicated for dental and invasive ENT procedures. (Circulation. 2007; 116: 3706-9413) [Participate only in Mild PE activities] : [unfilled] may participate ONLY IN MILD physical education activities such as [place] games, golf, and badminton. [Influenza vaccine is recommended] : Influenza vaccine is recommended [FreeTextEntry1] : Kang is doing well from a cardiovascular standpoint. We have asked him to call ENT to follow up with them again regarding the new episodes of bleeding. We have ordered a complete blood count to make sure that the bleeding is not excessive and he does not require any supplementation in the form of blood products or iron supplementation. \par \par We will see him again in 6 months time and will continue to follow his INR's monthly or more frequently if adjustments need to be made.\par

## 2018-12-03 NOTE — PHYSICAL EXAM
[General Appearance - Alert] : alert [Attitude Uncooperative] : cooperative [Sclera] : the conjunctiva were normal [Outer Ear] : the ears and nose were normal in appearance [Examination Of The Oral Cavity] : mucous membranes were moist and pink [Respiration, Rhythm And Depth] : normal respiratory rhythm and effort [Auscultation Breath Sounds / Voice Sounds] : breath sounds clear to auscultation bilaterally [No Cough] : no cough [Stridor] : no stridor was observed [Chest Surgical / Traumatic Scar] : chest incision well healed [Chest Palpation Tender Sternum] : no chest wall tenderness [No Sternal Instability] : no sternal instability [Pectus Excavatum] : a pectus excavatum was noted [Deformity] : a chest deformity was noted [Sternotomy] : sternotomy [Heart Rate And Rhythm] : normal heart rate and rhythm [Heart Sounds Gallop] : no gallops [Heart Sounds Pericardial Friction Rub] : no pericardial rub [Edema] : no edema [Capillary Refill Test] : normal capillary refill [Hyperdynamic] : There was a hyperdynamic precordium [S1 Accentuated] : was accentuated [S2 Single] : was single [A2 Accentuated] : had an accentuated A2 [Systolic] : systolic [III] : a grade 3/6   [LMSB] : LMSB  [Tuckercendo] : rogelio [Med] : medium pitched [Harsh] : harsh [Carotids] : the murmur was transmitted to the carotid arteries [Diastolic] : diastolic [II] : a grade 2/4  [Apical] : apex [Decrescendo] : decrescendo [Low] : low pitched [Early] : early [Bowel Sounds] : normal bowel sounds [Abdomen Soft] : soft [Nondistended] : nondistended [Abdomen Tenderness] : non-tender [] : no hepato-splenomegaly [Musculoskeletal Exam: Normal Movement Of All Extremities] : normal movements of all extremities [Musculoskeletal - Swelling] : no joint swelling or joint tenderness [Nail Clubbing] : clubbing of the fingers [Generalized Hypotonicity] : generalized hypotonicity was observed [Normal Station and Gait] : the gait and station were normal for the patient's age [Cervical Lymph Nodes Enlarged Anterior] : The anterior cervical nodes were normal [Cervical Lymph Nodes Enlarged Posterior] : The posterior cervical nodes were normal [Demonstrated Behavior - Infant Nonreactive To Parents] : interactive [Appropriate] : appropriate [General Appearance - In No Acute Distress] : in no acute distress [General Appearance - Well Nourished] : well nourished [General Appearance - Well Developed] : well developed [Appearance Of Head] : the head was normocephalic [Facies] : there were no dysmorphic facial features [Nasal Cavity] : the nasal mucosa was normal [Oropharynx] : the oropharynx was normal [Normal Chest Appearance] : the chest was normal in appearance [Lethargic] : the patient was not lethargic [Normal S1] : abnormal  [Normal] : abnormal  [FreeTextEntry1] : right calf and thigh slightly swollen and tender.  Not warm to touch [Joint Tenderenss] : pt not experiencing decreased ROM [Joint Swelling] : pt not experiencing joint swelling

## 2018-12-03 NOTE — CARDIOLOGY SUMMARY
[Today's Date] : [unfilled] [FreeTextEntry1] : Left atrial rhythm with a prolonged HI interval, ventricular rate 88 bpm. Left axis deviation. Non specific intraventricular block, ST segment depression in the lateral precordial leads

## 2018-12-10 ENCOUNTER — LABORATORY RESULT (OUTPATIENT)
Age: 23
End: 2018-12-10

## 2018-12-17 ENCOUNTER — LABORATORY RESULT (OUTPATIENT)
Age: 23
End: 2018-12-17

## 2019-01-10 ENCOUNTER — LABORATORY RESULT (OUTPATIENT)
Age: 24
End: 2019-01-10

## 2019-02-12 LAB
INR PPP: 2.6
PT BLD: 30.8

## 2019-03-05 ENCOUNTER — LABORATORY RESULT (OUTPATIENT)
Age: 24
End: 2019-03-05

## 2019-04-10 ENCOUNTER — RESULT REVIEW (OUTPATIENT)
Age: 24
End: 2019-04-10

## 2019-04-10 ENCOUNTER — LABORATORY RESULT (OUTPATIENT)
Age: 24
End: 2019-04-10

## 2019-04-19 ENCOUNTER — OTHER (OUTPATIENT)
Age: 24
End: 2019-04-19

## 2019-05-07 ENCOUNTER — LABORATORY RESULT (OUTPATIENT)
Age: 24
End: 2019-05-07

## 2019-05-29 ENCOUNTER — APPOINTMENT (OUTPATIENT)
Dept: OTOLARYNGOLOGY | Facility: CLINIC | Age: 24
End: 2019-05-29
Payer: COMMERCIAL

## 2019-05-29 VITALS
DIASTOLIC BLOOD PRESSURE: 75 MMHG | BODY MASS INDEX: 16.59 KG/M2 | SYSTOLIC BLOOD PRESSURE: 118 MMHG | HEART RATE: 86 BPM | HEIGHT: 69 IN | WEIGHT: 112 LBS

## 2019-05-29 DIAGNOSIS — R04.1 HEMORRHAGE FROM THROAT: ICD-10-CM

## 2019-05-29 DIAGNOSIS — R04.2 HEMOPTYSIS: ICD-10-CM

## 2019-05-29 DIAGNOSIS — J34.2 DEVIATED NASAL SEPTUM: ICD-10-CM

## 2019-05-29 PROCEDURE — 31575 DIAGNOSTIC LARYNGOSCOPY: CPT

## 2019-05-29 PROCEDURE — 99214 OFFICE O/P EST MOD 30 MIN: CPT | Mod: 25

## 2019-05-29 NOTE — PROCEDURE
[de-identified] : Afrin 0.05% and lidocaine 4% sprayed into both nasal passages. Flexible scope #olympus used. Passed through nasal passage and nasopharynx/oropharynx/hypopharynx clear. Blood tinged mucus stranding along right tonsil cleared with swallow. Supraglottis normal. Glottis with fully mobile vocal cords without lesions or masses. Visualized subglottis clear. Postcricoid area with pooling of blood tinged secretions. Clear with swallow and noted to be blood tinged secretions along pyriforms and reaccumulate along postcricoid space. Appears to be coming from esophageal inlet.

## 2019-05-29 NOTE — ASSESSMENT
[FreeTextEntry1] : hemoptysis:\par - appears to be coming from esophageal inlet\par - reviewed with Dr. Riggins who agreed appears to be esophageal source and recommend GI eval for endoscopy\par - recommend go to ED for urgent GI eval for upper endoscopy

## 2019-05-29 NOTE — CONSULT LETTER
[Consult Letter:] : I had the pleasure of evaluating your patient, [unfilled]. [Please see my note below.] : Please see my note below. [Dear  ___] : Dear  [unfilled], [Consult Closing:] : Thank you very much for allowing me to participate in the care of this patient.  If you have any questions, please do not hesitate to contact me. [Sincerely,] : Sincerely, [DrJose  ___] : Dr. MALDONADO [Yusuf Baeza MD] : Yusuf Baeza MD [Otolaryngology] : Otolaryngology [[facility]] : [facility] [[facility]] : [facility]

## 2019-05-29 NOTE — HISTORY OF PRESENT ILLNESS
[de-identified] : 21 y/o M with Hx of Single cardiac ventricle, s/p 3 open heart procedures, two in 1994, the 3rd in 1997.  was on  ASA that has been holding due to bleeding, no other blood thinners.  \par Has been evaluated in the past and unable to find any source.  Had Normal CTA in March of this year. \par Now on Coumadin and notes last night gain had episode of Hemoptysis around 11pm.  Notes had occurred 2 times since. No noted nasal bleeding.  Notes during episodes feels something in the throat and then coughs / spits up about 1/4 cup of blood.  Does note persistent feeling of PND.     No pain, no trouble swallowing.

## 2019-05-29 NOTE — PHYSICAL EXAM
[Midline] : trachea located in midline position [Normal] : cranial nerves 2-12 intact [FreeTextEntry1] : pale and weak appearing

## 2019-05-29 NOTE — DATA REVIEWED
[de-identified] : CT angio chest 3/9/18 - Ground glass appearance throughout right long as well as left lower lobe - consistent with hemorrhage.  [de-identified] : Upper Endoscopy 3/10/18 - Possible Varices in Cervical esophagus however without evidence of recent bleeding.

## 2019-05-30 ENCOUNTER — INPATIENT (INPATIENT)
Facility: HOSPITAL | Age: 24
LOS: 0 days | Discharge: ANOTHER HOSPITAL | DRG: 204 | End: 2019-05-31
Attending: INTERNAL MEDICINE | Admitting: INTERNAL MEDICINE
Payer: COMMERCIAL

## 2019-05-30 VITALS
OXYGEN SATURATION: 90 % | HEIGHT: 69 IN | WEIGHT: 111.99 LBS | HEART RATE: 91 BPM | TEMPERATURE: 98 F | RESPIRATION RATE: 20 BRPM | DIASTOLIC BLOOD PRESSURE: 78 MMHG | SYSTOLIC BLOOD PRESSURE: 119 MMHG

## 2019-05-30 DIAGNOSIS — R04.2 HEMOPTYSIS: ICD-10-CM

## 2019-05-30 DIAGNOSIS — Z98.89 OTHER SPECIFIED POSTPROCEDURAL STATES: Chronic | ICD-10-CM

## 2019-05-30 LAB
ALBUMIN SERPL ELPH-MCNC: 5 G/DL — SIGNIFICANT CHANGE UP (ref 3.3–5)
ALP SERPL-CCNC: 145 U/L — HIGH (ref 40–120)
ALT FLD-CCNC: 25 U/L — SIGNIFICANT CHANGE UP (ref 10–45)
ANION GAP SERPL CALC-SCNC: 16 MMOL/L — SIGNIFICANT CHANGE UP (ref 5–17)
APTT BLD: 36.3 SEC — SIGNIFICANT CHANGE UP (ref 27.5–36.3)
AST SERPL-CCNC: 33 U/L — SIGNIFICANT CHANGE UP (ref 10–40)
BASOPHILS # BLD AUTO: 0 K/UL — SIGNIFICANT CHANGE UP (ref 0–0.2)
BASOPHILS NFR BLD AUTO: 0.6 % — SIGNIFICANT CHANGE UP (ref 0–2)
BILIRUB SERPL-MCNC: 2.9 MG/DL — HIGH (ref 0.2–1.2)
BLD GP AB SCN SERPL QL: NEGATIVE — SIGNIFICANT CHANGE UP
BUN SERPL-MCNC: 14 MG/DL — SIGNIFICANT CHANGE UP (ref 7–23)
CALCIUM SERPL-MCNC: 9.9 MG/DL — SIGNIFICANT CHANGE UP (ref 8.4–10.5)
CHLORIDE SERPL-SCNC: 105 MMOL/L — SIGNIFICANT CHANGE UP (ref 96–108)
CO2 SERPL-SCNC: 22 MMOL/L — SIGNIFICANT CHANGE UP (ref 22–31)
CREAT SERPL-MCNC: 0.9 MG/DL — SIGNIFICANT CHANGE UP (ref 0.5–1.3)
DIGOXIN SERPL-MCNC: 0.6 NG/ML — LOW (ref 0.8–2)
EOSINOPHIL # BLD AUTO: 1 K/UL — HIGH (ref 0–0.5)
EOSINOPHIL NFR BLD AUTO: 13.7 % — HIGH (ref 0–6)
GLUCOSE SERPL-MCNC: 88 MG/DL — SIGNIFICANT CHANGE UP (ref 70–99)
HCT VFR BLD CALC: 53.6 % — HIGH (ref 39–50)
HGB BLD-MCNC: 16 G/DL — SIGNIFICANT CHANGE UP (ref 13–17)
INR BLD: 1.55 RATIO — HIGH (ref 0.88–1.16)
LYMPHOCYTES # BLD AUTO: 2.7 K/UL — SIGNIFICANT CHANGE UP (ref 1–3.3)
LYMPHOCYTES # BLD AUTO: 38.7 % — SIGNIFICANT CHANGE UP (ref 13–44)
MCHC RBC-ENTMCNC: 25 PG — LOW (ref 27–34)
MCHC RBC-ENTMCNC: 29.8 GM/DL — LOW (ref 32–36)
MCV RBC AUTO: 83.9 FL — SIGNIFICANT CHANGE UP (ref 80–100)
MONOCYTES # BLD AUTO: 0.9 K/UL — SIGNIFICANT CHANGE UP (ref 0–0.9)
MONOCYTES NFR BLD AUTO: 12.4 % — SIGNIFICANT CHANGE UP (ref 2–14)
NEUTROPHILS # BLD AUTO: 2.5 K/UL — SIGNIFICANT CHANGE UP (ref 1.8–7.4)
NEUTROPHILS NFR BLD AUTO: 34.7 % — LOW (ref 43–77)
PLATELET # BLD AUTO: 222 K/UL — SIGNIFICANT CHANGE UP (ref 150–400)
POTASSIUM SERPL-MCNC: 4.2 MMOL/L — SIGNIFICANT CHANGE UP (ref 3.5–5.3)
POTASSIUM SERPL-SCNC: 4.2 MMOL/L — SIGNIFICANT CHANGE UP (ref 3.5–5.3)
PROT SERPL-MCNC: 8.2 G/DL — SIGNIFICANT CHANGE UP (ref 6–8.3)
PROTHROM AB SERPL-ACNC: 18.1 SEC — HIGH (ref 10–12.9)
RBC # BLD: 6.39 M/UL — HIGH (ref 4.2–5.8)
RBC # FLD: 19.1 % — HIGH (ref 10.3–14.5)
RH IG SCN BLD-IMP: NEGATIVE — SIGNIFICANT CHANGE UP
SODIUM SERPL-SCNC: 143 MMOL/L — SIGNIFICANT CHANGE UP (ref 135–145)
WBC # BLD: 7.1 K/UL — SIGNIFICANT CHANGE UP (ref 3.8–10.5)
WBC # FLD AUTO: 7.1 K/UL — SIGNIFICANT CHANGE UP (ref 3.8–10.5)

## 2019-05-30 PROCEDURE — 93303 ECHO TRANSTHORACIC: CPT | Mod: 26

## 2019-05-30 PROCEDURE — 70498 CT ANGIOGRAPHY NECK: CPT | Mod: 26

## 2019-05-30 PROCEDURE — 99291 CRITICAL CARE FIRST HOUR: CPT

## 2019-05-30 PROCEDURE — 71275 CT ANGIOGRAPHY CHEST: CPT | Mod: 26

## 2019-05-30 PROCEDURE — 71045 X-RAY EXAM CHEST 1 VIEW: CPT | Mod: 26

## 2019-05-30 RX ORDER — CHLORHEXIDINE GLUCONATE 213 G/1000ML
1 SOLUTION TOPICAL DAILY
Refills: 0 | Status: DISCONTINUED | OUTPATIENT
Start: 2019-05-30 | End: 2019-05-31

## 2019-05-30 RX ORDER — FLUTICASONE PROPIONATE 50 MCG
1 SPRAY, SUSPENSION NASAL
Refills: 0 | Status: DISCONTINUED | OUTPATIENT
Start: 2019-05-30 | End: 2019-05-31

## 2019-05-30 RX ORDER — PANTOPRAZOLE SODIUM 20 MG/1
40 TABLET, DELAYED RELEASE ORAL
Refills: 0 | Status: DISCONTINUED | OUTPATIENT
Start: 2019-05-30 | End: 2019-05-31

## 2019-05-30 RX ORDER — CHLORHEXIDINE GLUCONATE 213 G/1000ML
1 SOLUTION TOPICAL
Refills: 0 | Status: DISCONTINUED | OUTPATIENT
Start: 2019-05-30 | End: 2019-05-30

## 2019-05-30 RX ORDER — DIGOXIN 250 MCG
0.25 TABLET ORAL DAILY
Refills: 0 | Status: DISCONTINUED | OUTPATIENT
Start: 2019-05-30 | End: 2019-05-31

## 2019-05-30 RX ORDER — ONDANSETRON 8 MG/1
4 TABLET, FILM COATED ORAL ONCE
Refills: 0 | Status: COMPLETED | OUTPATIENT
Start: 2019-05-30 | End: 2019-05-30

## 2019-05-30 RX ORDER — IPRATROPIUM/ALBUTEROL SULFATE 18-103MCG
3 AEROSOL WITH ADAPTER (GRAM) INHALATION EVERY 6 HOURS
Refills: 0 | Status: DISCONTINUED | OUTPATIENT
Start: 2019-05-30 | End: 2019-05-31

## 2019-05-30 RX ORDER — MORPHINE SULFATE 50 MG/1
0.5 CAPSULE, EXTENDED RELEASE ORAL EVERY 4 HOURS
Refills: 0 | Status: DISCONTINUED | OUTPATIENT
Start: 2019-05-30 | End: 2019-05-30

## 2019-05-30 RX ORDER — MORPHINE SULFATE 50 MG/1
1 CAPSULE, EXTENDED RELEASE ORAL EVERY 4 HOURS
Refills: 0 | Status: DISCONTINUED | OUTPATIENT
Start: 2019-05-30 | End: 2019-05-31

## 2019-05-30 RX ORDER — FLUTICASONE PROPIONATE 50 MCG
1 SPRAY, SUSPENSION NASAL
Refills: 0 | Status: DISCONTINUED | OUTPATIENT
Start: 2019-05-30 | End: 2019-05-30

## 2019-05-30 RX ADMIN — Medication 1 SPRAY(S): at 19:39

## 2019-05-30 RX ADMIN — Medication 3 MILLILITER(S): at 23:48

## 2019-05-30 RX ADMIN — Medication 3 MILLILITER(S): at 18:37

## 2019-05-30 RX ADMIN — MORPHINE SULFATE 1 MILLIGRAM(S): 50 CAPSULE, EXTENDED RELEASE ORAL at 17:25

## 2019-05-30 RX ADMIN — Medication 0.25 MILLIGRAM(S): at 16:43

## 2019-05-30 RX ADMIN — MORPHINE SULFATE 1 MILLIGRAM(S): 50 CAPSULE, EXTENDED RELEASE ORAL at 21:36

## 2019-05-30 RX ADMIN — MORPHINE SULFATE 1 MILLIGRAM(S): 50 CAPSULE, EXTENDED RELEASE ORAL at 21:35

## 2019-05-30 NOTE — ED PROVIDER NOTE - ATTENDING CONTRIBUTION TO CARE
fontain, on ac /dig. ent yest scope saw blood pooling at post cricoid at eso inlet.   pale/blue / thi / scar to chest / +murmur/  clear lungs/ non-tender abdomen  concern for cont cough w blood either gi source or upper airway - prob gi given scope findings. pt is concern for decompensation - will be physiologically challenging to intubate - right now protecting. need stat gi / micu input. may call anesthesia after gi recs. fontain, on ac /dig. ent yest scope saw blood pooling at post cricoid at eso inlet.   pale/blue / thi / scar to chest / +murmur/  clear lungs/ non-tender abdomen / cannot lay flat  concern for cont cough w blood either gi source or upper airway - prob gi given scope findings. pt is concern for decompensation - will be physiologically challenging to intubate - right now protecting. need stat gi / micu input. may call anesthesia after gi recs.

## 2019-05-30 NOTE — H&P ADULT - NSICDXPASTMEDICALHX_GEN_ALL_CORE_FT
PAST MEDICAL HISTORY:  Heterotaxy syndrome with asplenia     Single ventricle     Spleen absent     TAPVR (total anomalous pulmonary venous return)

## 2019-05-30 NOTE — ED PROVIDER NOTE - PROGRESS NOTE DETAILS
Discussed with GI, will see pt. promptly. dw micu fellow, will see pt. -Piyush Mayo MD- mult re-assessments, feeling a bit better, not feeling pooling any longer. can lay flat for a few min wo prob. Witnessed episode of hematemesis in ED. MICU and GI both paged and are aware. Pt. currently protecting airway. lupis np for dr nima peters [phone], rec for admit here, will keep in touch w team, gi/pulm at bedside w me, will admit to micu now.

## 2019-05-30 NOTE — ED PROVIDER NOTE - OBJECTIVE STATEMENT
23yo M pmhx single ventricle on digoxin and coumadin p/w CC hemoptysis, has had multiple episodes in past, started up again this past Friday, saw an ENT yesterday who referred him to ED for GI eval for findings of blood pooling in esophagus. Pt. denies fever chills CP SOB n/v/d abd pain. Last episode of hemoptysis was at 2AM this morning.

## 2019-05-30 NOTE — ED ADULT TRIAGE NOTE - CHIEF COMPLAINT QUOTE
Sent by Dr Teresa Damon  for urgent GI  Endoscopy    pt has  active blood pooling post cricoid esophogeal inlet (Dr Ref letter ). Pt looks pale. pt has low spo2 88%. as per mom his baseline is 90% Pt has single ventricle

## 2019-05-30 NOTE — CONSULT NOTE ADULT - ASSESSMENT
Impression:    1. Hemoptysis: doubt this a GI source given stable hemoglobin and presentation with hemoptysis, and prior negative EGD. While we cannot definitively exclude a GI source the symptoms are more consistent with an ENT or pulmonary source, and bleeding from aortopulmonary collaterals is described in Fontan patients. Can J Cardiol. 2008;24(2):145.    2. Status post Fontan procedure    3. DVT on coumadin    Recommendation:  -PPI IV BID  -pulmonary/MICU/pediatric cards evaluations  -GI team will follow, but no emergent plan for EGD at this time

## 2019-05-30 NOTE — ED ADULT NURSE REASSESSMENT NOTE - NS ED NURSE REASSESS COMMENT FT1
Pt. denies pain, SOB. Comfort and safety measures in place. Mother at bedside. Pt. remains on cardiac monitor and continuous pulse oximetry. Warm blanket provided. Pt. sitting in stretcher in no acute distress. Pt. denies pain, SOB. Comfort and safety measures in place. Mother at bedside. Pt. remains on cardiac monitor and continuous pulse oximetry. Warm blanket provided.

## 2019-05-30 NOTE — H&P ADULT - NSHPLABSRESULTS_GEN_ALL_CORE
LABS:                        16.0   7.1   )-----------( 222      ( 30 May 2019 10:48 )             53.6     30 May 2019 10:48    143    |  105    |  14     ----------------------------<  88     4.2     |  22     |  0.90     Ca    9.9        30 May 2019 10:48    TPro  8.2    /  Alb  5.0    /  TBili  2.9    /  DBili  x      /  AST  33     /  ALT  25     /  AlkPhos  145    30 May 2019 10:48    PT/INR - ( 30 May 2019 10:48 )   PT: 18.1 sec;   INR: 1.55 ratio         PTT - ( 30 May 2019 10:48 )  PTT:36.3 sec  CAPILLARY BLOOD GLUCOSE    BLOOD CULTURE: n/a    RADIOLOGY & ADDITIONAL TESTS:    Imaging Personally Reviewed:  [ x] YES

## 2019-05-30 NOTE — H&P ADULT - NSHPSOCIALHISTORY_GEN_ALL_CORE
Smoking: [x ] Never Smoked [ ] Former Smoker (__ packs x ___ years) [ ] Current Smoker  (__ packs x ___ years)  Substance Use: [x ] Never Used [ ] Used ____  EtOH Use: félix  Marital Status: [x ] Single [ ]  [ ]  [ ]

## 2019-05-30 NOTE — CONSULT NOTE ADULT - SUBJECTIVE AND OBJECTIVE BOX
HPI:  Pt is a 24yoM with PMH congenital heart disease/heterotaxy s/p multiple cardiac surgeries including Fontan's procedure, RLE DVT 1 year ago on warfarin, incorrect PE diagnosis 2014 s/p Eliquis for 1 week before stopped, chronic intermittent hemoptysis for five years presenting for recurrent episode of hemoptysis, referred to ED by ENT. Yesterday he underwent upper airway evaluation by outpatient ENT with no visualized upper airway or tracheal bleeding per handwritten note from Dr. Damon ([phone]) brought by the patient's mother. She referred him to ED for upper endoscopy because she saw blood pooling around esophogeal inlet and recommended urgent endoscopy. Regarding his hemoptysis, the patient reports that he has intermittent coughing up blood about every 6 months, lasting for several days before resolving. He notices that the hemoptysis is sometimes worse when he lays flat. He continues to take Coumadin 6mg daily with most recent INR in therapeutic range per his report. He denies fevers, chills, recurrent leg pain or swelling, shortness of breath, chest pain, palpitation, abd pain, pain or difficulty swallowing. He states that he sometimes feels like he has a "vibration" in his throat when he has these hemoptysis episodes. His only medications are Coumadin and digoxin. He is a non-smoker and denies alcohol use.     He notes that his baseline SpO2 is high 80's low 90's as a result of his Fontan's procedure.    Of note, he was in [facility] ICU 4/2018 admitted with extensive RLE DVT from R femoral vein up to infrarenal IVC. He was started on heparin gtt. No surgical intervention. He was transitioned to coumadin because DOAC's never studied in Fontan's procedure per cardiology c/s at that time.     PAST MEDICAL & SURGICAL HISTORY:  Spleen absent  TAPVR (total anomalous pulmonary venous return)  Heterotaxy syndrome with asplenia  Single ventricle  H/O heart surgery    FAMILY HISTORY:  No pertinent family history in first degree relatives      SOCIAL HISTORY:  Smoking: [x ] Never Smoked [ ] Former Smoker (__ packs x ___ years) [ ] Current Smoker  (__ packs x ___ years)  Substance Use: [x ] Never Used [ ] Used ____  EtOH Use: félix  Marital Status: [x ] Single [ ]  [ ]  [ ]   Sexual History:     Allergies  No Known Allergies    Intolerances: n/a    HOME MEDICATIONS:  Home Medications:  acetaminophen 325 mg oral tablet: 3 tab(s) orally every 6 hours for pain (06 Apr 2018 10:48)  digoxin 250 mcg (0.25 mg) oral tablet: 1 tab(s) orally once a day (28 Dec 2014 15:18)  warfarin 5 mg oral tablet: 1 tab(s) orally once a day (06 Apr 2018 10:26)    REVIEW OF SYSTEMS:  Constitutional: [x ] negative [ ] fevers [ ] chills [ ] weight loss [ ] weight gain  HEENT: [x ] negative [ ] dry eyes [ ] eye irritation [ ] postnasal drip [ ] nasal congestion  CV: [x ] negative  [ ] chest pain [ ] orthopnea [ ] palpitations [ ] murmur  Resp: [x ] negative [ ] cough [ ] shortness of breath [ ] dyspnea [ ] wheezing [ ] sputum [x ] hemoptysis  GI: [ ] negative [ ] nausea [ ] vomiting [ ] diarrhea [ ] constipation [ ] abd pain [ ] dysphagia   : [ x] negative [ ] dysuria [ ] nocturia [ ] hematuria [ ] increased urinary frequency  Musculoskeletal: [x ] negative [ ] back pain [ ] myalgias [ ] arthralgias [ ] fracture  Skin: [x ] negative [ ] rash [ ] itch  Neurological: [x ] negative [ ] headache [ ] dizziness [ ] syncope [ ] weakness [ ] numbness  Psychiatric: [x ] negative [ ] anxiety [ ] depression  Endocrine: [x ] negative [ ] diabetes [ ] thyroid problem  Hematologic/Lymphatic: [x ] negative [ ] anemia [ ] bleeding problem  Allergic/Immunologic: [x ] negative [ ] itchy eyes [ ] nasal discharge [ ] hives [ ] angioedema  [x ] All other systems negative  [ ] Unable to assess ROS because ________    OBJECTIVE:  ICU Vital Signs Last 24 Hrs  T(C): 36.6 (30 May 2019 09:42), Max: 36.6 (30 May 2019 09:42)  T(F): 97.8 (30 May 2019 09:42), Max: 97.8 (30 May 2019 09:42)  HR: 91 (30 May 2019 09:42) (91 - 91)  BP: 119/78 (30 May 2019 09:42) (119/78 - 119/78)  BP(mean): --  ABP: --  ABP(mean): --  RR: 20 (30 May 2019 09:42) (20 - 20)  SpO2: 90% (30 May 2019 09:42) (90% - 90%)    CAPILLARY BLOOD GLUCOSE: n/a    PHYSICAL EXAM:    Vital Signs Last 24 Hrs  T(C): 36.6 (30 May 2019 09:42), Max: 36.6 (30 May 2019 09:42)  T(F): 97.8 (30 May 2019 09:42), Max: 97.8 (30 May 2019 09:42)  HR: 91 (30 May 2019 09:42) (91 - 91)  BP: 119/78 (30 May 2019 09:42) (119/78 - 119/78)  BP(mean): --  RR: 20 (30 May 2019 09:42) (20 - 20)  SpO2: 90% (30 May 2019 09:42) (90% - 90%)    PHYSICAL EXAM:  GENERAL: NAD, sitting up in bed, breathing comfortably, no dried blood around oropharynx.   HEAD:  Atraumatic, Normocephalic  EYES: EOMI, PERRLA, conjunctiva and sclera clear  ENMT: No tonsillar erythema, exudates, or enlargement; Moist mucous membranes, Good dentition, no dried blood  NECK: Supple, No JVD  NERVOUS SYSTEM: AOX3, motor and sensation grossly intact in b/l UE and b/l LE  PSYCHIATRIC: Appropriate affect and mood  CHEST/LUNG: Clear to auscultation bilaterally; No rales, rhonchi, wheezing, or rubs  BACK: scoliosis   HEART: RRR; loud holosystolic murmur left lower sternal border.   ABDOMEN: Soft, Nontender, Nondistended; Bowel sounds present  EXTREMITIES:  2+ Peripheral Pulses, No clubbing, cyanosis  SKIN: No rashes or lesions; well-healed anterior chest surgical incision scars.     LINES: [facility] MEDICATIONS:  Standing Meds:  ondansetron Injectable 4 milliGRAM(s) IV Push once    PRN Meds: n/a    LABS:                        16.0   7.1   )-----------( x        ( 30 May 2019 10:48 )             53.6     30 May 2019 10:48    143    |  105    |  14     ----------------------------<  88     4.2     |  22     |  0.90     Ca    9.9        30 May 2019 10:48    TPro  8.2    /  Alb  5.0    /  TBili  2.9    /  DBili  x      /  AST  33     /  ALT  25     /  AlkPhos  145    30 May 2019 10:48    PTT - ( 30 May 2019 10:48 )  PTT:36.3 sec    BLOOD CULTURE: n/a    RADIOLOGY & ADDITIONAL TESTS:    Imaging Personally Reviewed:  [x] YES   no new    MICROBIOLOGY: n/a    RADIOLOGY:  [x ] Reviewed and interpreted by me    EKG: pending

## 2019-05-30 NOTE — H&P ADULT - ATTENDING COMMENTS
24 year old man with congenital heart disease/heterotaxy s/p multiple cardiac surgeries including Fontan's procedure, RLE DVT 1 year ago on warfarin, intermittent hemoptysis for the last five years presents with another episode of hemoptysis, referred to ED by ENT.     - patient on coumadin but INR sub therapeutic  - reported 1/4 cup of hemoptysis twice today  - CTA   - doubt this is hematemesis, appreciate GI input  - follow up with pediatric cardiology  - continue to quantify hemoptysis  - cough suppression  - may possibly need IR embolization    critical care time 35 minutes  case discussed in detail with mother at the bedside

## 2019-05-30 NOTE — CONSULT NOTE ADULT - ASSESSMENT
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 MICU consulted for hemoptysis.    #Neuro: no active issues.     #CV: Heterotaxy s/p multiple cardiac surgeries including Fontan's: hemodynamically stable with loud known systolic murmur. No symptoms suggestive of heart failure.   -pediatric cardiology c/s     Hx RLE DVT: taking Coumadin 6mg daily with therapeutic INR this month per his report. No new or recurrent swelling. He has not yet had repeat doppler since this was diagnosed in 4/2018 at [facility].  -hold Coumadin  -LE dopplers    #Pulm: Hemoptysis: 5 year hx of hemoptysis with evaluation by ENT yesterday showing no tracheal or upper airway bleeding. He was referred to ED for upper endoscopy. 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 Gastritis and duodenal erosions were also seen possibly 2/2 ASA gastropathy.   -f/u GI regarding upper endoscopy.    #GI: Cervical esophageal varices: possible varices seen on prior upper endoscopy. GI evaluation.  -f/u GI recommendations, ? endoscopy.    #Renal:    #ID:    #Endocrine:     #Heme:    #FEN:    #DVT ppx: SCD's    #Lines: PIV's    #GOC: full code 4yoM with PMH congenital heart disease/heterotaxy s/p multiple cardiac surgeries including Fontan's procedure, RLE DVT 1 year ago on warfarin, incorrect PE diagnosis 2014 s/p Eliquis for 1 week before stopped, chronic intermittent hemoptysis for five years presenting for recurrent episode of hemoptysis, referred to ED by ENT. MICU consulted for hemoptysis.    #Neuro: no active issues.     #CV: Heterotaxy s/p multiple cardiac surgeries including Fontan's: hemodynamically stable with loud known systolic murmur. No symptoms suggestive of heart failure.   -pediatric cardiology c/s     Hx RLE DVT: taking Coumadin 6mg daily with therapeutic INR this month per his report. No new or recurrent swelling. He has not yet had repeat doppler since this was diagnosed in 4/2018 at [facility].  -hold Coumadin  -LE dopplers    #Pulm: Hemoptysis: 5 year hx of hemoptysis with evaluation by ENT yesterday showing no tracheal or upper airway bleeding. He was referred to ED for upper endoscopy. 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 Gastritis and duodenal erosions were also seen possibly 2/2 ASA gastropathy.   -f/u GI regarding upper endoscopy.  -pulmonary consult for possible bronchoscopy if EGD not showing worsening varices.     #GI: Cervical esophageal varices: possible varices seen on prior upper endoscopy 3/2018. GI evaluation pending. No active bleeding at this time, though reportedly had small volume hemoptysis prior   -f/u GI recommendations, ? endoscopy.    #Renal: no active issues    #ID: no active issues    #Endocrine: no active issues    #Heme: no active issues    #FEN: would keep NPO pending decision on EGD.    #DVT ppx: SCD's    #Lines: PIV's    #GOC: full code    Dispo: pending    Marcus Pizarro, PGY-3  MICU resident, call [phone] or page [phone] 4yoM with PMH congenital heart disease/heterotaxy s/p multiple cardiac surgeries including Fontan's procedure, RLE DVT 1 year ago on warfarin, incorrect PE diagnosis 2014 s/p Eliquis for 1 week before stopped, chronic intermittent hemoptysis for five years presenting for recurrent episode of hemoptysis, referred to ED by ENT. MICU consulted for hemoptysis.    #Neuro: no active issues.     #CV: Heterotaxy s/p multiple cardiac surgeries including Fontan's: hemodynamically stable with loud known systolic murmur. No symptoms suggestive of heart failure.   -pediatric cardiology c/s     Hx RLE DVT: taking Coumadin 6mg daily with therapeutic INR this month per his report. No new or recurrent swelling. He has not yet had repeat doppler since this was diagnosed in 4/2018 at [facility].  -hold Coumadin  -LE dopplers    #Pulm: Hemoptysis: 5 year hx of hemoptysis with evaluation by ENT yesterday showing no tracheal or upper airway bleeding. He was referred to ED for upper endoscopy. 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 Gastritis and duodenal erosions were also seen possibly 2/2 ASA gastropathy. SpO2 at baseline high 80's low 90's.   -f/u GI regarding upper endoscopy.  -pulmonary consult for possible bronchoscopy if EGD not showing worsening varices.     #GI: Cervical esophageal varices: possible varices seen on prior upper endoscopy 3/2018. GI evaluation pending. No active bleeding at this time, though reportedly had small volume hemoptysis prior   -f/u GI recommendations, ? endoscopy.    #Renal: no active issues    #ID: no active issues    #Endocrine: no active issues    #Heme: no active issues    #FEN: would keep NPO pending decision on EGD.    #DVT ppx: SCD's    #Lines: PIV's    #GOC: full code    Dispo: pending    Marcus Pizarro, PGY-3  MICU resident, call [phone] or page [phone] or [phone]

## 2019-05-30 NOTE — ED PROVIDER NOTE - CRITICAL CARE PROVIDED
additional history taking/consult w/ pt's family directly relating to pts condition/direct patient care (not related to procedure)/documentation/consultation with other physicians/interpretation of diagnostic studies

## 2019-05-30 NOTE — H&P ADULT - ASSESSMENT
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 MICU consulted for hemoptysis.    #Neuro: no active issues.     #CV: Heterotaxy s/p multiple cardiac surgeries including Fontan's: hemodynamically stable with loud known systolic murmur. No symptoms suggestive of heart failure.   -pediatric cardiology c/s.    Hx RLE DVT: taking Coumadin 6mg daily with therapeutic INR this month per his report. No new or recurrent swelling. He has not yet had repeat doppler since this was diagnosed in 4/2018 at [facility].  -hold Coumadin  -LE dopplers    #Pulm: Hemoptysis: concern for bleeding from aortopulmonary collaterals vs. GI source. Pt has 5 year hx of hemoptysis with evaluation by ENT yesterday showing no tracheal or upper airway bleeding. He was referred to ED for upper endoscopy. 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 Gastritis and duodenal erosions were also seen possibly 2/2 ASA gastropathy. SpO2 at baseline high 80's low 90's.   -CT angio chest, CT angio neck in attempt to localize bleed.  -will consider IR embolization vs bronchoscopy for treatment of any identified pulmonary bleed.  -will f/u with GI regarding any need for repeat upper endoscopy.    #GI: Cervical esophageal varices: possible varices seen on prior upper endoscopy 3/2018. GI evaluation pending. No active bleeding at this time, though reportedly had small volume hemoptysis prior   -f/u GI recommendations, ? endoscopy.    #Renal: no active issues    #ID: no active issues    #Endocrine: no active issues    #Heme: no active issues    #FEN: would keep NPO pending CTA read.     #DVT ppx: SCD's    #Lines: PIV's    #GOC: full code    Marcus Pizarro, PGY-3  MICU resident, call [phone] or page [phone] or [phone] 4yoM with PMH congenital heart disease/heterotaxy s/p multiple cardiac surgeries including Fontan's procedure, RLE DVT 1 year ago on warfarin, incorrect PE diagnosis 2014 s/p Eliquis for 1 week before stopped, chronic intermittent hemoptysis for five years presenting for recurrent episode of hemoptysis, referred to ED by ENT. MICU consulted for hemoptysis.    #Neuro: no active issues.     #CV: Heterotaxy s/p multiple cardiac surgeries including Fontan's: hemodynamically stable with loud known systolic murmur. No symptoms suggestive of heart failure.   -pediatric cardiology c/s.    Hx RLE DVT: taking Coumadin 6mg daily with therapeutic INR this month per his report. No new or recurrent swelling. He has not yet had repeat doppler since this was diagnosed in 4/2018 at [facility].  -hold Coumadin  -LE dopplers    #Pulm: Hemoptysis: concern for bleeding from aortopulmonary collaterals vs. GI source. Pt has 5 year hx of hemoptysis with evaluation by ENT yesterday showing no tracheal or upper airway bleeding. He was referred to ED for upper endoscopy. 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 Gastritis and duodenal erosions were also seen possibly 2/2 ASA gastropathy. SpO2 at baseline high 80's low 90's. Of note, CTA 3/2018 showed groundglass opacities consistent with his diagnosis of pulmonary hemorrhage at that time. During 4/2018 admission he also underwent bronchoscopy showing small area of mucosal denudation in inferior turbinate; laryngoscopy by ENT at that time showed no source of bleeding.   -CT angio chest, CT angio neck in attempt to localize bleed.  -will consider IR embolization vs bronchoscopy for treatment of any identified pulmonary bleed.  -will f/u with GI regarding any need for repeat upper endoscopy.    #GI: Cervical esophageal varices: possible varices seen on prior upper endoscopy 3/2018. GI evaluation pending. No active bleeding at this time, though reportedly had small volume hemoptysis prior   -f/u GI recommendations, ? endoscopy.    #Renal: no active issues    #ID: no active issues    #Endocrine: no active issues    #Heme: no active issues    #FEN: would keep NPO pending CTA read.     #DVT ppx: SCD's    #Lines: PIV's    #GOC: full code    Marcus Pizarro, PGY-3  MICU resident, call [phone] or page [phone] or [phone] 4yoM with PMH congenital heart disease/heterotaxy s/p multiple cardiac surgeries including Fontan's procedure, RLE DVT 1 year ago on warfarin, incorrect PE diagnosis 2014 s/p Eliquis for 1 week before stopped, chronic intermittent hemoptysis for five years presenting for recurrent episode of hemoptysis, referred to ED by ENT. MICU consulted for hemoptysis.    #Neuro: no active issues.     #CV: Heterotaxy s/p multiple cardiac surgeries including Fontan's: hemodynamically stable with loud known systolic murmur. No symptoms suggestive of heart failure.   -pediatric cardiology c/s.    Hx RLE DVT: in the setting of patient being bedbound from an illness in 2018, likely provoked. He has been taking Coumadin 6mg daily with therapeutic INR this month per his report. No new or recurrent swelling. He has not yet had repeat doppler since this was diagnosed in 4/2018 at [facility].  -hold Coumadin  -LE dopplers    #Pulm: Hemoptysis: concern for bleeding from aortopulmonary collaterals vs. GI source. Pt has 5 year hx of hemoptysis with evaluation by ENT yesterday showing no tracheal or upper airway bleeding. He was referred to ED for upper endoscopy. 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 Gastritis and duodenal erosions were also seen possibly 2/2 ASA gastropathy. SpO2 at baseline high 80's low 90's. Of note, CTA 3/2018 showed groundglass opacities consistent with his diagnosis of pulmonary hemorrhage at that time. During 4/2018 admission he also underwent bronchoscopy showing small area of mucosal denudation in inferior turbinate; laryngoscopy by ENT at that time showed no source of bleeding.   -CT angio chest, CT angio neck in attempt to localize bleed.  -will consider IR embolization vs bronchoscopy for treatment of any identified pulmonary bleed.  -will f/u with GI regarding any need for repeat upper endoscopy.    #GI: Cervical esophageal varices: possible varices seen on prior upper endoscopy 3/2018. GI evaluation pending. No active bleeding at this time, though reportedly had small volume hemoptysis prior   -f/u GI recommendations, ? endoscopy.    #Renal: no active issues    #ID: no active issues    #Endocrine: no active issues    #Heme: no active issues    #FEN: would keep NPO pending CTA read.     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 MICU consulted for hemoptysis.    #Neuro: no active issues.     #CV: Heterotaxy s/p multiple cardiac surgeries including Fontan's: hemodynamically stable with loud known systolic murmur. No symptoms suggestive of heart failure.   -pediatric cardiology c/s.    Hx RLE DVT: in the setting of patient being bedbound from an illness in 2018, likely provoked. He has been taking Coumadin 6mg daily with therapeutic INR this month per his report. No new or recurrent swelling. He has not yet had repeat doppler since this was diagnosed in 4/2018 at [facility].  -hold Coumadin  -LE dopplers    #Pulm: Hemoptysis: concern for bleeding from aortopulmonary collaterals vs. GI source. Pt has 5 year hx of hemoptysis with evaluation by ENT yesterday showing no tracheal or upper airway bleeding. He was referred to ED for upper endoscopy. 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 Gastritis and duodenal erosions were also seen possibly 2/2 ASA gastropathy. SpO2 at baseline high 80's low 90's. Of note, CTA 3/2018 showed groundglass opacities consistent with his diagnosis of pulmonary hemorrhage at that time. During 4/2018 admission he also underwent bronchoscopy showing small area of mucosal denudation in inferior turbinate; laryngoscopy by ENT at that time showed no source of bleeding.   -CT angio chest, CT angio neck in attempt to localize bleed.  -will consider IR embolization vs bronchoscopy for treatment of any identified pulmonary bleed.  -will f/u with GI regarding any need for repeat upper endoscopy.    #GI: Cervical esophageal varices: possible varices seen on prior upper endoscopy 3/2018. GI evaluation pending. No active bleeding at this time, though reportedly had small volume hemoptysis prior   -f/u GI recommendations, ? endoscopy.    #Renal: no active issues    #ID: no active issues    #Endocrine: no active issues    #Heme: no active issues    #FEN: would keep NPO pending CTA read.     #DVT ppx: SCD's    #Lines: PIV's    #GOC: full code    Marcus Pizarro, PGY-3  MICU resident, call [phone] or page [phone] or [phone]

## 2019-05-30 NOTE — CONSULT NOTE ADULT - SUBJECTIVE AND OBJECTIVE BOX
Chief Complaint:  Patient is a 24y old  Male who presents with a chief complaint of hemoptysis    HPI:  This is a 24 year old man with a history of heterotaxy syndrome s/p Fontan procedure, DVT on coumadin who presents with 3 days of hemoptysis. He feels an irritation in his throat and then coughs up blood. Enough to fill half a cup at times. He denies emesis, or abdominal pain. He endorses dark tarry stools. He denies NSAID use or dysphagia. He had a similar presentation in 3/18 where EGD was grossly unremarkable except for some prominent veins in the cervical esophagus and some gastroduodenitis. The patient had fiberoptic exam at ENT yesterday with blood noted to pool near the pyriform sinus and he was referred for endoscopy.    Allergies:  No Known Allergies      Home Medications:    Hospital Medications:  ondansetron Injectable 4 milliGRAM(s) IV Push once      PMHX/PSHX:  Spleen absent  TAPVR (total anomalous pulmonary venous return)  Heterotaxy syndrome with asplenia  Single ventricle  H/O heart surgery      Family history:  No pertinent family history in first degree relatives      Social History:     ROS:     General:  No wt loss, fevers, chills, night sweats, fatigue,   Eyes:  Good vision, no reported pain  ENT:  No sore throat, pain, runny nose, dysphagia  CV:  No pain, palpitations, hypo/hypertension  Resp:  No dyspnea, cough, tachypnea, wheezing  GI:  See HPI  :  No pain, bleeding, incontinence, nocturia  Muscle:  No pain, weakness  Neuro:  No weakness, tingling, memory problems  Psych:  No fatigue, insomnia, mood problems, depression  Endocrine:  No polyuria, polydipsia, cold/heat intolerance  Heme:  No petechiae, ecchymosis, easy bruisability  Skin:  No rash, edema      PHYSICAL EXAM:     GENERAL:  Appears stated age, well-groomed, well-nourished, no distress  HEENT:  NC/AT,  conjunctivae clear and pink,  no JVD  CHEST:  Full & symmetric excursion, no increased effort, breath sounds clear    ABDOMEN:  Soft, non-tender, non-distended, normoactive bowel sounds,  no masses , no hepatosplenomegaly  EXTREMITIES:  no cyanosis,clubbing or edema  SKIN:  No rash/erythema/ecchymoses/petechiae/wounds/abscess/warm/dry  NEURO:  Alert, oriented  RECTUM: minimal brown stool  Vital Signs:  Vital Signs Last 24 Hrs  T(C): 36.6 (30 May 2019 09:42), Max: 36.6 (30 May 2019 09:42)  T(F): 97.8 (30 May 2019 09:42), Max: 97.8 (30 May 2019 09:42)  HR: 91 (30 May 2019 09:42) (91 - 91)  BP: 119/78 (30 May 2019 09:42) (119/78 - 119/78)  BP(mean): --  RR: 20 (30 May 2019 09:42) (20 - 20)  SpO2: 90% (30 May 2019 09:42) (90% - 90%)  Daily Height in cm: 175.26 (30 May 2019 09:42)    Daily     LABS:                        16.0   7.1   )-----------( x        ( 30 May 2019 10:48 )             53.6                     Imaging:

## 2019-05-30 NOTE — ED ADULT NURSE NOTE - OBJECTIVE STATEMENT
25 yo M presents to ED A+Ox3, ambulatory with steady gait from triage accompanied by family c/o "coughing up blood." As per family, patient takes digoxin and coumadin, had been on aspirin until he was diagnosed with a lower extremity DVT last year and had the aspirin discontinued and was placed on coumadin. Pt. reports multiple episodes of "coughing up  blood." Pt. states he "coughs up a quarter of a cup" intermittently over the past 5 months. Pt. denies chest pain, SOB, dizziness. Pt. appears pale, as  per mother "that's his normal color." Pt. states his normal oxygen saturation is in the high 80s-low 90s. Lungs clear to ascultation in all fields, pt. speaking and answering questions in full sentences. Pt. states he saw a ENT yesterday who saw "blood pooling." Pt. on cardiac monitor. Family at  bedside. Comfort and safety measures in place. Bed in lowest position. Pt. sitting up in position of comfort.

## 2019-05-30 NOTE — CONSULT NOTE ADULT - ATTENDING COMMENTS
Patient seen and examined. Mom at bedside. Agree with GI Fellow's assessment and plan. Will follow with you

## 2019-05-30 NOTE — H&P ADULT - HISTORY OF PRESENT ILLNESS
24yoM with PMH congenital heart disease/heterotaxy s/p multiple cardiac surgeries including Fontan's procedure, RLE DVT 1 year ago on warfarin, incorrect PE diagnosis 2014 s/p Eliquis for 1 week before stopped, chronic intermittent hemoptysis for five years presenting for recurrent episode of hemoptysis, referred to ED by ENT. Yesterday he underwent upper airway evaluation by outpatient ENT with no visualized upper airway or tracheal bleeding per handwritten note from Dr. Damon ([phone]) brought by the patient's mother. She referred him to ED for upper endoscopy because she saw blood pooling around esophogeal inlet and recommended urgent endoscopy. Regarding his hemoptysis, the patient reports that he has intermittent coughing up blood about every 6 months, lasting for several days before resolving. He notices that the hemoptysis is sometimes worse when he lays flat. He continues to take Coumadin 6mg daily with most recent INR in therapeutic range per his report. He denies fevers, chills, recurrent leg pain or swelling, shortness of breath, chest pain, palpitation, abd pain, pain or difficulty swallowing. He states that he sometimes feels like he has a "vibration" in his throat when he has these hemoptysis episodes. His only medications are Coumadin and digoxin. He is a non-smoker and denies alcohol use.     He notes that his baseline SpO2 is high 80's low 90's as a result of his Fontan's procedure.    Of note, he was in [facility] ICU 4/2018 admitted with extensive RLE DVT from R femoral vein up to infrarenal IVC. He was started on heparin gtt. No surgical intervention. He was transitioned to coumadin because DOAC's never studied in Fontan's procedure per cardiology c/s at that time.

## 2019-05-30 NOTE — ED PROVIDER NOTE - CLINICAL SUMMARY MEDICAL DECISION MAKING FREE TEXT BOX
25yo M pmhx single ventricle on coumadin, digoxin p/w CC hemoptysis - needs urgent GI consult, labs, coags and admission.

## 2019-05-30 NOTE — H&P ADULT - NSHPPHYSICALEXAM_GEN_ALL_CORE
Vital Signs Last 24 Hrs  T(C): 36.7 (30 May 2019 14:31), Max: 36.7 (30 May 2019 14:31)  T(F): 98.1 (30 May 2019 14:31), Max: 98.1 (30 May 2019 14:31)  HR: 91 (30 May 2019 14:31) (85 - 96)  BP: 123/72 (30 May 2019 14:31) (116/81 - 130/93)  BP(mean): --  RR: 20 (30 May 2019 14:31) (19 - 20)  SpO2: 90% (30 May 2019 14:31) (87% - 90%)    PHYSICAL EXAM:  GENERAL: NAD, sitting up in bed, breathing comfortably, no dried blood around oropharynx.   HEAD:  Atraumatic, Normocephalic  EYES: EOMI, PERRLA, conjunctiva and sclera clear  ENMT: No tonsillar erythema, exudates, or enlargement; Moist mucous membranes, Good dentition, no dried blood  NECK: Supple, No JVD  NERVOUS SYSTEM: AOX3, motor and sensation grossly intact in b/l UE and b/l LE  PSYCHIATRIC: Appropriate affect and mood  CHEST/LUNG: Clear to auscultation bilaterally; No rales, rhonchi, wheezing, or rubs  BACK: scoliosis   HEART: RRR; loud holosystolic murmur left lower sternal border.   ABDOMEN: Soft, Nontender, Nondistended; Bowel sounds present  EXTREMITIES:  2+ Peripheral Pulses, No clubbing, cyanosis  SKIN: No rashes or lesions; well-healed anterior chest surgical incision scars.

## 2019-05-30 NOTE — H&P ADULT - NSHPREVIEWOFSYSTEMS_GEN_ALL_CORE
Constitutional: [x ] negative [ ] fevers [ ] chills [ ] weight loss [ ] weight gain  HEENT: [x ] negative [ ] dry eyes [ ] eye irritation [ ] postnasal drip [ ] nasal congestion  CV: [x ] negative  [ ] chest pain [ ] orthopnea [ ] palpitations [ ] murmur  Resp: [x ] negative [ ] cough [ ] shortness of breath [ ] dyspnea [ ] wheezing [ ] sputum [x ] hemoptysis  GI: [ ] negative [ ] nausea [ ] vomiting [ ] diarrhea [ ] constipation [ ] abd pain [ ] dysphagia   : [ x] negative [ ] dysuria [ ] nocturia [ ] hematuria [ ] increased urinary frequency  Musculoskeletal: [x ] negative [ ] back pain [ ] myalgias [ ] arthralgias [ ] fracture  Skin: [x ] negative [ ] rash [ ] itch  Neurological: [x ] negative [ ] headache [ ] dizziness [ ] syncope [ ] weakness [ ] numbness  Psychiatric: [x ] negative [ ] anxiety [ ] depression  Endocrine: [x ] negative [ ] diabetes [ ] thyroid problem  Hematologic/Lymphatic: [x ] negative [ ] anemia [ ] bleeding problem  Allergic/Immunologic: [x ] negative [ ] itchy eyes [ ] nasal discharge [ ] hives [ ] angioedema  [x ] All other systems negative  [ ] Unable to assess ROS because ________

## 2019-05-31 ENCOUNTER — TRANSCRIPTION ENCOUNTER (OUTPATIENT)
Age: 24
End: 2019-05-31

## 2019-05-31 ENCOUNTER — INPATIENT (INPATIENT)
Facility: HOSPITAL | Age: 24
LOS: 21 days | Discharge: HOME HEALTH | End: 2019-06-22
Attending: INTERNAL MEDICINE | Admitting: INTERNAL MEDICINE
Payer: COMMERCIAL

## 2019-05-31 VITALS — TEMPERATURE: 97 F | OXYGEN SATURATION: 88 % | DIASTOLIC BLOOD PRESSURE: 109 MMHG | SYSTOLIC BLOOD PRESSURE: 135 MMHG

## 2019-05-31 VITALS — OXYGEN SATURATION: 92 %

## 2019-05-31 DIAGNOSIS — Q89.01 ASPLENIA (CONGENITAL): ICD-10-CM

## 2019-05-31 DIAGNOSIS — Q20.4 DOUBLE INLET VENTRICLE: ICD-10-CM

## 2019-05-31 DIAGNOSIS — Z98.89 OTHER SPECIFIED POSTPROCEDURAL STATES: Chronic | ICD-10-CM

## 2019-05-31 DIAGNOSIS — Q89.3 SITUS INVERSUS: ICD-10-CM

## 2019-05-31 DIAGNOSIS — R04.2 HEMOPTYSIS: ICD-10-CM

## 2019-05-31 DIAGNOSIS — Q26.2 TOTAL ANOMALOUS PULMONARY VENOUS CONNECTION: ICD-10-CM

## 2019-05-31 LAB
ALBUMIN SERPL ELPH-MCNC: 4 G/DL — SIGNIFICANT CHANGE UP (ref 3.3–5)
ALBUMIN SERPL ELPH-MCNC: 4.1 G/DL — SIGNIFICANT CHANGE UP (ref 3.3–5)
ALP SERPL-CCNC: 111 U/L — SIGNIFICANT CHANGE UP (ref 40–120)
ALP SERPL-CCNC: 113 U/L — SIGNIFICANT CHANGE UP (ref 40–120)
ALT FLD-CCNC: 17 U/L — SIGNIFICANT CHANGE UP (ref 4–41)
ALT FLD-CCNC: 21 U/L — SIGNIFICANT CHANGE UP (ref 10–45)
ANION GAP SERPL CALC-SCNC: 10 MMOL/L — SIGNIFICANT CHANGE UP (ref 5–17)
ANION GAP SERPL CALC-SCNC: 16 MMO/L — HIGH (ref 7–14)
APTT BLD: 34.4 SEC — SIGNIFICANT CHANGE UP (ref 27.5–36.3)
APTT BLD: 34.9 SEC — SIGNIFICANT CHANGE UP (ref 27.5–36.3)
AST SERPL-CCNC: 27 U/L — SIGNIFICANT CHANGE UP (ref 4–40)
AST SERPL-CCNC: 47 U/L — HIGH (ref 10–40)
BASE EXCESS BLDV CALC-SCNC: -0.5 MMOL/L — SIGNIFICANT CHANGE UP
BASOPHILS # BLD AUTO: 0.14 K/UL — SIGNIFICANT CHANGE UP (ref 0–0.2)
BASOPHILS # BLD AUTO: 0.2 K/UL — SIGNIFICANT CHANGE UP (ref 0–0.2)
BASOPHILS NFR BLD AUTO: 2.7 % — HIGH (ref 0–2)
BASOPHILS NFR BLD AUTO: 3 % — HIGH (ref 0–2)
BILIRUB SERPL-MCNC: 2.4 MG/DL — HIGH (ref 0.2–1.2)
BILIRUB SERPL-MCNC: 2.8 MG/DL — HIGH (ref 0.2–1.2)
BLD GP AB SCN SERPL QL: NEGATIVE — SIGNIFICANT CHANGE UP
BUN SERPL-MCNC: 14 MG/DL — SIGNIFICANT CHANGE UP (ref 7–23)
BUN SERPL-MCNC: 18 MG/DL — SIGNIFICANT CHANGE UP (ref 7–23)
CALCIUM SERPL-MCNC: 8.8 MG/DL — SIGNIFICANT CHANGE UP (ref 8.4–10.5)
CALCIUM SERPL-MCNC: 9.5 MG/DL — SIGNIFICANT CHANGE UP (ref 8.4–10.5)
CHLORIDE SERPL-SCNC: 101 MMOL/L — SIGNIFICANT CHANGE UP (ref 96–108)
CHLORIDE SERPL-SCNC: 103 MMOL/L — SIGNIFICANT CHANGE UP (ref 98–107)
CO2 SERPL-SCNC: 21 MMOL/L — LOW (ref 22–31)
CO2 SERPL-SCNC: 21 MMOL/L — LOW (ref 22–31)
CREAT SERPL-MCNC: 0.82 MG/DL — SIGNIFICANT CHANGE UP (ref 0.5–1.3)
CREAT SERPL-MCNC: 0.86 MG/DL — SIGNIFICANT CHANGE UP (ref 0.5–1.3)
EOSINOPHIL # BLD AUTO: 0.34 K/UL — SIGNIFICANT CHANGE UP (ref 0–0.5)
EOSINOPHIL # BLD AUTO: 0.37 K/UL — SIGNIFICANT CHANGE UP (ref 0–0.5)
EOSINOPHIL NFR BLD AUTO: 5.6 % — SIGNIFICANT CHANGE UP (ref 0–6)
EOSINOPHIL NFR BLD AUTO: 6.6 % — HIGH (ref 0–6)
GAS PNL BLDV: 137 MMOL/L — SIGNIFICANT CHANGE UP (ref 136–146)
GLUCOSE BLDV-MCNC: 90 MG/DL — SIGNIFICANT CHANGE UP (ref 70–99)
GLUCOSE SERPL-MCNC: 105 MG/DL — HIGH (ref 70–99)
GLUCOSE SERPL-MCNC: 90 MG/DL — SIGNIFICANT CHANGE UP (ref 70–99)
HCO3 BLDV-SCNC: 23 MMOL/L — SIGNIFICANT CHANGE UP (ref 20–27)
HCT VFR BLD CALC: 41.3 % — SIGNIFICANT CHANGE UP (ref 39–50)
HCT VFR BLD CALC: 41.8 % — SIGNIFICANT CHANGE UP (ref 39–50)
HCT VFR BLD CALC: 47 % — SIGNIFICANT CHANGE UP (ref 39–50)
HCT VFR BLDV CALC: 39.8 % — SIGNIFICANT CHANGE UP (ref 39–51)
HGB BLD-MCNC: 12.7 G/DL — LOW (ref 13–17)
HGB BLD-MCNC: 13.1 G/DL — SIGNIFICANT CHANGE UP (ref 13–17)
HGB BLD-MCNC: 14.2 G/DL — SIGNIFICANT CHANGE UP (ref 13–17)
HGB BLDV-MCNC: 12.9 G/DL — LOW (ref 13–17)
IMM GRANULOCYTES NFR BLD AUTO: 0.2 % — SIGNIFICANT CHANGE UP (ref 0–1.5)
IMM GRANULOCYTES NFR BLD AUTO: 0.3 % — SIGNIFICANT CHANGE UP (ref 0–1.5)
INR BLD: 1.12 RATIO — SIGNIFICANT CHANGE UP (ref 0.88–1.16)
INR BLD: 1.43 — HIGH (ref 0.88–1.17)
INR BLD: 1.73 RATIO — HIGH (ref 0.88–1.16)
LYMPHOCYTES # BLD AUTO: 1.39 K/UL — SIGNIFICANT CHANGE UP (ref 1–3.3)
LYMPHOCYTES # BLD AUTO: 1.7 K/UL — SIGNIFICANT CHANGE UP (ref 1–3.3)
LYMPHOCYTES # BLD AUTO: 25.6 % — SIGNIFICANT CHANGE UP (ref 13–44)
LYMPHOCYTES # BLD AUTO: 27.1 % — SIGNIFICANT CHANGE UP (ref 13–44)
MAGNESIUM SERPL-MCNC: 1.8 MG/DL — SIGNIFICANT CHANGE UP (ref 1.6–2.6)
MAGNESIUM SERPL-MCNC: 1.9 MG/DL — SIGNIFICANT CHANGE UP (ref 1.6–2.6)
MCHC RBC-ENTMCNC: 23.8 PG — LOW (ref 27–34)
MCHC RBC-ENTMCNC: 24 PG — LOW (ref 27–34)
MCHC RBC-ENTMCNC: 24.8 PG — LOW (ref 27–34)
MCHC RBC-ENTMCNC: 30.2 GM/DL — LOW (ref 32–36)
MCHC RBC-ENTMCNC: 30.8 % — LOW (ref 32–36)
MCHC RBC-ENTMCNC: 31.3 % — LOW (ref 32–36)
MCV RBC AUTO: 76.7 FL — LOW (ref 80–100)
MCV RBC AUTO: 77.3 FL — LOW (ref 80–100)
MCV RBC AUTO: 82.3 FL — SIGNIFICANT CHANGE UP (ref 80–100)
MONOCYTES # BLD AUTO: 0.54 K/UL — SIGNIFICANT CHANGE UP (ref 0–0.9)
MONOCYTES # BLD AUTO: 0.74 K/UL — SIGNIFICANT CHANGE UP (ref 0–0.9)
MONOCYTES NFR BLD AUTO: 10.5 % — SIGNIFICANT CHANGE UP (ref 2–14)
MONOCYTES NFR BLD AUTO: 11.1 % — SIGNIFICANT CHANGE UP (ref 2–14)
NEUTROPHILS # BLD AUTO: 2.7 K/UL — SIGNIFICANT CHANGE UP (ref 1.8–7.4)
NEUTROPHILS # BLD AUTO: 3.61 K/UL — SIGNIFICANT CHANGE UP (ref 1.8–7.4)
NEUTROPHILS NFR BLD AUTO: 52.9 % — SIGNIFICANT CHANGE UP (ref 43–77)
NEUTROPHILS NFR BLD AUTO: 54.4 % — SIGNIFICANT CHANGE UP (ref 43–77)
NRBC # FLD: 0.02 K/UL — SIGNIFICANT CHANGE UP (ref 0–0)
NRBC # FLD: 0.03 K/UL — SIGNIFICANT CHANGE UP (ref 0–0)
PCO2 BLDV: 47 MMHG — SIGNIFICANT CHANGE UP (ref 41–51)
PH BLDV: 7.34 PH — SIGNIFICANT CHANGE UP (ref 7.32–7.43)
PHOSPHATE SERPL-MCNC: 4.2 MG/DL — SIGNIFICANT CHANGE UP (ref 2.5–4.5)
PHOSPHATE SERPL-MCNC: 4.6 MG/DL — HIGH (ref 2.5–4.5)
PLATELET # BLD AUTO: 220 K/UL — SIGNIFICANT CHANGE UP (ref 150–400)
PLATELET # BLD AUTO: 226 K/UL — SIGNIFICANT CHANGE UP (ref 150–400)
PLATELET # BLD AUTO: 235 K/UL — SIGNIFICANT CHANGE UP (ref 150–400)
PMV BLD: SIGNIFICANT CHANGE UP FL (ref 7–13)
PMV BLD: SIGNIFICANT CHANGE UP FL (ref 7–13)
PO2 BLDV: 66 MMHG — HIGH (ref 35–40)
POTASSIUM BLDV-SCNC: 4.4 MMOL/L — SIGNIFICANT CHANGE UP (ref 3.4–4.5)
POTASSIUM SERPL-MCNC: 4.4 MMOL/L — SIGNIFICANT CHANGE UP (ref 3.5–5.3)
POTASSIUM SERPL-MCNC: 5.4 MMOL/L — HIGH (ref 3.5–5.3)
POTASSIUM SERPL-SCNC: 4.4 MMOL/L — SIGNIFICANT CHANGE UP (ref 3.5–5.3)
POTASSIUM SERPL-SCNC: 5.4 MMOL/L — HIGH (ref 3.5–5.3)
PROT SERPL-MCNC: 6.8 G/DL — SIGNIFICANT CHANGE UP (ref 6–8.3)
PROT SERPL-MCNC: 7 G/DL — SIGNIFICANT CHANGE UP (ref 6–8.3)
PROTHROM AB SERPL-ACNC: 12.9 SEC — SIGNIFICANT CHANGE UP (ref 10–12.9)
PROTHROM AB SERPL-ACNC: 16 SEC — HIGH (ref 9.8–13.1)
PROTHROM AB SERPL-ACNC: 20.2 SEC — HIGH (ref 10–12.9)
RBC # BLD: 5.34 M/UL — SIGNIFICANT CHANGE UP (ref 4.2–5.8)
RBC # BLD: 5.45 M/UL — SIGNIFICANT CHANGE UP (ref 4.2–5.8)
RBC # BLD: 5.71 M/UL — SIGNIFICANT CHANGE UP (ref 4.2–5.8)
RBC # FLD: 18.9 % — HIGH (ref 10.3–14.5)
RBC # FLD: 22.2 % — HIGH (ref 10.3–14.5)
RBC # FLD: 22.5 % — HIGH (ref 10.3–14.5)
RH IG SCN BLD-IMP: NEGATIVE — SIGNIFICANT CHANGE UP
SAO2 % BLDV: 89.7 % — HIGH (ref 60–85)
SODIUM SERPL-SCNC: 132 MMOL/L — LOW (ref 135–145)
SODIUM SERPL-SCNC: 140 MMOL/L — SIGNIFICANT CHANGE UP (ref 135–145)
WBC # BLD: 5.12 K/UL — SIGNIFICANT CHANGE UP (ref 3.8–10.5)
WBC # BLD: 6.64 K/UL — SIGNIFICANT CHANGE UP (ref 3.8–10.5)
WBC # BLD: 8.1 K/UL — SIGNIFICANT CHANGE UP (ref 3.8–10.5)
WBC # FLD AUTO: 5.12 K/UL — SIGNIFICANT CHANGE UP (ref 3.8–10.5)
WBC # FLD AUTO: 6.64 K/UL — SIGNIFICANT CHANGE UP (ref 3.8–10.5)
WBC # FLD AUTO: 8.1 K/UL — SIGNIFICANT CHANGE UP (ref 3.8–10.5)

## 2019-05-31 PROCEDURE — 31622 DX BRONCHOSCOPE/WASH: CPT | Mod: GC,59

## 2019-05-31 PROCEDURE — 85027 COMPLETE CBC AUTOMATED: CPT

## 2019-05-31 PROCEDURE — 99232 SBSQ HOSP IP/OBS MODERATE 35: CPT | Mod: GC

## 2019-05-31 PROCEDURE — 94640 AIRWAY INHALATION TREATMENT: CPT

## 2019-05-31 PROCEDURE — 83735 ASSAY OF MAGNESIUM: CPT

## 2019-05-31 PROCEDURE — 80053 COMPREHEN METABOLIC PANEL: CPT

## 2019-05-31 PROCEDURE — 99231 SBSQ HOSP IP/OBS SF/LOW 25: CPT

## 2019-05-31 PROCEDURE — 85730 THROMBOPLASTIN TIME PARTIAL: CPT

## 2019-05-31 PROCEDURE — 99291 CRITICAL CARE FIRST HOUR: CPT | Mod: 25

## 2019-05-31 PROCEDURE — 80162 ASSAY OF DIGOXIN TOTAL: CPT

## 2019-05-31 PROCEDURE — 86850 RBC ANTIBODY SCREEN: CPT

## 2019-05-31 PROCEDURE — 71045 X-RAY EXAM CHEST 1 VIEW: CPT | Mod: 26

## 2019-05-31 PROCEDURE — 71275 CT ANGIOGRAPHY CHEST: CPT

## 2019-05-31 PROCEDURE — 70498 CT ANGIOGRAPHY NECK: CPT

## 2019-05-31 PROCEDURE — 99223 1ST HOSP IP/OBS HIGH 75: CPT

## 2019-05-31 PROCEDURE — 71045 X-RAY EXAM CHEST 1 VIEW: CPT

## 2019-05-31 PROCEDURE — 99285 EMERGENCY DEPT VISIT HI MDM: CPT

## 2019-05-31 PROCEDURE — 93303 ECHO TRANSTHORACIC: CPT

## 2019-05-31 PROCEDURE — 84100 ASSAY OF PHOSPHORUS: CPT

## 2019-05-31 PROCEDURE — 85610 PROTHROMBIN TIME: CPT

## 2019-05-31 PROCEDURE — 31500 INSERT EMERGENCY AIRWAY: CPT | Mod: GC,59

## 2019-05-31 PROCEDURE — 86901 BLOOD TYPING SEROLOGIC RH(D): CPT

## 2019-05-31 PROCEDURE — 99291 CRITICAL CARE FIRST HOUR: CPT

## 2019-05-31 PROCEDURE — 86900 BLOOD TYPING SEROLOGIC ABO: CPT

## 2019-05-31 RX ORDER — CHLORHEXIDINE GLUCONATE 213 G/1000ML
1 SOLUTION TOPICAL DAILY
Refills: 0 | Status: DISCONTINUED | OUTPATIENT
Start: 2019-05-31 | End: 2019-05-31

## 2019-05-31 RX ORDER — PANTOPRAZOLE SODIUM 20 MG/1
40 TABLET, DELAYED RELEASE ORAL
Qty: 0 | Refills: 0 | DISCHARGE
Start: 2019-05-31

## 2019-05-31 RX ORDER — SODIUM CHLORIDE 9 MG/ML
1000 INJECTION INTRAMUSCULAR; INTRAVENOUS; SUBCUTANEOUS
Refills: 0 | Status: COMPLETED | OUTPATIENT
Start: 2019-05-31 | End: 2019-06-01

## 2019-05-31 RX ORDER — CHLORHEXIDINE GLUCONATE 213 G/1000ML
1 SOLUTION TOPICAL
Refills: 0 | Status: DISCONTINUED | OUTPATIENT
Start: 2019-05-31 | End: 2019-06-22

## 2019-05-31 RX ORDER — FLUTICASONE PROPIONATE 50 MCG
1 SPRAY, SUSPENSION NASAL
Refills: 0 | Status: DISCONTINUED | OUTPATIENT
Start: 2019-05-31 | End: 2019-06-01

## 2019-05-31 RX ORDER — FENTANYL CITRATE 50 UG/ML
100 INJECTION INTRAVENOUS ONCE
Refills: 0 | Status: DISCONTINUED | OUTPATIENT
Start: 2019-05-31 | End: 2019-05-31

## 2019-05-31 RX ORDER — PANTOPRAZOLE SODIUM 20 MG/1
40 TABLET, DELAYED RELEASE ORAL
Refills: 0 | Status: DISCONTINUED | OUTPATIENT
Start: 2019-05-31 | End: 2019-06-16

## 2019-05-31 RX ORDER — LIDOCAINE 4 G/100G
1 CREAM TOPICAL ONCE
Refills: 0 | Status: COMPLETED | OUTPATIENT
Start: 2019-05-31 | End: 2019-05-31

## 2019-05-31 RX ORDER — MIDAZOLAM HYDROCHLORIDE 1 MG/ML
5 INJECTION, SOLUTION INTRAMUSCULAR; INTRAVENOUS ONCE
Refills: 0 | Status: DISCONTINUED | OUTPATIENT
Start: 2019-05-31 | End: 2019-05-31

## 2019-05-31 RX ORDER — PROTHROMBIN COMPLEX CONCENTRATE (HUMAN) 25.5; 16.5; 24; 22; 22; 26 [IU]/ML; [IU]/ML; [IU]/ML; [IU]/ML; [IU]/ML; [IU]/ML
1500 POWDER, FOR SOLUTION INTRAVENOUS ONCE
Refills: 0 | Status: COMPLETED | OUTPATIENT
Start: 2019-05-31 | End: 2019-05-31

## 2019-05-31 RX ORDER — CHLORHEXIDINE GLUCONATE 213 G/1000ML
1 SOLUTION TOPICAL
Qty: 0 | Refills: 0 | DISCHARGE
Start: 2019-05-31

## 2019-05-31 RX ORDER — FENTANYL CITRATE 50 UG/ML
50 INJECTION INTRAVENOUS ONCE
Refills: 0 | Status: DISCONTINUED | OUTPATIENT
Start: 2019-05-31 | End: 2019-05-31

## 2019-05-31 RX ORDER — MIDAZOLAM HYDROCHLORIDE 1 MG/ML
0.02 INJECTION, SOLUTION INTRAMUSCULAR; INTRAVENOUS
Qty: 100 | Refills: 0 | Status: DISCONTINUED | OUTPATIENT
Start: 2019-05-31 | End: 2019-06-01

## 2019-05-31 RX ORDER — MIDAZOLAM HYDROCHLORIDE 1 MG/ML
1 INJECTION, SOLUTION INTRAMUSCULAR; INTRAVENOUS ONCE
Refills: 0 | Status: DISCONTINUED | OUTPATIENT
Start: 2019-05-31 | End: 2019-05-31

## 2019-05-31 RX ORDER — IPRATROPIUM BROMIDE 0.2 MG/ML
2.5 SOLUTION, NON-ORAL INHALATION
Qty: 0 | Refills: 0 | DISCHARGE
Start: 2019-05-31

## 2019-05-31 RX ORDER — PROPOFOL 10 MG/ML
31.45 INJECTION, EMULSION INTRAVENOUS
Qty: 1000 | Refills: 0 | Status: DISCONTINUED | OUTPATIENT
Start: 2019-05-31 | End: 2019-05-31

## 2019-05-31 RX ORDER — FLUTICASONE PROPIONATE 50 MCG
1 SPRAY, SUSPENSION NASAL
Qty: 0 | Refills: 0 | DISCHARGE
Start: 2019-05-31

## 2019-05-31 RX ORDER — IPRATROPIUM BROMIDE 0.2 MG/ML
500 SOLUTION, NON-ORAL INHALATION EVERY 6 HOURS
Refills: 0 | Status: DISCONTINUED | OUTPATIENT
Start: 2019-05-31 | End: 2019-05-31

## 2019-05-31 RX ORDER — FENTANYL CITRATE 50 UG/ML
1 INJECTION INTRAVENOUS
Qty: 2500 | Refills: 0 | Status: DISCONTINUED | OUTPATIENT
Start: 2019-05-31 | End: 2019-06-03

## 2019-05-31 RX ORDER — DIGOXIN 250 MCG
0.25 TABLET ORAL DAILY
Refills: 0 | Status: DISCONTINUED | OUTPATIENT
Start: 2019-06-01 | End: 2019-06-01

## 2019-05-31 RX ORDER — MORPHINE SULFATE 50 MG/1
1 CAPSULE, EXTENDED RELEASE ORAL EVERY 4 HOURS
Refills: 0 | Status: DISCONTINUED | OUTPATIENT
Start: 2019-05-31 | End: 2019-05-31

## 2019-05-31 RX ORDER — MIDAZOLAM HYDROCHLORIDE 1 MG/ML
2 INJECTION, SOLUTION INTRAMUSCULAR; INTRAVENOUS ONCE
Refills: 0 | Status: DISCONTINUED | OUTPATIENT
Start: 2019-05-31 | End: 2019-05-31

## 2019-05-31 RX ORDER — MORPHINE SULFATE 50 MG/1
1 CAPSULE, EXTENDED RELEASE ORAL
Qty: 0 | Refills: 0 | DISCHARGE
Start: 2019-05-31

## 2019-05-31 RX ORDER — MORPHINE SULFATE 50 MG/1
1 CAPSULE, EXTENDED RELEASE ORAL ONCE
Refills: 0 | Status: DISCONTINUED | OUTPATIENT
Start: 2019-05-31 | End: 2019-05-31

## 2019-05-31 RX ORDER — LIDOCAINE HCL 20 MG/ML
4 VIAL (ML) INJECTION ONCE
Refills: 0 | Status: COMPLETED | OUTPATIENT
Start: 2019-05-31 | End: 2019-05-31

## 2019-05-31 RX ORDER — DIGOXIN 250 MCG
0.25 TABLET ORAL DAILY
Refills: 0 | Status: DISCONTINUED | OUTPATIENT
Start: 2019-05-31 | End: 2019-05-31

## 2019-05-31 RX ORDER — LIDOCAINE HCL 20 MG/ML
10 VIAL (ML) INJECTION ONCE
Refills: 0 | Status: COMPLETED | OUTPATIENT
Start: 2019-05-31 | End: 2019-05-31

## 2019-05-31 RX ORDER — DIGOXIN 250 MCG
1 TABLET ORAL
Qty: 0 | Refills: 0 | DISCHARGE
Start: 2019-05-31

## 2019-05-31 RX ADMIN — FENTANYL CITRATE 50 MICROGRAM(S): 50 INJECTION INTRAVENOUS at 15:00

## 2019-05-31 RX ADMIN — Medication 4 MILLILITER(S): at 13:50

## 2019-05-31 RX ADMIN — PROTHROMBIN COMPLEX CONCENTRATE (HUMAN) 400 INTERNATIONAL UNIT(S): 25.5; 16.5; 24; 22; 22; 26 POWDER, FOR SOLUTION INTRAVENOUS at 02:33

## 2019-05-31 RX ADMIN — Medication 0.25 MILLIGRAM(S): at 05:51

## 2019-05-31 RX ADMIN — FENTANYL CITRATE 100 MICROGRAM(S): 50 INJECTION INTRAVENOUS at 17:55

## 2019-05-31 RX ADMIN — Medication 10 MILLILITER(S): at 14:15

## 2019-05-31 RX ADMIN — MORPHINE SULFATE 1 MILLIGRAM(S): 50 CAPSULE, EXTENDED RELEASE ORAL at 04:17

## 2019-05-31 RX ADMIN — Medication 500 MICROGRAM(S): at 11:22

## 2019-05-31 RX ADMIN — Medication 3 MILLILITER(S): at 05:13

## 2019-05-31 RX ADMIN — FENTANYL CITRATE 50 MICROGRAM(S): 50 INJECTION INTRAVENOUS at 15:51

## 2019-05-31 RX ADMIN — SODIUM CHLORIDE 100 MILLILITER(S): 9 INJECTION INTRAMUSCULAR; INTRAVENOUS; SUBCUTANEOUS at 17:54

## 2019-05-31 RX ADMIN — PANTOPRAZOLE SODIUM 40 MILLIGRAM(S): 20 TABLET, DELAYED RELEASE ORAL at 05:51

## 2019-05-31 RX ADMIN — MORPHINE SULFATE 1 MILLIGRAM(S): 50 CAPSULE, EXTENDED RELEASE ORAL at 04:07

## 2019-05-31 RX ADMIN — Medication 1 SPRAY(S): at 05:51

## 2019-05-31 RX ADMIN — FENTANYL CITRATE 50 MICROGRAM(S): 50 INJECTION INTRAVENOUS at 01:00

## 2019-05-31 RX ADMIN — MORPHINE SULFATE 1 MILLIGRAM(S): 50 CAPSULE, EXTENDED RELEASE ORAL at 01:11

## 2019-05-31 RX ADMIN — MIDAZOLAM HYDROCHLORIDE 1 MILLIGRAM(S): 1 INJECTION, SOLUTION INTRAMUSCULAR; INTRAVENOUS at 15:00

## 2019-05-31 RX ADMIN — MIDAZOLAM HYDROCHLORIDE 5 MILLIGRAM(S): 1 INJECTION, SOLUTION INTRAMUSCULAR; INTRAVENOUS at 15:51

## 2019-05-31 RX ADMIN — MIDAZOLAM HYDROCHLORIDE 2 MILLIGRAM(S): 1 INJECTION, SOLUTION INTRAMUSCULAR; INTRAVENOUS at 17:55

## 2019-05-31 RX ADMIN — FENTANYL CITRATE 5.3 MICROGRAM(S)/KG/HR: 50 INJECTION INTRAVENOUS at 17:54

## 2019-05-31 RX ADMIN — PANTOPRAZOLE SODIUM 40 MILLIGRAM(S): 20 TABLET, DELAYED RELEASE ORAL at 17:54

## 2019-05-31 RX ADMIN — LIDOCAINE 1 APPLICATION(S): 4 CREAM TOPICAL at 14:15

## 2019-05-31 RX ADMIN — MORPHINE SULFATE 1 MILLIGRAM(S): 50 CAPSULE, EXTENDED RELEASE ORAL at 01:57

## 2019-05-31 RX ADMIN — MORPHINE SULFATE 1 MILLIGRAM(S): 50 CAPSULE, EXTENDED RELEASE ORAL at 03:00

## 2019-05-31 RX ADMIN — MORPHINE SULFATE 1 MILLIGRAM(S): 50 CAPSULE, EXTENDED RELEASE ORAL at 02:31

## 2019-05-31 RX ADMIN — MIDAZOLAM HYDROCHLORIDE 1.06 MG/KG/HR: 1 INJECTION, SOLUTION INTRAMUSCULAR; INTRAVENOUS at 17:53

## 2019-05-31 NOTE — PROGRESS NOTE ADULT - ATTENDING COMMENTS
24 year old man with congenital heart disease/heterotaxy s/p multiple cardiac surgeries including Fontan's procedure, RLE DVT 1 year ago on warfarin, intermittent hemoptysis for the last five years presents with another episode of hemoptysis, referred to ED by ENT.     - continued to have hemoptysis overnight with 175 mL of blood expectorated   - given K-centra to help reverse effect of coumadin  - CTA showed patchy ground glass RUL and RML  - continue cough suppression  - appreciate cardiology input  - may possibly need IR embolization  - discussed case with IR, suggested CT dissection protocol to better elucidate the bronchial vessels    critical care time 35 minutes  case discussed in detail with mother at the bedside

## 2019-05-31 NOTE — H&P ADULT - NSHPLABSRESULTS_GEN_ALL_CORE
14.2   8.1   )-----------( 220      ( 31 May 2019 00:00 )             47.0       05-31    132<L>  |  101  |  14  ----------------------------<  105<H>  5.4<H>   |  21<L>  |  0.82    Ca    9.5      31 May 2019 00:00  Phos  4.2     05-31  Mg     1.9     05-31    TPro  7.0  /  Alb  4.1  /  TBili  2.8<H>  /  DBili  x   /  AST  47<H>  /  ALT  21  /  AlkPhos  111  05-31    PT/INR - ( 31 May 2019 03:05 )   PT: 12.9 sec;   INR: 1.12 ratio         PTT - ( 31 May 2019 03:05 )  PTT:34.4 sec 14.2   8.1   )-----------( 220      ( 31 May 2019 00:00 )             47.0       05-31    132<L>  |  101  |  14  ----------------------------<  105<H>  5.4<H>   |  21<L>  |  0.82    Ca    9.5      31 May 2019 00:00  Phos  4.2     05-31  Mg     1.9     05-31    TPro  7.0  /  Alb  4.1  /  TBili  2.8<H>  /  DBili  x   /  AST  47<H>  /  ALT  21  /  AlkPhos  111  05-31    PT/INR - ( 31 May 2019 03:05 )   PT: 12.9 sec;   INR: 1.12 ratio         PTT - ( 31 May 2019 03:05 )  PTT:34.4 sec    CT chest: IMPRESSION:   Patchy right upper lobe opacity may represent patient's known hemorrhage.    Congenital heart disease status post repair with nondiagnostic evaluation   of bronchial arteries and Fontane secondary to contrast timing.

## 2019-05-31 NOTE — H&P ADULT - NSHPSOCIALHISTORY_GEN_ALL_CORE
Lives with family   Occasionally uses nasal canula at home-unknown how much   Usually saturates >82% on room air without issues

## 2019-05-31 NOTE — PATIENT PROFILE ADULT - FUNCTIONAL SCREEN CURRENT LEVEL: SWALLOWING (IF SCORE 2 OR MORE FOR ANY ITEM, CONSULT REHAB SERVICES), MLM)
0 = swallows foods/liquids without difficulty

## 2019-05-31 NOTE — DISCHARGE NOTE PROVIDER - NSDCCPCAREPLAN_GEN_ALL_CORE_FT
PRINCIPAL DISCHARGE DIAGNOSIS  Diagnosis: Hemoptysis  Assessment and Plan of Treatment: Transfer to [facility] ICU for ongoing care and further diagnostic evaluation and treatment. Continue with current medications.      SECONDARY DISCHARGE DIAGNOSES  Diagnosis: DVT, lower extremity  Assessment and Plan of Treatment: Old DVT of Lower extremity. Doppler US is pending. Coumadin is held in the setting of hemoptysis.

## 2019-05-31 NOTE — CHART NOTE - NSCHARTNOTEFT_GEN_A_CORE
Pre-Bronchoscopy Tuberculosis Risk Screening Tool  To reduce the risk for airborne transmission of Mycobacterium tuberculosis, this assessment form must be completed prior to bronchoscopy procedures being performed outside of a negative pressure environment.    Procedure Date: 5/31/2019  Health Care Provider Name: Jewel Ellsworth  Form Completed By: Self  Reason for the Bronchoscopy: Hemoptysis  Date of Procedure: 5/31/2019  Planned Location for the Procedure:  ?[ ] Emergency Department ?[x] Intensive Care Unit ? [ ] Operating Room ? Other: ___________________    Risk Assessment  I. I have personally evaluated this patient for Mycobacterium tuberculosis and I determined the following risk:  ?[x] No Risk for TB     [ ] Low risk or TB     [ ] Significant risk for TB    II. Additional Findings: None    III. Based on the Determined Risk for TB the following Action(s) are Suggested:  1. If there are no risk factors for TB infection proceed with the procedure.  2. If there is low risk or significant risk for TB infection the following recommendations should be followed:            a. Perform the procedure in a negative pressure room, with N95 respirator.            b. If not feasible to move the patient or defer the procedure:                    i. Use a single-bedded room low traffic area to perform the bronchoscopy procedure.                   ii. Place a portable high-efficiency particulate air (HEPA) filter in the space prior to starting the procedure and keep closed.                       Refer to the INF.1132 titled“Tuberculosis Control Strategy Plan” for additional information.                  iii. All Health Care Provider within the procedure room shall wear an N95 respirator.            c. Documentation of the tuberculosis risk assessment should be included within the patient’s medical record.        ICU BRONCHOSCOPY PROCEDURE NOTE                                                             : Jewel Ellswroth  PROCEDURE:  Flexible bronchoscopy  Position:  Sitting then Supine  Intubation site: right nares then ETT.  INDICATION:  hemoptysis  FINDINGS: Patient was provided with 4% nebulized lidocaine and both nares were anesthetized with 2% lidocaine jelly. Bronchoscope was   SPECIMENS: None  COMPLICATIONS: Bleeding, 150cc  IMPRESSION: RUL posterior segment bleed  PLAN: Inform IR bleed likely originating from RUL posterior segment, patient will remain intubated Pre-Bronchoscopy Tuberculosis Risk Screening Tool  To reduce the risk for airborne transmission of Mycobacterium tuberculosis, this assessment form must be completed prior to bronchoscopy procedures being performed outside of a negative pressure environment.    Procedure Date: 5/31/2019  Health Care Provider Name: Jewel Ellsowrth  Form Completed By: Self  Reason for the Bronchoscopy: Hemoptysis  Date of Procedure: 5/31/2019  Planned Location for the Procedure:  ?[ ] Emergency Department ?[x] Intensive Care Unit ? [ ] Operating Room ? Other: ___________________    Risk Assessment  I. I have personally evaluated this patient for Mycobacterium tuberculosis and I determined the following risk:  ?[x] No Risk for TB     [ ] Low risk or TB     [ ] Significant risk for TB    II. Additional Findings: None    III. Based on the Determined Risk for TB the following Action(s) are Suggested:  1. If there are no risk factors for TB infection proceed with the procedure.  2. If there is low risk or significant risk for TB infection the following recommendations should be followed:            a. Perform the procedure in a negative pressure room, with N95 respirator.            b. If not feasible to move the patient or defer the procedure:                    i. Use a single-bedded room low traffic area to perform the bronchoscopy procedure.                   ii. Place a portable high-efficiency particulate air (HEPA) filter in the space prior to starting the procedure and keep closed.                       Refer to the INF.1132 titled“Tuberculosis Control Strategy Plan” for additional information.                  iii. All Health Care Provider within the procedure room shall wear an N95 respirator.            c. Documentation of the tuberculosis risk assessment should be included within the patient’s medical record.        ICU BRONCHOSCOPY PROCEDURE NOTE                                                             : Jewel Ellsworth  PROCEDURE:  Flexible bronchoscopy  Position:  Sitting then Supine  Intubation site: right nares then ETT.  INDICATION:  hemoptysis  FINDINGS: Patient was provided with 4% nebulized lidocaine and both nares were anesthetized with 2% lidocaine jelly. Bronchoscope was inserted through the right nares to the level of the VC. 4cc 1% lidocaine was provided. Bronchoscope was inserted to the distal trachea to the level of the main javy with ease. 4cc of lidocaine was provided. The tracheobronchial tree was inspected to the level of the segmental and subsegmental levels bilaterally. A clot was found in the RUL posterior segment and irrigation was provided. Bleeding ensued from the posterior segment and 150cc was removed. The bronchoscope was removed at this time and the patient was endotracheally intubated and mechanically ventilated for airway protection. Bronchoscope was re-inserted through the ETT and the RMB, RML, RLL was washed out and by the end of the procedure, the bleeding appeared to stop. A total of 200cc had been suctioned. The bronchoscope was removed and the procedure ended.   SPECIMENS: None  COMPLICATIONS: Bleeding, 150cc  IMPRESSION: RUL posterior segment bleed  PLAN: Inform IR bleed likely originating from RUL posterior segment, patient will remain intubated        Jewel Ellsworth MD  PGY-4  Pulmonary and Critical Care Fellow  Pager: [phone] Pre-Bronchoscopy Tuberculosis Risk Screening Tool  To reduce the risk for airborne transmission of Mycobacterium tuberculosis, this assessment form must be completed prior to bronchoscopy procedures being performed outside of a negative pressure environment.    Procedure Date: 5/31/2019  Health Care Provider Name: Jewel Ellsworth  Form Completed By: Self  Reason for the Bronchoscopy: Hemoptysis  Date of Procedure: 5/31/2019  Planned Location for the Procedure:  ?[ ] Emergency Department ?[x] Intensive Care Unit ? [ ] Operating Room ? Other: ___________________    Risk Assessment  I. I have personally evaluated this patient for Mycobacterium tuberculosis and I determined the following risk:  ?[x] No Risk for TB     [ ] Low risk or TB     [ ] Significant risk for TB    II. Additional Findings: None    III. Based on the Determined Risk for TB the following Action(s) are Suggested:  1. If there are no risk factors for TB infection proceed with the procedure.  2. If there is low risk or significant risk for TB infection the following recommendations should be followed:            a. Perform the procedure in a negative pressure room, with N95 respirator.            b. If not feasible to move the patient or defer the procedure:                    i. Use a single-bedded room low traffic area to perform the bronchoscopy procedure.                   ii. Place a portable high-efficiency particulate air (HEPA) filter in the space prior to starting the procedure and keep closed.                       Refer to the INF.1132 titled“Tuberculosis Control Strategy Plan” for additional information.                  iii. All Health Care Provider within the procedure room shall wear an N95 respirator.            c. Documentation of the tuberculosis risk assessment should be included within the patient’s medical record.        ICU BRONCHOSCOPY PROCEDURE NOTE                                                             : Jewel Ellsworth  PROCEDURE:  Flexible bronchoscopy  Position:  Sitting then Supine  Intubation site: right nares then ETT.  INDICATION:  hemoptysis  FINDINGS: Patient was provided with 4% nebulized lidocaine and both nares were anesthetized with 2% lidocaine jelly. Bronchoscope was inserted through the right nares to the level of the VC. 4cc 1% lidocaine was provided. Bronchoscope was inserted to the distal trachea to the level of the main javy with ease. 4cc of lidocaine was provided. The tracheobronchial tree was inspected to the level of the segmental and subsegmental levels bilaterally. A clot was found in the RUL posterior segment and irrigation was provided. Bleeding ensued from the posterior segment and 150cc was removed. The bronchoscope was removed at this time and the patient was endotracheally intubated and mechanically ventilated for airway protection. Bronchoscope was re-inserted through the ETT and the RMB, RML, RLL was washed out and by the end of the procedure, the bleeding appeared to stop. A total of 200cc had been suctioned. The bronchoscope was removed and the procedure ended.   SPECIMENS: None  COMPLICATIONS: Bleeding, ~200cc  IMPRESSION: RUL posterior segment bleed  PLAN: Inform IR bleed likely originating from RUL posterior segment, patient will remain intubated        Jewel Ellsworth MD  PGY-4  Pulmonary and Critical Care Fellow  Pager: [phone] Pre-Bronchoscopy Tuberculosis Risk Screening Tool  To reduce the risk for airborne transmission of Mycobacterium tuberculosis, this assessment form must be completed prior to bronchoscopy procedures being performed outside of a negative pressure environment.    Procedure Date: 5/31/2019  Health Care Provider Name: Jewel Ellsworth  Form Completed By: Self  Reason for the Bronchoscopy: Hemoptysis  Date of Procedure: 5/31/2019  Planned Location for the Procedure:  ?[ ] Emergency Department ?[x] Intensive Care Unit ? [ ] Operating Room ? Other: ___________________    Risk Assessment  I. I have personally evaluated this patient for Mycobacterium tuberculosis and I determined the following risk:  ?[x] No Risk for TB     [ ] Low risk or TB     [ ] Significant risk for TB    II. Additional Findings: None    III. Based on the Determined Risk for TB the following Action(s) are Suggested:  1. If there are no risk factors for TB infection proceed with the procedure.  2. If there is low risk or significant risk for TB infection the following recommendations should be followed:            a. Perform the procedure in a negative pressure room, with N95 respirator.            b. If not feasible to move the patient or defer the procedure:                    i. Use a single-bedded room low traffic area to perform the bronchoscopy procedure.                   ii. Place a portable high-efficiency particulate air (HEPA) filter in the space prior to starting the procedure and keep closed.                       Refer to the INF.1132 titled“Tuberculosis Control Strategy Plan” for additional information.                  iii. All Health Care Provider within the procedure room shall wear an N95 respirator.            c. Documentation of the tuberculosis risk assessment should be included within the patient’s medical record.        ICU BRONCHOSCOPY PROCEDURE NOTE                                                             : Jewel Ellsworth  PROCEDURE:  Flexible bronchoscopy  Position:  Sitting then Supine  Intubation site: right nares then ETT.  INDICATION:  hemoptysis  FINDINGS: Patient was provided with 4% nebulized lidocaine and both nares were anesthetized with 2% lidocaine jelly. Bronchoscope was inserted through the right nares to the level of the VC. 4cc 1% lidocaine was provided. Bronchoscope was inserted to the distal trachea to the level of the main jvay with ease. 4cc of lidocaine was provided. The tracheobronchial tree was inspected to the level of the segmental and subsegmental levels bilaterally. A clot was found in the RUL posterior segment and irrigation was provided. Bleeding ensued from the posterior segment and 150cc was removed. The bronchoscope was removed at this time and the patient was endotracheally intubated and mechanically ventilated for airway protection. Bronchoscope was re-inserted through the ETT and the RMB, RML, RLL was washed out and by the end of the procedure, the bleeding appeared to stop. A total of 200cc had been suctioned. The bronchoscope was removed and the procedure ended.   SPECIMENS: None  COMPLICATIONS: Bleeding, ~200cc  IMPRESSION: RUL posterior segment bleed  PLAN: Inform IR bleed likely originating from RUL posterior segment, patient will remain intubated        Jewel Ellsworth MD  PGY-4  Pulmonary and Critical Care Fellow  Pager: [phone]      [facility] Attending : I supervised and assisted with above  procedure. I  was  present for entire procedure.      Julio Buchanan MD

## 2019-05-31 NOTE — H&P ADULT - NSHPPHYSICALEXAM_GEN_ALL_CORE
ICU Vital Signs Last 24 Hrs  T(C): 36.9 (31 May 2019 08:00), Max: 37 (31 May 2019 00:00)  T(F): 98.4 (31 May 2019 08:00), Max: 98.6 (31 May 2019 00:00)  HR: 90 (31 May 2019 11:23) (87 - 110)  BP: 135/109 (31 May 2019 12:30) (99/71 - 137/81)  BP(mean): 117 (31 May 2019 12:30) (70 - 117)  ABP: --  ABP(mean): --  RR: 14 (31 May 2019 11:00) (12 - 48)  SpO2: 88% (31 May 2019 12:30) (77% - 93%)      PHYSICAL EXAM:  GENERAL: NAD, well-developed  HEAD:  Atraumatic, Normocephalic  EYES: EOMI, PERRLA, conjunctiva and sclera clear  NECK: Supple, No JVD  CHEST/LUNG: Clear to auscultation bilaterally; No wheeze  HEART: Regular rate and rhythm; No murmurs, rubs, or gallops  ABDOMEN: Soft, Nontender, Nondistended; Bowel sounds present  EXTREMITIES:  2+ Peripheral Pulses, No clubbing, cyanosis, or edema  PSYCH: AAOx3  NEUROLOGY: non-focal  SKIN: No rashes or lesions ICU Vital Signs Last 24 Hrs  T(C): 36.9 (31 May 2019 08:00), Max: 37 (31 May 2019 00:00)  T(F): 98.4 (31 May 2019 08:00), Max: 98.6 (31 May 2019 00:00)  HR: 90 (31 May 2019 11:23) (87 - 110)  BP: 135/109 (31 May 2019 12:30) (99/71 - 137/81)  BP(mean): 117 (31 May 2019 12:30) (70 - 117)  ABP: --  ABP(mean): --  RR: 14 (31 May 2019 11:00) (12 - 48)  SpO2: 88% (31 May 2019 12:30) (77% - 93%)      PHYSICAL EXAM:  GENERAL: NAD, think male, webbed neck   HEAD:  Atraumatic, Normocephalic  EYES: EOMI, PERRLA, conjunctiva and sclera clear  NECK: Supple, No JVD  CHEST/LUNG: Clear to auscultation bilaterally; No wheeze  HEART: Regular rate and rhythm; II/VI pan systolic murmur with + S3  ABDOMEN: Soft, Nontender, Nondistended; Bowel sounds present  EXTREMITIES:  2+ Peripheral Pulses, No clubbing, cyanosis, or edema  PSYCH: AAOx3  NEUROLOGY: non-focal  SKIN: No rashes or lesions

## 2019-05-31 NOTE — CHART NOTE - NSCHARTNOTEFT_GEN_A_CORE
PRE-INTERVENTIONAL RADIOLOGY PROCEDURE NOTE      Patient Age: 24    Patient Gender: M    Procedure: IR embolization of bleeding vessel    Diagnosis/Indication: Hemoptysis with bleeding identified in RUL posterior segment    Interventional Radiology Attending Physician: Dr. Chris    Ordering Attending Physician: Dr. 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 Was on coumadin for DVT, reversed.    Pertinent labs:                      12.7   5.12  )-----------( 226      ( 31 May 2019 16:30 )             41.3       05-31    140  |  103  |  18  ----------------------------<  90  4.4   |  21<L>  |  0.86    Ca    8.8      31 May 2019 12:40  Phos  4.6     05-31  Mg     1.8     05-31    TPro  6.8  /  Alb  4.0  /  TBili  2.4<H>  /  DBili  x   /  AST  27  /  ALT  17  /  AlkPhos  113  05-31      PT/INR - ( 31 May 2019 12:40 )   PT: 16.0 SEC;   INR: 1.43          PTT - ( 31 May 2019 03:05 )  PTT:34.4 sec        Patient and Family Aware ? Yes - start high intensity statin - consider starting high intensity statin

## 2019-05-31 NOTE — DISCHARGE NOTE PROVIDER - CARE PROVIDER_API CALL
Teresa Damon)  Otolaryngology  [address] [place] [address], [address]  [place] [address]  Phone: [phone]  Fax: [phone]  Follow Up Time:

## 2019-05-31 NOTE — CONSULT NOTE ADULT - SUBJECTIVE AND OBJECTIVE BOX
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  He had a Fontan fenestration closure complicated by right femoral venous injury resulting in chronic RLE venous insufficiency and ultimately ulceration.  He has chronic desaturation, moderate single atrioventricular valve regurgitation and systemic ventricular dysfunction.  Additionally, he has a long standing (3-4 years) history of intermittent hemoptysis.  In 2018, he was admitted to the [facility] MICU for work up and evaluation of this bleeding.  At that time, he had an EGD, bronchoscopy, and laryngoscopy which did not reveal an active bleeding source.  Ultimately, his hemoptysis stopped spontaneously.  After that admission, his Aspirin was discontinued.  A few weeks later, he was readmitted to the [facility] MICU with extensive DVT of his RLE.  He was started on heparin drip, monitored for hemoptysis, and transitioned to therapy with Coumadin.   Since then, he had a few days of bleeding in November around  that again resolved spontaneously. Tuesday night, he began with cough and hemoptysis. On Wednesday, he was seen by Dr. Damon who scoped him and did not find an active source of bleeding.  She suspected a possible GI etiology of bleeding and so recommended Kang go to the ER for an evaluation by GI.  The family did not go until they called our office on Thursday morning and our NP advised them to go to the ER for evaluation.  The family then took Kang to the [facility] ER.    He has since been admitted to the MICU for closer monitoring.  He has had episodes of flori hemoptysis triggered by coughing- up to 200 ml at a time.  GI did not feel upper endoscopy was indicated.  A repeat bronchoscopy has not been done yet.  He received KCentra overnight to correct any coagulopathy associated with warfarin use- his INR on admission was 1.55.  Admission H/H was 16/43.6 on May 30 at 1048 am and was 14.2/47 on May 31 at 0000.      Kang reports he had hemoptysis last 2 hours ago.    He denies preceding URI symptoms.    REVIEW OF SYSTEMS:  Constitutional - no fever   Eyes - no change in vision   Ears / Nose / Mouth / Throat - denies congestion  Respiratory - as per HPI  Cardiovascular - no chest pain, palpitations, saturations at baseline - upper 80s, low 90s  Gastrointestinal - no vomiting, no diarrhea- states last stool was darker than prior  Genitourinary - no change in urination or hematuria.  Integumentary - no rash, jaundice, pallor, or color change.  Musculoskeletal - no joint swelling or stiffness.  Endocrine - no heat or cold intolerance, jitteriness, or failure to thrive.  Hematologic / Lymphatic - as per HPI    PAST MEDICAL HISTORY/SURGICAL:  as above s/p BT shunt  s/p TAPVR repair and re-repair  s/p bilateral bidirectional sparkle  s/p fenestrated fontan, s/p Fontan fenestration closure  chronic venous insufficiency 2/2 right femoral venous occlusion  RLE DVT    MEDICATIONS:  digoxin     Tablet 0.25 milliGRAM(s) Oral daily  HYDROcodone/homatropine Syrup 5 milliLiter(s) Oral four times a day  ipratropium    for Nebulization 500 MICROGram(s) Nebulizer every 6 hours  pantoprazole  Injectable 40 milliGRAM(s) IV Push two times a day    FAMILY HISTORY:  No family history of CHD    SOCIAL HISTORY:  The patient lives with mother and father.    PHYSICAL EXAMINATION:  Vital signs - Weight (kg): 54.8 (30 @ 16:15)    General - non-dysmorphic appearance, well-developed, in no distress.  Skin -  no rash, RLE chronic varicosities  Eyes / ENT - no conjunctival injection, sclerae anicteric, external ears & nares normal, mucous membranes moist.  Pulmonary -  clear lungs  Cardiovascular - regular rate and rhythm, s1, single s2, II/VI HSM at LLSB, cap refil 2 seconds, short nails, mild clubbing, 2+ distal pulses  Gastrointestinal - soft, non-distended, non-tender  Musculoskeletal - no edema  Neurologic / Psychiatric - alert, oriented     LABORATORY TESTS:                          14.2  CBC:   8.1 )-----------( 220   (19 @ 00:00)                          47.0               132   |  101   |  14                 Ca: 9.5    BMP:   ----------------------------< 105    M.9   (19 @ 00:00)             5.4    |  21    | 0.82               Ph: 4.2      LFT:     TPro: 7.0 / Alb: 4.1 / TBili: 2.8 / DBili: x / AST: 47 / ALT: 21 / AlkPhos: 111   (19 @ 00:00)    COAG: PT: 12.9 / PTT: 34.4 / INR: 1.12   (19 @ 03:05)     Echocardiogram - 2018   1. Heterotaxy syndrome.   2. Atrial situs Inversus; L-Ventricular loop; D-Malposition of the great arteries.   3. Double outlet right ventricle.   4. Mitral valve atresia.   5. Pulmonary valve atresia.   6. Status post placement of bilateral bidirectional Sparkle shunts.   7. Status post extracardiac fenestrated Fontan conduit.   8. Status post device closure of Fontan fenestration.   9. Low velocity color Doppler flow was demonstrated in the right SVC and Fontan conduit. The left SVC was not well visualized.  10. 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  11. One pulmonary vein from each side is visualized entering the atria, with no evidence of obstruction.  12. Redundant chordae noted on the tricuspid valve and mild tricuspid valve prolapse.  13. There are multiple jets of tricuspid regurgitation which is cumulatively moderate.  14. Dilated right ventricle.  15. There is adequate systolic excursion of the right ventricular free wall compared to the hypokinetic septal wall.  16. 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  17. Qualitatively depressed left ventricular systolic function.  18. No pericardial effusion.  19. Study was significantly limited by poor acoustic windows.    3/10/2018:  Normal hypopharynx.                       - Esophagogastric landmarks identified.                       - Possible diminutive varices in the cervical esophagus,                        but there was no evidence of bleeding from this site and                        these are very unlikely to be the etiology of the                        patient's hemoptysis                       - An otherwise unremarkable esophagus                       - Gastritis and Duodenal erosions possibly ASA                        gastropathy.  Recommendation:      - Advance diet as tolerated as per MICU team                       - Consider adding a PPI during the stress of acute                        illness in light of the gastropathy seen

## 2019-05-31 NOTE — PROCEDURE NOTE - NSTRACHPOSTINTU_RESP_A_CORE
Positive end tidal Co2 noted/Appropriate capnography/Breath sounds bilateral/Chest excursion noted/Chest X-Ray/Breath sounds equal

## 2019-05-31 NOTE — H&P ADULT - HISTORY OF PRESENT ILLNESS
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 Admitted to MICU for monitoring with hemoptysis.  He was started on Hycodan standing, morphine prn for pain with hemoptysis. After admission, he had intermittent hemoptysis with approximately 200 cc's bright red blood measured overnight. Coumadin was held, and he received Kcentra x 1. He is still pending LE doppler to assess old RLE DVT for resolution. He was continued on his home digoxin 0.25 mg daily. GI evaluated the patient and did not recommend EGD at this time to assess possible cervical esophageal varices seen on 3/2018 EGD. They did recommend protonix 40mg IVP BID which was started.  CT scan showed patchy upper lobe opacity which may represent patient's known hemorrhage. CT was discussed with IR; IR does not recommend attempt at embolization as no localized bleed. Pediatric cardiothoracic surgery evaluated patient and recommended transfer to [facility]. Patient is pending possible bronchoscopy and/or cardiac cath with pediatric cardiology. 23 y/o M with PMH congenital heart disease/heterotaxy s/p multiple cardiac surgeries including Fontan's procedure, RLE DVT 1 year ago on warfarin, incorrect PE diagnosis 2014 s/p Eliquis for 1 week before stopped, chronic intermittent hemoptysis for five years presenting for recurrent episode of hemoptysis, referred to [place] ED by ENT. Admitted to MICU for monitoring with hemoptysis.  He was started on Hycodan standing, morphine prn for pain with hemoptysis. After admission, he had intermittent hemoptysis with approximately 200 cc's bright red blood measured overnight. Coumadin was held, and he received Kcentra x 1. He is still pending LE doppler to assess old RLE DVT for resolution. He was continued on his home digoxin 0.25 mg daily. GI evaluated the patient and did not recommend EGD at this time to assess possible cervical esophageal varices seen on 3/2018 EGD. They did recommend protonix 40mg IVP BID which was started.  CT scan showed patchy upper lobe opacity which may represent patient's known hemorrhage. CT was discussed with IR; IR does not recommend attempt at embolization as no localized bleed.     Multidisciplinary discussion held between MICU, Adult congenital cardiology and CT surgery and decision was made to transfer patient to [facility] for further care. Patient currently having occasional cough, non productive. He endorses feeling fatigued but denies any lightheadedness, chest pain, SOBm orthopnea, PND, 25 y/o M with PMH congenital heart disease/heterotaxy s/p multiple cardiac surgeries including Fontan's procedure, RLE DVT 1 year ago on warfarin, incorrect PE diagnosis 2014 s/p Eliquis for 1 week before stopped, chronic intermittent hemoptysis for five years presenting for recurrent episode of hemoptysis, referred to [place] [facility] by ENT. Admitted to MICU for monitoring with hemoptysis.  He was started on Hycodan standing, morphine prn for pain with hemoptysis. After admission, he had intermittent hemoptysis with approximately 200 cc's bright red blood measured overnight. Coumadin was held, and he received Kcentra x 1. He is still pending LE doppler to assess old RLE DVT for resolution. He was continued on his home digoxin 0.25 mg daily. GI evaluated the patient and did not recommend EGD at this time to assess possible cervical esophageal varices seen on 3/2018 EGD. They did recommend protonix 40mg IVP BID which was started.  CT scan showed patchy upper lobe opacity which may represent patient's known hemorrhage. CT was discussed with IR; IR does not recommend attempt at embolization as no localized bleed.     Multidisciplinary discussion held between MICU, Adult congenital cardiology and CT surgery and decision was made to transfer patient to [facility] for further care. Patient currently having occasional cough, non productive. He endorses feeling fatigued but denies any lightheadedness, chest pain, SOB, orthopnea or PND. He had about 400 cc of hemoptysis in last 2 days. Last dose of coumadin was two days ago.

## 2019-05-31 NOTE — DISCHARGE NOTE PROVIDER - HOSPITAL COURSE
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 Admitted to MICU for monitoring with hemoptysis.  He was started on Hycodan standing, morphine prn for pain with hemoptysis. After admission, he had intermittent hemoptysis with approximately 200 cc's bright red blood measured overnight. Coumadin was held, and he received Kcentra x 1. He is still pending LE doppler to assess old RLE DVT for resolution. He was continued on his home digoxin 0.25 mg daily. GI evaluated the patient and did not recommend EGD at this time to assess possible cervical esophageal varices seen on 3/2018 EGD. They did recommend protonix 40mg IVP BID which was started.  CT scan showed patchy upper lobe opacity which may represent patient's known hemorrhage. 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 Admitted to MICU for monitoring with hemoptysis.  He was started on Hycodan standing, morphine prn for pain with hemoptysis. After admission, he had intermittent hemoptysis with approximately 200 cc's bright red blood measured overnight. Coumadin was held, and he received Kcentra x 1. He is still pending LE doppler to assess old RLE DVT for resolution. He was continued on his home digoxin 0.25 mg daily. GI evaluated the patient and did not recommend EGD at this time to assess possible cervical esophageal varices seen on 3/2018 EGD. They did recommend protonix 40mg IVP BID which was started.  CT scan showed patchy upper lobe opacity which may represent patient's known hemorrhage. CT was discussed with IR; IR does not recommend attempt at embolization as no localized bleed. Pediatric cardiothoracic surgery evaluated patient and recommended transfer to [facility]. Patient is pending possible bronchoscopy and/or cardiac cath with pediatric cardiology.

## 2019-05-31 NOTE — H&P ADULT - NSHPREVIEWOFSYSTEMS_GEN_ALL_CORE
GEN: no fevers, chills, no night sweats no weight loss , no swollen lymph nodes    EYES: No blurry vision , no changes in vision  ENT: no sores, no runny nose, no sore throat   PULM: no cough, no shortness of breath   CARD: no chest pain, no palpitations    GI : no abdominal pain , no nausea, no vomiting  : no burning urination, no change in color of urine, no flank pain, no blood in urine   MSK: no swelling   SKIN: no bruising or bleeding, no sores in mouth  , no yellowing of the skin  Neuro: no lightheadedness, no headaches, no weakness, no fainting, no confusion , no seizures GEN: no fevers, chills, no night sweats no weight loss , no swollen lymph nodes    EYES: No blurry vision , no changes in vision  ENT: + post nasal drip, no sore throat  PULM: POSITIVE as per HPI    CARD: no chest pain, no palpitations    GI : no abdominal pain , no nausea, no vomiting  : no burning urination, no change in color of urine, no flank pain, no blood in urine   MSK: no swelling   SKIN: no bruising or bleeding, no sores in mouth  , no yellowing of the skin  Neuro: no lightheadedness, no headaches, no weakness, no fainting, no confusion , no seizures

## 2019-05-31 NOTE — CONSULT NOTE ADULT - ASSESSMENT
In summary, 24 year old male with heterotaxy syndrome and complex single ventricle anatomy as above culminating in an extracardiac Fontan completion who presents with recurrent hemoptysis. Prior work up to date has been unrevealing for active source of bleeding.      1.  Recommend repeat bronchoscopy to identify source of bleeding. As per MICU, to get bronch at [facility].  Case was discussed by MICU staff with with IR and if indicated, will get a dedicated CT of the aortic (dissection protocol) to better elucidate culprit vessels. If actively bleeding, would proceed with IR guided coiling of vessels  2.  Would give IVFs if being kept NPO as Fontan physiology is preload dependent  3.  With active bleeding, okay to hold Coumadin.

## 2019-05-31 NOTE — PROGRESS NOTE ADULT - SUBJECTIVE AND OBJECTIVE BOX
Interval Events:  Received morphine IVP's overnight for pain with cough. He was also anxious overnight. Hemoptysis with approximately 200 cc's blood overnight.     REVIEW OF SYSTEMS:  Constitutional: [x ] negative [ ] fevers [ ] chills [ ] weight loss [ ] weight gain  	HEENT: [x ] negative [ ] dry eyes [ ] eye irritation [ ] postnasal drip [ ] nasal congestion  	CV: [x ] negative  [ ] chest pain [ ] orthopnea [ ] palpitations [ ] murmur  	Resp: [x ] negative [ ] cough [ ] shortness of breath [ ] dyspnea [ ] wheezing [ ] sputum [x ] hemoptysis  	GI: [ ] negative [ ] nausea [ ] vomiting [ ] diarrhea [ ] constipation [ ] abd pain [ ] dysphagia   	: [ x] negative [ ] dysuria [ ] nocturia [ ] hematuria [ ] increased urinary frequency  	Musculoskeletal: [x ] negative [ ] back pain [ ] myalgias [ ] arthralgias [ ] fracture  	Skin: [x ] negative [ ] rash [ ] itch  	Neurological: [x ] negative [ ] headache [ ] dizziness [ ] syncope [ ] weakness [ ] numbness  	Psychiatric: [x ] negative [ ] anxiety [ ] depression  	Endocrine: [x ] negative [ ] diabetes [ ] thyroid problem  	Hematologic/Lymphatic: [x ] negative [ ] anemia [ ] bleeding problem  	Allergic/Immunologic: [x ] negative [ ] itchy eyes [ ] nasal discharge [ ] hives [ ] angioedema    [x ] All other systems negative    OBJECTIVE:  ICU Vital Signs Last 24 Hrs  T(C): 36.7 (31 May 2019 04:00), Max: 37 (31 May 2019 00:00)  T(F): 98 (31 May 2019 04:00), Max: 98.6 (31 May 2019 00:00)  HR: 97 (31 May 2019 06:00) (85 - 110)  BP: 112/65 (31 May 2019 06:00) (99/71 - 137/81)  BP(mean): 83 (31 May 2019 06:00) (79 - 103)  ABP: --  ABP(mean): --  RR: 15 (31 May 2019 06:00) (15 - 48)  SpO2: 87% (31 May 2019 06:00) (77% - 93%)        05-30 @ 07:01  -  05-31 @ 07:00  --------------------------------------------------------  IN: 445 mL / OUT: 265 mL / NET: 180 mL      CAPILLARY BLOOD GLUCOSE          PHYSICAL EXAM:  GENERAL: NAD, sitting up in bed, breathing comfortably, no dried blood around oropharynx.   HEAD:  Atraumatic, Normocephalic  EYES: EOMI, PERRLA, conjunctiva and sclera clear  ENMT: No tonsillar erythema, exudates, or enlargement; Moist mucous membranes, Good dentition, no dried blood  NECK: Supple, No JVD  NERVOUS SYSTEM: AOX3, motor and sensation grossly intact in b/l UE and b/l LE  PSYCHIATRIC: Appropriate affect and mood  CHEST/LUNG: Clear to auscultation bilaterally; No rales, rhonchi, wheezing, or rubs  BACK: scoliosis   HEART: RRR; loud holosystolic murmur left lower sternal border.   ABDOMEN: Soft, Nontender, Nondistended; Bowel sounds present  EXTREMITIES:  2+ Peripheral Pulses, No clubbing, cyanosis  SKIN: No rashes or lesions; well-healed anterior chest surgical incision scars.    LINES: PIV's.     [facility] MEDICATIONS:  Standing Meds:  chlorhexidine 2% Cloths 1 Application(s) Topical daily  digoxin     Tablet 0.25 milliGRAM(s) Oral daily  fluticasone propionate 50 MICROgram(s)/spray Nasal Spray 1 Spray(s) Both Nostrils two times a day  HYDROcodone/homatropine Syrup 5 milliLiter(s) Oral four times a day  ipratropium    for Nebulization 500 MICROGram(s) Nebulizer every 6 hours  pantoprazole  Injectable 40 milliGRAM(s) IV Push two times a day      PRN Meds:  morphine  - Injectable 1 milliGRAM(s) IV Push every 4 hours PRN      LABS:                        14.2   8.1   )-----------( 220      ( 31 May 2019 00:00 )             47.0     Hgb Trend: 14.2<--, 16.0<--  05-31    132<L>  |  101  |  14  ----------------------------<  105<H>  5.4<H>   |  21<L>  |  0.82    Ca    9.5      31 May 2019 00:00  Phos  4.2     05-31  Mg     1.9     05-31    TPro  7.0  /  Alb  4.1  /  TBili  2.8<H>  /  DBili  x   /  AST  47<H>  /  ALT  21  /  AlkPhos  111  05-31  Creatinine Trend: 0.82<--, 0.90<--  PT/INR - ( 31 May 2019 03:05 )   PT: 12.9 sec;   INR: 1.12 ratio    PTT - ( 31 May 2019 03:05 )  PTT:34.4 sec      MICROBIOLOGY: n/a    RADIOLOGY:  [x ] Reviewed and interpreted by me  < from: CT Angio Chest w/ IV Cont (05.30.19 @ 16:21) >  IMPRESSION:   Patchy right upper lobe opacity may represent patient's known hemorrhage.    Congenital heart disease status post repair with nondiagnostic evaluation   of bronchial arteries and Fontane secondary to contrast timing.  < end of copied text >    < from: CT Angio Neck w/ IV Cont (05.30.19 @ 16:12) >  IMPRESSION:     Normal CT angiogram of the cervical and intracranial circulation.   Evidence of parenchymal lung changes in the left chest. Please see CT of   the chest performed the same day for more complete evaluation.  < end of copied text >
Chief Complaint:  Patient is a 24y old  Male who presents with a chief complaint of hemoptysis (31 May 2019 07:24)      Interval Events: Pt with continued hemoptysis.  No vomiting. To be  transferred to [facility] for pediatric cardiologist.     Allergies:  No Known Allergies      Hospital Medications:  chlorhexidine 2% Cloths 1 Application(s) Topical daily  digoxin     Tablet 0.25 milliGRAM(s) Oral daily  fluticasone propionate 50 MICROgram(s)/spray Nasal Spray 1 Spray(s) Both Nostrils two times a day  HYDROcodone/homatropine Syrup 5 milliLiter(s) Oral four times a day  ipratropium    for Nebulization 500 MICROGram(s) Nebulizer every 6 hours  morphine  - Injectable 1 milliGRAM(s) IV Push every 4 hours PRN  pantoprazole  Injectable 40 milliGRAM(s) IV Push two times a day      PMHX/PSHX:  Spleen absent  TAPVR (total anomalous pulmonary venous return)  Heterotaxy syndrome with asplenia  Single ventricle  H/O heart surgery      Family history:  No pertinent family history in first degree relatives      ROS:     General:  No wt loss, fevers, chills, night sweats, fatigue,   Eyes:  Good vision, no reported pain  ENT:  No sore throat, pain, runny nose, dysphagia  CV:  No pain, palpitations, hypo/hypertension  Resp:  No dyspnea, cough, tachypnea, wheezing  GI:  See HPI  :  No pain, bleeding, incontinence, nocturia  Muscle:  No pain, weakness  Neuro:  No weakness, tingling, memory problems  Psych:  No fatigue, insomnia, mood problems, depression  Endocrine:  No polyuria, polydipsia, cold/heat intolerance  Heme:  No petechiae, ecchymosis, easy bruisability  Skin:  No rash, edema      PHYSICAL EXAM:     GENERAL: NAD  HEENT:  NC/AT  CHEST:  Full & symmetric excursion, no increased effort  ABDOMEN:  Soft, non-tender, non-distended, +BS  EXTREMITIES:  no edema  SKIN:  No rash  NEURO:  Alert    Vital Signs:  Vital Signs Last 24 Hrs  T(C): 36.7 (31 May 2019 04:00), Max: 37 (31 May 2019 00:00)  T(F): 98 (31 May 2019 04:00), Max: 98.6 (31 May 2019 00:00)  HR: 97 (31 May 2019 06:00) (85 - 110)  BP: 112/65 (31 May 2019 06:00) (99/71 - 137/81)  BP(mean): 83 (31 May 2019 06:00) (79 - 103)  RR: 15 (31 May 2019 06:00) (15 - 48)  SpO2: 87% (31 May 2019 06:00) (77% - 93%)  Daily Height in cm: 175.26 (30 May 2019 16:15)    Daily     LABS:                        14.2   8.1   )-----------( 220      ( 31 May 2019 00:00 )             47.0     05-31    132<L>  |  101  |  14  ----------------------------<  105<H>  5.4<H>   |  21<L>  |  0.82    Ca    9.5      31 May 2019 00:00  Phos  4.2     05-31  Mg     1.9     05-31    TPro  7.0  /  Alb  4.1  /  TBili  2.8<H>  /  DBili  x   /  AST  47<H>  /  ALT  21  /  AlkPhos  111  05-31    LIVER FUNCTIONS - ( 31 May 2019 00:00 )  Alb: 4.1 g/dL / Pro: 7.0 g/dL / ALK PHOS: 111 U/L / ALT: 21 U/L / AST: 47 U/L / GGT: x           PT/INR - ( 31 May 2019 03:05 )   PT: 12.9 sec;   INR: 1.12 ratio         PTT - ( 31 May 2019 03:05 )  PTT:34.4 sec        Imaging:  < from: CT Angio Chest w/ IV Cont (05.30.19 @ 16:21) >    EXAM:  CT ANGIO CHEST (W)AW IC                            PROCEDURE DATE:  05/30/2019            INTERPRETATION:  EXAMINATION: CT ANGIO CHEST WITHOUT AND OR WITH IV   CONTRAST    CLINICAL INDICATION: Hemoptysis.    TECHNIQUE: CTA of the chest was obtained after the administration of IV   contrast.  MIP images were reconstructed.      COMPARISON: Multiple CT, most recent 3/9/2018.    FINDINGS:     AIRWAYS AND LUNGS: The central tracheobronchial tree is patent.  Patchy   right upper lobe opacity is similar distribution compared to the prior   study.    MEDIASTINUM AND PLEURA: There are no enlarged mediastinal, hilar or   axillary lymph nodes. The visualized portion of the thyroid gland is   unremarkable. There is no pleural effusion. There isno pneumothorax.      HEART AND VESSELS: Large common ventricle giving rise to a right-sided   aortic arch. Patient status post left SVC to left PA shunt as well as a   Fontane procedure. Contrast noted within the SVC and left-sided pulmonary   vessels. Nondiagnostic evaluation of the Fontane secondary to contrast   timing. No pericardial effusion. Abdominal ascites.     UPPER ABDOMEN: Images of the upper abdomen demonstrate nonspecific bowel   gas pattern on CT scan.. Heterotaxy.    BONES AND SOFT TISSUES: The bones are unremarkable.  The soft tissues are   unremarkable.    TUBES/LINES: None.    IMPRESSION:   Patchy right upper lobe opacity may represent patient's known hemorrhage.    Congenital heart disease status post repair with nondiagnostic evaluation   of bronchial arteries and Fontane secondary to contrast timing.                    AMY PERAZA M.D., ATTENDING RADIOLOGIST  This document has been electronically signed. May 30 2019  5:11PM                < end of copied text >

## 2019-05-31 NOTE — PROGRESS NOTE ADULT - ASSESSMENT
Impression:    1. Hemoptysis: doubt this a GI source given stable hemoglobin and presentation with hemoptysis, and prior negative EGD. While we cannot definitively exclude a GI source the symptoms are more consistent with an ENT or pulmonary source, and bleeding from aortopulmonary collaterals is described in Fontan patients. Can J Cardiol. 2008;24(2):145.  2. Status post Fontan procedure  3. DVT on coumadin    Recommendation:  -PPI IV BID  - transfer to [facility] per MICU team  - no plans for EGD from GI standpoint unless deemed necessary by MICU/IR
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 Admitted to MICU for monitoring with hemoptysis.     #Neuro: no active issues.     #CV: Heterotaxy s/p multiple cardiac surgeries including Fontan's: hemodynamically stable with loud known systolic murmur. No symptoms suggestive of heart failure.   -pediatric cardiology c/s.  -c/w home digoxin 0.25mg daily.  -TTE     Hx RLE DVT: in the setting of patient being bedbound from an illness in 2018, likely provoked. He has been taking Coumadin 6mg daily with therapeutic INR this month per his report. No new or recurrent swelling. He has not yet had repeat doppler since this was diagnosed in 4/2018 at [facility].  -hold Coumadin. KCentra overnight. INR 1.1 now.   -LE dopplers    #Pulm: Hemoptysis: concern for bleeding from aortopulmonary collaterals vs. GI source. Pt has 5 year hx of hemoptysis with evaluation by ENT yesterday showing no tracheal or upper airway bleeding. He was referred to ED for upper endoscopy. 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 Gastritis and duodenal erosions were also seen possibly 2/2 ASA gastropathy. SpO2 at baseline high 80's low 90's. Of note, CTA 3/2018 showed groundglass opacities consistent with his diagnosis of pulmonary hemorrhage at that time. During 4/2018 admission he also underwent bronchoscopy showing small area of mucosal denudation in inferior turbinate; laryngoscopy by ENT at that time showed no source of bleeding.   -CT angio chest, CT angio neck showed patchy RUL opacity which may represent known hemorrhage.   -will consider IR embolization vs bronchoscopy for treatment of any identified pulmonary bleed.  -will f/u with GI regarding any need for repeat upper endoscopy.  -morphine 0.5 IVP prn q4h for pain.  -Hycodan cough suppressant q6h for now.  -c/w Atrovent q6h.  -c/w Flonase.    #GI: Cervical esophageal varices: possible varices seen on prior upper endoscopy 3/2018. GI evaluation pending. No active bleeding at this time, though reportedly had small volume hemoptysis prior   -f/u GI recommendations, ? endoscopy.  -protonix 40mg IVP BID for now.    #Renal: no active issues    #ID: no active issues    #Endocrine: no active issues    #Heme: no active issues    #FEN: would keep NPO pending CTA read.     #DVT ppx: SCD's    #Lines: PIV's    #GOC: full code    Marcus Pizarro, PGY-3  MICU resident, call [phone] or page [phone] or [phone]

## 2019-05-31 NOTE — CONSULT NOTE ADULT - SUBJECTIVE AND OBJECTIVE BOX
HPI:  23 y/o M with PMH congenital heart disease/heterotaxy s/p multiple cardiac surgeries including Fontan's procedure, RLE DVT 1 year ago on warfarin, incorrect PE diagnosis 2014 s/p Eliquis for 1 week before stopped, chronic intermittent hemoptysis for five years presenting for recurrent episode of hemoptysis, referred to [place] [facility] by ENT. Admitted to MICU for monitoring with hemoptysis.  He was started on Hycodan standing, morphine prn for pain with hemoptysis. After admission, he had intermittent hemoptysis with approximately 200 cc's bright red blood measured overnight. Coumadin was held, and he received Kcentra x 1. He is still pending LE doppler to assess old RLE DVT for resolution. He was continued on his home digoxin 0.25 mg daily. GI evaluated the patient and did not recommend EGD at this time to assess possible cervical esophageal varices seen on 3/2018 EGD. They did recommend protonix 40mg IVP BID which was started.  CT scan showed patchy upper lobe opacity which may represent patient's known hemorrhage. CT was discussed with IR; IR does not recommend attempt at embolization as no localized bleed.     Multidisciplinary discussion held between MICU, Adult congenital cardiology and CT surgery and decision was made to transfer patient to [facility] for further care. Patient currently having occasional cough, non productive. He endorses feeling fatigued but denies any lightheadedness, chest pain, SOB, orthopnea or PND. He had about 400 cc of hemoptysis in last 2 days. Last dose of coumadin was two days ago. (31 May 2019 12:50)    Pt was bronched in MICU, hemoptysis originated from RUL posterior segment. Pt was intubated 2/2 hemoptysis and for airway protection.     PAST MEDICAL & SURGICAL HISTORY:  Spleen absent  TAPVR (total anomalous pulmonary venous return)  Heterotaxy syndrome with asplenia  Single ventricle  H/O heart surgery      REVIEW OF SYSTEMS  Unable to obtian 2/2 intubation    MEDICATIONS  (STANDING):  chlorhexidine 2% Cloths 1 Application(s) Topical daily  chlorhexidine 4% Liquid 1 Application(s) Topical <User Schedule>  digoxin     Tablet 0.25 milliGRAM(s) Oral daily  fentaNYL   Infusion. 1 MICROgram(s)/kG/Hr (5.3 mL/Hr) IV Continuous <Continuous>  fluticasone propionate 50 MICROgram(s)/spray Nasal Spray 1 Spray(s) Both Nostrils two times a day  pantoprazole  Injectable 40 milliGRAM(s) IV Push two times a day  sodium chloride 0.9%. 1000 milliLiter(s) (100 mL/Hr) IV Continuous <Continuous>    MEDICATIONS  (PRN):      Allergies    No Known Allergies    Intolerances        SOCIAL HISTORY:  Occupation:  Smoking Hx: denies  Etoh Hx: denies  IVDA Hx: denies    FAMILY HISTORY:    unless noted, no significant family hx with Mother, Father, Siblings    Vital Signs Last 24 Hrs  T(C): 36.1 (31 May 2019 16:00), Max: 37 (31 May 2019 00:00)  T(F): 97 (31 May 2019 16:00), Max: 98.6 (31 May 2019 00:00)  HR: 87 (31 May 2019 16:00) (87 - 110)  BP: 104/64 (31 May 2019 16:00) (99/71 - 137/81)  BP(mean): 77 (31 May 2019 16:00) (70 - 117)  RR: 14 (31 May 2019 16:00) (10 - 48)  SpO2: 87% (31 May 2019 16:00) (73% - 97%)    General: WN/WD NAD  Neurology: sedated. intubated  Eyes: Scleras clear, PERRLA/ EOMI, Gross vision intact  ENT:Gross hearing intact, grossly patent pharynx, no stridor  Neck: Neck supple, trachea midline, No JVD,   Respiratory: CTA B/L, No wheezing, rales, rhonchi  CV: RRR, S1S2, + systolic murmurs, rubs or gallops  Abdominal: Soft, NT, ND +BS,   Extremities: No edema, + peripheral pulses  Skin: No Rashes, Hematoma, Ecchymosis  Lymphatic: No Neck, axilla, groin LAD  Psych: Oriented x 3, normal affect    LABS:                        13.1   6.64  )-----------( 235      ( 31 May 2019 12:40 )             41.8     05-31    140  |  103  |  18  ----------------------------<  90  4.4   |  21<L>  |  0.86    Ca    8.8      31 May 2019 12:40  Phos  4.6     05-31  Mg     1.8     05-31    TPro  6.8  /  Alb  4.0  /  TBili  2.4<H>  /  DBili  x   /  AST  27  /  ALT  17  /  AlkPhos  113  05-31    PT/INR - ( 31 May 2019 12:40 )   PT: 16.0 SEC;   INR: 1.43          PTT - ( 31 May 2019 03:05 )  PTT:34.4 sec      RADIOLOGY & ADDITIONAL STUDIES:    ASSESSMENT:   23 y/o M with PMH congenital heart disease/heterotaxy s/p multiple cardiac surgeries including Fontan's procedure, RLE DVT 1 year ago on warfarin, incorrect PE diagnosis 2014 s/p Eliquis for 1 week before stopped, chronic intermittent hemoptysis for five years presenting for recurrent episode of hemoptysis. Pt was bronched in MICU, found to have hemopytsis originating from posterior segment of RUL.      PLAN:  - Recommend IR embolization  - Will follow with you.  - Plan discussed with MICU and Dr. Mónica Cook PAC   [id number]

## 2019-06-01 DIAGNOSIS — Q89.01 ASPLENIA (CONGENITAL): ICD-10-CM

## 2019-06-01 DIAGNOSIS — Q26.2 TOTAL ANOMALOUS PULMONARY VENOUS CONNECTION: ICD-10-CM

## 2019-06-01 DIAGNOSIS — I07.1 RHEUMATIC TRICUSPID INSUFFICIENCY: ICD-10-CM

## 2019-06-01 DIAGNOSIS — Q89.3 SITUS INVERSUS: ICD-10-CM

## 2019-06-01 DIAGNOSIS — Q20.4 DOUBLE INLET VENTRICLE: ICD-10-CM

## 2019-06-01 DIAGNOSIS — R04.2 HEMOPTYSIS: ICD-10-CM

## 2019-06-01 LAB
ALBUMIN SERPL ELPH-MCNC: 3.3 G/DL — SIGNIFICANT CHANGE UP (ref 3.3–5)
ALBUMIN SERPL ELPH-MCNC: 3.5 G/DL — SIGNIFICANT CHANGE UP (ref 3.3–5)
ALBUMIN SERPL ELPH-MCNC: 3.5 G/DL — SIGNIFICANT CHANGE UP (ref 3.3–5)
ALP SERPL-CCNC: 100 U/L — SIGNIFICANT CHANGE UP (ref 40–120)
ALP SERPL-CCNC: 88 U/L — SIGNIFICANT CHANGE UP (ref 40–120)
ALP SERPL-CCNC: 97 U/L — SIGNIFICANT CHANGE UP (ref 40–120)
ALT FLD-CCNC: 13 U/L — SIGNIFICANT CHANGE UP (ref 4–41)
ALT FLD-CCNC: 13 U/L — SIGNIFICANT CHANGE UP (ref 4–41)
ALT FLD-CCNC: 21 U/L — SIGNIFICANT CHANGE UP (ref 4–41)
ANION GAP SERPL CALC-SCNC: 11 MMO/L — SIGNIFICANT CHANGE UP (ref 7–14)
ANION GAP SERPL CALC-SCNC: 12 MMO/L — SIGNIFICANT CHANGE UP (ref 7–14)
ANION GAP SERPL CALC-SCNC: 12 MMO/L — SIGNIFICANT CHANGE UP (ref 7–14)
ANION GAP SERPL CALC-SCNC: 14 MMO/L — SIGNIFICANT CHANGE UP (ref 7–14)
APPEARANCE UR: SIGNIFICANT CHANGE UP
APTT BLD: 35.1 SEC — SIGNIFICANT CHANGE UP (ref 27.5–36.3)
APTT BLD: 37.1 SEC — HIGH (ref 27.5–36.3)
APTT BLD: 45.1 SEC — HIGH (ref 27.5–36.3)
AST SERPL-CCNC: 25 U/L — SIGNIFICANT CHANGE UP (ref 4–40)
AST SERPL-CCNC: 42 U/L — HIGH (ref 4–40)
AST SERPL-CCNC: 59 U/L — HIGH (ref 4–40)
BACTERIA # UR AUTO: SIGNIFICANT CHANGE UP
BASE EXCESS BLDA CALC-SCNC: -2.5 MMOL/L — SIGNIFICANT CHANGE UP
BASE EXCESS BLDA CALC-SCNC: -3 MMOL/L — SIGNIFICANT CHANGE UP
BASE EXCESS BLDA CALC-SCNC: -3.6 MMOL/L — SIGNIFICANT CHANGE UP
BASE EXCESS BLDA CALC-SCNC: -3.8 MMOL/L — SIGNIFICANT CHANGE UP
BASOPHILS # BLD AUTO: 0.15 K/UL — SIGNIFICANT CHANGE UP (ref 0–0.2)
BASOPHILS # BLD AUTO: 0.16 K/UL — SIGNIFICANT CHANGE UP (ref 0–0.2)
BASOPHILS # BLD AUTO: 0.18 K/UL — SIGNIFICANT CHANGE UP (ref 0–0.2)
BASOPHILS NFR BLD AUTO: 0.8 % — SIGNIFICANT CHANGE UP (ref 0–2)
BASOPHILS NFR BLD AUTO: 1 % — SIGNIFICANT CHANGE UP (ref 0–2)
BASOPHILS NFR BLD AUTO: 1 % — SIGNIFICANT CHANGE UP (ref 0–2)
BILIRUB SERPL-MCNC: 2.2 MG/DL — HIGH (ref 0.2–1.2)
BILIRUB SERPL-MCNC: 2.4 MG/DL — HIGH (ref 0.2–1.2)
BILIRUB SERPL-MCNC: 2.4 MG/DL — HIGH (ref 0.2–1.2)
BILIRUB UR-MCNC: NEGATIVE — SIGNIFICANT CHANGE UP
BLD GP AB SCN SERPL QL: NEGATIVE — SIGNIFICANT CHANGE UP
BLOOD GAS ARTERIAL - FIO2: 100 — SIGNIFICANT CHANGE UP
BLOOD GAS ARTERIAL - FIO2: 100 — SIGNIFICANT CHANGE UP
BLOOD GAS ARTERIAL - FIO2: 70 — SIGNIFICANT CHANGE UP
BLOOD UR QL VISUAL: HIGH
BUN SERPL-MCNC: 17 MG/DL — SIGNIFICANT CHANGE UP (ref 7–23)
BUN SERPL-MCNC: 19 MG/DL — SIGNIFICANT CHANGE UP (ref 7–23)
BUN SERPL-MCNC: 24 MG/DL — HIGH (ref 7–23)
BUN SERPL-MCNC: 24 MG/DL — HIGH (ref 7–23)
CA-I BLDA-SCNC: 1.15 MMOL/L — SIGNIFICANT CHANGE UP (ref 1.15–1.29)
CA-I BLDA-SCNC: 1.18 MMOL/L — SIGNIFICANT CHANGE UP (ref 1.15–1.29)
CALCIUM SERPL-MCNC: 7.6 MG/DL — LOW (ref 8.4–10.5)
CALCIUM SERPL-MCNC: 7.8 MG/DL — LOW (ref 8.4–10.5)
CALCIUM SERPL-MCNC: 8.9 MG/DL — SIGNIFICANT CHANGE UP (ref 8.4–10.5)
CALCIUM SERPL-MCNC: 9.1 MG/DL — SIGNIFICANT CHANGE UP (ref 8.4–10.5)
CHLORIDE BLDA-SCNC: 110 MMOL/L — HIGH (ref 96–108)
CHLORIDE SERPL-SCNC: 105 MMOL/L — SIGNIFICANT CHANGE UP (ref 98–107)
CHLORIDE SERPL-SCNC: 106 MMOL/L — SIGNIFICANT CHANGE UP (ref 98–107)
CHLORIDE SERPL-SCNC: 107 MMOL/L — SIGNIFICANT CHANGE UP (ref 98–107)
CHLORIDE SERPL-SCNC: 107 MMOL/L — SIGNIFICANT CHANGE UP (ref 98–107)
CO2 SERPL-SCNC: 19 MMOL/L — LOW (ref 22–31)
CO2 SERPL-SCNC: 19 MMOL/L — LOW (ref 22–31)
CO2 SERPL-SCNC: 20 MMOL/L — LOW (ref 22–31)
CO2 SERPL-SCNC: 20 MMOL/L — LOW (ref 22–31)
COLOR SPEC: YELLOW — SIGNIFICANT CHANGE UP
CREAT SERPL-MCNC: 0.97 MG/DL — SIGNIFICANT CHANGE UP (ref 0.5–1.3)
CREAT SERPL-MCNC: 1.01 MG/DL — SIGNIFICANT CHANGE UP (ref 0.5–1.3)
CREAT SERPL-MCNC: 1.22 MG/DL — SIGNIFICANT CHANGE UP (ref 0.5–1.3)
CREAT SERPL-MCNC: 1.26 MG/DL — SIGNIFICANT CHANGE UP (ref 0.5–1.3)
EOSINOPHIL # BLD AUTO: 0.37 K/UL — SIGNIFICANT CHANGE UP (ref 0–0.5)
EOSINOPHIL # BLD AUTO: 0.79 K/UL — HIGH (ref 0–0.5)
EOSINOPHIL # BLD AUTO: 1.18 K/UL — HIGH (ref 0–0.5)
EOSINOPHIL NFR BLD AUTO: 2.1 % — SIGNIFICANT CHANGE UP (ref 0–6)
EOSINOPHIL NFR BLD AUTO: 4.9 % — SIGNIFICANT CHANGE UP (ref 0–6)
EOSINOPHIL NFR BLD AUTO: 6.2 % — HIGH (ref 0–6)
GLUCOSE BLDA-MCNC: 67 MG/DL — LOW (ref 70–99)
GLUCOSE BLDA-MCNC: 79 MG/DL — SIGNIFICANT CHANGE UP (ref 70–99)
GLUCOSE BLDA-MCNC: 88 MG/DL — SIGNIFICANT CHANGE UP (ref 70–99)
GLUCOSE BLDA-MCNC: 89 MG/DL — SIGNIFICANT CHANGE UP (ref 70–99)
GLUCOSE BLDC GLUCOMTR-MCNC: 112 MG/DL — HIGH (ref 70–99)
GLUCOSE BLDC GLUCOMTR-MCNC: 72 MG/DL — SIGNIFICANT CHANGE UP (ref 70–99)
GLUCOSE BLDC GLUCOMTR-MCNC: 72 MG/DL — SIGNIFICANT CHANGE UP (ref 70–99)
GLUCOSE SERPL-MCNC: 122 MG/DL — HIGH (ref 70–99)
GLUCOSE SERPL-MCNC: 68 MG/DL — LOW (ref 70–99)
GLUCOSE SERPL-MCNC: 83 MG/DL — SIGNIFICANT CHANGE UP (ref 70–99)
GLUCOSE SERPL-MCNC: 84 MG/DL — SIGNIFICANT CHANGE UP (ref 70–99)
GLUCOSE UR-MCNC: NEGATIVE — SIGNIFICANT CHANGE UP
HCO3 BLDA-SCNC: 20 MMOL/L — LOW (ref 22–26)
HCO3 BLDA-SCNC: 20 MMOL/L — LOW (ref 22–26)
HCO3 BLDA-SCNC: 21 MMOL/L — LOW (ref 22–26)
HCO3 BLDA-SCNC: 22 MMOL/L — SIGNIFICANT CHANGE UP (ref 22–26)
HCT VFR BLD CALC: 39.1 % — SIGNIFICANT CHANGE UP (ref 39–50)
HCT VFR BLD CALC: 41 % — SIGNIFICANT CHANGE UP (ref 39–50)
HCT VFR BLD CALC: 41.4 % — SIGNIFICANT CHANGE UP (ref 39–50)
HCT VFR BLD CALC: 44.4 % — SIGNIFICANT CHANGE UP (ref 39–50)
HCT VFR BLDA CALC: 37.6 % — LOW (ref 39–51)
HCT VFR BLDA CALC: 39.3 % — SIGNIFICANT CHANGE UP (ref 39–51)
HCT VFR BLDA CALC: 40.4 % — SIGNIFICANT CHANGE UP (ref 39–51)
HCT VFR BLDA CALC: 41.7 % — SIGNIFICANT CHANGE UP (ref 39–51)
HGB BLD-MCNC: 12.2 G/DL — LOW (ref 13–17)
HGB BLD-MCNC: 12.5 G/DL — LOW (ref 13–17)
HGB BLD-MCNC: 12.6 G/DL — LOW (ref 13–17)
HGB BLD-MCNC: 13.3 G/DL — SIGNIFICANT CHANGE UP (ref 13–17)
HGB BLDA-MCNC: 12.2 G/DL — LOW (ref 13–17)
HGB BLDA-MCNC: 12.8 G/DL — LOW (ref 13–17)
HGB BLDA-MCNC: 13.1 G/DL — SIGNIFICANT CHANGE UP (ref 13–17)
HGB BLDA-MCNC: 13.6 G/DL — SIGNIFICANT CHANGE UP (ref 13–17)
HYALINE CASTS # UR AUTO: SIGNIFICANT CHANGE UP
IMM GRANULOCYTES NFR BLD AUTO: 0.4 % — SIGNIFICANT CHANGE UP (ref 0–1.5)
IMM GRANULOCYTES NFR BLD AUTO: 0.4 % — SIGNIFICANT CHANGE UP (ref 0–1.5)
IMM GRANULOCYTES NFR BLD AUTO: 0.5 % — SIGNIFICANT CHANGE UP (ref 0–1.5)
INR BLD: 1.67 — HIGH (ref 0.88–1.17)
INR BLD: 1.81 — HIGH (ref 0.88–1.17)
INR BLD: 1.98 — HIGH (ref 0.88–1.17)
KETONES UR-MCNC: NEGATIVE — SIGNIFICANT CHANGE UP
LACTATE BLDA-SCNC: 1.2 MMOL/L — SIGNIFICANT CHANGE UP (ref 0.5–2)
LACTATE BLDA-SCNC: 1.3 MMOL/L — SIGNIFICANT CHANGE UP (ref 0.5–2)
LEUKOCYTE ESTERASE UR-ACNC: NEGATIVE — SIGNIFICANT CHANGE UP
LYMPHOCYTES # BLD AUTO: 1.28 K/UL — SIGNIFICANT CHANGE UP (ref 1–3.3)
LYMPHOCYTES # BLD AUTO: 1.63 K/UL — SIGNIFICANT CHANGE UP (ref 1–3.3)
LYMPHOCYTES # BLD AUTO: 1.78 K/UL — SIGNIFICANT CHANGE UP (ref 1–3.3)
LYMPHOCYTES # BLD AUTO: 10 % — LOW (ref 13–44)
LYMPHOCYTES # BLD AUTO: 7.9 % — LOW (ref 13–44)
LYMPHOCYTES # BLD AUTO: 8.6 % — LOW (ref 13–44)
MAGNESIUM SERPL-MCNC: 1.8 MG/DL — SIGNIFICANT CHANGE UP (ref 1.6–2.6)
MAGNESIUM SERPL-MCNC: 1.8 MG/DL — SIGNIFICANT CHANGE UP (ref 1.6–2.6)
MAGNESIUM SERPL-MCNC: 1.9 MG/DL — SIGNIFICANT CHANGE UP (ref 1.6–2.6)
MAGNESIUM SERPL-MCNC: 2.5 MG/DL — SIGNIFICANT CHANGE UP (ref 1.6–2.6)
MCHC RBC-ENTMCNC: 24 PG — LOW (ref 27–34)
MCHC RBC-ENTMCNC: 24.1 PG — LOW (ref 27–34)
MCHC RBC-ENTMCNC: 24.2 PG — LOW (ref 27–34)
MCHC RBC-ENTMCNC: 24.5 PG — LOW (ref 27–34)
MCHC RBC-ENTMCNC: 30 % — LOW (ref 32–36)
MCHC RBC-ENTMCNC: 30.4 % — LOW (ref 32–36)
MCHC RBC-ENTMCNC: 30.5 % — LOW (ref 32–36)
MCHC RBC-ENTMCNC: 31.2 % — LOW (ref 32–36)
MCV RBC AUTO: 78.7 FL — LOW (ref 80–100)
MCV RBC AUTO: 78.9 FL — LOW (ref 80–100)
MCV RBC AUTO: 79.5 FL — LOW (ref 80–100)
MCV RBC AUTO: 80.6 FL — SIGNIFICANT CHANGE UP (ref 80–100)
MONOCYTES # BLD AUTO: 1.06 K/UL — HIGH (ref 0–0.9)
MONOCYTES # BLD AUTO: 1.15 K/UL — HIGH (ref 0–0.9)
MONOCYTES # BLD AUTO: 1.3 K/UL — HIGH (ref 0–0.9)
MONOCYTES NFR BLD AUTO: 6 % — SIGNIFICANT CHANGE UP (ref 2–14)
MONOCYTES NFR BLD AUTO: 6.9 % — SIGNIFICANT CHANGE UP (ref 2–14)
MONOCYTES NFR BLD AUTO: 7.1 % — SIGNIFICANT CHANGE UP (ref 2–14)
NEUTROPHILS # BLD AUTO: 12.82 K/UL — HIGH (ref 1.8–7.4)
NEUTROPHILS # BLD AUTO: 14.33 K/UL — HIGH (ref 1.8–7.4)
NEUTROPHILS # BLD AUTO: 14.55 K/UL — HIGH (ref 1.8–7.4)
NEUTROPHILS NFR BLD AUTO: 76.9 % — SIGNIFICANT CHANGE UP (ref 43–77)
NEUTROPHILS NFR BLD AUTO: 78.6 % — HIGH (ref 43–77)
NEUTROPHILS NFR BLD AUTO: 80.7 % — HIGH (ref 43–77)
NITRITE UR-MCNC: NEGATIVE — SIGNIFICANT CHANGE UP
NRBC # FLD: 0 K/UL — SIGNIFICANT CHANGE UP (ref 0–0)
NRBC # FLD: 0.02 K/UL — SIGNIFICANT CHANGE UP (ref 0–0)
NRBC # FLD: 0.02 K/UL — SIGNIFICANT CHANGE UP (ref 0–0)
NRBC # FLD: 0.05 K/UL — SIGNIFICANT CHANGE UP (ref 0–0)
PCO2 BLDA: 44 MMHG — SIGNIFICANT CHANGE UP (ref 35–48)
PCO2 BLDA: 53 MMHG — HIGH (ref 35–48)
PCO2 BLDA: 54 MMHG — HIGH (ref 35–48)
PCO2 BLDA: 58 MMHG — HIGH (ref 35–48)
PH BLDA: 7.24 PH — LOW (ref 7.35–7.45)
PH BLDA: 7.25 PH — LOW (ref 7.35–7.45)
PH BLDA: 7.25 PH — LOW (ref 7.35–7.45)
PH BLDA: 7.32 PH — LOW (ref 7.35–7.45)
PH UR: 5.5 — SIGNIFICANT CHANGE UP (ref 5–8)
PHOSPHATE SERPL-MCNC: 2.4 MG/DL — LOW (ref 2.5–4.5)
PHOSPHATE SERPL-MCNC: 3.9 MG/DL — SIGNIFICANT CHANGE UP (ref 2.5–4.5)
PHOSPHATE SERPL-MCNC: 4.1 MG/DL — SIGNIFICANT CHANGE UP (ref 2.5–4.5)
PHOSPHATE SERPL-MCNC: 4.4 MG/DL — SIGNIFICANT CHANGE UP (ref 2.5–4.5)
PLATELET # BLD AUTO: 168 K/UL — SIGNIFICANT CHANGE UP (ref 150–400)
PLATELET # BLD AUTO: 171 K/UL — SIGNIFICANT CHANGE UP (ref 150–400)
PLATELET # BLD AUTO: 178 K/UL — SIGNIFICANT CHANGE UP (ref 150–400)
PLATELET # BLD AUTO: 189 K/UL — SIGNIFICANT CHANGE UP (ref 150–400)
PMV BLD: SIGNIFICANT CHANGE UP FL (ref 7–13)
PO2 BLDA: 117 MMHG — HIGH (ref 83–108)
PO2 BLDA: 39 MMHG — CRITICAL LOW (ref 83–108)
PO2 BLDA: 84 MMHG — SIGNIFICANT CHANGE UP (ref 83–108)
PO2 BLDA: 88 MMHG — SIGNIFICANT CHANGE UP (ref 83–108)
POTASSIUM BLDA-SCNC: 3.8 MMOL/L — SIGNIFICANT CHANGE UP (ref 3.4–4.5)
POTASSIUM BLDA-SCNC: 4.3 MMOL/L — SIGNIFICANT CHANGE UP (ref 3.4–4.5)
POTASSIUM BLDA-SCNC: 5.5 MMOL/L — HIGH (ref 3.4–4.5)
POTASSIUM BLDA-SCNC: 6 MMOL/L — HIGH (ref 3.4–4.5)
POTASSIUM SERPL-MCNC: 4.3 MMOL/L — SIGNIFICANT CHANGE UP (ref 3.5–5.3)
POTASSIUM SERPL-MCNC: 4.4 MMOL/L — SIGNIFICANT CHANGE UP (ref 3.5–5.3)
POTASSIUM SERPL-MCNC: 6 MMOL/L — HIGH (ref 3.5–5.3)
POTASSIUM SERPL-MCNC: 6.2 MMOL/L — CRITICAL HIGH (ref 3.5–5.3)
POTASSIUM SERPL-SCNC: 4.3 MMOL/L — SIGNIFICANT CHANGE UP (ref 3.5–5.3)
POTASSIUM SERPL-SCNC: 4.4 MMOL/L — SIGNIFICANT CHANGE UP (ref 3.5–5.3)
POTASSIUM SERPL-SCNC: 6 MMOL/L — HIGH (ref 3.5–5.3)
POTASSIUM SERPL-SCNC: 6.2 MMOL/L — CRITICAL HIGH (ref 3.5–5.3)
PROT SERPL-MCNC: 5.9 G/DL — LOW (ref 6–8.3)
PROT SERPL-MCNC: 6 G/DL — SIGNIFICANT CHANGE UP (ref 6–8.3)
PROT SERPL-MCNC: 6.2 G/DL — SIGNIFICANT CHANGE UP (ref 6–8.3)
PROT UR-MCNC: SIGNIFICANT CHANGE UP
PROTHROM AB SERPL-ACNC: 19.3 SEC — HIGH (ref 9.8–13.1)
PROTHROM AB SERPL-ACNC: 20.5 SEC — HIGH (ref 9.8–13.1)
PROTHROM AB SERPL-ACNC: 23 SEC — HIGH (ref 9.8–13.1)
RBC # BLD: 4.97 M/UL — SIGNIFICANT CHANGE UP (ref 4.2–5.8)
RBC # BLD: 5.16 M/UL — SIGNIFICANT CHANGE UP (ref 4.2–5.8)
RBC # BLD: 5.25 M/UL — SIGNIFICANT CHANGE UP (ref 4.2–5.8)
RBC # BLD: 5.51 M/UL — SIGNIFICANT CHANGE UP (ref 4.2–5.8)
RBC # FLD: 22 % — HIGH (ref 10.3–14.5)
RBC # FLD: 22.5 % — HIGH (ref 10.3–14.5)
RBC # FLD: 22.5 % — HIGH (ref 10.3–14.5)
RBC # FLD: 23.2 % — HIGH (ref 10.3–14.5)
RBC CASTS # UR COMP ASSIST: SIGNIFICANT CHANGE UP (ref 0–?)
RH IG SCN BLD-IMP: NEGATIVE — SIGNIFICANT CHANGE UP
SAO2 % BLDA: 61.1 % — LOW (ref 95–99)
SAO2 % BLDA: 95.4 % — SIGNIFICANT CHANGE UP (ref 95–99)
SAO2 % BLDA: 95.8 % — SIGNIFICANT CHANGE UP (ref 95–99)
SAO2 % BLDA: 97.2 % — SIGNIFICANT CHANGE UP (ref 95–99)
SODIUM BLDA-SCNC: 131 MMOL/L — LOW (ref 136–146)
SODIUM BLDA-SCNC: 136 MMOL/L — SIGNIFICANT CHANGE UP (ref 136–146)
SODIUM BLDA-SCNC: 138 MMOL/L — SIGNIFICANT CHANGE UP (ref 136–146)
SODIUM BLDA-SCNC: 138 MMOL/L — SIGNIFICANT CHANGE UP (ref 136–146)
SODIUM SERPL-SCNC: 136 MMOL/L — SIGNIFICANT CHANGE UP (ref 135–145)
SODIUM SERPL-SCNC: 137 MMOL/L — SIGNIFICANT CHANGE UP (ref 135–145)
SODIUM SERPL-SCNC: 138 MMOL/L — SIGNIFICANT CHANGE UP (ref 135–145)
SODIUM SERPL-SCNC: 141 MMOL/L — SIGNIFICANT CHANGE UP (ref 135–145)
SP GR SPEC: 1.04 — SIGNIFICANT CHANGE UP (ref 1–1.04)
SQUAMOUS # UR AUTO: SIGNIFICANT CHANGE UP
UROBILINOGEN FLD QL: NORMAL — SIGNIFICANT CHANGE UP
WBC # BLD: 16.28 K/UL — HIGH (ref 3.8–10.5)
WBC # BLD: 16.63 K/UL — HIGH (ref 3.8–10.5)
WBC # BLD: 17.76 K/UL — HIGH (ref 3.8–10.5)
WBC # BLD: 18.91 K/UL — HIGH (ref 3.8–10.5)
WBC # FLD AUTO: 16.28 K/UL — HIGH (ref 3.8–10.5)
WBC # FLD AUTO: 16.63 K/UL — HIGH (ref 3.8–10.5)
WBC # FLD AUTO: 17.76 K/UL — HIGH (ref 3.8–10.5)
WBC # FLD AUTO: 18.91 K/UL — HIGH (ref 3.8–10.5)
WBC UR QL: SIGNIFICANT CHANGE UP (ref 0–?)

## 2019-06-01 PROCEDURE — 31646 BRNCHSC W/THER ASPIR SBSQ: CPT

## 2019-06-01 PROCEDURE — 99223 1ST HOSP IP/OBS HIGH 75: CPT

## 2019-06-01 PROCEDURE — 36217 PLACE CATHETER IN ARTERY: CPT

## 2019-06-01 PROCEDURE — 99291 CRITICAL CARE FIRST HOUR: CPT | Mod: 25

## 2019-06-01 PROCEDURE — 36216 PLACE CATHETER IN ARTERY: CPT | Mod: 59

## 2019-06-01 PROCEDURE — 93970 EXTREMITY STUDY: CPT | Mod: 26

## 2019-06-01 PROCEDURE — 76937 US GUIDE VASCULAR ACCESS: CPT | Mod: 26

## 2019-06-01 PROCEDURE — 36215 PLACE CATHETER IN ARTERY: CPT | Mod: 59

## 2019-06-01 PROCEDURE — 31634 BRONCH W/BALLOON OCCLUSION: CPT | Mod: GC

## 2019-06-01 PROCEDURE — 37244 VASC EMBOLIZE/OCCLUDE BLEED: CPT

## 2019-06-01 PROCEDURE — 99233 SBSQ HOSP IP/OBS HIGH 50: CPT | Mod: 57

## 2019-06-01 RX ORDER — MAGNESIUM SULFATE 500 MG/ML
2 VIAL (ML) INJECTION ONCE
Refills: 0 | Status: DISCONTINUED | OUTPATIENT
Start: 2019-06-01 | End: 2019-06-01

## 2019-06-01 RX ORDER — SODIUM CHLORIDE 9 MG/ML
500 INJECTION, SOLUTION INTRAVENOUS ONCE
Refills: 0 | Status: DISCONTINUED | OUTPATIENT
Start: 2019-06-01 | End: 2019-06-01

## 2019-06-01 RX ORDER — NOREPINEPHRINE BITARTRATE/D5W 8 MG/250ML
0.05 PLASTIC BAG, INJECTION (ML) INTRAVENOUS
Qty: 8 | Refills: 0 | Status: DISCONTINUED | OUTPATIENT
Start: 2019-06-01 | End: 2019-06-01

## 2019-06-01 RX ORDER — MIDAZOLAM HYDROCHLORIDE 1 MG/ML
2 INJECTION, SOLUTION INTRAMUSCULAR; INTRAVENOUS ONCE
Refills: 0 | Status: DISCONTINUED | OUTPATIENT
Start: 2019-06-01 | End: 2019-06-01

## 2019-06-01 RX ORDER — DEXTROSE 50 % IN WATER 50 %
50 SYRINGE (ML) INTRAVENOUS ONCE
Refills: 0 | Status: COMPLETED | OUTPATIENT
Start: 2019-06-01 | End: 2019-06-01

## 2019-06-01 RX ORDER — MIDAZOLAM HYDROCHLORIDE 1 MG/ML
0.02 INJECTION, SOLUTION INTRAMUSCULAR; INTRAVENOUS
Qty: 100 | Refills: 0 | Status: DISCONTINUED | OUTPATIENT
Start: 2019-06-01 | End: 2019-06-03

## 2019-06-01 RX ORDER — ACETAMINOPHEN 500 MG
1000 TABLET ORAL ONCE
Refills: 0 | Status: COMPLETED | OUTPATIENT
Start: 2019-06-01 | End: 2019-06-01

## 2019-06-01 RX ORDER — AZITHROMYCIN 500 MG/1
TABLET, FILM COATED ORAL
Refills: 0 | Status: DISCONTINUED | OUTPATIENT
Start: 2019-06-01 | End: 2019-06-04

## 2019-06-01 RX ORDER — FENTANYL CITRATE 50 UG/ML
50 INJECTION INTRAVENOUS ONCE
Refills: 0 | Status: DISCONTINUED | OUTPATIENT
Start: 2019-06-01 | End: 2019-06-01

## 2019-06-01 RX ORDER — POTASSIUM PHOSPHATE, MONOBASIC POTASSIUM PHOSPHATE, DIBASIC 236; 224 MG/ML; MG/ML
15 INJECTION, SOLUTION INTRAVENOUS ONCE
Refills: 0 | Status: COMPLETED | OUTPATIENT
Start: 2019-06-01 | End: 2019-06-01

## 2019-06-01 RX ORDER — SODIUM CHLORIDE 9 MG/ML
500 INJECTION, SOLUTION INTRAVENOUS ONCE
Refills: 0 | Status: COMPLETED | OUTPATIENT
Start: 2019-06-01 | End: 2019-06-01

## 2019-06-01 RX ORDER — ALBUTEROL 90 UG/1
10 AEROSOL, METERED ORAL ONCE
Refills: 0 | Status: COMPLETED | OUTPATIENT
Start: 2019-06-01 | End: 2019-06-01

## 2019-06-01 RX ORDER — SODIUM CHLORIDE 9 MG/ML
1000 INJECTION, SOLUTION INTRAVENOUS
Refills: 0 | Status: DISCONTINUED | OUTPATIENT
Start: 2019-06-01 | End: 2019-06-01

## 2019-06-01 RX ORDER — ALBUTEROL 90 UG/1
2.5 AEROSOL, METERED ORAL ONCE
Refills: 0 | Status: DISCONTINUED | OUTPATIENT
Start: 2019-06-01 | End: 2019-06-01

## 2019-06-01 RX ORDER — MAGNESIUM SULFATE 500 MG/ML
2 VIAL (ML) INJECTION ONCE
Refills: 0 | Status: COMPLETED | OUTPATIENT
Start: 2019-06-01 | End: 2019-06-01

## 2019-06-01 RX ORDER — AZITHROMYCIN 500 MG/1
500 TABLET, FILM COATED ORAL ONCE
Refills: 0 | Status: COMPLETED | OUTPATIENT
Start: 2019-06-01 | End: 2019-06-01

## 2019-06-01 RX ORDER — SODIUM POLYSTYRENE SULFONATE 4.1 MEQ/G
30 POWDER, FOR SUSPENSION ORAL ONCE
Refills: 0 | Status: DISCONTINUED | OUTPATIENT
Start: 2019-06-01 | End: 2019-06-01

## 2019-06-01 RX ORDER — PIPERACILLIN AND TAZOBACTAM 4; .5 G/20ML; G/20ML
3.38 INJECTION, POWDER, LYOPHILIZED, FOR SOLUTION INTRAVENOUS EVERY 8 HOURS
Refills: 0 | Status: DISCONTINUED | OUTPATIENT
Start: 2019-06-01 | End: 2019-06-08

## 2019-06-01 RX ORDER — VANCOMYCIN HCL 1 G
1000 VIAL (EA) INTRAVENOUS EVERY 12 HOURS
Refills: 0 | Status: DISCONTINUED | OUTPATIENT
Start: 2019-06-01 | End: 2019-06-04

## 2019-06-01 RX ORDER — AZITHROMYCIN 500 MG/1
500 TABLET, FILM COATED ORAL EVERY 24 HOURS
Refills: 0 | Status: DISCONTINUED | OUTPATIENT
Start: 2019-06-02 | End: 2019-06-04

## 2019-06-01 RX ORDER — SODIUM CHLORIDE 9 MG/ML
1000 INJECTION INTRAMUSCULAR; INTRAVENOUS; SUBCUTANEOUS
Refills: 0 | Status: DISCONTINUED | OUTPATIENT
Start: 2019-06-01 | End: 2019-06-01

## 2019-06-01 RX ORDER — CALCIUM GLUCONATE 100 MG/ML
2 VIAL (ML) INTRAVENOUS ONCE
Refills: 0 | Status: COMPLETED | OUTPATIENT
Start: 2019-06-01 | End: 2019-06-01

## 2019-06-01 RX ORDER — INSULIN HUMAN 100 [IU]/ML
10 INJECTION, SOLUTION SUBCUTANEOUS ONCE
Refills: 0 | Status: COMPLETED | OUTPATIENT
Start: 2019-06-01 | End: 2019-06-01

## 2019-06-01 RX ORDER — PHENYLEPHRINE HYDROCHLORIDE 10 MG/ML
0.2 INJECTION INTRAVENOUS
Qty: 160 | Refills: 0 | Status: DISCONTINUED | OUTPATIENT
Start: 2019-06-01 | End: 2019-06-06

## 2019-06-01 RX ADMIN — SODIUM CHLORIDE 100 MILLILITER(S): 9 INJECTION INTRAMUSCULAR; INTRAVENOUS; SUBCUTANEOUS at 02:56

## 2019-06-01 RX ADMIN — INSULIN HUMAN 10 UNIT(S): 100 INJECTION, SOLUTION SUBCUTANEOUS at 08:29

## 2019-06-01 RX ADMIN — FENTANYL CITRATE 5.3 MICROGRAM(S)/KG/HR: 50 INJECTION INTRAVENOUS at 20:42

## 2019-06-01 RX ADMIN — SODIUM CHLORIDE 100 MILLILITER(S): 9 INJECTION INTRAMUSCULAR; INTRAVENOUS; SUBCUTANEOUS at 08:29

## 2019-06-01 RX ADMIN — SODIUM CHLORIDE 500 MILLILITER(S): 9 INJECTION, SOLUTION INTRAVENOUS at 09:30

## 2019-06-01 RX ADMIN — Medication 50 GRAM(S): at 10:38

## 2019-06-01 RX ADMIN — PHENYLEPHRINE HYDROCHLORIDE 1.99 MICROGRAM(S)/KG/MIN: 10 INJECTION INTRAVENOUS at 20:42

## 2019-06-01 RX ADMIN — Medication 50 MILLILITER(S): at 08:21

## 2019-06-01 RX ADMIN — PANTOPRAZOLE SODIUM 40 MILLIGRAM(S): 20 TABLET, DELAYED RELEASE ORAL at 06:32

## 2019-06-01 RX ADMIN — Medication 4.97 MICROGRAM(S)/KG/MIN: at 08:22

## 2019-06-01 RX ADMIN — AZITHROMYCIN 250 MILLIGRAM(S): 500 TABLET, FILM COATED ORAL at 18:00

## 2019-06-01 RX ADMIN — Medication 400 MILLIGRAM(S): at 02:55

## 2019-06-01 RX ADMIN — MIDAZOLAM HYDROCHLORIDE 2 MILLIGRAM(S): 1 INJECTION, SOLUTION INTRAMUSCULAR; INTRAVENOUS at 14:45

## 2019-06-01 RX ADMIN — PIPERACILLIN AND TAZOBACTAM 25 GRAM(S): 4; .5 INJECTION, POWDER, LYOPHILIZED, FOR SOLUTION INTRAVENOUS at 20:41

## 2019-06-01 RX ADMIN — FENTANYL CITRATE 50 MICROGRAM(S): 50 INJECTION INTRAVENOUS at 22:30

## 2019-06-01 RX ADMIN — ALBUTEROL 10 MILLIGRAM(S): 90 AEROSOL, METERED ORAL at 11:24

## 2019-06-01 RX ADMIN — MIDAZOLAM HYDROCHLORIDE 1.06 MG/KG/HR: 1 INJECTION, SOLUTION INTRAMUSCULAR; INTRAVENOUS at 08:24

## 2019-06-01 RX ADMIN — PANTOPRAZOLE SODIUM 40 MILLIGRAM(S): 20 TABLET, DELAYED RELEASE ORAL at 20:41

## 2019-06-01 RX ADMIN — FENTANYL CITRATE 5.3 MICROGRAM(S)/KG/HR: 50 INJECTION INTRAVENOUS at 08:23

## 2019-06-01 RX ADMIN — Medication 250 MILLIGRAM(S): at 16:38

## 2019-06-01 RX ADMIN — Medication 1 SPRAY(S): at 06:32

## 2019-06-01 RX ADMIN — Medication 4.97 MICROGRAM(S)/KG/MIN: at 02:55

## 2019-06-01 RX ADMIN — CHLORHEXIDINE GLUCONATE 1 APPLICATION(S): 213 SOLUTION TOPICAL at 16:15

## 2019-06-01 RX ADMIN — MIDAZOLAM HYDROCHLORIDE 2 MILLIGRAM(S): 1 INJECTION, SOLUTION INTRAMUSCULAR; INTRAVENOUS at 15:30

## 2019-06-01 RX ADMIN — POTASSIUM PHOSPHATE, MONOBASIC POTASSIUM PHOSPHATE, DIBASIC 62.5 MILLIMOLE(S): 236; 224 INJECTION, SOLUTION INTRAVENOUS at 22:29

## 2019-06-01 RX ADMIN — FENTANYL CITRATE 5.3 MICROGRAM(S)/KG/HR: 50 INJECTION INTRAVENOUS at 02:56

## 2019-06-01 RX ADMIN — Medication 200 GRAM(S): at 10:38

## 2019-06-01 RX ADMIN — MIDAZOLAM HYDROCHLORIDE 1.06 MG/KG/HR: 1 INJECTION, SOLUTION INTRAMUSCULAR; INTRAVENOUS at 02:55

## 2019-06-01 RX ADMIN — MIDAZOLAM HYDROCHLORIDE 1.06 MG/KG/HR: 1 INJECTION, SOLUTION INTRAMUSCULAR; INTRAVENOUS at 20:42

## 2019-06-01 RX ADMIN — Medication 50 MILLILITER(S): at 13:00

## 2019-06-01 RX ADMIN — Medication 1000 MILLIGRAM(S): at 03:15

## 2019-06-01 RX ADMIN — FENTANYL CITRATE 50 MICROGRAM(S): 50 INJECTION INTRAVENOUS at 22:45

## 2019-06-01 NOTE — CHART NOTE - NSCHARTNOTEFT_GEN_A_CORE
Pt with endobronchial blocker in place in RUL    FOB done to evaluate the placement of the blocker  and to evaluate for bleeding,    FOB done through the et tube, no bleeding noted,  catheter in place, balloon deflated and reinflated,    Pt tolerated the procedure well o2 sat 100%.

## 2019-06-01 NOTE — PROGRESS NOTE ADULT - SUBJECTIVE AND OBJECTIVE BOX
Vascular & Interventional Radiology Post-Procedure Note    Pre-Procedure Diagnosis: History of Fontan procedure, hemoptysis  Post-Procedure Diagnosis: Same as pre.  Indications for Procedure: Posterior segment right upper lobe bleeding seen on bronchoscopy    : Dr. Boston  Assistant(s): Dr. Blair, Dr. Chris    Procedure Details/Findings:  Multiple right-sided aortopulmonary collaterals studied and embolized.  Right bronchial artery studied and embolized.  Multiple right thoracic intercostal arteries studied and embolized.    Access (if applicable): right femoral artery, 5 Brazilian    Complications: None  Estimated Blood Loss: 20cc  Contrast: 120cc Omnipaque 350  Sedation: General anesthesia  Patient Condition/Disposition: Guarded. Return to MICU Vascular & Interventional Radiology Post-Procedure Note    Pre-Procedure Diagnosis: History of Fontan procedure, hemoptysis  Post-Procedure Diagnosis: Same as pre.  Indications for Procedure: Posterior segment right upper lobe bleeding seen on bronchoscopy    : Dr. Boston  Assistant(s): Dr. Blair, Dr. Chris    Procedure Details/Findings:  Multiple right-sided aortopulmonary collaterals studied and embolized.  Right bronchial artery studied and embolized.  Multiple right thoracic intercostal arteries studied and embolized.    Access (if applicable): right femoral artery, 5 Azerbaijani    Complications: None  Estimated Blood Loss: 20cc  Contrast: 120cc Omnipaque 350   Sedation: General anesthesia  Patient Condition/Disposition: Guarded. Return to MICU

## 2019-06-01 NOTE — PROGRESS NOTE ADULT - ASSESSMENT
25 yo M w/ hxo congenital heart disease/heterotaxy s/p multiple cardiac surgeries including Fontan's procedure presenting with hemoptysis, requiring intubation. Successful embolization yesterday of RUL vessels although patient still critically ill on pressors  No acute thoracic surgical intervention given successful embolization  Management per medical team  Thoracic surgery to follow, please call with questions [phone]  Patient seen and assessed w/ Dr. Cunningham who agrees

## 2019-06-01 NOTE — PROGRESS NOTE PEDS - ASSESSMENT
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 Now s/p IR embolization of right upper lobe aortopulmonary collateral vessels likely culprits for his hemoptysis.    #Fontan physiology. Fontan physiology is preload dependent and dependent on unobstructed flow through PAs. baseline saturation for him is in the upper 80s/low 90s likely related to veno-venous collateral vessels that serve as right to left shunting.  Known history of asplenia  - wean oxygen for goal saturations >85% on room air  - would wean to extubate, positive pressure can impede venous return, although he's on low PEEP (5 cmh20)  - with bump in WBC, fevers, and hypotension, would cover with antibiotics as functionally asplenic    #Hemoptysis. No further episodes since yesterday. No bleeding overnight, ETT clear.   - trend H/H    #h/o extensive RLE DVT in March 2018 with a prior history of venous injury to the right lower extremity  - once clear from bleeding, would consider restarting anticoagulation as had extensive DVT last year when antiplatelet therapy was discontinued after a presentation for hemoptysis    #MELLO. Multifactorial from contrast exposure x 2, hypovolemia (NPO, blood loss) and hypotension.   - would keep on IVFs  - trend BMP  - okay to hold digoxin in like of MELLO, hyperkalemia

## 2019-06-01 NOTE — PROGRESS NOTE ADULT - SUBJECTIVE AND OBJECTIVE BOX
S:  Went w/ to IR last night with successful embolization.  Per RN, no further blood in ETT.    O: ICU Vital Signs Last 24 Hrs  T(C): 35.3 (01 Jun 2019 07:30), Max: 39.7 (01 Jun 2019 02:00)  T(F): 95.5 (01 Jun 2019 07:30), Max: 103.4 (01 Jun 2019 02:00)  HR: 102 (01 Jun 2019 08:44) (72 - 102)  BP: 133/65 (01 Jun 2019 08:44) (77/43 - 146/78)  BP(mean): 83 (01 Jun 2019 08:44) (53 - 117)  ABP: --  ABP(mean): --  RR: 16 (01 Jun 2019 08:44) (10 - 22)  SpO2: 95% (01 Jun 2019 08:44) (73% - 98%)    NAD  Intubated  Sedated  ETT in place, no blood in tube                          12.6   17.76 )-----------( 171      ( 01 Jun 2019 04:24 )             41.4     06-01    138  |  107  |  24<H>  ----------------------------<  84  6.0<H>   |  19<L>  |  1.22    Ca    7.8<L>      01 Jun 2019 06:00  Phos  4.1     06-01  Mg     1.8     06-01    TPro  6.0  /  Alb  3.5  /  TBili  2.4<H>  /  DBili  x   /  AST  25  /  ALT  13  /  AlkPhos  97  06-01

## 2019-06-01 NOTE — PROGRESS NOTE ADULT - ATTENDING COMMENTS
Hemoptysis requiring intubation and embolization.  Continue MV, bronch planned for tomorrow to evaluate formation of clot after rebleeding today.  Will keep sedated, keep endobronchial blocker in RUL. Deflating in 4-6 hrs under direct visualization, if still oozing will reinflate.  Starting abx, for presumed infection ( hypothermia and elevation of WBC).

## 2019-06-01 NOTE — PROGRESS NOTE ADULT - ASSESSMENT
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 Pending bronchoscopy     #Neuro: no active issues.     #CV: Heterotaxy s/p multiple cardiac surgeries including Fontan's: hemodynamically stable.   -Adult congenital cardiology, CT surgery, and ENT aware of patient being at [facility].   -Plan for bronch today and possible angiogram next week   -hold Coumadin. KCentra overnight. INR 1.1 now.   -LE dopplers pending     #Pulm: Hemoptysis: concern for bleeding from aortopulmonary collaterals vs. GI source. Pt has 5 year hx of hemoptysis with evaluation by ENT yesterday showing no tracheal or upper airway bleeding.   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 Gastritis and duodenal erosions were also seen possibly 2/2 ASA gastropathy.   SpO2 at baseline high 80's low 90's.   Multiple bronchoscopies in the past all unremarkable   -CT angio chest, CT angio neck showed patchy RUL opacity which may represent known hemorrhage.   -Will bronch today in ICU--if able to localize bleed, will call IR for embolization   -morphine 0.5 IVP prn q4h for pain.  -Hycodan cough suppressant q6h for now.  -c/w Atrovent q6h.  -c/w Flonase.    #GI: Cervical esophageal varices: possible varices seen on prior upper endoscopy 3/2018. GI evaluation pending. No active bleeding at this time, though reportedly had small volume hemoptysis prior   -no endoscopy offered by GI at [facility]   -protonix 40mg IVP BID for now. NPO for now     #Renal: no active issues    #ID: no active issues    #Endocrine: no active issues    #Heme: no active issues    #DVT ppx: SCD's    #Lines: PIV's    #GOC: full code 25 y/o M with PMH complex congenital single atrium/ventricle physiology/heterotaxy s/p multiple cardiac surgeries including Fontan's procedure, RLE DVT 1 year ago on warfarin, chronic intermittent hemoptysis for five years presenting for recurrent episode of hemoptysis.    #Neuro: no active issues  - Sedated on fent, PRN ativan  - Careful sedation as pre-load dependent    #CV: Heterotaxy with congenital single atrium/ventricle s/p multiple cardiac surgeries including Fontan's  - Switch levo to phenylephrine  - IVF given hypotension, blood loss, and pre-load dependent  - Possible angiogram next week per cardiology  - Hold Coumadin. Elevated INR, FFP today.  - Hold hold digoxin given elevated K.  - Pending LE dopplers    #Pulm: Hemoptysis, s/p bronch 5/31 showing RUL bleeding. SpO2 at baseline high 80's given cardiopulmonary pathophysiology.  - s/p IR embolization 5/31 of R-sided aortopulmonary collaterals, R bronchial artery, multiple R thoracic intercostal arteries; likely a/w collaterals from known R pulm vein stenosis.  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 Gastritis and duodenal erosions were also seen possibly 2/2 ASA gastropathy.   - NPO for now given hemoptysis  - protonix 40 daily    #ID  - febrile, elevated white count, no atelectasis on POCUS  - start empiric vanc, zosyn, azithro  - BCx, UA    #Renal  - MELLO likely pre-renal in setting of poor PO intake, hemoptysis vs possible PATRICA  - Elevated K, insulin d50, albuterol  - Goal K>4, Mg>2    #Endocrine  - FSBG q6 while NPO  - D5 1/2NS given NPO and hypoglycemia    #Heme  - Reactive leukocytosis vs infection, started on empiric abx, monitor CBC  - Monitor INR    #DVT ppx: SCDs    #GOC: full code

## 2019-06-01 NOTE — CHART NOTE - NSCHARTNOTEFT_GEN_A_CORE
Pre-Bronchoscopy Tuberculosis Risk Screening Tool  To reduce the risk for airborne transmission of Mycobacterium tuberculosis, this assessment form must be completed prior to bronchoscopy procedures being performed outside of a negative pressure environment.    Procedure Date: 6/1/2019  Health Care Provider Name: Jewel Ellsworth  Form Completed By: Self  Reason for the Bronchoscopy: Hemoptysis  Date of Procedure: 6/1/2019  Planned Location for the Procedure: MICU  ?[ ] Emergency Department ?[x] Intensive Care Unit ? [ ] Operating Room ? Other: ___________________    Risk Assessment  I. I have personally evaluated this patient for Mycobacterium tuberculosis and I determined thefollowing risk:  ?[x] No Risk for TB     [ ] Low risk or TB     [ ] Significant risk for TB    II. Additional Findings: _________________________    III. Based on the Determined Risk for TB the following Action(s) are Suggested:  1. If there are no risk factors for TB infection proceed with the procedure.  2. If there is low risk or significant risk for TB infection the following recommendations should befollowed:            a. Perform the procedure in a negative pressure room, with N95 respirator.            b. If not feasible to move the patient or defer the procedure:                    i. Use a single-bedded room low traffic area to perform the bronchoscopy procedure.                   ii. Place a portable high-efficiency particulate air (HEPA) filter in the space prior tostarting the procedure and keep closed.                       Refer to the INF.1132 titled“Tuberculosis Control Strategy Plan” for additional information.                  iii. All Health Care Provider within the procedure room shall wear an N95 respirator.            c. Documentation of the tuberculosis risk assessment should be included within the patient’smedical record.        ICU BRONCHOSCOPY PROCEDURE NOTE                                                             : Luigi Wilson Andrew Weber  PROCEDURE:  Flexible bronchoscopy with endobronchial block placement  Position: Supine  Intubation site: ETT  INDICATION: Hemoptysis  FINDINGS: Bronchocope was inserted through ETT to the distal trachea to the level of the MC with ease. The tracheobronchial tree was inspected to the level of the segmental anatomy bilaterally. Brisk bleed with intermittent clot formation was found coming from RUL. Endobronchial block #5 was placed successfully with termination of bleeding. Bronchoscope was removed and procedure stopped.   SPECIMENS: None  COMPLICATIONS: none  IMPRESSION: RUL bleeding s/p endobronchial block #5 placement  PLAN:  Leave endobronchial block in RUL for now. Deflate temoporarily to prevent bronchial necrosis and re-inflate. Plan to re-assess need for placement in 24 hours with repeat bronchoscope procedure Pre-Bronchoscopy Tuberculosis Risk Screening Tool  To reduce the risk for airborne transmission of Mycobacterium tuberculosis, this assessment form must be completed prior to bronchoscopy procedures being performed outside of a negative pressure environment.    Procedure Date: 6/1/2019  Health Care Provider Name: Jewel Ellsworth  Form Completed By: Self  Reason for the Bronchoscopy: Hemoptysis  Date of Procedure: 6/1/2019  Planned Location for the Procedure: MICU  ?[ ] Emergency Department ?[x] Intensive Care Unit ? [ ] Operating Room ? Other: ___________________    Risk Assessment  I. I have personally evaluated this patient for Mycobacterium tuberculosis and I determined thefollowing risk:  ?[x] No Risk for TB     [ ] Low risk or TB     [ ] Significant risk for TB    II. Additional Findings: _________________________    III. Based on the Determined Risk for TB the following Action(s) are Suggested:  1. If there are no risk factors for TB infection proceed with the procedure.  2. If there is low risk or significant risk for TB infection the following recommendations should befollowed:            a. Perform the procedure in a negative pressure room, with N95 respirator.            b. If not feasible to move the patient or defer the procedure:                    i. Use a single-bedded room low traffic area to perform the bronchoscopy procedure.                   ii. Place a portable high-efficiency particulate air (HEPA) filter in the space prior tostarting the procedure and keep closed.                       Refer to the INF.1132 titled“Tuberculosis Control Strategy Plan” for additional information.                  iii. All Health Care Provider within the procedure room shall wear an N95 respirator.            c. Documentation of the tuberculosis risk assessment should be included within the patient’smedical record.        ICU BRONCHOSCOPY PROCEDURE NOTE                                                             : Luigi Wilson Andrew Weber  PROCEDURE:  Flexible bronchoscopy with endobronchial block placement  Position: Supine  Intubation site: ETT  INDICATION: Hemoptysis  FINDINGS: Bronchocope was inserted through ETT to the distal trachea to the level of the MC with ease. The tracheobronchial tree was inspected to the level of the segmental anatomy bilaterally. Brisk bleed with intermittent clot formation was found coming from RUL. Endobronchial block #7 was placed successfully with termination of bleeding. Bronchoscope was removed and procedure stopped.   SPECIMENS: None  COMPLICATIONS: none  IMPRESSION: RUL bleeding s/p endobronchial block #7 placement  PLAN:  Leave endobronchial block in RUL for now. Deflate temoporarily to prevent bronchial necrosis and re-inflate. Plan to re-assess need for placement in 24 hours with repeat bronchoscope procedure

## 2019-06-01 NOTE — PROGRESS NOTE PEDS - SUBJECTIVE AND OBJECTIVE BOX
Adult Congenital Heart Disease  Progress Note    S: transferred to [facility] yesterday afternoon. Bronch revealed RUL bleeding. Taken to IR where had coiling of RUL aortopulmonary collateral vessels. no bleeding since procedure. Remains intubated. Initial temp > 103 on return, started on cooling blankets. Hypotension leaving IR, started on norepi    O:  ICU Vital Signs Last 24 Hrs  T(C): 35.3 (01 Jun 2019 04:00), Max: 39.7 (01 Jun 2019 02:00)  T(F): 95.6 (01 Jun 2019 04:00), Max: 103.4 (01 Jun 2019 02:00)  HR: 89 (01 Jun 2019 07:52) (72 - 100)  BP: 96/59 (01 Jun 2019 07:00) (77/43 - 146/78)  BP(mean): 71 (01 Jun 2019 07:00) (54 - 117)  ABP: --  ABP(mean): --  RR: 16 (01 Jun 2019 07:00) (10 - 22)  SpO2: 93% (01 Jun 2019 07:52) (73% - 98%)    intubated, sedateded  ett in place- clear  s1 single s2 3/6 HSM at [facility], pectus  clear lungs anterior  soft abdomen  right groin site with dressing  cooler lower extremities (on cooling blanket), mottling to plantar surface of left leg    labs:                        12.6   17.76 )-----------( 171      ( 01 Jun 2019 04:24 )             41.4   06-01    138  |  107  |  24<H>  ----------------------------<  84  6.0<H>   |  19<L>  |  1.22    Ca    7.8<L>      01 Jun 2019 06:00  Phos  4.1     06-01  Mg     1.8     06-01    TPro  6.0  /  Alb  3.5  /  TBili  2.4<H>  /  DBili  x   /  AST  25  /  ALT  13  /  AlkPhos  97  06-01    PT/INR - ( 01 Jun 2019 04:24 )   PT: 19.3 SEC;   INR: 1.67       PTT - ( 01 Jun 2019 04:24 )  PTT:37.1 SEC    telemetry:  sinus rhythm  isolated PVCs  one ventricular couplet

## 2019-06-02 LAB
ALBUMIN SERPL ELPH-MCNC: 3.2 G/DL — LOW (ref 3.3–5)
ALP SERPL-CCNC: 93 U/L — SIGNIFICANT CHANGE UP (ref 40–120)
ALT FLD-CCNC: 22 U/L — SIGNIFICANT CHANGE UP (ref 4–41)
ANION GAP SERPL CALC-SCNC: 13 MMO/L — SIGNIFICANT CHANGE UP (ref 7–14)
APTT BLD: 31.7 SEC — SIGNIFICANT CHANGE UP (ref 27.5–36.3)
AST SERPL-CCNC: 71 U/L — HIGH (ref 4–40)
BASOPHILS # BLD AUTO: 0.13 K/UL — SIGNIFICANT CHANGE UP (ref 0–0.2)
BASOPHILS NFR BLD AUTO: 0.9 % — SIGNIFICANT CHANGE UP (ref 0–2)
BILIRUB SERPL-MCNC: 2.6 MG/DL — HIGH (ref 0.2–1.2)
BUN SERPL-MCNC: 13 MG/DL — SIGNIFICANT CHANGE UP (ref 7–23)
CALCIUM SERPL-MCNC: 8.6 MG/DL — SIGNIFICANT CHANGE UP (ref 8.4–10.5)
CHLORIDE SERPL-SCNC: 104 MMOL/L — SIGNIFICANT CHANGE UP (ref 98–107)
CO2 SERPL-SCNC: 20 MMOL/L — LOW (ref 22–31)
CREAT SERPL-MCNC: 0.82 MG/DL — SIGNIFICANT CHANGE UP (ref 0.5–1.3)
EOSINOPHIL # BLD AUTO: 0.95 K/UL — HIGH (ref 0–0.5)
EOSINOPHIL NFR BLD AUTO: 6.3 % — HIGH (ref 0–6)
GLUCOSE BLDC GLUCOMTR-MCNC: 100 MG/DL — HIGH (ref 70–99)
GLUCOSE BLDC GLUCOMTR-MCNC: 133 MG/DL — HIGH (ref 70–99)
GLUCOSE SERPL-MCNC: 93 MG/DL — SIGNIFICANT CHANGE UP (ref 70–99)
HCT VFR BLD CALC: 35.3 % — LOW (ref 39–50)
HCT VFR BLD CALC: 40.8 % — SIGNIFICANT CHANGE UP (ref 39–50)
HGB BLD-MCNC: 11.4 G/DL — LOW (ref 13–17)
HGB BLD-MCNC: 12.8 G/DL — LOW (ref 13–17)
IMM GRANULOCYTES NFR BLD AUTO: 0.3 % — SIGNIFICANT CHANGE UP (ref 0–1.5)
INR BLD: 1.56 — HIGH (ref 0.88–1.17)
INR BLD: 2.06 — HIGH (ref 0.88–1.17)
LYMPHOCYTES # BLD AUTO: 1.37 K/UL — SIGNIFICANT CHANGE UP (ref 1–3.3)
LYMPHOCYTES # BLD AUTO: 9 % — LOW (ref 13–44)
MAGNESIUM SERPL-MCNC: 1.8 MG/DL — SIGNIFICANT CHANGE UP (ref 1.6–2.6)
MCHC RBC-ENTMCNC: 24.5 PG — LOW (ref 27–34)
MCHC RBC-ENTMCNC: 24.6 PG — LOW (ref 27–34)
MCHC RBC-ENTMCNC: 31.4 % — LOW (ref 32–36)
MCHC RBC-ENTMCNC: 32.3 % — SIGNIFICANT CHANGE UP (ref 32–36)
MCV RBC AUTO: 75.9 FL — LOW (ref 80–100)
MCV RBC AUTO: 78.3 FL — LOW (ref 80–100)
MONOCYTES # BLD AUTO: 1.14 K/UL — HIGH (ref 0–0.9)
MONOCYTES NFR BLD AUTO: 7.5 % — SIGNIFICANT CHANGE UP (ref 2–14)
NEUTROPHILS # BLD AUTO: 11.51 K/UL — HIGH (ref 1.8–7.4)
NEUTROPHILS NFR BLD AUTO: 76 % — SIGNIFICANT CHANGE UP (ref 43–77)
NRBC # FLD: 0.02 K/UL — SIGNIFICANT CHANGE UP (ref 0–0)
NRBC # FLD: 0.03 K/UL — SIGNIFICANT CHANGE UP (ref 0–0)
PHOSPHATE SERPL-MCNC: 3.3 MG/DL — SIGNIFICANT CHANGE UP (ref 2.5–4.5)
PLATELET # BLD AUTO: 151 K/UL — SIGNIFICANT CHANGE UP (ref 150–400)
PLATELET # BLD AUTO: 165 K/UL — SIGNIFICANT CHANGE UP (ref 150–400)
PMV BLD: SIGNIFICANT CHANGE UP FL (ref 7–13)
PMV BLD: SIGNIFICANT CHANGE UP FL (ref 7–13)
POTASSIUM SERPL-MCNC: 4.5 MMOL/L — SIGNIFICANT CHANGE UP (ref 3.5–5.3)
POTASSIUM SERPL-SCNC: 4.5 MMOL/L — SIGNIFICANT CHANGE UP (ref 3.5–5.3)
PROT SERPL-MCNC: 5.9 G/DL — LOW (ref 6–8.3)
PROTHROM AB SERPL-ACNC: 17.5 SEC — HIGH (ref 9.8–13.1)
PROTHROM AB SERPL-ACNC: 23.4 SEC — HIGH (ref 9.8–13.1)
RBC # BLD: 4.65 M/UL — SIGNIFICANT CHANGE UP (ref 4.2–5.8)
RBC # BLD: 5.21 M/UL — SIGNIFICANT CHANGE UP (ref 4.2–5.8)
RBC # FLD: 21.9 % — HIGH (ref 10.3–14.5)
RBC # FLD: 22.8 % — HIGH (ref 10.3–14.5)
SODIUM SERPL-SCNC: 137 MMOL/L — SIGNIFICANT CHANGE UP (ref 135–145)
SPECIMEN SOURCE: SIGNIFICANT CHANGE UP
SPECIMEN SOURCE: SIGNIFICANT CHANGE UP
WBC # BLD: 12.2 K/UL — HIGH (ref 3.8–10.5)
WBC # BLD: 15.15 K/UL — HIGH (ref 3.8–10.5)
WBC # FLD AUTO: 12.2 K/UL — HIGH (ref 3.8–10.5)
WBC # FLD AUTO: 15.15 K/UL — HIGH (ref 3.8–10.5)

## 2019-06-02 PROCEDURE — 31634 BRONCH W/BALLOON OCCLUSION: CPT | Mod: GC

## 2019-06-02 PROCEDURE — 99233 SBSQ HOSP IP/OBS HIGH 50: CPT

## 2019-06-02 PROCEDURE — 99291 CRITICAL CARE FIRST HOUR: CPT | Mod: 25

## 2019-06-02 RX ORDER — PHYTONADIONE (VIT K1) 5 MG
10 TABLET ORAL ONCE
Refills: 0 | Status: COMPLETED | OUTPATIENT
Start: 2019-06-02 | End: 2019-06-02

## 2019-06-02 RX ORDER — MIDAZOLAM HYDROCHLORIDE 1 MG/ML
2 INJECTION, SOLUTION INTRAMUSCULAR; INTRAVENOUS ONCE
Refills: 0 | Status: DISCONTINUED | OUTPATIENT
Start: 2019-06-02 | End: 2019-06-02

## 2019-06-02 RX ORDER — MAGNESIUM SULFATE 500 MG/ML
2 VIAL (ML) INJECTION ONCE
Refills: 0 | Status: COMPLETED | OUTPATIENT
Start: 2019-06-02 | End: 2019-06-02

## 2019-06-02 RX ORDER — TRANEXAMIC ACID 100 MG/ML
5 INJECTION, SOLUTION INTRAVENOUS ONCE
Refills: 0 | Status: COMPLETED | OUTPATIENT
Start: 2019-06-02 | End: 2019-06-02

## 2019-06-02 RX ADMIN — Medication 250 MILLIGRAM(S): at 18:37

## 2019-06-02 RX ADMIN — CHLORHEXIDINE GLUCONATE 1 APPLICATION(S): 213 SOLUTION TOPICAL at 18:30

## 2019-06-02 RX ADMIN — PHENYLEPHRINE HYDROCHLORIDE 1.99 MICROGRAM(S)/KG/MIN: 10 INJECTION INTRAVENOUS at 19:35

## 2019-06-02 RX ADMIN — Medication 1 APPLICATION(S): at 18:38

## 2019-06-02 RX ADMIN — MIDAZOLAM HYDROCHLORIDE 2 MILLIGRAM(S): 1 INJECTION, SOLUTION INTRAMUSCULAR; INTRAVENOUS at 19:27

## 2019-06-02 RX ADMIN — AZITHROMYCIN 250 MILLIGRAM(S): 500 TABLET, FILM COATED ORAL at 15:00

## 2019-06-02 RX ADMIN — PANTOPRAZOLE SODIUM 40 MILLIGRAM(S): 20 TABLET, DELAYED RELEASE ORAL at 18:40

## 2019-06-02 RX ADMIN — MIDAZOLAM HYDROCHLORIDE 1.06 MG/KG/HR: 1 INJECTION, SOLUTION INTRAMUSCULAR; INTRAVENOUS at 19:35

## 2019-06-02 RX ADMIN — MIDAZOLAM HYDROCHLORIDE 2 MILLIGRAM(S): 1 INJECTION, SOLUTION INTRAMUSCULAR; INTRAVENOUS at 15:20

## 2019-06-02 RX ADMIN — PIPERACILLIN AND TAZOBACTAM 25 GRAM(S): 4; .5 INJECTION, POWDER, LYOPHILIZED, FOR SOLUTION INTRAVENOUS at 06:31

## 2019-06-02 RX ADMIN — Medication 102 MILLIGRAM(S): at 14:30

## 2019-06-02 RX ADMIN — Medication 250 MILLIGRAM(S): at 05:06

## 2019-06-02 RX ADMIN — FENTANYL CITRATE 5.3 MICROGRAM(S)/KG/HR: 50 INJECTION INTRAVENOUS at 19:36

## 2019-06-02 RX ADMIN — Medication 50 GRAM(S): at 09:00

## 2019-06-02 RX ADMIN — PANTOPRAZOLE SODIUM 40 MILLIGRAM(S): 20 TABLET, DELAYED RELEASE ORAL at 05:04

## 2019-06-02 RX ADMIN — MIDAZOLAM HYDROCHLORIDE 2 MILLIGRAM(S): 1 INJECTION, SOLUTION INTRAMUSCULAR; INTRAVENOUS at 19:25

## 2019-06-02 RX ADMIN — PIPERACILLIN AND TAZOBACTAM 25 GRAM(S): 4; .5 INJECTION, POWDER, LYOPHILIZED, FOR SOLUTION INTRAVENOUS at 22:26

## 2019-06-02 RX ADMIN — Medication 1 APPLICATION(S): at 05:06

## 2019-06-02 RX ADMIN — MIDAZOLAM HYDROCHLORIDE 2 MILLIGRAM(S): 1 INJECTION, SOLUTION INTRAMUSCULAR; INTRAVENOUS at 15:30

## 2019-06-02 RX ADMIN — PIPERACILLIN AND TAZOBACTAM 25 GRAM(S): 4; .5 INJECTION, POWDER, LYOPHILIZED, FOR SOLUTION INTRAVENOUS at 13:40

## 2019-06-02 RX ADMIN — TRANEXAMIC ACID 5 MILLILITER(S): 100 INJECTION, SOLUTION INTRAVENOUS at 19:20

## 2019-06-02 NOTE — CHART NOTE - NSCHARTNOTEFT_GEN_A_CORE
Pre-Bronchoscopy Tuberculosis Risk Screening Tool  To reduce the risk for airborne transmission of Mycobacterium tuberculosis, this assessment form must be completed prior to bronchoscopy procedures being performed outside of a negative pressure environment.    Procedure Date: 6/2/2109  Health Care Provider Name: Jewel Ellsworth  Form Completed By: Self  Reason for the Bronchoscopy: Hemoptysis s/p endobronchial block placement on 6/1/2019  Date of Procedure: 6/2/2019  Planned Location for the Procedure:  ?[ ] Emergency Department ?[x] Intensive Care Unit ? [ ] Operating Room ? Other: ___________________    Risk Assessment  I. I have personally evaluated this patient for Mycobacterium tuberculosis and I determined thefollowing risk:  ?[x] No Risk for TB     [ ] Low risk or TB     [ ] Significant risk for TB    II. Additional Findings: _________________________    III. Based on the Determined Risk for TB the following Action(s) are Suggested:  1. If there are no risk factors for TB infection proceed with the procedure.  2. If there is low risk or significant risk for TB infection the following recommendations should befollowed:            a. Perform the procedure in a negative pressure room, with N95 respirator.            b. If not feasible to move the patient or defer the procedure:                    i. Use a single-bedded room low traffic area to perform the bronchoscopy procedure.                   ii. Place a portable high-efficiency particulate air (HEPA) filter in the space prior tostarting the procedure and keep closed.                       Refer to the INF.1132 titled“Tuberculosis Control Strategy Plan” for additional information.                  iii. All Health Care Provider within the procedure room shall wear an N95 respirator.            c. Documentation of the tuberculosis risk assessment should be included within the patient’smedical record.        ICU BRONCHOSCOPY PROCEDURE NOTE                                                             : Jewel Wilson  ANESTHETIC: 4mg Versed  PROCEDURE:  Flexible bronchoscopy  Position: Supine  Intubation site: ETT  INDICATION: Hemoptysis s/p endobronchial block placement on 6/1/2019  FINDINGS: Bronchoscope was inserted through the ETT to the level of the MC in the distal trachea with ease. The tracheobronchial tree was inspected to the level of the segmental anatomy bilaterally. Interval fresh blood was seen after the endobronchial balloon had been deflated. The blood and associated clots were removed. The endobronchial blocker balloon was reinflated and remains locked in place. The bronchoscope was removed and the procedure ended.   SPECIMENS: BAL: None  COMPLICATIONS: None  IMPRESSION: Interval bleeding from RUL after endobronchial balloon deflated  PLAN: Maintain INR < 1.5. Provide FFP x 2. Discuss with IR for future embolization

## 2019-06-02 NOTE — PROGRESS NOTE ADULT - SUBJECTIVE AND OBJECTIVE BOX
[facility] Follow up Note    S: Had rebleeding yesterday- endobronchial blocker placed. Got FFP x 2 today and vitamin K. getting tranexamic acid instilled to RUL.      O:  ICU Vital Signs Last 24 Hrs  T(C): 36.4 (02 Jun 2019 17:30), Max: 37.4 (01 Jun 2019 20:00)  T(F): 97.5 (02 Jun 2019 17:30), Max: 99.4 (01 Jun 2019 20:00)  HR: 75 (02 Jun 2019 19:00) (69 - 85)  BP: 103/64 (02 Jun 2019 19:00) (86/53 - 131/81)  BP(mean): 76 (02 Jun 2019 19:00) (61 - 99)  ABP: --  ABP(mean): --  RR: 20 (02 Jun 2019 19:00) (20 - 20)  SpO2: 100% (02 Jun 2019 19:00) (86% - 100%)    intubated, sedated  ett in place  s1 single s2 3/6 HSM at LLSB/LMSB  pectus  abdomen soft  distal extremities improved temp/color compared to yesterday, no edema, chronic venous stasis changes to RLE    MAR reviewed  labs reviewed  K, Cr better  Co2 20  H/H holding  INR 2 this am prior to vitamin K and FFP, repeat pending    sats 100% on 100% FIo2 and PEEP 10 cm H20

## 2019-06-02 NOTE — PROGRESS NOTE ADULT - ATTENDING COMMENTS
Hemoptysis requiring intubation and embolization.  Patient rebled yesterday, s/p endobronchial blocker insertion.  Bleeding again today, balloon reinflated, will treat locally with tranexamic acid, correcting coagulopathy.  Will d/w IR possibility and feasibility of repeat embolization if still bleeding after correction of coagulopathy.  Empiric abx.

## 2019-06-02 NOTE — PROGRESS NOTE ADULT - ASSESSMENT
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    #Neuro: no active issues  - Sedated on fent, PRN ativan  - Careful sedation as pre-load dependent    #CV: Heterotaxy with congenital single atrium/ventricle s/p multiple cardiac surgeries including Fontan's  - Switch levo to phenylephrine  - IVF given hypotension, blood loss, and pre-load dependent  - Possible angiogram next week per cardiology  - Hold Coumadin. Elevated INR, FFP today.  - Hold hold digoxin given elevated K.  - Pending LE dopplers    #Pulm: Hemoptysis, s/p bronch 5/31 showing RUL bleeding. SpO2 at baseline high 80's given cardiopulmonary pathophysiology.  - s/p IR embolization 5/31 of R-sided aortopulmonary collaterals, R bronchial artery, multiple R thoracic intercostal arteries; likely a/w collaterals from known R pulm vein stenosis.  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 Gastritis and duodenal erosions were also seen possibly 2/2 ASA gastropathy.   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    #Neuro: no active issues  - Sedated on fentanyl and versed gtt  - Careful sedation as pre-load dependent    #CV: Heterotaxy with congenital single atrium/ventricle s/p multiple cardiac surgeries including Fontan's  - Ectopy resolved after d/c levo gtt, c/w phenylephrine gtt  - Possible angiogram next week per cardiology  - Holding Coumadin in setting of bleed. Persistently elevated INR s/p FFP x1, will give vitamin K x1 today in preparation for repeat bronchoscopy.  - Holding digoxin given elevated K  - F/u LE dopplers  - peds cardiology recs appreciated    #Pulm: Hemoptysis, s/p bronch 5/31 showing RUL bleeding. SpO2 at baseline high 80's given cardiopulmonary pathophysiology, currently hypoxemic requiring FiO2 100% to maintain oxygenation in low 90s.  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 Gastritis and duodenal erosions were also seen possibly 2/2 ASA gastropathy.   - Holding feeds for now for bronchoscopy  - protonix 40 daily    #ID  Initially febrile, elevated white count, no atelectasis on POCUS  - c/w empiric vanc, zosyn, azithro x3 days if infectious work-up negative  - f/u BCxs  - UA negative  - send urine legionella    #Renal  - MELLO likely pre-renal in setting of poor PO intake, hemoptysis vs possible PATRICA, now resolved after IVF and pressor support. Hyperkalemia resolved with medical management.  - Goal K>4, Mg>2, replete prn    #Endocrine  - FSBG q6 while NPO    #Heme  - Reactive leukocytosis vs infection, started on empiric abx, monitor CBC  - INR 2 s/p FFP, will give vitamin K 10mg IV x1 in preparation for bronchoscopy  - Monitor INR, consider further FFP transfusions if actively bleeding    #DVT ppx: SCDs    #GOC: full code

## 2019-06-02 NOTE — PROGRESS NOTE ADULT - ASSESSMENT
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 Guarded condition with rebleeding overnight but so far, H/H stable.    - f/u repeat coags now s/p Vitamin K and FFP, blood products as necessary to correct coagulopathy and hopefully stop bleeding; will revisit need for repeat catheter based intervention pending clinical course  - wean PEEP when able/as tolerated given increased PEEP will impede venous return  - hypotension is multifactorial- sedatives, hypovolemia, low cardiac output- would give more volume at this point and adjust peep as above     d/w parents, d/w pulm/critical care, updated congenital cath team

## 2019-06-02 NOTE — PROGRESS NOTE ADULT - SUBJECTIVE AND OBJECTIVE BOX
S: Yesterday had an episode of hemoptysis during repositioning. Flexible bronchoscopy at the time demonstrated brisk bleeding from RUL. Bronchial blocker placed w/ good control of bleeding.  No further episodes since.    O:  ICU Vital Signs Last 24 Hrs  T(C): 36.6 (02 Jun 2019 08:00), Max: 37.4 (01 Jun 2019 20:00)  T(F): 97.8 (02 Jun 2019 08:00), Max: 99.4 (01 Jun 2019 20:00)  HR: 73 (02 Jun 2019 08:30) (73 - 105)  BP: 94/53 (02 Jun 2019 08:30) (94/53 - 141/71)  BP(mean): 65 (02 Jun 2019 08:30) (65 - 100)  ABP: --  ABP(mean): --  RR: 20 (02 Jun 2019 08:30) (12 - 43)  SpO2: 89% (02 Jun 2019 08:30) (82% - 97%)    Intubated  Sedated  ETT in place bronchial blocker in place, no blood in ETT                          12.8   15.15 )-----------( 165      ( 02 Jun 2019 03:58 )             40.8   06-02    137  |  104  |  13  ----------------------------<  93  4.5   |  20<L>  |  0.82    Ca    8.6      02 Jun 2019 03:58  Phos  3.3     06-02  Mg     1.8     06-02    TPro  5.9<L>  /  Alb  3.2<L>  /  TBili  2.6<H>  /  DBili  x   /  AST  71<H>  /  ALT  22  /  AlkPhos  93  06-02

## 2019-06-02 NOTE — DIETITIAN INITIAL EVALUATION ADULT. - NS AS NUTRI INTERV ENTERAL NUTRITION
Rate/Recommend increase TF-----Jevity 1.2 to 50 ml/hr x 24 hrs (1440 kcals/66 gm pro).  2. Recommend Prosource 1 pack daily (additional 15 gm pro), total pro=81 gm.

## 2019-06-02 NOTE — DIETITIAN INITIAL EVALUATION ADULT. - NS FNS WEIGHT USED FOR CALC
ideal/admit weight 53 kg, height 69 inches, BMI 17.3 kg/m^2. ideal/.         admit weight 53 kg, height 69 inches, BMI 17.3 kg/m^2., Lower IBW 65 kg

## 2019-06-02 NOTE — PROGRESS NOTE ADULT - ASSESSMENT
25 yo M w/ hxo congenital heart disease/heterotaxy s/p multiple cardiac surgeries including Fontan's procedure presenting with hemoptysis, requiring intubation. Embolization on 5/31 of RUL vessels, yesterday with episode of hemoptysis, bronchial blocker placed.  Pt still critically ill, on pressors, although Cr and hyperkalemia improving.  INR 2.05  Consider more FFP today given elevated INR  Aware of MICU team plans to bronch pt again today  Will hold on acute thoracic surgical intervention   Management per medical team  Thoracic surgery to follow, please call with questions [phone]  Patient seen and assessed w/ Dr. Cunningham who agrees

## 2019-06-02 NOTE — PROGRESS NOTE ADULT - SUBJECTIVE AND OBJECTIVE BOX
OVERNIGHT EVENTS / SUBJECTIVE: Patient seen and examined at bedside.     OBJECTIVE:    VITAL SIGNS:  ICU Vital Signs Last 24 Hrs  T(C): 36.9 (2019 04:00), Max: 37.4 (2019 20:00)  T(F): 98.4 (2019 04:00), Max: 99.4 (2019 20:00)  HR: 74 (2019 05:00) (74 - 105)  BP: 109/64 (2019 05:00) (77/53 - 142/87)  BP(mean): 78 (2019 05:00) (53 - 103)  ABP: --  ABP(mean): --  RR: 20 (2019 05:00) (12 - 43)  SpO2: 92% (2019 05:00) (82% - 97%)    Mode: AC/ CMV (Assist Control/ Continuous Mandatory Ventilation), RR (machine): 20, TV (machine): 450, FiO2: 100, PEEP: 5, MAP: 11, PIP: 25    06- @ 07:01  -  06-02 @ 07:00  --------------------------------------------------------  IN: 2669.3 mL / OUT: 1925 mL / NET: 744.3 mL      CAPILLARY BLOOD GLUCOSE      POCT Blood Glucose.: 133 mg/dL (2019 05:42)      PHYSICAL EXAM:  General: NAD  HEENT: NCAT, PERRL, clear conjunctiva, mmm  Neck: supple, no JVD  Respiratory: CTAB  Cardiovascular: RRR, S1S2, no murmurs, rubs, or gallops  Abdomen: soft, nontender, nondistended, normal bowel sounds  Extremities: chronic RLE mottled appearance a/w RLE venous insufficiency after iatrogenic femoral vein injury  Neurological: sedated, nonfocal    MEDICATIONS:  MEDICATIONS  (STANDING):  azithromycin  IVPB      azithromycin  IVPB 500 milliGRAM(s) IV Intermittent every 24 hours  chlorhexidine 4% Liquid 1 Application(s) Topical <User Schedule>  fentaNYL   Infusion. 1 MICROgram(s)/kG/Hr (5.3 mL/Hr) IV Continuous <Continuous>  midazolam Infusion 0.02 mG/kG/Hr (1.06 mL/Hr) IV Continuous <Continuous>  pantoprazole  Injectable 40 milliGRAM(s) IV Push two times a day  petrolatum Ophthalmic Ointment 1 Application(s) Both EYES two times a day  phenylephrine    Infusion 0.2 MICROgram(s)/kG/Min (1.988 mL/Hr) IV Continuous <Continuous>  piperacillin/tazobactam IVPB. 3.375 Gram(s) IV Intermittent every 8 hours  vancomycin  IVPB 1000 milliGRAM(s) IV Intermittent every 12 hours    MEDICATIONS  (PRN):  LORazepam   Injectable 2 milliGRAM(s) IV Push every 4 hours PRN Agitation      ALLERGIES:  Allergies    No Known Allergies    Intolerances        LABS:                        12.8   15.15 )-----------( 165      ( 2019 03:58 )             40.8     Hemoglobin: 12.8 g/dL ( @ 03:58)  Hemoglobin: 12.5 g/dL ( @ 20:55)  Hemoglobin: 13.3 g/dL ( @ 13:15)  Hemoglobin: 12.6 g/dL ( @ 04:24)  Hemoglobin: 12.2 g/dL ( @ 00:40)    CBC Full  -  ( 2019 03:58 )  WBC Count : 15.15 K/uL  RBC Count : 5.21 M/uL  Hemoglobin : 12.8 g/dL  Hematocrit : 40.8 %  Platelet Count - Automated : 165 K/uL  Mean Cell Volume : 78.3 fL  Mean Cell Hemoglobin : 24.6 pg  Mean Cell Hemoglobin Concentration : 31.4 %  Auto Neutrophil # : 11.51 K/uL  Auto Lymphocyte # : 1.37 K/uL  Auto Monocyte # : 1.14 K/uL  Auto Eosinophil # : 0.95 K/uL  Auto Basophil # : 0.13 K/uL  Auto Neutrophil % : 76.0 %  Auto Lymphocyte % : 9.0 %  Auto Monocyte % : 7.5 %  Auto Eosinophil % : 6.3 %  Auto Basophil % : 0.9 %        137  |  104  |  13  ----------------------------<  93  4.5   |  20<L>  |  0.82    Ca    8.6      2019 03:58  Phos  3.3     06-  Mg     1.8         TPro  5.9<L>  /  Alb  3.2<L>  /  TBili  2.6<H>  /  DBili  x   /  AST  71<H>  /  ALT  22  /  AlkPhos  93      Creatinine Trend: 0.82<--, 0.97<--, 1.01<--, 1.22<--, 1.26<--, 0.86<--  LIVER FUNCTIONS - ( 2019 03:58 )  Alb: 3.2 g/dL / Pro: 5.9 g/dL / ALK PHOS: 93 u/L / ALT: 22 u/L / AST: 71 u/L / GGT: x           PT/INR - ( 2019 03:58 )   PT: 23.4 SEC;   INR: 2.06          PTT - ( 2019 20:55 )  PTT:35.1 SEC      ABG - ( 2019 16:49 )  pH, Arterial: 7.32  pH, Blood: x     /  pCO2: 44    /  pO2: 84    / HCO3: 22    / Base Excess: -3.0  /  SaO2: 95.8                16:49 - ABG - pH: 7.32  |  pCO2: 44    |  pO2: 84    | Lactate: 1.3   | BE: -3.0   13:15 - ABG - pH: 7.24  |  pCO2: 58    |  pO2: 39    | Lactate:       | BE: -2.5       Urinalysis Basic - ( 2019 19:20 )    Color: YELLOW / Appearance: Lt TURBID / S.039 / pH: 5.5  Gluc: NEGATIVE / Ketone: NEGATIVE  / Bili: NEGATIVE / Urobili: NORMAL   Blood: MODERATE / Protein: SMALL / Nitrite: NEGATIVE   Leuk Esterase: NEGATIVE / RBC: 20-30 / WBC 0-2   Sq Epi: OCC / Non Sq Epi: x / Bacteria: FEW    Labs, imaging, EKG personally reviewed    RADIOLOGY & ADDITIONAL TESTS: Reviewed. OVERNIGHT EVENTS / SUBJECTIVE: Patient seen and examined at bedside.     OBJECTIVE: No acute events overnight. Yesterday pt had repeat bronchscopy with bleeding from RUL seen, endobronchial block placed for hemostasis without further bleeding noted. Pt weaned from levo to phenylephrine gtt d/t ectopy.     VITAL SIGNS:  ICU Vital Signs Last 24 Hrs  T(C): 36.9 (2019 04:00), Max: 37.4 (2019 20:00)  T(F): 98.4 (2019 04:00), Max: 99.4 (2019 20:00)  HR: 74 (2019 05:00) (74 - 105)  BP: 109/64 (2019 05:00) (77/53 - 142/87)  BP(mean): 78 (2019 05:00) (53 - 103)  ABP: --  ABP(mean): --  RR: 20 (2019 05:00) (12 - 43)  SpO2: 92% (2019 05:00) (82% - 97%)    Mode: AC/ CMV (Assist Control/ Continuous Mandatory Ventilation), RR (machine): 20, TV (machine): 450, FiO2: 100, PEEP: 5, MAP: 11, PIP: 25    06- @ 07:01  -  -02 @ 07:00  --------------------------------------------------------  IN: 2669.3 mL / OUT: 1925 mL / NET: 744.3 mL      CAPILLARY BLOOD GLUCOSE      POCT Blood Glucose.: 133 mg/dL (2019 05:42)      PHYSICAL EXAM:  General: NAD  HEENT: NCAT, PERRL, clear conjunctiva, mmm  Neck: supple, no JVD  Respiratory: CTAB  Cardiovascular: RRR, S1S2, no murmurs, rubs, or gallops  Abdomen: soft, nontender, nondistended, normal bowel sounds  Extremities: chronic RLE mottled appearance a/w RLE venous insufficiency after iatrogenic femoral vein injury  Neurological: sedated, nonfocal    MEDICATIONS:  MEDICATIONS  (STANDING):  azithromycin  IVPB      azithromycin  IVPB 500 milliGRAM(s) IV Intermittent every 24 hours  chlorhexidine 4% Liquid 1 Application(s) Topical <User Schedule>  fentaNYL   Infusion. 1 MICROgram(s)/kG/Hr (5.3 mL/Hr) IV Continuous <Continuous>  midazolam Infusion 0.02 mG/kG/Hr (1.06 mL/Hr) IV Continuous <Continuous>  pantoprazole  Injectable 40 milliGRAM(s) IV Push two times a day  petrolatum Ophthalmic Ointment 1 Application(s) Both EYES two times a day  phenylephrine    Infusion 0.2 MICROgram(s)/kG/Min (1.988 mL/Hr) IV Continuous <Continuous>  piperacillin/tazobactam IVPB. 3.375 Gram(s) IV Intermittent every 8 hours  vancomycin  IVPB 1000 milliGRAM(s) IV Intermittent every 12 hours    MEDICATIONS  (PRN):  LORazepam   Injectable 2 milliGRAM(s) IV Push every 4 hours PRN Agitation      ALLERGIES:  Allergies    No Known Allergies    Intolerances        LABS:                        12.8   15.15 )-----------( 165      ( 2019 03:58 )             40.8     Hemoglobin: 12.8 g/dL ( @ 03:58)  Hemoglobin: 12.5 g/dL ( @ 20:55)  Hemoglobin: 13.3 g/dL ( @ 13:15)  Hemoglobin: 12.6 g/dL ( @ 04:24)  Hemoglobin: 12.2 g/dL ( @ 00:40)    CBC Full  -  ( 2019 03:58 )  WBC Count : 15.15 K/uL  RBC Count : 5.21 M/uL  Hemoglobin : 12.8 g/dL  Hematocrit : 40.8 %  Platelet Count - Automated : 165 K/uL  Mean Cell Volume : 78.3 fL  Mean Cell Hemoglobin : 24.6 pg  Mean Cell Hemoglobin Concentration : 31.4 %  Auto Neutrophil # : 11.51 K/uL  Auto Lymphocyte # : 1.37 K/uL  Auto Monocyte # : 1.14 K/uL  Auto Eosinophil # : 0.95 K/uL  Auto Basophil # : 0.13 K/uL  Auto Neutrophil % : 76.0 %  Auto Lymphocyte % : 9.0 %  Auto Monocyte % : 7.5 %  Auto Eosinophil % : 6.3 %  Auto Basophil % : 0.9 %        137  |  104  |  13  ----------------------------<  93  4.5   |  20<L>  |  0.82    Ca    8.6      2019 03:58  Phos  3.3     06-02  Mg     1.8     06-    TPro  5.9<L>  /  Alb  3.2<L>  /  TBili  2.6<H>  /  DBili  x   /  AST  71<H>  /  ALT  22  /  AlkPhos  93  06-02    Creatinine Trend: 0.82<--, 0.97<--, 1.01<--, 1.22<--, 1.26<--, 0.86<--  LIVER FUNCTIONS - ( 2019 03:58 )  Alb: 3.2 g/dL / Pro: 5.9 g/dL / ALK PHOS: 93 u/L / ALT: 22 u/L / AST: 71 u/L / GGT: x           PT/INR - ( 2019 03:58 )   PT: 23.4 SEC;   INR: 2.06          PTT - ( 2019 20:55 )  PTT:35.1 SEC      ABG - ( 2019 16:49 )  pH, Arterial: 7.32  pH, Blood: x     /  pCO2: 44    /  pO2: 84    / HCO3: 22    / Base Excess: -3.0  /  SaO2: 95.8                16:49 - ABG - pH: 7.32  |  pCO2: 44    |  pO2: 84    | Lactate: 1.3   | BE: -3.0   13:15 - ABG - pH: 7.24  |  pCO2: 58    |  pO2: 39    | Lactate:       | BE: -2.5       Urinalysis Basic - ( 2019 19:20 )    Color: YELLOW / Appearance: Lt TURBID / S.039 / pH: 5.5  Gluc: NEGATIVE / Ketone: NEGATIVE  / Bili: NEGATIVE / Urobili: NORMAL   Blood: MODERATE / Protein: SMALL / Nitrite: NEGATIVE   Leuk Esterase: NEGATIVE / RBC: 20-30 / WBC 0-2   Sq Epi: OCC / Non Sq Epi: x / Bacteria: FEW    Labs, imaging, EKG personally reviewed    RADIOLOGY & ADDITIONAL TESTS: Reviewed. OVERNIGHT EVENTS / SUBJECTIVE: Patient seen and examined at bedside.     OBJECTIVE: No acute events overnight. Yesterday pt had repeat bronchscopy with bleeding from RUL seen, endobronchial block placed for hemostasis without further bleeding noted. Pt weaned from levo to phenylephrine gtt d/t ectopy.     VITAL SIGNS:  ICU Vital Signs Last 24 Hrs  T(C): 36.9 (2019 04:00), Max: 37.4 (2019 20:00)  T(F): 98.4 (2019 04:00), Max: 99.4 (2019 20:00)  HR: 74 (2019 05:00) (74 - 105)  BP: 109/64 (2019 05:00) (77/53 - 142/87)  BP(mean): 78 (2019 05:00) (53 - 103)  ABP: --  ABP(mean): --  RR: 20 (2019 05:00) (12 - 43)  SpO2: 92% (2019 05:00) (82% - 97%)    Mode: AC/ CMV (Assist Control/ Continuous Mandatory Ventilation), RR (machine): 20, TV (machine): 450, FiO2: 100, PEEP: 5, MAP: 11, PIP: 25    06- @ 07:01  -  -02 @ 07:00  --------------------------------------------------------  IN: 2669.3 mL / OUT: 1925 mL / NET: 744.3 mL      CAPILLARY BLOOD GLUCOSE      POCT Blood Glucose.: 133 mg/dL (2019 05:42)      PHYSICAL EXAM:  General: NAD  HEENT: NCAT, PERRL, clear conjunctiva, mmm  Neck: supple, no JVD  Respiratory: CTAB  Cardiovascular: RRR, soft systolic murmur  Abdomen: soft, nondistended, normal bowel sounds  Extremities: chronic RLE mottled appearance a/w RLE venous insufficiency after iatrogenic femoral vein injury  Neurological: sedated, nonfocal    MEDICATIONS:  MEDICATIONS  (STANDING):  azithromycin  IVPB      azithromycin  IVPB 500 milliGRAM(s) IV Intermittent every 24 hours  chlorhexidine 4% Liquid 1 Application(s) Topical <User Schedule>  fentaNYL   Infusion. 1 MICROgram(s)/kG/Hr (5.3 mL/Hr) IV Continuous <Continuous>  midazolam Infusion 0.02 mG/kG/Hr (1.06 mL/Hr) IV Continuous <Continuous>  pantoprazole  Injectable 40 milliGRAM(s) IV Push two times a day  petrolatum Ophthalmic Ointment 1 Application(s) Both EYES two times a day  phenylephrine    Infusion 0.2 MICROgram(s)/kG/Min (1.988 mL/Hr) IV Continuous <Continuous>  piperacillin/tazobactam IVPB. 3.375 Gram(s) IV Intermittent every 8 hours  vancomycin  IVPB 1000 milliGRAM(s) IV Intermittent every 12 hours    MEDICATIONS  (PRN):  LORazepam   Injectable 2 milliGRAM(s) IV Push every 4 hours PRN Agitation    ALLERGIES:  Allergies    No Known Allergies    Intolerances    LABS:                        12.8   15.15 )-----------( 165      ( 2019 03:58 )             40.8     Hemoglobin: 12.8 g/dL ( @ 03:58)  Hemoglobin: 12.5 g/dL ( @ 20:55)  Hemoglobin: 13.3 g/dL ( @ 13:15)  Hemoglobin: 12.6 g/dL ( @ 04:24)  Hemoglobin: 12.2 g/dL ( @ 00:40)    CBC Full  -  ( 2019 03:58 )  WBC Count : 15.15 K/uL  RBC Count : 5.21 M/uL  Hemoglobin : 12.8 g/dL  Hematocrit : 40.8 %  Platelet Count - Automated : 165 K/uL  Mean Cell Volume : 78.3 fL  Mean Cell Hemoglobin : 24.6 pg  Mean Cell Hemoglobin Concentration : 31.4 %  Auto Neutrophil # : 11.51 K/uL  Auto Lymphocyte # : 1.37 K/uL  Auto Monocyte # : 1.14 K/uL  Auto Eosinophil # : 0.95 K/uL  Auto Basophil # : 0.13 K/uL  Auto Neutrophil % : 76.0 %  Auto Lymphocyte % : 9.0 %  Auto Monocyte % : 7.5 %  Auto Eosinophil % : 6.3 %  Auto Basophil % : 0.9 %        137  |  104  |  13  ----------------------------<  93  4.5   |  20<L>  |  0.82    Ca    8.6      2019 03:58  Phos  3.3     06-02  Mg     1.8     06-02    TPro  5.9<L>  /  Alb  3.2<L>  /  TBili  2.6<H>  /  DBili  x   /  AST  71<H>  /  ALT  22  /  AlkPhos  93  06-02    Creatinine Trend: 0.82<--, 0.97<--, 1.01<--, 1.22<--, 1.26<--, 0.86<--  LIVER FUNCTIONS - ( 2019 03:58 )  Alb: 3.2 g/dL / Pro: 5.9 g/dL / ALK PHOS: 93 u/L / ALT: 22 u/L / AST: 71 u/L / GGT: x           PT/INR - ( 2019 03:58 )   PT: 23.4 SEC;   INR: 2.06          PTT - ( 2019 20:55 )  PTT:35.1 SEC      ABG - ( 2019 16:49 )  pH, Arterial: 7.32  pH, Blood: x     /  pCO2: 44    /  pO2: 84    / HCO3: 22    / Base Excess: -3.0  /  SaO2: 95.8      16:49 - ABG - pH: 7.32  |  pCO2: 44    |  pO2: 84    | Lactate: 1.3   | BE: -3.0   13:15 - ABG - pH: 7.24  |  pCO2: 58    |  pO2: 39    | Lactate:       | BE: -2.5     Urinalysis Basic - ( 2019 19:20 )    Color: YELLOW / Appearance: Lt TURBID / S.039 / pH: 5.5  Gluc: NEGATIVE / Ketone: NEGATIVE  / Bili: NEGATIVE / Urobili: NORMAL   Blood: MODERATE / Protein: SMALL / Nitrite: NEGATIVE   Leuk Esterase: NEGATIVE / RBC: 20-30 / WBC 0-2   Sq Epi: OCC / Non Sq Epi: x / Bacteria: FEW    Labs, imaging, EKG personally reviewed    RADIOLOGY & ADDITIONAL TESTS: Reviewed.

## 2019-06-02 NOTE — DIETITIAN INITIAL EVALUATION ADULT. - ORAL INTAKE PTA
Pt usually with excellent appetite and po intakes. Per Mom, did not eat anything for 3 days prior to arrival to [facility]. Pt was admitted to [facility] MICU prior to transfer to [facility]./good

## 2019-06-02 NOTE — DIETITIAN INITIAL EVALUATION ADULT. - SOURCE
EMR Reviewed, Spoke with Pt's Mother Olive at bedside./family/significant other/other (specify) family/significant other/EMR Reviewed, Spoke with Pt's Mother Sandra at bedside./other (specify)

## 2019-06-02 NOTE — DIETITIAN INITIAL EVALUATION ADULT. - OTHER INFO
Received nutrition consult for BMI < 18 kg/m^2. Pt is currently intubated and sedated. Receiving TF with Jevity 1.2 @ goal rate via OGT. Pt remains with good tolerance of enteral feeds. Usual stated weight prior to admission 112#. Admit weight 117#. Family is happy that Pt is receiving nourishment and is looking forward to Pt eating by mouth soon.

## 2019-06-02 NOTE — CHART NOTE - NSCHARTNOTEFT_GEN_A_CORE
Pre-Bronchoscopy Tuberculosis Risk Screening Tool  To reduce the risk for airborne transmission of Mycobacterium tuberculosis, this assessment form must be completed prior to bronchoscopy procedures being performed outside of a negative pressure environment.    Procedure Date:  6/2/19  Health Care Provider Name: Jose Del Toro  Form Completed By:  Jose Del Toro  Reason for the Bronchoscopy: hemoptysis  Date of Procedure: 06/02/2019  Planned Location for the Procedure:  ?[ ] Emergency Department ?[X] Intensive Care Unit ? [ ] Operating Room ?     Risk Assessment  I. I have personally evaluated this patient for Mycobacterium tuberculosis and I determined thefollowing risk:  ?[X] No Risk for TB     [ ] Low risk or TB     [ ] Significant risk for TB    II. Additional Findings: _________________________    III. Based on the Determined Risk for TB the following Action(s) are Suggested:  1. If there are no risk factors for TB infection proceed with the procedure.  2. If there is low risk or significant risk for TB infection the following recommendations should befollowed:            a. Perform the procedure in a negative pressure room, with N95 respirator.            b. If not feasible to move the patient or defer the procedure:                    i. Use a single-bedded room low traffic area to perform the bronchoscopy procedure.                   ii. Place a portable high-efficiency particulate air (HEPA) filter in the space prior tostarting the procedure and keep closed.                       Refer to the INF.1132 titled“Tuberculosis Control Strategy Plan” for additional information.                  iii. All Health Care Provider within the procedure room shall wear an N95 respirator.            c. Documentation of the tuberculosis risk assessment should be included within the patient’smedical record.    Indication: Hemoptysis    Operators: Jose Del Toro MD, Nata Castro    Findings:  Bronchoscope inserted through ETT. The #7 endobronchial blocker was adjusted. 750 mg (in 30 ml of NS) of tranexemic acid was given through the blocker. 250mg (in 10ml of NS) of the tranexemic acid was given through the bronchoscope around the blocker in the RUL. The blocker was inflated and secured in position under direct visualization to block the RUL. The residual blood from RLL and Left bronchus and segments was aspirated. Patient tolerated procedure well. No immediate post-procedure complication.    Dispo: Keep blocker in secure position. Don't turn patient. Repeat coags. Target INR < 1.5. Further care per MICU. Pre-Bronchoscopy Tuberculosis Risk Screening Tool  To reduce the risk for airborne transmission of Mycobacterium tuberculosis, this assessment form must be completed prior to bronchoscopy procedures being performed outside of a negative pressure environment.    Procedure Date:  6/2/19  Health Care Provider Name: Jose Del Toro  Form Completed By:  Jose Del Toro  Reason for the Bronchoscopy: hemoptysis  Date of Procedure: 06/02/2019  Planned Location for the Procedure:  ?[ ] Emergency Department ?[X] Intensive Care Unit ? [ ] Operating Room ?     Risk Assessment  I. I have personally evaluated this patient for Mycobacterium tuberculosis and I determined thefollowing risk:  ?[X] No Risk for TB     [ ] Low risk or TB     [ ] Significant risk for TB    II. Additional Findings: _________________________    III. Based on the Determined Risk for TB the following Action(s) are Suggested:  1. If there are no risk factors for TB infection proceed with the procedure.  2. If there is low risk or significant risk for TB infection the following recommendations should befollowed:            a. Perform the procedure in a negative pressure room, with N95 respirator.            b. If not feasible to move the patient or defer the procedure:                    i. Use a single-bedded room low traffic area to perform the bronchoscopy procedure.                   ii. Place a portable high-efficiency particulate air (HEPA) filter in the space prior tostarting the procedure and keep closed.                       Refer to the INF.1132 titled“Tuberculosis Control Strategy Plan” for additional information.                  iii. All Health Care Provider within the procedure room shall wear an N95 respirator.            c. Documentation of the tuberculosis risk assessment should be included within the patient’smedical record.    Indication: Hemoptysis    Operators: Jose Del Toro MD, Nata Thorpe MD    Findings:  Bronchoscope inserted through ETT. The #7 endobronchial blocker was adjusted. 750 mg (in 30 ml of NS) of tranexemic acid was given through the blocker. 250mg (in 10ml of NS) of the tranexemic acid was given through the bronchoscope around the blocker in the RUL. The blocker was inflated and secured in position under direct visualization to block the RUL. The residual blood from RLL and Left bronchus and segments was aspirated. Patient tolerated procedure well. No immediate post-procedure complication.    Dispo: Keep blocker in secure position. Don't turn patient. Repeat coags. Target INR < 1.5. Further care per MICU.

## 2019-06-03 LAB
ANION GAP SERPL CALC-SCNC: 11 MMO/L — SIGNIFICANT CHANGE UP (ref 7–14)
APTT BLD: 35.1 SEC — SIGNIFICANT CHANGE UP (ref 27.5–36.3)
APTT BLD: 37.7 SEC — HIGH (ref 27.5–36.3)
BASE EXCESS BLDA CALC-SCNC: -0.6 MMOL/L — SIGNIFICANT CHANGE UP
BASOPHILS # BLD AUTO: 0.07 K/UL — SIGNIFICANT CHANGE UP (ref 0–0.2)
BASOPHILS NFR BLD AUTO: 0.6 % — SIGNIFICANT CHANGE UP (ref 0–2)
BLOOD GAS ARTERIAL - FIO2: 100 — SIGNIFICANT CHANGE UP
BUN SERPL-MCNC: 13 MG/DL — SIGNIFICANT CHANGE UP (ref 7–23)
CALCIUM SERPL-MCNC: 8.3 MG/DL — LOW (ref 8.4–10.5)
CHLORIDE BLDA-SCNC: 104 MMOL/L — SIGNIFICANT CHANGE UP (ref 96–108)
CHLORIDE SERPL-SCNC: 104 MMOL/L — SIGNIFICANT CHANGE UP (ref 98–107)
CO2 SERPL-SCNC: 19 MMOL/L — LOW (ref 22–31)
CREAT BLDA-MCNC: 0.85 MG/DL — SIGNIFICANT CHANGE UP (ref 0.5–1.3)
CREAT SERPL-MCNC: 0.84 MG/DL — SIGNIFICANT CHANGE UP (ref 0.5–1.3)
EOSINOPHIL # BLD AUTO: 0.69 K/UL — HIGH (ref 0–0.5)
EOSINOPHIL NFR BLD AUTO: 6.1 % — HIGH (ref 0–6)
GLUCOSE BLDA-MCNC: 113 MG/DL — HIGH (ref 70–99)
GLUCOSE SERPL-MCNC: 112 MG/DL — HIGH (ref 70–99)
HCO3 BLDA-SCNC: 24 MMOL/L — SIGNIFICANT CHANGE UP (ref 22–26)
HCT VFR BLD CALC: 35.5 % — LOW (ref 39–50)
HCT VFR BLDA CALC: 35.9 % — LOW (ref 39–51)
HGB BLD-MCNC: 11.4 G/DL — LOW (ref 13–17)
HGB BLDA-MCNC: 11.7 G/DL — LOW (ref 13–17)
IMM GRANULOCYTES NFR BLD AUTO: 0.4 % — SIGNIFICANT CHANGE UP (ref 0–1.5)
INR BLD: 1.36 — HIGH (ref 0.88–1.17)
INR BLD: 1.49 — HIGH (ref 0.88–1.17)
LACTATE BLDA-SCNC: 1.5 MMOL/L — SIGNIFICANT CHANGE UP (ref 0.5–2)
LYMPHOCYTES # BLD AUTO: 0.86 K/UL — LOW (ref 1–3.3)
LYMPHOCYTES # BLD AUTO: 7.6 % — LOW (ref 13–44)
MAGNESIUM SERPL-MCNC: 2.1 MG/DL — SIGNIFICANT CHANGE UP (ref 1.6–2.6)
MCHC RBC-ENTMCNC: 24.5 PG — LOW (ref 27–34)
MCHC RBC-ENTMCNC: 32.1 % — SIGNIFICANT CHANGE UP (ref 32–36)
MCV RBC AUTO: 76.3 FL — LOW (ref 80–100)
MONOCYTES # BLD AUTO: 0.99 K/UL — HIGH (ref 0–0.9)
MONOCYTES NFR BLD AUTO: 8.7 % — SIGNIFICANT CHANGE UP (ref 2–14)
NEUTROPHILS # BLD AUTO: 8.71 K/UL — HIGH (ref 1.8–7.4)
NEUTROPHILS NFR BLD AUTO: 76.6 % — SIGNIFICANT CHANGE UP (ref 43–77)
NRBC # FLD: 0.02 K/UL — SIGNIFICANT CHANGE UP (ref 0–0)
PCO2 BLDA: 34 MMHG — LOW (ref 35–48)
PH BLDA: 7.45 PH — SIGNIFICANT CHANGE UP (ref 7.35–7.45)
PHOSPHATE SERPL-MCNC: 2.3 MG/DL — LOW (ref 2.5–4.5)
PLATELET # BLD AUTO: 147 K/UL — LOW (ref 150–400)
PMV BLD: SIGNIFICANT CHANGE UP FL (ref 7–13)
PO2 BLDA: 70 MMHG — LOW (ref 83–108)
POTASSIUM BLDA-SCNC: 3.9 MMOL/L — SIGNIFICANT CHANGE UP (ref 3.4–4.5)
POTASSIUM SERPL-MCNC: 4.2 MMOL/L — SIGNIFICANT CHANGE UP (ref 3.5–5.3)
POTASSIUM SERPL-SCNC: 4.2 MMOL/L — SIGNIFICANT CHANGE UP (ref 3.5–5.3)
PROTHROM AB SERPL-ACNC: 15.7 SEC — HIGH (ref 9.8–13.1)
PROTHROM AB SERPL-ACNC: 16.8 SEC — HIGH (ref 9.8–13.1)
RBC # BLD: 4.65 M/UL — SIGNIFICANT CHANGE UP (ref 4.2–5.8)
RBC # FLD: 21.8 % — HIGH (ref 10.3–14.5)
SAO2 % BLDA: 94.4 % — LOW (ref 95–99)
SODIUM BLDA-SCNC: 132 MMOL/L — LOW (ref 136–146)
SODIUM SERPL-SCNC: 134 MMOL/L — LOW (ref 135–145)
VANCOMYCIN TROUGH SERPL-MCNC: 10.8 UG/ML — SIGNIFICANT CHANGE UP (ref 10–20)
WBC # BLD: 11.37 K/UL — HIGH (ref 3.8–10.5)
WBC # FLD AUTO: 11.37 K/UL — HIGH (ref 3.8–10.5)

## 2019-06-03 PROCEDURE — 99291 CRITICAL CARE FIRST HOUR: CPT | Mod: 25

## 2019-06-03 PROCEDURE — 31622 DX BRONCHOSCOPE/WASH: CPT | Mod: GC

## 2019-06-03 PROCEDURE — 99233 SBSQ HOSP IP/OBS HIGH 50: CPT

## 2019-06-03 RX ORDER — FENTANYL CITRATE 50 UG/ML
1 INJECTION INTRAVENOUS
Qty: 2500 | Refills: 0 | Status: DISCONTINUED | OUTPATIENT
Start: 2019-06-03 | End: 2019-06-06

## 2019-06-03 RX ORDER — MIDAZOLAM HYDROCHLORIDE 1 MG/ML
2 INJECTION, SOLUTION INTRAMUSCULAR; INTRAVENOUS ONCE
Refills: 0 | Status: DISCONTINUED | OUTPATIENT
Start: 2019-06-03 | End: 2019-06-03

## 2019-06-03 RX ORDER — DIGOXIN 250 MCG
0.25 TABLET ORAL DAILY
Refills: 0 | Status: DISCONTINUED | OUTPATIENT
Start: 2019-06-03 | End: 2019-06-04

## 2019-06-03 RX ORDER — ACETAMINOPHEN 500 MG
1000 TABLET ORAL ONCE
Refills: 0 | Status: COMPLETED | OUTPATIENT
Start: 2019-06-03 | End: 2019-06-03

## 2019-06-03 RX ORDER — CISATRACURIUM BESYLATE 2 MG/ML
20 INJECTION INTRAVENOUS ONCE
Refills: 0 | Status: COMPLETED | OUTPATIENT
Start: 2019-06-03 | End: 2019-06-03

## 2019-06-03 RX ORDER — FENTANYL CITRATE 50 UG/ML
100 INJECTION INTRAVENOUS ONCE
Refills: 0 | Status: DISCONTINUED | OUTPATIENT
Start: 2019-06-03 | End: 2019-06-03

## 2019-06-03 RX ORDER — MIDAZOLAM HYDROCHLORIDE 1 MG/ML
0.02 INJECTION, SOLUTION INTRAMUSCULAR; INTRAVENOUS
Qty: 100 | Refills: 0 | Status: DISCONTINUED | OUTPATIENT
Start: 2019-06-03 | End: 2019-06-06

## 2019-06-03 RX ADMIN — AZITHROMYCIN 250 MILLIGRAM(S): 500 TABLET, FILM COATED ORAL at 14:06

## 2019-06-03 RX ADMIN — Medication 1 APPLICATION(S): at 17:29

## 2019-06-03 RX ADMIN — MIDAZOLAM HYDROCHLORIDE 2 MILLIGRAM(S): 1 INJECTION, SOLUTION INTRAMUSCULAR; INTRAVENOUS at 17:29

## 2019-06-03 RX ADMIN — PANTOPRAZOLE SODIUM 40 MILLIGRAM(S): 20 TABLET, DELAYED RELEASE ORAL at 05:07

## 2019-06-03 RX ADMIN — MIDAZOLAM HYDROCHLORIDE 1.06 MG/KG/HR: 1 INJECTION, SOLUTION INTRAMUSCULAR; INTRAVENOUS at 07:53

## 2019-06-03 RX ADMIN — Medication 0.25 MILLIGRAM(S): at 18:31

## 2019-06-03 RX ADMIN — PHENYLEPHRINE HYDROCHLORIDE 1.99 MICROGRAM(S)/KG/MIN: 10 INJECTION INTRAVENOUS at 07:53

## 2019-06-03 RX ADMIN — Medication 63.75 MILLIMOLE(S): at 05:07

## 2019-06-03 RX ADMIN — CISATRACURIUM BESYLATE 20 MILLIGRAM(S): 2 INJECTION INTRAVENOUS at 17:29

## 2019-06-03 RX ADMIN — Medication 250 MILLIGRAM(S): at 05:07

## 2019-06-03 RX ADMIN — CHLORHEXIDINE GLUCONATE 1 APPLICATION(S): 213 SOLUTION TOPICAL at 07:54

## 2019-06-03 RX ADMIN — Medication 400 MILLIGRAM(S): at 05:40

## 2019-06-03 RX ADMIN — FENTANYL CITRATE 5.3 MICROGRAM(S)/KG/HR: 50 INJECTION INTRAVENOUS at 07:54

## 2019-06-03 RX ADMIN — Medication 1 APPLICATION(S): at 05:08

## 2019-06-03 RX ADMIN — PHENYLEPHRINE HYDROCHLORIDE 1.99 MICROGRAM(S)/KG/MIN: 10 INJECTION INTRAVENOUS at 19:40

## 2019-06-03 RX ADMIN — Medication 250 MILLIGRAM(S): at 19:40

## 2019-06-03 RX ADMIN — PIPERACILLIN AND TAZOBACTAM 25 GRAM(S): 4; .5 INJECTION, POWDER, LYOPHILIZED, FOR SOLUTION INTRAVENOUS at 15:06

## 2019-06-03 RX ADMIN — PANTOPRAZOLE SODIUM 40 MILLIGRAM(S): 20 TABLET, DELAYED RELEASE ORAL at 17:29

## 2019-06-03 RX ADMIN — MIDAZOLAM HYDROCHLORIDE 1.06 MG/KG/HR: 1 INJECTION, SOLUTION INTRAMUSCULAR; INTRAVENOUS at 19:39

## 2019-06-03 RX ADMIN — PIPERACILLIN AND TAZOBACTAM 25 GRAM(S): 4; .5 INJECTION, POWDER, LYOPHILIZED, FOR SOLUTION INTRAVENOUS at 22:02

## 2019-06-03 RX ADMIN — FENTANYL CITRATE 100 MICROGRAM(S): 50 INJECTION INTRAVENOUS at 04:20

## 2019-06-03 RX ADMIN — PIPERACILLIN AND TAZOBACTAM 25 GRAM(S): 4; .5 INJECTION, POWDER, LYOPHILIZED, FOR SOLUTION INTRAVENOUS at 05:07

## 2019-06-03 RX ADMIN — FENTANYL CITRATE 5.3 MICROGRAM(S)/KG/HR: 50 INJECTION INTRAVENOUS at 19:39

## 2019-06-03 NOTE — PROGRESS NOTE ADULT - ATTENDING COMMENTS
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

## 2019-06-03 NOTE — PROGRESS NOTE ADULT - SUBJECTIVE AND OBJECTIVE BOX
Mg Hayward, PGY1   Pager [phone]    INTERVAL HPI/OVERNIGHT EVENTS:    SUBJECTIVE: Patient seen and examined at bedside.     CONSTITUTIONAL: No fevers, chills, weakness  HEENT: No headache, acute visual changes, throat pain  NECK: No pain or stiffness  RESPIRATORY: No sob, cough, wheezing, hemoptysis  CARDIOVASCULAR: No chest pain or palpitations  GASTROINTESTINAL: No abdominal pain, nausea, vomiting, constipation, or diarrhea  GENITOURINARY: No dysuria, frequency or hematuria  NEUROLOGICAL: No numbness or weakness  SKIN: No rash or itching    OBJECTIVE:    VITAL SIGNS:  ICU Vital Signs Last 24 Hrs  T(C): 38.3 (2019 04:00), Max: 38.3 (2019 04:00)  T(F): 100.9 (2019 04:00), Max: 100.9 (2019 04:00)  HR: 82 (2019 06:00) (69 - 85)  BP: 115/63 (2019 06:00) (86/53 - 131/81)  BP(mean): 78 (2019 06:00) (61 - 99)  ABP: --  ABP(mean): --  RR: 20 (2019 06:00) (18 - 20)  SpO2: 94% (2019 06:00) (86% - 100%)    Mode: AC/ CMV (Assist Control/ Continuous Mandatory Ventilation), RR (machine): 20, TV (machine): 450, FiO2: 100, PEEP: 10, MAP: 17, PIP: 31     @ 07:  -   @ 07:00  --------------------------------------------------------  IN: 2669.3 mL / OUT: 1925 mL / NET: 744.3 mL     @ 07:01  -  03 @ 06:42  --------------------------------------------------------  IN: 1848.6 mL / OUT: 1455 mL / NET: 393.6 mL      CAPILLARY BLOOD GLUCOSE      POCT Blood Glucose.: 133 mg/dL (2019 05:42)      PHYSICAL EXAM:    General: NAD  HEENT: NCAT, PERRL, clear conjunctiva, no erythema or exudates in the oropharynx  Neck: supple, no JVD  Respiratory: CTAB, normal respiratory effort  Cardiovascular: RRR, S1S2, no murmurs, rubs, or gallops  Abdomen: soft, nontender, nondistended, normal bowel sounds  Extremities: 2+ peripheral pulses b/l, no edema or cyanosis   Skin: normal color and turgor; no rash  Neurological: AOx3, nonfocal    MEDICATIONS:  MEDICATIONS  (STANDING):  azithromycin  IVPB      azithromycin  IVPB 500 milliGRAM(s) IV Intermittent every 24 hours  chlorhexidine 4% Liquid 1 Application(s) Topical <User Schedule>  fentaNYL   Infusion. 1 MICROgram(s)/kG/Hr (5.3 mL/Hr) IV Continuous <Continuous>  midazolam Infusion 0.02 mG/kG/Hr (1.06 mL/Hr) IV Continuous <Continuous>  pantoprazole  Injectable 40 milliGRAM(s) IV Push two times a day  petrolatum Ophthalmic Ointment 1 Application(s) Both EYES two times a day  phenylephrine    Infusion 0.2 MICROgram(s)/kG/Min (1.988 mL/Hr) IV Continuous <Continuous>  piperacillin/tazobactam IVPB. 3.375 Gram(s) IV Intermittent every 8 hours  vancomycin  IVPB 1000 milliGRAM(s) IV Intermittent every 12 hours    MEDICATIONS  (PRN):  LORazepam   Injectable 2 milliGRAM(s) IV Push every 4 hours PRN Agitation      ALLERGIES:  Allergies    No Known Allergies    Intolerances        LABS:                        11.4   11.37 )-----------( 147      ( 2019 02:36 )             35.5     06-03    134<L>  |  104  |  13  ----------------------------<  112<H>  4.2   |  19<L>  |  0.84    Ca    8.3<L>      2019 02:36  Phos  2.3     06-03  Mg     2.1     06-03    TPro  5.9<L>  /  Alb  3.2<L>  /  TBili  2.6<H>  /  DBili  x   /  AST  71<H>  /  ALT  22  /  AlkPhos  93  06-02    PT/INR - ( 2019 02:36 )   PT: 15.7 SEC;   INR: 1.36          PTT - ( 2019 02:36 )  PTT:37.7 SEC  Urinalysis Basic - ( 2019 19:20 )    Color: YELLOW / Appearance: Lt TURBID / S.039 / pH: 5.5  Gluc: NEGATIVE / Ketone: NEGATIVE  / Bili: NEGATIVE / Urobili: NORMAL   Blood: MODERATE / Protein: SMALL / Nitrite: NEGATIVE   Leuk Esterase: NEGATIVE / RBC: 20-30 / WBC 0-2   Sq Epi: OCC / Non Sq Epi: x / Bacteria: FEW        RADIOLOGY & ADDITIONAL TESTS: Reviewed. Mg Hayward, PGY1   Pager [phone]    INTERVAL HPI/OVERNIGHT EVENTS: 1g tylenol for fever    SUBJECTIVE: Patient seen and examined at bedside.     CONSTITUTIONAL: No fevers, chills, weakness  HEENT: No headache, acute visual changes, throat pain  NECK: No pain or stiffness  RESPIRATORY: No sob, cough, wheezing, hemoptysis  CARDIOVASCULAR: No chest pain or palpitations  GASTROINTESTINAL: No abdominal pain, nausea, vomiting, constipation, or diarrhea  GENITOURINARY: No dysuria, frequency or hematuria  NEUROLOGICAL: No numbness or weakness  SKIN: No rash or itching    OBJECTIVE:    VITAL SIGNS:  ICU Vital Signs Last 24 Hrs  T(C): 38.3 (2019 04:00), Max: 38.3 (2019 04:00)  T(F): 100.9 (2019 04:00), Max: 100.9 (2019 04:00)  HR: 82 (2019 06:00) (69 - 85)  BP: 115/63 (2019 06:00) (86/53 - 131/81)  BP(mean): 78 (2019 06:00) (61 - 99)  ABP: --  ABP(mean): --  RR: 20 (2019 06:00) (18 - 20)  SpO2: 94% (2019 06:00) (86% - 100%)    Mode: AC/ CMV (Assist Control/ Continuous Mandatory Ventilation), RR (machine): 20, TV (machine): 450, FiO2: 100, PEEP: 10, MAP: 17, PIP: 31     @ 07: @ 07:00  --------------------------------------------------------  IN: 2669.3 mL / OUT: 1925 mL / NET: 744.3 mL     @ 07:  -  03 @ 06:42  --------------------------------------------------------  IN: 1848.6 mL / OUT: 1455 mL / NET: 393.6 mL      CAPILLARY BLOOD GLUCOSE      POCT Blood Glucose.: 133 mg/dL (2019 05:42)      PHYSICAL EXAM:    General: NAD  HEENT: NCAT, PERRL, clear conjunctiva, no erythema or exudates in the oropharynx  Neck: supple, no JVD  Respiratory: CTAB, normal respiratory effort  Cardiovascular: RRR, S1S2, no murmurs, rubs, or gallops  Abdomen: soft, nontender, nondistended, normal bowel sounds  Extremities: 2+ peripheral pulses b/l, no edema or cyanosis   Skin: normal color and turgor; no rash  Neurological: AOx3, nonfocal    MEDICATIONS:  MEDICATIONS  (STANDING):  azithromycin  IVPB      azithromycin  IVPB 500 milliGRAM(s) IV Intermittent every 24 hours  chlorhexidine 4% Liquid 1 Application(s) Topical <User Schedule>  fentaNYL   Infusion. 1 MICROgram(s)/kG/Hr (5.3 mL/Hr) IV Continuous <Continuous>  midazolam Infusion 0.02 mG/kG/Hr (1.06 mL/Hr) IV Continuous <Continuous>  pantoprazole  Injectable 40 milliGRAM(s) IV Push two times a day  petrolatum Ophthalmic Ointment 1 Application(s) Both EYES two times a day  phenylephrine    Infusion 0.2 MICROgram(s)/kG/Min (1.988 mL/Hr) IV Continuous <Continuous>  piperacillin/tazobactam IVPB. 3.375 Gram(s) IV Intermittent every 8 hours  vancomycin  IVPB 1000 milliGRAM(s) IV Intermittent every 12 hours    MEDICATIONS  (PRN):  LORazepam   Injectable 2 milliGRAM(s) IV Push every 4 hours PRN Agitation      ALLERGIES:  Allergies    No Known Allergies    Intolerances        LABS:                        11.4   11.37 )-----------( 147      ( 2019 02:36 )             35.5     06-03    134<L>  |  104  |  13  ----------------------------<  112<H>  4.2   |  19<L>  |  0.84    Ca    8.3<L>      2019 02:36  Phos  2.3     06-03  Mg     2.1     06-03    TPro  5.9<L>  /  Alb  3.2<L>  /  TBili  2.6<H>  /  DBili  x   /  AST  71<H>  /  ALT  22  /  AlkPhos  93  06-02    PT/INR - ( 2019 02:36 )   PT: 15.7 SEC;   INR: 1.36          PTT - ( 2019 02:36 )  PTT:37.7 SEC  Urinalysis Basic - ( 2019 19:20 )    Color: YELLOW / Appearance: Lt TURBID / S.039 / pH: 5.5  Gluc: NEGATIVE / Ketone: NEGATIVE  / Bili: NEGATIVE / Urobili: NORMAL   Blood: MODERATE / Protein: SMALL / Nitrite: NEGATIVE   Leuk Esterase: NEGATIVE / RBC: 20-30 / WBC 0-2   Sq Epi: OCC / Non Sq Epi: x / Bacteria: FEW        RADIOLOGY & ADDITIONAL TESTS: Reviewed. Mg Yesy, PGY1   Pager [phone]    INTERVAL HPI/OVERNIGHT EVENTS: Bronched yesterday night, tranexamic acid through endobronchial blocker. 1g tylenol for fever.    SUBJECTIVE: Patient seen and examined at bedside. Sedated.    OBJECTIVE:    VITAL SIGNS:  ICU Vital Signs Last 24 Hrs  T(C): 38.3 (2019 04:00), Max: 38.3 (2019 04:00)  T(F): 100.9 (2019 04:00), Max: 100.9 (2019 04:00)  HR: 82 (2019 06:00) (69 - 85)  BP: 115/63 (2019 06:00) (86/53 - 131/81)  BP(mean): 78 (2019 06:00) (61 - 99)  ABP: --  ABP(mean): --  RR: 20 (2019 06:00) (18 - 20)  SpO2: 94% (2019 06:00) (86% - 100%)    Mode: AC/ CMV (Assist Control/ Continuous Mandatory Ventilation), RR (machine): 20, TV (machine): 450, FiO2: 100, PEEP: 10, MAP: 17, PIP: 31     @ 07:  -  02 @ 07:00  --------------------------------------------------------  IN: 2669.3 mL / OUT: 1925 mL / NET: 744.3 mL     @ 07:01  -  03 @ 06:42  --------------------------------------------------------  IN: 1848.6 mL / OUT: 1455 mL / NET: 393.6 mL      CAPILLARY BLOOD GLUCOSE      POCT Blood Glucose.: 133 mg/dL (2019 05:42)      PHYSICAL EXAM:    General: NAD  HEENT: NCAT, PERRL, clear conjunctiva, mmm  Neck: supple, no JVD  Respiratory: CTAB  Cardiovascular: RRR, S1S2, no murmurs, rubs, or gallops  Abdomen: soft, nontender, nondistended, normal bowel sounds  Extremities: no edema or cyanosis   Skin: normal color and turgor; no rash  Neurological: sedated, nonfocal    MEDICATIONS:  MEDICATIONS  (STANDING):  azithromycin  IVPB      azithromycin  IVPB 500 milliGRAM(s) IV Intermittent every 24 hours  chlorhexidine 4% Liquid 1 Application(s) Topical <User Schedule>  fentaNYL   Infusion. 1 MICROgram(s)/kG/Hr (5.3 mL/Hr) IV Continuous <Continuous>  midazolam Infusion 0.02 mG/kG/Hr (1.06 mL/Hr) IV Continuous <Continuous>  pantoprazole  Injectable 40 milliGRAM(s) IV Push two times a day  petrolatum Ophthalmic Ointment 1 Application(s) Both EYES two times a day  phenylephrine    Infusion 0.2 MICROgram(s)/kG/Min (1.988 mL/Hr) IV Continuous <Continuous>  piperacillin/tazobactam IVPB. 3.375 Gram(s) IV Intermittent every 8 hours  vancomycin  IVPB 1000 milliGRAM(s) IV Intermittent every 12 hours    MEDICATIONS  (PRN):  LORazepam   Injectable 2 milliGRAM(s) IV Push every 4 hours PRN Agitation      ALLERGIES:  Allergies    No Known Allergies    Intolerances        LABS:                        11.4   11.37 )-----------( 147      ( 2019 02:36 )             35.5     06-03    134<L>  |  104  |  13  ----------------------------<  112<H>  4.2   |  19<L>  |  0.84    Ca    8.3<L>      2019 02:36  Phos  2.3     06-03  Mg     2.1     06-03    TPro  5.9<L>  /  Alb  3.2<L>  /  TBili  2.6<H>  /  DBili  x   /  AST  71<H>  /  ALT  22  /  AlkPhos  93  06-02    PT/INR - ( 2019 02:36 )   PT: 15.7 SEC;   INR: 1.36          PTT - ( 2019 02:36 )  PTT:37.7 SEC  Urinalysis Basic - ( 2019 19:20 )    Color: YELLOW / Appearance: Lt TURBID / S.039 / pH: 5.5  Gluc: NEGATIVE / Ketone: NEGATIVE  / Bili: NEGATIVE / Urobili: NORMAL   Blood: MODERATE / Protein: SMALL / Nitrite: NEGATIVE   Leuk Esterase: NEGATIVE / RBC: 20-30 / WBC 0-2   Sq Epi: OCC / Non Sq Epi: x / Bacteria: FEW        RADIOLOGY & ADDITIONAL TESTS: Reviewed.

## 2019-06-03 NOTE — PROGRESS NOTE PEDS - ASSESSMENT
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 He is now s/p IR embolization of right upper lobe aortopulmonary collateral vessels likely culprits for his hemoptysis.  Fontan physiology is preload dependent and dependent on unobstructed flow through PAs. Baseline saturation for him is in the upper 80s/low 90s likely related to veno-venous collateral vessels that serve as right to left shunting.  Known history of asplenia.     Plan-   - Ventilation and FiO2 for goal saturations >85%.   - Trend H/H; continue antibiotics  - s/p bronch 6/1 w/ endobronchial blocker placed for re-bleeding from RUL, plan for repeat bronch today to remove block and evaluate for bleeding and clots; reconsult IR if persistent active bleeding  - once clear from bleeding, would consider restarting anticoagulation as had extensive DVT last year when antiplatelet therapy was discontinued after a presentation for hemoptysis.  - [facility] team will continue to monitor this patient's progress. 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 He is now s/p IR embolization of right upper lobe aortopulmonary collateral vessels likely culprits for his hemoptysis.  Fontan physiology is preload dependent and dependent on unobstructed flow through PAs. Baseline saturation for him is in the upper 80s/low 90s likely related to veno-venous collateral vessels that serve as right to left shunting.  Known history of asplenia.     Plan-   - Ventilation and FiO2 for goal saturations >85%.   - Trend H/H; continue antibiotics while balloon in place  - s/p bronch 6/1 w/ endobronchial blocker placed for re-bleeding from RUL, plan for repeat bronch today to remove block and evaluate for bleeding and clots  - f/u coags, if persistently abnormal, consider hematology consultation  - if has rebleeding despite normalization of coags, will need to consider cardiac catheterization to evaluate the rest of the aorta to evaluate for aortopulmonary collateral vessels that may be leading to persistent bleeding; per our discussion with MICU yesterday at the time of the bronchoscopy, bleeding was isolated to the RUL as noted on Friday  - if no rebleeding on bronch today, would wean down PEEP as increased PEEP will impede venous return to the Fontan circuit  - wean phenylephrine as tolerated; can give more volume if concerned hypotension; hopeful to wean sedatives and wean towards extubation if no recurrence of bleeding on bronchoscopy; if escalating dose of phenylephrine, may impair Fontan flow due to its effects on PVR

## 2019-06-03 NOTE — CHART NOTE - NSCHARTNOTEFT_GEN_A_CORE
Indication: Hemoptysis. Endobronchial Blocker in place #7. Evaluate airway.     Operators: Jose Del Toro MD, Fabby Munguia MD.    Preop medications: 4 mg of versed. 20 mg of cisatracurium.     Findings:  Bronchoscope inserted through ETT/Endobronchial blocker apparatus. ETT noted to be in good position. Airway evaluation revealed some old clots noted in the RLL which were aspirated. Old small amount of clots noted in the Left side which was aspirated. Mucoid secretions were noted at the level of the TONJA which were aspirated. Under bronchoscopic visualization, the balloon of the endobronchial blocker was deflated. Clots were noted at the entry of the RUL. No active bleeding was noted. An subsequent inspection did not show any active bleeding. A decision was made to leave the blocker in place with a deflated balloon to assess for further bleeding with repeat bronchoscopy in am. Bronchoscope then withdrawn from ETT. Patient tolerated procedure well.     No immediate post-procedure complications.    Plan: - Target INR <1.5  - Repeat bronchoscopy in am unless clinical situation worsens.  - If no further active bleeding, the blocker will be withdrawn in am.     Jose Del Toro MD  Fellow (PGY 6),  Pulmonary & Critical Care Medicine.  Pager - [phone] ([facility])/ [phone] ([place]) Indication: Hemoptysis. Endobronchial Blocker in place #7. Evaluate airway.     Operators: Jose Del Toro MD, Fabby Munguia MD.    Preop medications: 4 mg of versed. 20 mg of cisatracurium.     Findings:  Bronchoscope inserted through ETT/Endobronchial blocker apparatus. ETT noted to be in good position. Airway evaluation revealed some old clots noted in the RLL which were aspirated. Old small amount of clots noted in the Left side which was aspirated. Mucoid secretions were noted at the level of the TONJA which were aspirated. Under bronchoscopic visualization, the balloon of the endobronchial blocker was deflated. Clots were noted at the entry of the RUL. No active bleeding was noted. An subsequent inspection did not show any active bleeding. A decision was made to leave the blocker in place with a deflated balloon to assess for further bleeding with repeat bronchoscopy in am. Bronchoscope then withdrawn from ETT. Patient tolerated procedure well.     No immediate post-procedure complications.    Plan: - Target INR <1.5  - Repeat bronchoscopy in am unless clinical situation worsens.  - If no further active bleeding, the blocker will be withdrawn.     Jose Del Toro MD  Fellow (PGY 6),  Pulmonary & Critical Care Medicine.  Pager - [phone] ([facility])/ [phone] ([place])    I was present through out the procedure.

## 2019-06-03 NOTE — PROGRESS NOTE ADULT - ASSESSMENT
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    #Neuro: no active issues  - Sedated on fentanyl and versed gtt  - Careful sedation as pre-load dependent    #CV: Heterotaxy with congenital single atrium/ventricle s/p multiple cardiac surgeries including Fontan's  - Ectopy resolved after d/c levo gtt, c/w phenylephrine gtt  - Possible angiogram next week per cardiology  - Holding Coumadin in setting of bleed. Persistently elevated INR s/p FFP x1, will give vitamin K x1 today in preparation for repeat bronchoscopy.  - Holding digoxin given elevated K  - F/u LE dopplers  - peds cardiology recs appreciated    #Pulm: Hemoptysis, s/p bronch 5/31 showing RUL bleeding. SpO2 at baseline high 80's given cardiopulmonary pathophysiology, currently hypoxemic requiring FiO2 100% to maintain oxygenation in low 90s.  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 Gastritis and duodenal erosions were also seen possibly 2/2 ASA gastropathy.   - Holding feeds for now for bronchoscopy  - protonix 40 daily    #ID  Initially febrile, elevated white count, no atelectasis on POCUS  - c/w empiric vanc, zosyn, azithro x3 days if infectious work-up negative  - f/u BCxs  - UA negative  - send urine legionella    #Renal  - MELLO likely pre-renal in setting of poor PO intake, hemoptysis vs possible PATRICA, now resolved after IVF and pressor support. Hyperkalemia resolved with medical management.  - Goal K>4, Mg>2, replete prn    #Endocrine  - FSBG q6 while NPO    #Heme  - Reactive leukocytosis vs infection, started on empiric abx, monitor CBC  - INR 2 s/p FFP, will give vitamin K 10mg IV x1 in preparation for bronchoscopy  - Monitor INR, consider further FFP transfusions if actively bleeding    #DVT ppx: SCDs    #GOC: full code 25 y/o M with 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    #Neuro: no active issues  - Sedated on fentanyl and versed gtt  - Careful sedation as pre-load dependent    #CV: Heterotaxy with congenital single atrium/ventricle s/p multiple cardiac surgeries including Fontan's  - Ectopy resolved after d/c levo gtt, c/w phenylephrine gtt  - Possible angiogram next week per cardiology  - Holding Coumadin in setting of bleed. Downtrending INR s/p PCC, FFP x4, vitamin K x1.  - Restart dig 0.25 daily  - LE dopplers with chronic RLE DVT  - peds cardiology recs appreciated    #Pulm: Hemoptysis s/p bronch 5/31 showing RUL bleeding. SpO2 at baseline high 80's given cardiopulmonary pathophysiology.  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 Gastritis and duodenal erosions were also seen possibly 2/2 ASA gastropathy.   - Tube feeds  - protonix 40 daily    #ID  Initially febrile, elevated white count, no atelectasis on POCUS  - c/w empiric vanc, zosyn, azithro  - f/u BCx NTD  - UA negative  - f/u urine legionella    #Renal  - MELLO likely pre-renal in setting of poor PO intake, hemoptysis vs possible PATRICA, now resolved after IVF and pressor support.  - Goal K>4, Mg>2, replete prn    #Endocrine  - FSBG q6    #Heme  - Reactive leukocytosis vs infection, started on empiric abx, monitor CBC  - Monitor INR    #DVT ppx: SCDs    #GOC: full code

## 2019-06-03 NOTE — PROGRESS NOTE PEDS - SUBJECTIVE AND OBJECTIVE BOX
INTERVAL HISTORY: Continues to be critically ill with need for high PEEP; no new bleeding. Maintaining hematocrit. Was bronched yesterday night and  tranexamic acid given through endobronchial blocker.    RESPIRATORY SUPPORT: Mode: AC/ CMV (Assist Control/ Continuous Mandatory Ventilation), RR (machine): 20, FiO2: 80, PEEP: 10     @ 07:01  -   @ 07:00  --------------------------------------------------------  IN: 1848.6 mL / OUT: 1555 mL / NET: 293.6 mL    INTRAVASCULAR ACCESS: Peripheral IV    MEDICATIONS:  digoxin  Injectable 0.25 milliGRAM(s) IV Push daily  phenylephrine    Infusion 0.2 MICROgram(s)/kG/Min IV Continuous <Continuous>  azithromycin  IVPB      azithromycin  IVPB 500 milliGRAM(s) IV Intermittent every 24 hours  piperacillin/tazobactam IVPB. 3.375 Gram(s) IV Intermittent every 8 hours  vancomycin  IVPB 1000 milliGRAM(s) IV Intermittent every 12 hours  fentaNYL   Infusion. 1 MICROgram(s)/kG/Hr IV Continuous <Continuous>  midazolam Infusion 0.02 mG/kG/Hr IV Continuous <Continuous>  pantoprazole  Injectable 40 milliGRAM(s) IV Push two times a day    PHYSICAL EXAMINATION:  Vital signs - Weight (kg): 53 (31 @ 20:00)  T(C): 36.8 (19 @ 16:00), Max: 38.3 (19 @ 04:00)  HR: 80 (19 @ 18:00) (74 - 84)  BP: 113/66 (19 @ 18:00) (93/52 - 117/65)  RR: 20 (19 @ 18:00) (18 - 20)  SpO2: 92% (19 @ 18:00) (89% - 100%)    General - non-dysmorphic appearance, well-developed, sedated and on mechanical ventilator  Skin - mottling of skin in the peripheries, with petechiae; no desquamation, no cyanosis.  Eyes / ENT - no conjunctival injection, sclerae anicteric, external ears & nares normal, mucous membranes moist.  Pulmonary - normal inspiratory effort, no retractions, lungs clear to auscultation bilaterally, no wheezes, no rales.  Cardiovascular - tachycardia; regular rhythm, normal S1 & single S2, no murmurs, no rubs, no gallops, capillary refill < 2sec, normal pulses.  Gastrointestinal - soft, non-distended, non-tender, hepatomegaly + .  Musculoskeletal - no joint swelling, no clubbing, no edema.  Neurologic / Psychiatric - sedated with Versed and Fentanyl.    LABORATORY TESTS:                          11.4  CBC:   11.37 )-----------( 147   (19 @ 02:36)                          35.5               134   |  104   |  13                 Ca: 8.3    BMP:   ----------------------------< 112    M.1   (19 @ 02:36)             4.2    |  19    | 0.84               Ph: 2.3      LFT:     TPro: 5.9 / Alb: 3.2 / TBili: 2.6 / DBili: x / AST: 71 / ALT: 22 / AlkPhos: 93   (19 @ 03:58)    COAG: PT: 16.8 / PTT: 35.1 / INR: 1.49   (19 @ 17:20)     ABG:   pH: 7.45 / pCO2: 34 / pO2: 70 / HCO3: 24 / Base Excess: -0.6 / SaO2: 94.4 / Lactate: 1.5 / iCa: x   (19 @ 02:36)    VBG:   pH: 7.34 / pCO2: 47 / pO2: 66 / HCO3: 23 / Base Excess: -0.5 / SaO2: 89.7   (19 @ 16:30)    IMAGING STUDIES:  TTE Congential Anomalies (19 @ 17:28) >  Conclusions:  1. Moderate aortic regurgitation.  2. Systemic Right ventricle loops to the left.  The  Systemic Right ventricular function is mildly decreased.  Hypoplastic left ventricle.  3. Normal pericardium with no pericardial effusion.  Unable to comment on the Fontan.  Case and images discussed with adult congenital Dr. Geoffrey  Georgekutty as per his records, no sisgnificant change from  prior, should additional images be required please contact  adult congenital for an ECHO. INTERVAL HISTORY: Continues to be critically ill with need for high PEEP; no new bleeding. Maintaining hematocrit. Was bronched yesterday night and  tranexamic acid given through endobronchial blocker.    RESPIRATORY SUPPORT: Mode: AC/ CMV (Assist Control/ Continuous Mandatory Ventilation), RR (machine): 20, FiO2: 80, PEEP: 10     @ 07:01  -   @ 07:00  --------------------------------------------------------  IN: 1848.6 mL / OUT: 1555 mL / NET: 293.6 mL    INTRAVASCULAR ACCESS: Peripheral IV    MEDICATIONS:  digoxin  Injectable 0.25 milliGRAM(s) IV Push daily  phenylephrine    Infusion 0.2 MICROgram(s)/kG/Min IV Continuous <Continuous>  azithromycin  IVPB      azithromycin  IVPB 500 milliGRAM(s) IV Intermittent every 24 hours  piperacillin/tazobactam IVPB. 3.375 Gram(s) IV Intermittent every 8 hours  vancomycin  IVPB 1000 milliGRAM(s) IV Intermittent every 12 hours  fentaNYL   Infusion. 1 MICROgram(s)/kG/Hr IV Continuous <Continuous>  midazolam Infusion 0.02 mG/kG/Hr IV Continuous <Continuous>  pantoprazole  Injectable 40 milliGRAM(s) IV Push two times a day    PHYSICAL EXAMINATION:  Vital signs - Weight (kg): 53 (31 @ 20:00)  T(C): 36.8 (19 @ 16:00), Max: 38.3 (19 @ 04:00)  HR: 80 (19 @ 18:00) (74 - 84)  BP: 113/66 (19 @ 18:00) (93/52 - 117/65)  RR: 20 (19 @ 18:00) (18 - 20)  SpO2: 92% (19 @ 18:00) (89% - 100%)    General - non-dysmorphic appearance, well-developed, sedated and on mechanical ventilator  Skin - mottling of skin in the peripheries, with petechiae; no desquamation  Eyes / ENT - no conjunctival injection, sclerae anicteric, external ears & nares normal, mucous membranes moist.  Pulmonary - normal inspiratory effort, no retractions, lungs clear to auscultation bilaterally, no wheezes, no rales.  Cardiovascular - tachycardia; regular rhythm, normal S1 & single S2, 2/6 HSM at LMSB, no rubs, no gallops, capillary refill < 2sec, + pectus  Gastrointestinal - soft, distended, non-tender, hepatomegaly + .  Neurologic / Psychiatric - sedated with Versed and Fentanyl.    LABORATORY TESTS:                          11.4  CBC:   11.37 )-----------( 147   (19 @ 02:36)                          35.5               134   |  104   |  13                 Ca: 8.3    BMP:   ----------------------------< 112    M.1   (19 @ 02:36)             4.2    |  19    | 0.84               Ph: 2.3      LFT:     TPro: 5.9 / Alb: 3.2 / TBili: 2.6 / DBili: x / AST: 71 / ALT: 22 / AlkPhos: 93   (19 @ 03:58)    COAG: PT: 16.8 / PTT: 35.1 / INR: 1.49   (19 @ 17:20)     ABG:   pH: 7.45 / pCO2: 34 / pO2: 70 / HCO3: 24 / Base Excess: -0.6 / SaO2: 94.4 / Lactate: 1.5 / iCa: x   (19 @ 02:36)    VBG:   pH: 7.34 / pCO2: 47 / pO2: 66 / HCO3: 23 / Base Excess: -0.5 / SaO2: 89.7   (19 @ 16:30)    IMAGING STUDIES:  TTE Congential Anomalies (19 @ 17:28) >  Conclusions:  1. Moderate aortic regurgitation.  2. Systemic Right ventricle loops to the left.  The  Systemic Right ventricular function is mildly decreased.  Hypoplastic left ventricle.  3. Normal pericardium with no pericardial effusion.  Unable to comment on the Fontan.  Case and images discussed with adult congenital Dr. Geoffrey Plata as per his records, no sisgnificant change from  prior, should additional images be required please contact  adult congenital for an ECHO.

## 2019-06-03 NOTE — CHART NOTE - NSCHARTNOTEFT_GEN_A_CORE
Pre-Bronchoscopy Tuberculosis Risk Screening Tool  To reduce the risk for airborne transmission of Mycobacterium tuberculosis, this assessment form must be completed prior to bronchoscopy procedures being performed outside of a negative pressure environment.    Procedure Date: 06/03/2019  Health Care Provider Name: Jose Katey  Form Completed By: Jose Del Toro  Reason for the Bronchoscopy: RUL bleeding. Hemoptysis. Evaluation.  Date of Procedure: 06/03/2019  Planned Location for the Procedure:  [ ] Emergency Department [X] Intensive Care Unit [ ] Operating Room  Other: ___________________    Risk Assessment  I. I have personally evaluated this patient for Mycobacterium tuberculosis and I determined the following risk:  [X] No Risk for TB     [ ] Low risk or TB     [ ] Significant risk for TB    II. Additional Findings: _________________________    III. Based on the Determined Risk for TB the following Action(s) are Suggested:  1. If there are no risk factors for TB infection proceed with the procedure.  2. If there is low risk or significant risk for TB infection the following recommendations should be followed:            a. Perform the procedure in a negative pressure room, with N95 respirator.            b. If not feasible to move the patient or defer the procedure:                    i. Use a single-bedded room low traffic area to perform the bronchoscopy procedure.                   ii. Place a portable high-efficiency particulate air (HEPA) filter in the space prior to starting the procedure and keep closed.                       Refer to the INF.1132 titled“Tuberculosis Control Strategy Plan” for additional information.                  iii. All Health Care Provider within the procedure room shall wear an N95 respirator.            c. Documentation of the tuberculosis risk assessment should be included within the patient’s medical record. Pre-Bronchoscopy Tuberculosis Risk Screening Tool  To reduce the risk for airborne transmission of Mycobacterium tuberculosis, this assessment form must be completed prior to bronchoscopy procedures being performed outside of a negative pressure environment.    Procedure Date: 06/03/2019  Health Care Provider Name: Jose Katey  Form Completed By: Jose Del Toro  Reason for the Bronchoscopy: RUL bleeding. Hemoptysis. Evaluation.  Date of Procedure: 06/03/2019  Planned Location for the Procedure:  [ ] Emergency Department [X] Intensive Care Unit [ ] Operating Room  Other: ___________________    Risk Assessment  I. I have personally evaluated this patient for Mycobacterium tuberculosis and I determined the following risk:  [x] No Risk for TB     [ ] Low risk or TB     [ ] Significant risk for TB    II. Additional Findings: _________________________    III. Based on the Determined Risk for TB the following Action(s) are Suggested:  1. If there are no risk factors for TB infection proceed with the procedure.  2. If there is low risk or significant risk for TB infection the following recommendations should be followed:            a. Perform the procedure in a negative pressure room, with N95 respirator.            b. If not feasible to move the patient or defer the procedure:                    i. Use a single-bedded room low traffic area to perform the bronchoscopy procedure.                   ii. Place a portable high-efficiency particulate air (HEPA) filter in the space prior to starting the procedure and keep closed.                       Refer to the INF.1132 titled“Tuberculosis Control Strategy Plan” for additional information.                  iii. All Health Care Provider within the procedure room shall wear an N95 respirator.            c. Documentation of the tuberculosis risk assessment should be included within the patient’s medical record.

## 2019-06-04 LAB
ALBUMIN SERPL ELPH-MCNC: 3.1 G/DL — LOW (ref 3.3–5)
ALP SERPL-CCNC: 102 U/L — SIGNIFICANT CHANGE UP (ref 40–120)
ALT FLD-CCNC: 22 U/L — SIGNIFICANT CHANGE UP (ref 4–41)
ANION GAP SERPL CALC-SCNC: 14 MMO/L — SIGNIFICANT CHANGE UP (ref 7–14)
AST SERPL-CCNC: 67 U/L — HIGH (ref 4–40)
BASE EXCESS BLDA CALC-SCNC: -0.4 MMOL/L — SIGNIFICANT CHANGE UP
BASOPHILS # BLD AUTO: 0.08 K/UL — SIGNIFICANT CHANGE UP (ref 0–0.2)
BASOPHILS NFR BLD AUTO: 0.8 % — SIGNIFICANT CHANGE UP (ref 0–2)
BILIRUB SERPL-MCNC: 3.3 MG/DL — HIGH (ref 0.2–1.2)
BLOOD GAS ARTERIAL - FIO2: 70 — SIGNIFICANT CHANGE UP
BUN SERPL-MCNC: 13 MG/DL — SIGNIFICANT CHANGE UP (ref 7–23)
CA-I BLDA-SCNC: 1.16 MMOL/L — SIGNIFICANT CHANGE UP (ref 1.15–1.29)
CALCIUM SERPL-MCNC: 8.2 MG/DL — LOW (ref 8.4–10.5)
CHLORIDE SERPL-SCNC: 102 MMOL/L — SIGNIFICANT CHANGE UP (ref 98–107)
CO2 SERPL-SCNC: 19 MMOL/L — LOW (ref 22–31)
CREAT SERPL-MCNC: 0.79 MG/DL — SIGNIFICANT CHANGE UP (ref 0.5–1.3)
D DIMER BLD IA.RAPID-MCNC: 5494 NG/ML — SIGNIFICANT CHANGE UP
EOSINOPHIL # BLD AUTO: 0.55 K/UL — HIGH (ref 0–0.5)
EOSINOPHIL NFR BLD AUTO: 5.2 % — SIGNIFICANT CHANGE UP (ref 0–6)
FIBRINOGEN PPP-MCNC: 797 MG/DL — HIGH (ref 350–510)
GLUCOSE BLDA-MCNC: 117 MG/DL — HIGH (ref 70–99)
GLUCOSE SERPL-MCNC: 116 MG/DL — HIGH (ref 70–99)
GRAM STN SPT: SIGNIFICANT CHANGE UP
HCO3 BLDA-SCNC: 24 MMOL/L — SIGNIFICANT CHANGE UP (ref 22–26)
HCT VFR BLD CALC: 36.9 % — LOW (ref 39–50)
HCT VFR BLDA CALC: 37.8 % — LOW (ref 39–51)
HGB BLD-MCNC: 11.9 G/DL — LOW (ref 13–17)
HGB BLDA-MCNC: 12.3 G/DL — LOW (ref 13–17)
IMM GRANULOCYTES NFR BLD AUTO: 0.5 % — SIGNIFICANT CHANGE UP (ref 0–1.5)
INR BLD: 1.5 — HIGH (ref 0.88–1.17)
L PNEUMO AG UR QL: NEGATIVE — SIGNIFICANT CHANGE UP
LACTATE BLDA-SCNC: 1.2 MMOL/L — SIGNIFICANT CHANGE UP (ref 0.5–2)
LYMPHOCYTES # BLD AUTO: 0.68 K/UL — LOW (ref 1–3.3)
LYMPHOCYTES # BLD AUTO: 6.5 % — LOW (ref 13–44)
MAGNESIUM SERPL-MCNC: 2 MG/DL — SIGNIFICANT CHANGE UP (ref 1.6–2.6)
MCHC RBC-ENTMCNC: 24.6 PG — LOW (ref 27–34)
MCHC RBC-ENTMCNC: 32.2 % — SIGNIFICANT CHANGE UP (ref 32–36)
MCV RBC AUTO: 76.4 FL — LOW (ref 80–100)
MONOCYTES # BLD AUTO: 1.07 K/UL — HIGH (ref 0–0.9)
MONOCYTES NFR BLD AUTO: 10.2 % — SIGNIFICANT CHANGE UP (ref 2–14)
NEUTROPHILS # BLD AUTO: 8.06 K/UL — HIGH (ref 1.8–7.4)
NEUTROPHILS NFR BLD AUTO: 76.8 % — SIGNIFICANT CHANGE UP (ref 43–77)
NRBC # FLD: 0.03 K/UL — SIGNIFICANT CHANGE UP (ref 0–0)
PCO2 BLDA: 35 MMHG — SIGNIFICANT CHANGE UP (ref 35–48)
PH BLDA: 7.43 PH — SIGNIFICANT CHANGE UP (ref 7.35–7.45)
PHOSPHATE SERPL-MCNC: 2.8 MG/DL — SIGNIFICANT CHANGE UP (ref 2.5–4.5)
PLATELET # BLD AUTO: 146 K/UL — LOW (ref 150–400)
PMV BLD: SIGNIFICANT CHANGE UP FL (ref 7–13)
PO2 BLDA: 70 MMHG — LOW (ref 83–108)
POTASSIUM BLDA-SCNC: 4.2 MMOL/L — SIGNIFICANT CHANGE UP (ref 3.4–4.5)
POTASSIUM SERPL-MCNC: 4.6 MMOL/L — SIGNIFICANT CHANGE UP (ref 3.5–5.3)
POTASSIUM SERPL-SCNC: 4.6 MMOL/L — SIGNIFICANT CHANGE UP (ref 3.5–5.3)
PROT SERPL-MCNC: 6.1 G/DL — SIGNIFICANT CHANGE UP (ref 6–8.3)
PROTHROM AB SERPL-ACNC: 16.9 SEC — HIGH (ref 9.8–13.1)
RBC # BLD: 4.83 M/UL — SIGNIFICANT CHANGE UP (ref 4.2–5.8)
RBC # FLD: 21.9 % — HIGH (ref 10.3–14.5)
SAO2 % BLDA: 94.4 % — LOW (ref 95–99)
SODIUM BLDA-SCNC: 132 MMOL/L — LOW (ref 136–146)
SODIUM SERPL-SCNC: 135 MMOL/L — SIGNIFICANT CHANGE UP (ref 135–145)
SPECIMEN SOURCE: SIGNIFICANT CHANGE UP
WBC # BLD: 10.49 K/UL — SIGNIFICANT CHANGE UP (ref 3.8–10.5)
WBC # FLD AUTO: 10.49 K/UL — SIGNIFICANT CHANGE UP (ref 3.8–10.5)

## 2019-06-04 PROCEDURE — 99291 CRITICAL CARE FIRST HOUR: CPT

## 2019-06-04 PROCEDURE — 99233 SBSQ HOSP IP/OBS HIGH 50: CPT

## 2019-06-04 RX ORDER — DOCUSATE SODIUM 100 MG
100 CAPSULE ORAL THREE TIMES A DAY
Refills: 0 | Status: DISCONTINUED | OUTPATIENT
Start: 2019-06-04 | End: 2019-06-17

## 2019-06-04 RX ORDER — MIDAZOLAM HYDROCHLORIDE 1 MG/ML
2 INJECTION, SOLUTION INTRAMUSCULAR; INTRAVENOUS ONCE
Refills: 0 | Status: DISCONTINUED | OUTPATIENT
Start: 2019-06-04 | End: 2019-06-04

## 2019-06-04 RX ORDER — CISATRACURIUM BESYLATE 2 MG/ML
20 INJECTION INTRAVENOUS ONCE
Refills: 0 | Status: DISCONTINUED | OUTPATIENT
Start: 2019-06-04 | End: 2019-06-04

## 2019-06-04 RX ORDER — CISATRACURIUM BESYLATE 2 MG/ML
10 INJECTION INTRAVENOUS ONCE
Refills: 0 | Status: COMPLETED | OUTPATIENT
Start: 2019-06-04 | End: 2019-06-04

## 2019-06-04 RX ORDER — DIGOXIN 250 MCG
0.25 TABLET ORAL DAILY
Refills: 0 | Status: DISCONTINUED | OUTPATIENT
Start: 2019-06-04 | End: 2019-06-06

## 2019-06-04 RX ORDER — ACETAMINOPHEN 500 MG
500 TABLET ORAL ONCE
Refills: 0 | Status: COMPLETED | OUTPATIENT
Start: 2019-06-04 | End: 2019-06-04

## 2019-06-04 RX ORDER — SENNA PLUS 8.6 MG/1
10 TABLET ORAL AT BEDTIME
Refills: 0 | Status: DISCONTINUED | OUTPATIENT
Start: 2019-06-04 | End: 2019-06-17

## 2019-06-04 RX ORDER — VANCOMYCIN HCL 1 G
1000 VIAL (EA) INTRAVENOUS EVERY 8 HOURS
Refills: 0 | Status: DISCONTINUED | OUTPATIENT
Start: 2019-06-04 | End: 2019-06-05

## 2019-06-04 RX ADMIN — PANTOPRAZOLE SODIUM 40 MILLIGRAM(S): 20 TABLET, DELAYED RELEASE ORAL at 05:58

## 2019-06-04 RX ADMIN — PIPERACILLIN AND TAZOBACTAM 25 GRAM(S): 4; .5 INJECTION, POWDER, LYOPHILIZED, FOR SOLUTION INTRAVENOUS at 05:58

## 2019-06-04 RX ADMIN — PHENYLEPHRINE HYDROCHLORIDE 1.99 MICROGRAM(S)/KG/MIN: 10 INJECTION INTRAVENOUS at 22:30

## 2019-06-04 RX ADMIN — MIDAZOLAM HYDROCHLORIDE 1.06 MG/KG/HR: 1 INJECTION, SOLUTION INTRAMUSCULAR; INTRAVENOUS at 22:30

## 2019-06-04 RX ADMIN — MIDAZOLAM HYDROCHLORIDE 2 MILLIGRAM(S): 1 INJECTION, SOLUTION INTRAMUSCULAR; INTRAVENOUS at 12:30

## 2019-06-04 RX ADMIN — Medication 250 MILLIGRAM(S): at 22:28

## 2019-06-04 RX ADMIN — Medication 500 MILLIGRAM(S): at 05:59

## 2019-06-04 RX ADMIN — Medication 250 MILLIGRAM(S): at 05:33

## 2019-06-04 RX ADMIN — Medication 250 MILLIGRAM(S): at 14:07

## 2019-06-04 RX ADMIN — CISATRACURIUM BESYLATE 10 MILLIGRAM(S): 2 INJECTION INTRAVENOUS at 12:50

## 2019-06-04 RX ADMIN — Medication 100 MILLIGRAM(S): at 13:13

## 2019-06-04 RX ADMIN — FENTANYL CITRATE 5.3 MICROGRAM(S)/KG/HR: 50 INJECTION INTRAVENOUS at 22:30

## 2019-06-04 RX ADMIN — MIDAZOLAM HYDROCHLORIDE 2 MILLIGRAM(S): 1 INJECTION, SOLUTION INTRAMUSCULAR; INTRAVENOUS at 12:40

## 2019-06-04 RX ADMIN — Medication 200 MILLIGRAM(S): at 05:57

## 2019-06-04 RX ADMIN — PIPERACILLIN AND TAZOBACTAM 25 GRAM(S): 4; .5 INJECTION, POWDER, LYOPHILIZED, FOR SOLUTION INTRAVENOUS at 22:29

## 2019-06-04 RX ADMIN — Medication 100 MILLIGRAM(S): at 22:28

## 2019-06-04 RX ADMIN — SENNA PLUS 10 MILLILITER(S): 8.6 TABLET ORAL at 22:29

## 2019-06-04 RX ADMIN — PANTOPRAZOLE SODIUM 40 MILLIGRAM(S): 20 TABLET, DELAYED RELEASE ORAL at 17:44

## 2019-06-04 RX ADMIN — PIPERACILLIN AND TAZOBACTAM 25 GRAM(S): 4; .5 INJECTION, POWDER, LYOPHILIZED, FOR SOLUTION INTRAVENOUS at 14:07

## 2019-06-04 RX ADMIN — AZITHROMYCIN 250 MILLIGRAM(S): 500 TABLET, FILM COATED ORAL at 14:07

## 2019-06-04 RX ADMIN — Medication 1 APPLICATION(S): at 17:44

## 2019-06-04 RX ADMIN — Medication 0.25 MILLIGRAM(S): at 17:44

## 2019-06-04 NOTE — PROGRESS NOTE PEDS - ASSESSMENT
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 He is now s/p IR embolization of right upper lobe aortopulmonary collateral vessels likely culprits for his hemoptysis.  Fontan physiology is preload dependent and dependent on unobstructed flow through PAs. Baseline saturation for him is in the upper 80s/low 90s likely related to veno-venous collateral vessels that serve as right to left shunting.  Known history of asplenia.     Plan-   - Ventilation and FiO2 for goal saturations >85%.   - Trend H/H; continue antibiotics while balloon in place  - s/p bronch 6/1 and 6/3 w/ endobronchial blocker placed for re-bleeding from RUL, plan for repeat bronch today to remove block and evaluate for bleeding and clots  - f/u coags, if persistently abnormal, FFP transfusion and consider hematology consultation  - if has rebleeding despite normalization of coags, will need to consider cardiac catheterization to evaluate the rest of the aorta to evaluate for aortopulmonary collateral vessels that may be leading to persistent bleeding; per our discussion with MICU yesterday at the time of the bronchoscopy, bleeding was isolated to the RUL as noted on Friday  - if no rebleeding on bronch today, would consider wean down PEEP as increased PEEP will impede venous return to the Fontan circuit  - wean phenylephrine as tolerated; can give more volume if concerned hypotension; hopeful to wean sedatives and wean towards extubation if no recurrence of bleeding on bronchoscopy; if escalating dose of phenylephrine, may impair Fontan flow due to its effects on PVR

## 2019-06-04 NOTE — PROGRESS NOTE ADULT - SUBJECTIVE AND OBJECTIVE BOX
Mg Hayward, PGY1   Pager [phone]    INTERVAL HPI/OVERNIGHT EVENTS:    SUBJECTIVE: Patient seen and examined at bedside.     OBJECTIVE:    VITAL SIGNS:  ICU Vital Signs Last 24 Hrs  T(C): 37.9 (04 Jun 2019 04:00), Max: 37.9 (04 Jun 2019 04:00)  T(F): 100.3 (04 Jun 2019 04:00), Max: 100.3 (04 Jun 2019 04:00)  HR: 94 (04 Jun 2019 06:00) (76 - 94)  BP: 102/58 (04 Jun 2019 06:00) (102/58 - 128/82)  BP(mean): 72 (04 Jun 2019 06:00) (71 - 95)  ABP: --  ABP(mean): --  RR: 20 (04 Jun 2019 06:00) (18 - 20)  SpO2: 92% (04 Jun 2019 06:00) (88% - 96%)    Mode: AC/ CMV (Assist Control/ Continuous Mandatory Ventilation), RR (machine): 20, TV (machine): 450, FiO2: 70, PEEP: 10, MAP: 16, PIP: 30    06-02 @ 07:01  -  06-03 @ 07:00  --------------------------------------------------------  IN: 1848.6 mL / OUT: 1555 mL / NET: 293.6 mL    06-03 @ 07:01  -  06-04 @ 06:46  --------------------------------------------------------  IN: 2683.6 mL / OUT: 1815 mL / NET: 868.6 mL      CAPILLARY BLOOD GLUCOSE          PHYSICAL EXAM:    General: NAD  HEENT: NCAT, PERRL, clear conjunctiva, no erythema or exudates in the oropharynx  Neck: supple, no JVD  Respiratory: CTAB, normal respiratory effort  Cardiovascular: RRR, S1S2, no murmurs, rubs, or gallops  Abdomen: soft, nontender, nondistended, normal bowel sounds  Extremities: 2+ peripheral pulses b/l, no edema or cyanosis   Skin: normal color and turgor; no rash  Neurological: AOx3, nonfocal    MEDICATIONS:  MEDICATIONS  (STANDING):  azithromycin  IVPB      azithromycin  IVPB 500 milliGRAM(s) IV Intermittent every 24 hours  chlorhexidine 4% Liquid 1 Application(s) Topical <User Schedule>  digoxin  Injectable 0.25 milliGRAM(s) IV Push daily  fentaNYL   Infusion. 1 MICROgram(s)/kG/Hr (5.3 mL/Hr) IV Continuous <Continuous>  midazolam Infusion 0.02 mG/kG/Hr (1.06 mL/Hr) IV Continuous <Continuous>  pantoprazole  Injectable 40 milliGRAM(s) IV Push two times a day  petrolatum Ophthalmic Ointment 1 Application(s) Both EYES two times a day  phenylephrine    Infusion 0.2 MICROgram(s)/kG/Min (1.988 mL/Hr) IV Continuous <Continuous>  piperacillin/tazobactam IVPB. 3.375 Gram(s) IV Intermittent every 8 hours  vancomycin  IVPB 1000 milliGRAM(s) IV Intermittent every 12 hours    MEDICATIONS  (PRN):  LORazepam   Injectable 2 milliGRAM(s) IV Push every 4 hours PRN Agitation      ALLERGIES:  Allergies    No Known Allergies    Intolerances        LABS:                        11.9   10.49 )-----------( 146      ( 04 Jun 2019 03:10 )             36.9     06-04    135  |  102  |  13  ----------------------------<  116<H>  4.6   |  19<L>  |  0.79    Ca    8.2<L>      04 Jun 2019 03:10  Phos  2.8     06-04  Mg     2.0     06-04    TPro  6.1  /  Alb  3.1<L>  /  TBili  3.3<H>  /  DBili  x   /  AST  67<H>  /  ALT  22  /  AlkPhos  102  06-04    PT/INR - ( 04 Jun 2019 03:10 )   PT: 16.9 SEC;   INR: 1.50          PTT - ( 03 Jun 2019 17:20 )  PTT:35.1 SEC      RADIOLOGY & ADDITIONAL TESTS: Reviewed. Mg Hayward, PGY1   Pager [phone]    INTERVAL HPI/OVERNIGHT EVENTS: KIMMY.    SUBJECTIVE: Patient seen and examined at bedside. Sedated.    OBJECTIVE:    VITAL SIGNS:  ICU Vital Signs Last 24 Hrs  T(C): 37.9 (04 Jun 2019 04:00), Max: 37.9 (04 Jun 2019 04:00)  T(F): 100.3 (04 Jun 2019 04:00), Max: 100.3 (04 Jun 2019 04:00)  HR: 94 (04 Jun 2019 06:00) (76 - 94)  BP: 102/58 (04 Jun 2019 06:00) (102/58 - 128/82)  BP(mean): 72 (04 Jun 2019 06:00) (71 - 95)  ABP: --  ABP(mean): --  RR: 20 (04 Jun 2019 06:00) (18 - 20)  SpO2: 92% (04 Jun 2019 06:00) (88% - 96%)    Mode: AC/ CMV (Assist Control/ Continuous Mandatory Ventilation), RR (machine): 20, TV (machine): 450, FiO2: 70, PEEP: 10, MAP: 16, PIP: 30    06-02 @ 07:01  -  06-03 @ 07:00  --------------------------------------------------------  IN: 1848.6 mL / OUT: 1555 mL / NET: 293.6 mL    06-03 @ 07:01  -  06-04 @ 06:46  --------------------------------------------------------  IN: 2683.6 mL / OUT: 1815 mL / NET: 868.6 mL      CAPILLARY BLOOD GLUCOSE      PHYSICAL EXAM:    General: NAD  HEENT: NCAT, PERRL, clear conjunctiva, mmm  Neck: supple, no JVD  Respiratory: CTAB, no blood in vent tubing  Cardiovascular: RRR, S1S2, no murmurs, rubs, or gallops  Abdomen: soft, nontender, mild abd distention, normal bowel sounds  Extremities: no edema or cyanosis   Skin: normal color and turgor; no rash  Neurological: sedated, nonfocal    MEDICATIONS:  MEDICATIONS  (STANDING):  azithromycin  IVPB      azithromycin  IVPB 500 milliGRAM(s) IV Intermittent every 24 hours  chlorhexidine 4% Liquid 1 Application(s) Topical <User Schedule>  digoxin  Injectable 0.25 milliGRAM(s) IV Push daily  fentaNYL   Infusion. 1 MICROgram(s)/kG/Hr (5.3 mL/Hr) IV Continuous <Continuous>  midazolam Infusion 0.02 mG/kG/Hr (1.06 mL/Hr) IV Continuous <Continuous>  pantoprazole  Injectable 40 milliGRAM(s) IV Push two times a day  petrolatum Ophthalmic Ointment 1 Application(s) Both EYES two times a day  phenylephrine    Infusion 0.2 MICROgram(s)/kG/Min (1.988 mL/Hr) IV Continuous <Continuous>  piperacillin/tazobactam IVPB. 3.375 Gram(s) IV Intermittent every 8 hours  vancomycin  IVPB 1000 milliGRAM(s) IV Intermittent every 12 hours    MEDICATIONS  (PRN):  LORazepam   Injectable 2 milliGRAM(s) IV Push every 4 hours PRN Agitation      ALLERGIES:  Allergies    No Known Allergies    Intolerances        LABS:                        11.9   10.49 )-----------( 146      ( 04 Jun 2019 03:10 )             36.9     06-04    135  |  102  |  13  ----------------------------<  116<H>  4.6   |  19<L>  |  0.79    Ca    8.2<L>      04 Jun 2019 03:10  Phos  2.8     06-04  Mg     2.0     06-04    TPro  6.1  /  Alb  3.1<L>  /  TBili  3.3<H>  /  DBili  x   /  AST  67<H>  /  ALT  22  /  AlkPhos  102  06-04    PT/INR - ( 04 Jun 2019 03:10 )   PT: 16.9 SEC;   INR: 1.50          PTT - ( 03 Jun 2019 17:20 )  PTT:35.1 SEC      RADIOLOGY & ADDITIONAL TESTS: Reviewed. Mg Hayward, PGY1   Pager [phone]    INTERVAL HPI/OVERNIGHT EVENTS: low grade T100.3, tylenol 500mg.    SUBJECTIVE: Patient seen and examined at bedside. Sedated.    OBJECTIVE:    VITAL SIGNS:  ICU Vital Signs Last 24 Hrs  T(C): 37.9 (04 Jun 2019 04:00), Max: 37.9 (04 Jun 2019 04:00)  T(F): 100.3 (04 Jun 2019 04:00), Max: 100.3 (04 Jun 2019 04:00)  HR: 94 (04 Jun 2019 06:00) (76 - 94)  BP: 102/58 (04 Jun 2019 06:00) (102/58 - 128/82)  BP(mean): 72 (04 Jun 2019 06:00) (71 - 95)  ABP: --  ABP(mean): --  RR: 20 (04 Jun 2019 06:00) (18 - 20)  SpO2: 92% (04 Jun 2019 06:00) (88% - 96%)    Mode: AC/ CMV (Assist Control/ Continuous Mandatory Ventilation), RR (machine): 20, TV (machine): 450, FiO2: 70, PEEP: 10, MAP: 16, PIP: 30    06-02 @ 07:01  -  06-03 @ 07:00  --------------------------------------------------------  IN: 1848.6 mL / OUT: 1555 mL / NET: 293.6 mL    06-03 @ 07:01 - 06-04 @ 06:46  --------------------------------------------------------  IN: 2683.6 mL / OUT: 1815 mL / NET: 868.6 mL      CAPILLARY BLOOD GLUCOSE      PHYSICAL EXAM:    General: NAD  HEENT: NCAT, PERRL, clear conjunctiva, mmm  Neck: supple, no JVD  Respiratory: CTAB, no blood in vent tubing  Cardiovascular: RRR, S1S2, no murmurs, rubs, or gallops  Abdomen: soft, nontender, mild abd distention, normal bowel sounds  Extremities: no edema, mild bluish discoloration of fingers  Skin: chronic RLE mottling  Neurological: sedated, nonfocal    MEDICATIONS:  MEDICATIONS  (STANDING):  azithromycin  IVPB      azithromycin  IVPB 500 milliGRAM(s) IV Intermittent every 24 hours  chlorhexidine 4% Liquid 1 Application(s) Topical <User Schedule>  digoxin  Injectable 0.25 milliGRAM(s) IV Push daily  fentaNYL   Infusion. 1 MICROgram(s)/kG/Hr (5.3 mL/Hr) IV Continuous <Continuous>  midazolam Infusion 0.02 mG/kG/Hr (1.06 mL/Hr) IV Continuous <Continuous>  pantoprazole  Injectable 40 milliGRAM(s) IV Push two times a day  petrolatum Ophthalmic Ointment 1 Application(s) Both EYES two times a day  phenylephrine    Infusion 0.2 MICROgram(s)/kG/Min (1.988 mL/Hr) IV Continuous <Continuous>  piperacillin/tazobactam IVPB. 3.375 Gram(s) IV Intermittent every 8 hours  vancomycin  IVPB 1000 milliGRAM(s) IV Intermittent every 12 hours    MEDICATIONS  (PRN):  LORazepam   Injectable 2 milliGRAM(s) IV Push every 4 hours PRN Agitation      ALLERGIES:  Allergies    No Known Allergies    Intolerances        LABS:                        11.9   10.49 )-----------( 146      ( 04 Jun 2019 03:10 )             36.9     06-04    135  |  102  |  13  ----------------------------<  116<H>  4.6   |  19<L>  |  0.79    Ca    8.2<L>      04 Jun 2019 03:10  Phos  2.8     06-04  Mg     2.0     06-04    TPro  6.1  /  Alb  3.1<L>  /  TBili  3.3<H>  /  DBili  x   /  AST  67<H>  /  ALT  22  /  AlkPhos  102  06-04    PT/INR - ( 04 Jun 2019 03:10 )   PT: 16.9 SEC;   INR: 1.50          PTT - ( 03 Jun 2019 17:20 )  PTT:35.1 SEC      RADIOLOGY & ADDITIONAL TESTS: Reviewed.

## 2019-06-04 NOTE — CHART NOTE - NSCHARTNOTEFT_GEN_A_CORE
Indication: Hemoptysis    Operators: Jose Del Toro MD, Fabby Munguia MD    Medications: 10cc of cisatracurium, 4mg of versed.     Findings:  Bronchoscope inserted through ETT. ETT noted to be in good position. Airway evaluation revealed mucus plugging of the TONJA which was aspirated. The endobronchial blocker was displaced and noted to be in the bronchus intermedius. No active clots noted apart from the RUL. The clots in the RUL was suctioned. No active bleeding noted subsequently. The deflated endobronchial blocker was removed. Bronchoscope then withdrawn from ETT.     Specimens: TONJA lavage. Will send for culture and micro.    Plan: - Target INR <1.5  - Follow up cultures results from TONJA  - Repeat surveillance bronchoscopy in am.    Plan d/w family at bedside. Indication: Hemoptysis    Operators: Jose Del Toro MD, Fabby Munguia MD    Medications: 10cc of cisatracurium, 4mg of versed.     Findings:  Bronchoscope inserted through ETT. ETT noted to be in good position. Airway evaluation revealed mucus plugging of the TONJA which was aspirated. The endobronchial blocker was displaced and noted to be in the bronchus intermedius. No active clots noted apart from the RUL. The clots in the RUL was suctioned. No active bleeding noted subsequently. The deflated endobronchial blocker was removed. Bronchoscope then withdrawn from ETT.     Specimens: TONJA lavage. Will send for culture and microbiology.    Plan: - Target INR <1.5  - Follow up cultures results from TONJA  - Repeat surveillance bronchoscopy in am.    Plan d/w family at bedside.

## 2019-06-04 NOTE — CHART NOTE - NSCHARTNOTEFT_GEN_A_CORE
Pre-Bronchoscopy Tuberculosis Risk Screening Tool  To reduce the risk for airborne transmission of Mycobacterium tuberculosis, this assessment form must be completed prior to bronchoscopy procedures being performed outside of a negative pressure environment.    Procedure Date: 06/04/2019  Health Care Provider Name: Jose Katey  Form Completed By: Jose Del Toro  Reason for the Bronchoscopy: Hemoptysis  Date of Procedure: 06/04/2019  Planned Location for the Procedure:  [ ] Emergency Department [X] Intensive Care Unit [ ] Operating Room Other: ___________________    Risk Assessment  I. I have personally evaluated this patient for Mycobacterium tuberculosis and I determined the following risk:  [X] No Risk for TB     [ ] Low risk or TB     [ ] Significant risk for TB    II. Additional Findings: _________________________    III. Based on the Determined Risk for TB the following Action(s) are Suggested:  1. If there are no risk factors for TB infection proceed with the procedure.  2. If there is low risk or significant risk for TB infection the following recommendations should be followed:            a. Perform the procedure in a negative pressure room, with N95 respirator.            b. If not feasible to move the patient or defer the procedure:                    i. Use a single-bedded room low traffic area to perform the bronchoscopy procedure.                   ii. Place a portable high-efficiency particulate air (HEPA) filter in the space prior to starting the procedure and keep closed.                       Refer to the INF.1132 titled“Tuberculosis Control Strategy Plan” for additional information.                  iii. All Health Care Provider within the procedure room shall wear an N95 respirator.            c. Documentation of the tuberculosis risk assessment should be included within the patient’s medical record. Pre-Bronchoscopy Tuberculosis Risk Screening Tool  To reduce the risk for airborne transmission of Mycobacterium tuberculosis, this assessment form must be completed prior to bronchoscopy procedures being performed outside of a negative pressure environment.    Procedure Date: 06/04/2019  Health Care Provider Name: Jose Katey  Form Completed By: Jose Del Toro  Reason for the Bronchoscopy: Hemoptysis  Date of Procedure: 06/04/2019  Planned Location for the Procedure:  [ ] Emergency Department [X] Intensive Care Unit [ ] Operating Room Other:  ___________________    Risk Assessment  I. I have personally evaluated this patient for Mycobacterium tuberculosis and I determined the following risk:  [X] No Risk for TB     [ ] Low risk or TB     [ ] Significant risk for TB    II. Additional Findings: _________________________    III. Based on the Determined Risk for TB the following Action(s) are Suggested:  1. If there are no risk factors for TB infection proceed with the procedure.  2. If there is low risk or significant risk for TB infection the following recommendations should be followed:            a. Perform the procedure in a negative pressure room, with N95 respirator.            b. If not feasible to move the patient or defer the procedure:                    i. Use a single-bedded room low traffic area to perform the bronchoscopy procedure.                   ii. Place a portable high-efficiency particulate air (HEPA) filter in the space prior to starting the procedure and keep closed.                       Refer to the INF.1132 titled“Tuberculosis Control Strategy Plan” for additional information.                  iii. All Health Care Provider within the procedure room shall wear an N95 respirator.            c. Documentation of the tuberculosis risk assessment should be included within the patient’s medical record.

## 2019-06-04 NOTE — PROGRESS NOTE PEDS - SUBJECTIVE AND OBJECTIVE BOX
INTERVAL HISTORY: Continues to be critically ill with need for high PEEP; no new bleeding. Maintaining hematocrit. Was bronched yesterday night the balloon of the endobronchial blocker was deflated. Clots were noted at the entry of the RUL. No active bleeding was noted. The blocker was left in place with a deflated balloon to assess for further bleeding with repeat bronchoscopy today am.     RESPIRATORY SUPPORT: Mode: AC/ CMV (Assist Control/ Continuous Mandatory Ventilation), RR (machine): 20, FiO2: 70, PEEP: 10    I&O's Summary  2019 07:01  -  2019 07:00  --------------------------------------------------------  IN: 2683.6 mL / OUT: 1815 mL / NET: 868.6 mL    INTRAVASCULAR ACCESS: Peripheral IV    MEDICATIONS  (STANDING):  azithromycin  IVPB      azithromycin  IVPB 500 milliGRAM(s) IV Intermittent every 24 hours  chlorhexidine 4% Liquid 1 Application(s) Topical <User Schedule>  cisatracurium Injectable 20 milliGRAM(s) IV Push once  digoxin     Tablet 0.25 milliGRAM(s) Oral daily  docusate sodium Liquid 100 milliGRAM(s) Oral three times a day  fentaNYL   Infusion. 1 MICROgram(s)/kG/Hr (5.3 mL/Hr) IV Continuous <Continuous>  midazolam Infusion 0.02 mG/kG/Hr (1.06 mL/Hr) IV Continuous <Continuous>  midazolam Injectable 2 milliGRAM(s) IV Push once  midazolam Injectable 2 milliGRAM(s) IV Push once  pantoprazole  Injectable 40 milliGRAM(s) IV Push two times a day  petrolatum Ophthalmic Ointment 1 Application(s) Both EYES two times a day  phenylephrine    Infusion 0.2 MICROgram(s)/kG/Min (1.988 mL/Hr) IV Continuous <Continuous>  piperacillin/tazobactam IVPB. 3.375 Gram(s) IV Intermittent every 8 hours  senna Syrup 10 milliLiter(s) Oral at bedtime  vancomycin  IVPB 1000 milliGRAM(s) IV Intermittent every 8 hours    PHYSICAL EXAMINATION:  ICU Vital Signs Last 24 Hrs  T(C): 36.6 (2019 12:00), Max: 37.9 (2019 04:00)  T(F): 97.8 (2019 12:00), Max: 100.3 (2019 04:00)  HR: 93 (:24) (77 - 94)  BP: 113/73 (:26) (102/58 - 128/82)  BP(mean): 85 (:) (71 - 95)  RR: 20 (:) (18 - 20)  SpO2: 95% (:24) (87% - 96%)    General - non-dysmorphic appearance, well-developed, sedated and on mechanical ventilator  Skin - mottling of skin in the peripheries, with petechiae; no desquamation  Eyes / ENT - no conjunctival injection, sclerae anicteric, external ears & nares normal, mucous membranes moist.  Pulmonary - normal inspiratory effort, no retractions, lungs clear to auscultation bilaterally, no wheezes, no rales.  Cardiovascular - tachycardia; regular rhythm, normal S1 & single S2, 2/6 HSM at LMSB, no rubs, no gallops, capillary refill < 2sec, + pectus  Gastrointestinal - soft, distended, non-tender, hepatomegaly + .  Neurologic / Psychiatric - sedated with Versed and Fentanyl.    LABORATORY TESTS:  LABORATORY TESTS:                          11.9  CBC:   10.49 )-----------( 146   (19 @ 03:10)                          36.9               135   |  102   |  13                 Ca: 8.2    BMP:   ----------------------------< 116    M.0   (19 @ 03:10)             4.6    |  19    | 0.79               Ph: 2.8      LFT:     TPro: 6.1 / Alb: 3.1 / TBili: 3.3 / DBili: x / AST: 67 / ALT: 22 / AlkPhos: 102   (19 @ 03:10)    COAG: PT: 16.9 / PTT: x / INR: 1.50   (19 @ 03:10)     ABG:   pH: 7.43 / pCO2: 35 / pO2: 70 / HCO3: 24 / Base Excess: -0.4 / SaO2: 94.4 / Lactate: 1.2 / iCa: 1.16   (19 @ 03:10)    VBG:   pH: 7.34 / pCO2: 47 / pO2: 66 / HCO3: 23 / Base Excess: -0.5 / SaO2: 89.7   (19 @ 16:30)    IMAGING STUDIES:  TTE Congential Anomalies (19 @ 17:28) >  Conclusions:  1. Moderate aortic regurgitation.  2. Systemic Right ventricle loops to the left.  The  Systemic Right ventricular function is mildly decreased.  Hypoplastic left ventricle.  3. Normal pericardium with no pericardial effusion.  Unable to comment on the Fontan.  Case and images discussed with adult congenital Dr. Geoffrey Plata as per his records, no sisgnificant change from  prior, should additional images be required please contact  adult congenital for an ECHO.

## 2019-06-04 NOTE — PROGRESS NOTE ADULT - ATTENDING COMMENTS
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 of congestive hepatopathy causing elevation in INR will get Heme consult    critical care time 40 minutes  case discussed in detail with parents at bedside

## 2019-06-04 NOTE — PROGRESS NOTE ADULT - ASSESSMENT
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    #Neuro: no active issues  - Sedated on fentanyl and versed gtt  - Careful sedation as pre-load dependent    #CV: Heterotaxy with congenital single atrium/ventricle s/p multiple cardiac surgeries including Fontan's  - Ectopy resolved after d/c levo gtt, c/w phenylephrine gtt  - Possible angiogram next week per cardiology  - Holding Coumadin in setting of bleed. Downtrending INR s/p PCC, FFP x4, vitamin K x1.  - Restart dig 0.25 daily  - LE dopplers with chronic RLE DVT  - peds cardiology recs appreciated    #Pulm: Hemoptysis s/p bronch 5/31 showing RUL bleeding. SpO2 at baseline high 80's given cardiopulmonary pathophysiology.  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 Gastritis and duodenal erosions were also seen possibly 2/2 ASA gastropathy.   - Tube feeds  - protonix 40 daily    #ID  Initially febrile, elevated white count, no atelectasis on POCUS  - c/w empiric vanc, zosyn, azithro  - f/u BCx NTD  - UA negative  - f/u urine legionella    #Renal  - MELLO likely pre-renal in setting of poor PO intake, hemoptysis vs possible PATRICA, now resolved after IVF and pressor support.  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    #Neuro: no active issues  - Sedated on fentanyl and versed gtt  - Careful sedation as pre-load dependent    #CV: Heterotaxy with congenital single atrium/ventricle s/p multiple cardiac surgeries including Fontan's  - Ectopy resolved after d/c levo gtt, c/w phenylephrine gtt  - Possible angiogram per cardiology  - Holding Coumadin in setting of recent bleed. Downtrending INR s/p PCC, FFP x4, vitamin K x1.  - Restart dig 0.25 daily  - LE dopplers with chronic RLE DVT    #Pulm: Hemoptysis s/p bronch 5/31 showing RUL bleeding. SpO2 at baseline high 80's given cardiopulmonary pathophysiology.  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 Gastritis and duodenal erosions were also seen possibly 2/2 ASA gastropathy.   - c/w tube feeds  - protonix 40 daily    #ID  Initially febrile, elevated white count, no atelectasis on POCUS  - c/w empiric vanc, zosyn, azithro, pre-4th vanc trough  - f/u BCx NTD  - UA negative  - f/u urine legionella in lab    #Renal  - MELLO resolved with IVF and pressor support, likely pre-renal in setting of poor PO intake, hemoptysis vs possible PATRICA.  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    #Neuro: no active issues  - Sedated on fentanyl and versed gtt  - Careful sedation as pre-load dependent    #CV: Heterotaxy with congenital single atrium/ventricle s/p multiple cardiac surgeries including Fontan's  - Ectopy resolved after d/c levo gtt, c/w phenylephrine gtt  - Possible angiogram per cardiology  - Holding Coumadin in setting of recent bleed. INR 1.5 s/p PCC, FFP x4, vitamin K x1.  - Restart dig 0.25 daily  - LE dopplers with chronic RLE DVT    #Pulm: Hemoptysis s/p bronch 5/31 showing RUL bleeding. SpO2 at baseline high 80's given cardiopulmonary pathophysiology.  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 Gastritis and duodenal erosions were also seen possibly 2/2 ASA gastropathy.   - c/w tube feeds  - protonix 40 daily  - start senna colace for constipation    #ID  Initially febrile, elevated white count, no atelectasis on POCUS  - c/w empiric vanc, zosyn, azithro, pre-4th vanc trough  - f/u BCx NTD  - UA negative  - f/u urine legionella in lab    #Renal  - MELLO resolved with IVF and pressor support, likely pre-renal in setting of poor PO intake, hemoptysis vs possible PATRICA.  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    #Neuro: no active issues  - Sedated on fentanyl and versed gtt  - Careful sedation as pre-load dependent    #CV: Heterotaxy with congenital single atrium/ventricle s/p multiple cardiac surgeries including Fontan's  - Ectopy resolved after d/c levo gtt, c/w phenylephrine gtt  - Possible angiogram per cardiology  - Holding Coumadin in setting of recent bleed. INR 1.5 s/p PCC, FFP x4, vitamin K x1.  - Restart dig 0.25 daily  - LE dopplers with chronic RLE DVT (diagnosed 3/30/18, suspect provoked 2/2 sedentary status after hospitalization around that time for hematemesis, has been on coumadin per cards Dr. Mega Rojas)    #Pulm: Hemoptysis s/p bronch 5/31 showing RUL bleeding. SpO2 at baseline high 80's given cardiopulmonary pathophysiology.  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 Gastritis and duodenal erosions were also seen possibly 2/2 ASA gastropathy.   - c/w tube feeds  - protonix 40 daily  - start senna colace for constipation    #ID  Initially febrile, elevated white count, no atelectasis on POCUS  - c/w empiric vanc, zosyn, azithro, pre-4th vanc trough  - f/u BCx NTD  - UA negative  - f/u urine legionella in lab    #Renal  - MELLO resolved with IVF and pressor support, likely pre-renal in setting of poor PO intake, hemoptysis vs possible PATRICA.  - Goal K>4, Mg>2, replete prn    #Endocrine  - FSBG q6    #Heme  - Reactive leukocytosis vs infection, started on empiric abx, monitor CBC  - Heme c/s placed for AC recs for DVT  - Monitor INR, elevated to 1.5 s/p vit K and 4U FFP, could be 2/2 congestive hepatopathy from pHTN, fibrinogen elevated inconsistent with DIC    #DVT ppx: SCDs    #GOC: full code

## 2019-06-05 DIAGNOSIS — I07.1 RHEUMATIC TRICUSPID INSUFFICIENCY: ICD-10-CM

## 2019-06-05 LAB
ALBUMIN SERPL ELPH-MCNC: 3.3 G/DL — SIGNIFICANT CHANGE UP (ref 3.3–5)
ALP SERPL-CCNC: 101 U/L — SIGNIFICANT CHANGE UP (ref 40–120)
ALT FLD-CCNC: 23 U/L — SIGNIFICANT CHANGE UP (ref 4–41)
ANION GAP SERPL CALC-SCNC: 13 MMO/L — SIGNIFICANT CHANGE UP (ref 7–14)
ANION GAP SERPL CALC-SCNC: 14 MMO/L — SIGNIFICANT CHANGE UP (ref 7–14)
APTT BLD: 31.4 SEC — SIGNIFICANT CHANGE UP (ref 27.5–36.3)
AST SERPL-CCNC: 66 U/L — HIGH (ref 4–40)
BASOPHILS # BLD AUTO: 0.1 K/UL — SIGNIFICANT CHANGE UP (ref 0–0.2)
BASOPHILS NFR BLD AUTO: 0.8 % — SIGNIFICANT CHANGE UP (ref 0–2)
BILIRUB SERPL-MCNC: 4 MG/DL — HIGH (ref 0.2–1.2)
BLD GP AB SCN SERPL QL: NEGATIVE — SIGNIFICANT CHANGE UP
BUN SERPL-MCNC: 23 MG/DL — SIGNIFICANT CHANGE UP (ref 7–23)
BUN SERPL-MCNC: 32 MG/DL — HIGH (ref 7–23)
CALCIUM SERPL-MCNC: 8.4 MG/DL — SIGNIFICANT CHANGE UP (ref 8.4–10.5)
CALCIUM SERPL-MCNC: 8.8 MG/DL — SIGNIFICANT CHANGE UP (ref 8.4–10.5)
CHLORIDE SERPL-SCNC: 98 MMOL/L — SIGNIFICANT CHANGE UP (ref 98–107)
CHLORIDE SERPL-SCNC: 99 MMOL/L — SIGNIFICANT CHANGE UP (ref 98–107)
CK MB BLD-MCNC: 0.1 — SIGNIFICANT CHANGE UP (ref 0–2.5)
CK MB BLD-MCNC: 1.13 NG/ML — SIGNIFICANT CHANGE UP (ref 1–6.6)
CK SERPL-CCNC: 1152 U/L — HIGH (ref 30–200)
CO2 SERPL-SCNC: 22 MMOL/L — SIGNIFICANT CHANGE UP (ref 22–31)
CO2 SERPL-SCNC: 22 MMOL/L — SIGNIFICANT CHANGE UP (ref 22–31)
CREAT SERPL-MCNC: 1.85 MG/DL — HIGH (ref 0.5–1.3)
CREAT SERPL-MCNC: 2.2 MG/DL — HIGH (ref 0.5–1.3)
DIGOXIN SERPL-MCNC: 1.1 NG/ML — SIGNIFICANT CHANGE UP (ref 0.8–2)
EOSINOPHIL # BLD AUTO: 0.35 K/UL — SIGNIFICANT CHANGE UP (ref 0–0.5)
EOSINOPHIL NFR BLD AUTO: 2.8 % — SIGNIFICANT CHANGE UP (ref 0–6)
GLUCOSE SERPL-MCNC: 146 MG/DL — HIGH (ref 70–99)
GLUCOSE SERPL-MCNC: 147 MG/DL — HIGH (ref 70–99)
HCT VFR BLD CALC: 38.6 % — LOW (ref 39–50)
HGB BLD-MCNC: 12.2 G/DL — LOW (ref 13–17)
IMM GRANULOCYTES NFR BLD AUTO: 0.7 % — SIGNIFICANT CHANGE UP (ref 0–1.5)
INR BLD: 1.38 — HIGH (ref 0.88–1.17)
INR BLD: 1.58 — HIGH (ref 0.88–1.17)
LYMPHOCYTES # BLD AUTO: 0.72 K/UL — LOW (ref 1–3.3)
LYMPHOCYTES # BLD AUTO: 5.7 % — LOW (ref 13–44)
MAGNESIUM SERPL-MCNC: 2.2 MG/DL — SIGNIFICANT CHANGE UP (ref 1.6–2.6)
MCHC RBC-ENTMCNC: 24.6 PG — LOW (ref 27–34)
MCHC RBC-ENTMCNC: 31.6 % — LOW (ref 32–36)
MCV RBC AUTO: 77.8 FL — LOW (ref 80–100)
MONOCYTES # BLD AUTO: 1.55 K/UL — HIGH (ref 0–0.9)
MONOCYTES NFR BLD AUTO: 12.4 % — SIGNIFICANT CHANGE UP (ref 2–14)
NEUTROPHILS # BLD AUTO: 9.74 K/UL — HIGH (ref 1.8–7.4)
NEUTROPHILS NFR BLD AUTO: 77.6 % — HIGH (ref 43–77)
NRBC # FLD: 0.02 K/UL — SIGNIFICANT CHANGE UP (ref 0–0)
PHOSPHATE SERPL-MCNC: 3.2 MG/DL — SIGNIFICANT CHANGE UP (ref 2.5–4.5)
PLATELET # BLD AUTO: 135 K/UL — LOW (ref 150–400)
PMV BLD: SIGNIFICANT CHANGE UP FL (ref 7–13)
POTASSIUM SERPL-MCNC: 4.3 MMOL/L — SIGNIFICANT CHANGE UP (ref 3.5–5.3)
POTASSIUM SERPL-MCNC: 4.9 MMOL/L — SIGNIFICANT CHANGE UP (ref 3.5–5.3)
POTASSIUM SERPL-SCNC: 4.3 MMOL/L — SIGNIFICANT CHANGE UP (ref 3.5–5.3)
POTASSIUM SERPL-SCNC: 4.9 MMOL/L — SIGNIFICANT CHANGE UP (ref 3.5–5.3)
PROT SERPL-MCNC: 7 G/DL — SIGNIFICANT CHANGE UP (ref 6–8.3)
PROTHROM AB SERPL-ACNC: 15.9 SEC — HIGH (ref 9.8–13.1)
PROTHROM AB SERPL-ACNC: 17.8 SEC — HIGH (ref 9.8–13.1)
RBC # BLD: 4.96 M/UL — SIGNIFICANT CHANGE UP (ref 4.2–5.8)
RBC # FLD: 22.6 % — HIGH (ref 10.3–14.5)
RH IG SCN BLD-IMP: NEGATIVE — SIGNIFICANT CHANGE UP
SODIUM SERPL-SCNC: 133 MMOL/L — LOW (ref 135–145)
SODIUM SERPL-SCNC: 135 MMOL/L — SIGNIFICANT CHANGE UP (ref 135–145)
SPECIMEN SOURCE: SIGNIFICANT CHANGE UP
VANCOMYCIN FLD-MCNC: 37.5 UG/ML — CRITICAL HIGH
WBC # BLD: 12.55 K/UL — HIGH (ref 3.8–10.5)
WBC # FLD AUTO: 12.55 K/UL — HIGH (ref 3.8–10.5)

## 2019-06-05 PROCEDURE — 99223 1ST HOSP IP/OBS HIGH 75: CPT

## 2019-06-05 PROCEDURE — 99233 SBSQ HOSP IP/OBS HIGH 50: CPT

## 2019-06-05 PROCEDURE — 76604 US EXAM CHEST: CPT | Mod: 26,GC

## 2019-06-05 PROCEDURE — 99291 CRITICAL CARE FIRST HOUR: CPT | Mod: 25

## 2019-06-05 PROCEDURE — 31622 DX BRONCHOSCOPE/WASH: CPT | Mod: GC

## 2019-06-05 PROCEDURE — 71045 X-RAY EXAM CHEST 1 VIEW: CPT | Mod: 26

## 2019-06-05 RX ORDER — SODIUM CHLORIDE 9 MG/ML
1000 INJECTION, SOLUTION INTRAVENOUS
Refills: 0 | Status: DISCONTINUED | OUTPATIENT
Start: 2019-06-05 | End: 2019-06-05

## 2019-06-05 RX ORDER — POLYETHYLENE GLYCOL 3350 17 G/17G
17 POWDER, FOR SOLUTION ORAL DAILY
Refills: 0 | Status: DISCONTINUED | OUTPATIENT
Start: 2019-06-05 | End: 2019-06-06

## 2019-06-05 RX ORDER — SODIUM CHLORIDE 9 MG/ML
1000 INJECTION, SOLUTION INTRAVENOUS
Refills: 0 | Status: COMPLETED | OUTPATIENT
Start: 2019-06-05 | End: 2019-06-06

## 2019-06-05 RX ORDER — ACETAMINOPHEN 500 MG
650 TABLET ORAL EVERY 6 HOURS
Refills: 0 | Status: DISCONTINUED | OUTPATIENT
Start: 2019-06-05 | End: 2019-06-17

## 2019-06-05 RX ORDER — MIDAZOLAM HYDROCHLORIDE 1 MG/ML
4 INJECTION, SOLUTION INTRAMUSCULAR; INTRAVENOUS ONCE
Refills: 0 | Status: DISCONTINUED | OUTPATIENT
Start: 2019-06-05 | End: 2019-06-05

## 2019-06-05 RX ORDER — DEXMEDETOMIDINE HYDROCHLORIDE IN 0.9% SODIUM CHLORIDE 4 UG/ML
0.2 INJECTION INTRAVENOUS
Qty: 200 | Refills: 0 | Status: DISCONTINUED | OUTPATIENT
Start: 2019-06-05 | End: 2019-06-06

## 2019-06-05 RX ADMIN — Medication 650 MILLIGRAM(S): at 11:00

## 2019-06-05 RX ADMIN — Medication 100 MILLIGRAM(S): at 22:11

## 2019-06-05 RX ADMIN — PANTOPRAZOLE SODIUM 40 MILLIGRAM(S): 20 TABLET, DELAYED RELEASE ORAL at 17:07

## 2019-06-05 RX ADMIN — Medication 650 MILLIGRAM(S): at 09:03

## 2019-06-05 RX ADMIN — SODIUM CHLORIDE 100 MILLILITER(S): 9 INJECTION, SOLUTION INTRAVENOUS at 11:23

## 2019-06-05 RX ADMIN — POLYETHYLENE GLYCOL 3350 17 GRAM(S): 17 POWDER, FOR SOLUTION ORAL at 11:23

## 2019-06-05 RX ADMIN — Medication 1 APPLICATION(S): at 17:07

## 2019-06-05 RX ADMIN — MIDAZOLAM HYDROCHLORIDE 1.06 MG/KG/HR: 1 INJECTION, SOLUTION INTRAMUSCULAR; INTRAVENOUS at 08:07

## 2019-06-05 RX ADMIN — MIDAZOLAM HYDROCHLORIDE 4 MILLIGRAM(S): 1 INJECTION, SOLUTION INTRAMUSCULAR; INTRAVENOUS at 16:08

## 2019-06-05 RX ADMIN — DEXMEDETOMIDINE HYDROCHLORIDE IN 0.9% SODIUM CHLORIDE 2.65 MICROGRAM(S)/KG/HR: 4 INJECTION INTRAVENOUS at 20:10

## 2019-06-05 RX ADMIN — PHENYLEPHRINE HYDROCHLORIDE 1.99 MICROGRAM(S)/KG/MIN: 10 INJECTION INTRAVENOUS at 08:07

## 2019-06-05 RX ADMIN — PIPERACILLIN AND TAZOBACTAM 25 GRAM(S): 4; .5 INJECTION, POWDER, LYOPHILIZED, FOR SOLUTION INTRAVENOUS at 22:11

## 2019-06-05 RX ADMIN — PANTOPRAZOLE SODIUM 40 MILLIGRAM(S): 20 TABLET, DELAYED RELEASE ORAL at 05:01

## 2019-06-05 RX ADMIN — DEXMEDETOMIDINE HYDROCHLORIDE IN 0.9% SODIUM CHLORIDE 2.65 MICROGRAM(S)/KG/HR: 4 INJECTION INTRAVENOUS at 16:51

## 2019-06-05 RX ADMIN — CHLORHEXIDINE GLUCONATE 1 APPLICATION(S): 213 SOLUTION TOPICAL at 08:06

## 2019-06-05 RX ADMIN — Medication 100 MILLIGRAM(S): at 05:02

## 2019-06-05 RX ADMIN — PIPERACILLIN AND TAZOBACTAM 25 GRAM(S): 4; .5 INJECTION, POWDER, LYOPHILIZED, FOR SOLUTION INTRAVENOUS at 13:15

## 2019-06-05 RX ADMIN — PHENYLEPHRINE HYDROCHLORIDE 1.99 MICROGRAM(S)/KG/MIN: 10 INJECTION INTRAVENOUS at 20:08

## 2019-06-05 RX ADMIN — Medication 0.25 MILLIGRAM(S): at 17:07

## 2019-06-05 RX ADMIN — FENTANYL CITRATE 5.3 MICROGRAM(S)/KG/HR: 50 INJECTION INTRAVENOUS at 20:07

## 2019-06-05 RX ADMIN — SENNA PLUS 10 MILLILITER(S): 8.6 TABLET ORAL at 22:11

## 2019-06-05 RX ADMIN — Medication 250 MILLIGRAM(S): at 05:02

## 2019-06-05 RX ADMIN — PIPERACILLIN AND TAZOBACTAM 25 GRAM(S): 4; .5 INJECTION, POWDER, LYOPHILIZED, FOR SOLUTION INTRAVENOUS at 05:02

## 2019-06-05 RX ADMIN — Medication 100 MILLIGRAM(S): at 13:15

## 2019-06-05 RX ADMIN — FENTANYL CITRATE 5.3 MICROGRAM(S)/KG/HR: 50 INJECTION INTRAVENOUS at 08:06

## 2019-06-05 NOTE — CHART NOTE - NSCHARTNOTEFT_GEN_A_CORE
Indication: Hemoptysis     Operators: Javy Willoughby and Ezra    Pre-op Dx: Hemostasis     History: 23 yo man with congenital heart disease p/w hemoptysis s/p endobronchial blocker placed and removal on previous bronchoscopies    Preop medications: Versed 4mg IVP    Findings:  Bronchoscope inserted through ETT. ETT noted to be in good position.  No active clots noted apart from the RUL, just erythematous airway. The clots in the RUL was suctioned. No active bleeding noted subsequently. Bronchoscope then withdrawn from ETT.  Patient tolerated procedure well. Indication: Hemoptysis     Operators: Javy Willoughby and Ezra    Pre-op Dx: Hemostasis     History: 25 yo man with congenital heart disease p/w hemoptysis s/p endobronchial blocker placed and removal on previous bronchoscopies    Preop medications: Versed 4mg IVP    Findings:  Bronchoscope inserted through ETT. ETT noted to be in good position.  No active bleeding seen Clot in the RUL posterior segment. Mucus noted in the left upper lobe.  The clots in the RUL was suctioned. No active bleeding noted subsequently. Bronchoscope then withdrawn from ETT.  Patient tolerated procedure well.

## 2019-06-05 NOTE — CHART NOTE - NSCHARTNOTEFT_GEN_A_CORE
:    INDICATION:    PROCEDURE:  [ ] LIMITED ECHO  [x ] LIMITED CHEST  [ ] LIMITED RETROPERITONEAL  [x ] LIMITED ABDOMINAL  [ ] LIMITED DVT  [ ] NEEDLE GUIDANCE VASCULAR  [ ] NEEDLE GUIDANCE THORACENTESIS  [ ] NEEDLE GUIDANCE PARACENTESIS  [ ] NEEDLE GUIDANCE PERICARDIOCENTESIS  [ ] OTHER    FINDINGS:  The lungs are A and B line predominant    No pleural effusion    There is ascites present in the     The IVC appears thin.     INTERPRETATION:    No overt pulmonary edema on exam.     Mild peritoneal ascites is appreciate    The IVC appears small, suggesting volume depletion. :    INDICATION:    PROCEDURE:  [ ] LIMITED ECHO  [x] LIMITED CHEST  [ ] LIMITED RETROPERITONEAL  [x] LIMITED ABDOMINAL  [ ] LIMITED DVT  [ ] NEEDLE GUIDANCE VASCULAR  [ ] NEEDLE GUIDANCE THORACENTESIS  [ ] NEEDLE GUIDANCE PARACENTESIS  [ ] NEEDLE GUIDANCE PERICARDIOCENTESIS  [ ] OTHER    FINDINGS:  LUNGS: A lines predominantly  No pleural effusion    Abdomen: Small ascites, floating loop of bowels    IVC is small    INTERPRETATION:  Normal aeration pattern of the lung, small peritoneal ascites.     The IVC appears small, suggesting volume depletion.

## 2019-06-05 NOTE — ADVANCED PRACTICE NURSE CONSULT - RECOMMEDATIONS
Recommend nutrition consult.    Sacrococcygeum: Apply Advanced Liquid barrier film every 3 days.    Right lateral thoracic region (rib): Advanced Liquid barrier film every 3 days.    Continue low air loss bed therapy, continue heel elevation, continue to turn & reposition q2h, continue moisture management with barrier creams & single breathable pad, continue measures to decrease friction/shear/pressure.     Please call wound care service line is further assistance is needed ([phone]).

## 2019-06-05 NOTE — PROGRESS NOTE ADULT - ASSESSMENT
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    #Neuro: no active issues  - Sedated on fentanyl and versed gtt  - Careful sedation as pre-load dependent    #CV: Heterotaxy with congenital single atrium/ventricle s/p multiple cardiac surgeries including Fontan's  - Ectopy resolved after d/c levo gtt, c/w phenylephrine gtt  - Possible angiogram per cardiology  - Holding Coumadin in setting of recent bleed. INR 1.5 s/p PCC, FFP x4, vitamin K x1.  - Restart dig 0.25 daily  - LE dopplers with chronic RLE DVT (diagnosed 3/30/18, suspect provoked 2/2 sedentary status after hospitalization around that time for hematemesis, has been on coumadin per cards Dr. Mega Rojas)    #Pulm: Hemoptysis s/p bronch 5/31 showing RUL bleeding. SpO2 at baseline high 80's given cardiopulmonary pathophysiology.  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 Gastritis and duodenal erosions were also seen possibly 2/2 ASA gastropathy.   - c/w tube feeds  - protonix 40 daily  - start senna colace for constipation    #ID  Initially febrile, elevated white count, no atelectasis on POCUS  - c/w empiric vanc, zosyn, azithro, pre-4th vanc trough  - f/u BCx NTD  - UA negative  - f/u urine legionella in lab    #Renal  - MELLO resolved with IVF and pressor support, likely pre-renal in setting of poor PO intake, hemoptysis vs possible PATRICA.  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    #Neuro: no active issues  - Sedated on fentanyl and versed gtt  - Careful sedation as pre-load dependent    #CV: Heterotaxy with congenital single atrium/ventricle s/p multiple cardiac surgeries including Fontan's  - Ectopy resolved after d/c levo gtt, c/w phenylephrine gtt, wean as tolerated  - Possible angiogram per cardiology  - Holding Coumadin in setting of recent bleed. INR 1.58 s/p PCC, FFP x4, vitamin K x1. Repeat 1U FFP today.  - c/w dig 0.25 daily  - LE dopplers with chronic RLE DVT (diagnosed 3/30/18, suspect provoked 2/2 sedentary status after hospitalization around that time for hematemesis, has been on coumadin per cards Dr. Mega Rojas)  - 1L LR over 10 hours     #Pulm: Hemoptysis s/p bronch 5/31 showing RUL bleeding. SpO2 at baseline high 80's given cardiopulmonary pathophysiology.  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 Gastritis and duodenal erosions were also seen possibly 2/2 ASA gastropathy.   - c/w tube feeds  - protonix 40 daily  - senna/colace/miralax for constipation    #ID  Initially febrile, elevated white count, no atelectasis on POCUS  - on empiric vanc/zosyn, holding vanc in setting of elevated level  - f/u BCx NTD    #Renal  - MELLO resolved with IVF and pressor support, likely pre-renal in setting of poor PO intake, hemoptysis vs possible PATRICA.  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    #Neuro: no active issues  - Sedated on fentanyl and versed gtt  - Careful sedation as pre-load dependent    #CV: Heterotaxy with congenital single atrium/ventricle s/p multiple cardiac surgeries including Fontan's  - Ectopy resolved after d/c levo gtt, c/w phenylephrine gtt, wean as tolerated  - Possible angiogram per cardiology  - Holding Coumadin in setting of recent bleed. INR 1.58 s/p PCC, FFP x4, vitamin K x1. Repeat 1U FFP today.  - c/w dig 0.25 daily  - LE dopplers with chronic RLE DVT (diagnosed 3/30/18, suspect provoked 2/2 sedentary status after hospitalization around that time for hematemesis, has been on coumadin per cards Dr. Mega Rojas)  - 1L LR over 10 hours     #Pulm: Hemoptysis s/p bronch 5/31 showing RUL bleeding. SpO2 at baseline high 80's given cardiopulmonary pathophysiology.  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 Gastritis and duodenal erosions were also seen possibly 2/2 ASA gastropathy.   - c/w tube feeds  - protonix 40 daily  - senna/colace/miralax for constipation    #ID  Initially febrile, elevated white count, no atelectasis on POCUS  - on empiric vanc/zosyn day 5, holding vanc in setting of elevated level  - f/u BCx NTD    #Renal  - MELLO resolved with IVF and pressor support, likely pre-renal in setting of poor PO intake, hemoptysis vs possible PATRICA.  - Goal K>4, Mg>2, replete prn    #Endocrine  - FSBG q6    #Heme  - Reactive leukocytosis vs infection, started on empiric abx, monitor CBC  - Heme c/s placed for AC recs for DVT  - Monitor INR, goal <1.5, elevated ?2/2 congestive hepatopathy from pHTN, fibrinogen elevated inconsistent with DIC    #DVT ppx: SCDs    #GOC: full code

## 2019-06-05 NOTE — PROVIDER CONTACT NOTE (OTHER) - SITUATION
Patient in critical care, unable to be turned for 48 hours due to critical illness requiring embolization and endobronchial blocker insertion.

## 2019-06-05 NOTE — CONSULT NOTE ADULT - SUBJECTIVE AND OBJECTIVE BOX
HPI:    Patient is a 24 year old male with PMH congenital heart disease/heterotaxy s/p Fontan's procedure, history of RLE extensive provoked DVT on coumadin who presents with hemoptysis requiring hypoxic respiratory failure requiring intubation multiple bronchoscopies and IR embolization. Patient has had an endobronchial blocker placed to control continued bleeding. Patient is intubated currently and unable to offer history. Hematology consulted for abnormal coagulation parameters.     Per patient's mother he was diagnosed with an extensive RLE DVT in March 2018 that occurred after prolonged hospitalization. The DVT extended up to the infra-renal IVC. He was previously on aspirin but discharged on coumadin. He has been having on and of episodes of hemoptysis of uncertain etiology, but these symptoms worsened since initiating the coumadin. Patient has been receiving vitamin K and FFP to reduce his INR given continued bleeding.         Allergies    No Known Allergies    Intolerances        MEDICATIONS  (STANDING):  chlorhexidine 4% Liquid 1 Application(s) Topical <User Schedule>  dexmedetomidine Infusion 0.2 MICROgram(s)/kG/Hr (2.65 mL/Hr) IV Continuous <Continuous>  digoxin     Tablet 0.25 milliGRAM(s) Oral daily  docusate sodium Liquid 100 milliGRAM(s) Oral three times a day  fentaNYL   Infusion. 1 MICROgram(s)/kG/Hr (5.3 mL/Hr) IV Continuous <Continuous>  lactated ringers. 1000 milliLiter(s) (100 mL/Hr) IV Continuous <Continuous>  midazolam Infusion 0.02 mG/kG/Hr (1.06 mL/Hr) IV Continuous <Continuous>  pantoprazole  Injectable 40 milliGRAM(s) IV Push two times a day  petrolatum Ophthalmic Ointment 1 Application(s) Both EYES two times a day  phenylephrine    Infusion 0.2 MICROgram(s)/kG/Min (1.988 mL/Hr) IV Continuous <Continuous>  piperacillin/tazobactam IVPB. 3.375 Gram(s) IV Intermittent every 8 hours  polyethylene glycol 3350 17 Gram(s) Oral daily  senna Syrup 10 milliLiter(s) Oral at bedtime    MEDICATIONS  (PRN):  acetaminophen    Suspension .. 650 milliGRAM(s) Oral every 6 hours PRN Temp greater or equal to 38C (100.4F)  LORazepam   Injectable 2 milliGRAM(s) IV Push every 4 hours PRN Agitation      PAST MEDICAL & SURGICAL HISTORY:  Spleen absent  TAPVR (total anomalous pulmonary venous return)  Heterotaxy syndrome with asplenia  Single ventricle  H/O heart surgery      FAMILY HISTORY:      SOCIAL HISTORY: No EtOH, no tobacco    REVIEW OF SYSTEMS:    CONSTITUTIONAL: No weakness, fevers or chills  EYES/ENT: No visual changes;  No vertigo or throat pain   NECK: No pain or stiffness  RESPIRATORY: No cough, wheezing, hemoptysis; No shortness of breath  CARDIOVASCULAR: No chest pain or palpitations  GASTROINTESTINAL: No abdominal or epigastric pain. No nausea, vomiting, or hematemesis; No diarrhea or constipation. No melena or hematochezia.  GENITOURINARY: No dysuria, frequency or hematuria  NEUROLOGICAL: No numbness or weakness  SKIN: No itching, burning, rashes, or lesions   All other review of systems is negative unless indicated above.        T(F): 97.7 (06-05-19 @ 16:00), Max: 101.2 (06-05-19 @ 08:00)  HR: 82 (06-05-19 @ 18:00)  BP: 106/61 (06-05-19 @ 18:00)  RR: 20 (06-05-19 @ 18:00)  SpO2: 91% (06-05-19 @ 18:00)  Wt(kg): --    GENERAL: NAD, well-developed  HEAD:  Atraumatic, Normocephalic  EYES: EOMI, PERRLA, conjunctiva and sclera clear  NECK: Supple, No JVD  CHEST/LUNG: Clear to auscultation bilaterally; No wheeze  HEART: Regular rate and rhythm; No murmurs, rubs, or gallops  ABDOMEN: Soft, Nontender, Nondistended; Bowel sounds present  EXTREMITIES:  2+ Peripheral Pulses, No clubbing, cyanosis, or edema  NEUROLOGY: non-focal  SKIN: No rashes or lesions                          12.2   12.55 )-----------( 135      ( 05 Jun 2019 02:50 )             38.6       06-05    133<L>  |  98  |  23  ----------------------------<  146<H>  4.9   |  22  |  1.85<H>    Ca    8.4      05 Jun 2019 02:50  Phos  3.2     06-05  Mg     2.2     06-05    TPro  7.0  /  Alb  3.3  /  TBili  4.0<H>  /  DBili  x   /  AST  66<H>  /  ALT  23  /  AlkPhos  101  06-05      Phosphorus Level, Serum: 3.2 mg/dL (06-05 @ 02:50)  Magnesium, Serum: 2.2 mg/dL (06-05 @ 02:50)

## 2019-06-05 NOTE — ADVANCED PRACTICE NURSE CONSULT - ASSESSMENT
Intubated and sedated, OGT in place, bedbound, indwelling urethral catheter in place. Skin warm, dry, adequate skin turgor. Blanchable erythema on bilateral heels, bilateral ears, bilateral elbows. Bilateral hands with mottling of finger tips, mottling (chronic as per bedside RN) of right foot. Generalized +2-3 edema. Patient in critical care, unable to be turned for 48 hours due to critical illness requiring embolization and endobronchial blocker insertion. As per RN note and MD order unable to turn patient due to recurrent hemoptysis with turning.     Right lateral thoracic trunk (ribs): DTPI measures 2cmx0.5dku0qo. 100% purple/maroon discoloration, epidermis intact. Periwound skin with blanchable erythema that extends circumferentially 0.5cm. No induration, no increased warmth. No drainage. Goal of care: monitor for changes in tissue type.    Sacrococcygeum, DTPI, entire are of nonblanchable erythema [id number] with 4 areas of purple/maroon discoloration. Largest purple/maroon discoloration in sacral fold over coccyx that measures 2cmx0.1ndl8se, 1cm from largest purple area from 12-6 o'clock there are 3 areas that all measures 0.5cmx0.1yqe7tw and area 1cm from one another. No induration, no increased warmth. No drainage. Goal of care: monitor for changes in tissue type. Intubated and sedated, OGT in place, bedbound, indwelling urethral catheter in place. Skin warm, dry, adequate skin turgor. Blanchable erythema on bilateral heels, bilateral ears, bilateral elbows. Bilateral hands with mottling of finger tips, mottling (chronic as per bedside RN) of right foot. Generalized +2-3 edema. Patient in critical care, unable to be turned for 48 hours due to critical illness requiring embolization and endobronchial blocker insertion. As per RN note and MD order unable to turn patient due to recurrent hemoptysis with turning. As per RN charting able to perform Q 30 min small shifts in weight during that time period.    Right lateral thoracic trunk (ribs): DTPI measures 2cmx0.4pam4gq. 100% purple/maroon discoloration, epidermis intact. Periwound skin with blanchable erythema that extends circumferentially 0.5cm. No induration, no increased warmth. No drainage. Goal of care: monitor for changes in tissue type.    Sacrococcygeum, DTPI, entire are of nonblanchable erythema [id number] with 4 areas of purple/maroon discoloration. Largest purple/maroon discoloration in sacral fold over coccyx that measures 2cmx0.0avy3il, 1cm from largest purple area from 12-6 o'clock there are 3 areas that all measures 0.5cmx0.2zcu5xw and area 1cm from one another. No induration, no increased warmth. No drainage. Goal of care: monitor for changes in tissue type.

## 2019-06-05 NOTE — PROGRESS NOTE PEDS - SUBJECTIVE AND OBJECTIVE BOX
INTERVAL HISTORY: Continues to be critically ill with need for high PEEP; no new bleeding. Maintaining hematocrit. Repeat bronch yesterday evening- old clots were removed, samples sent for culture (NGTD) and the endobronchial blocker was removed. No active bleeding was noted.   Mild increase in the creatinine, and ascites noted. Fevers+.     RESPIRATORY SUPPORT: Mode: AC/ CMV (Assist Control/ Continuous Mandatory Ventilation), RR (machine): 20, FiO2: 70, PEEP: 10    I&O's Summary  2019 07:01  -  2019 07:00  --------------------------------------------------------  IN: 2555.7 mL / OUT: 970 mL / NET: 1585.7 mL    INTRAVASCULAR ACCESS: Peripheral IV    MEDICATIONS  (STANDING):  chlorhexidine 4% Liquid 1 Application(s) Topical <User Schedule>  digoxin     Tablet 0.25 milliGRAM(s) Oral daily  docusate sodium Liquid 100 milliGRAM(s) Oral three times a day  fentaNYL   Infusion. 1 MICROgram(s)/kG/Hr (5.3 mL/Hr) IV Continuous <Continuous>  lactated ringers. 1000 milliLiter(s) (100 mL/Hr) IV Continuous <Continuous>  midazolam Infusion 0.02 mG/kG/Hr (1.06 mL/Hr) IV Continuous <Continuous>  pantoprazole  Injectable 40 milliGRAM(s) IV Push two times a day  petrolatum Ophthalmic Ointment 1 Application(s) Both EYES two times a day  phenylephrine    Infusion 0.2 MICROgram(s)/kG/Min (1.988 mL/Hr) IV Continuous <Continuous>  piperacillin/tazobactam IVPB. 3.375 Gram(s) IV Intermittent every 8 hours  polyethylene glycol 3350 17 Gram(s) Oral daily  senna Syrup 10 milliLiter(s) Oral at bedtime  vancomycin  IVPB 1000 milliGRAM(s) IV Intermittent every 8 hours    PHYSICAL EXAMINATION:  ICU Vital Signs Last 24 Hrs  T(C): 36.6 (2019 12:00), Max: 38.4 (2019 08:00)  T(F): 97.8 (2019 12:00), Max: 101.2 (2019 08:00)  HR: 73 (2019 12:09) (73 - 90)  BP: 103/52 (2019 12:09) (86/48 - 120/71)  BP(mean): 68 (2019 12:09) (60 - 85)  RR: 20 (2019 12:09) (20 - 20)  SpO2: 92% (2019 12:09) (86% - 96%)    General - non-dysmorphic appearance, sedated and on mechanical ventilator  Skin - mottling of skin in the peripheries, with petechiae on bilateral LE; no desquamation  Eyes / ENT - no conjunctival injection, sclerae anicteric, external ears & nares normal, mucous membranes moist.  Pulmonary - mechanically ventilated; no retractions, transmitted sounds on auscultation bilaterally, no wheezes, no rales.  Cardiovascular - tachycardia; regular rhythm, normal S1 & single S2, 2/6 HSM at LMSB, no rubs, no gallops, capillary refill < 2sec, + pectus  Gastrointestinal - soft, distended, non-tender, hepatomegaly + .  Neurologic / Psychiatric - sedated with Versed and Fentanyl.    LABORATORY TESTS:                          11.9  CBC:   10.49 )-----------( 146   (19 @ 03:10)                          36.9               135   |  102   |  13                 Ca: 8.2    BMP:   ----------------------------< 116    M.0   (19 @ 03:10)             4.6    |  19    | 0.79               Ph: 2.8      LFT:     TPro: 6.1 / Alb: 3.1 / TBili: 3.3 / DBili: x / AST: 67 / ALT: 22 / AlkPhos: 102   (19 @ 03:10)    COAG: PT: 16.9 / PTT: x / INR: 1.50   (19 @ 03:10)     ABG:   pH: 7.43 / pCO2: 35 / pO2: 70 / HCO3: 24 / Base Excess: -0.4 / SaO2: 94.4 / Lactate: 1.2 / iCa: 1.16   (19 @ 03:10)    VBG:   pH: 7.34 / pCO2: 47 / pO2: 66 / HCO3: 23 / Base Excess: -0.5 / SaO2: 89.7   (19 @ 16:30)    IMAGING STUDIES:  TTE Congential Anomalies (19 @ 17:28) >  Conclusions:  1. Moderate aortic regurgitation.  2. Systemic Right ventricle loops to the left.  The  Systemic Right ventricular function is mildly decreased.  Hypoplastic left ventricle.  3. Normal pericardium with no pericardial effusion.  Unable to comment on the Fontan.  Case and images discussed with adult congenital Dr. Geoffrey Plata as per his records, no sisgnificant change from  prior, should additional images be required please contact  adult congenital for an ECHO.

## 2019-06-05 NOTE — PROGRESS NOTE ADULT - SUBJECTIVE AND OBJECTIVE BOX
Mg Hayward, PGY1   Pager [phone]    INTERVAL HPI/OVERNIGHT EVENTS:    SUBJECTIVE: Patient seen and examined at bedside.      OBJECTIVE:    VITAL SIGNS:  ICU Vital Signs Last 24 Hrs  T(C): 37.7 (05 Jun 2019 00:00), Max: 37.7 (04 Jun 2019 08:00)  T(F): 99.8 (05 Jun 2019 00:00), Max: 99.8 (04 Jun 2019 08:00)  HR: 84 (05 Jun 2019 04:31) (81 - 96)  BP: 116/73 (05 Jun 2019 03:00) (86/48 - 124/90)  BP(mean): 85 (05 Jun 2019 03:00) (60 - 99)  ABP: --  ABP(mean): --  RR: 20 (05 Jun 2019 03:00) (20 - 20)  SpO2: 92% (05 Jun 2019 04:31) (79% - 95%)    Mode: AC/ CMV (Assist Control/ Continuous Mandatory Ventilation), RR (machine): 20, TV (machine): 450, FiO2: 70, PEEP: 10, MAP: 16, PIP: 30    06-03 @ 07:01  -  06-04 @ 07:00  --------------------------------------------------------  IN: 2683.6 mL / OUT: 1815 mL / NET: 868.6 mL    06-04 @ 07:01  -  06-05 @ 06:39  --------------------------------------------------------  IN: 2021.4 mL / OUT: 870 mL / NET: 1151.4 mL      CAPILLARY BLOOD GLUCOSE          PHYSICAL EXAM:    General: NAD  HEENT: NCAT, PERRL, clear conjunctiva, mmm  Neck: supple, no JVD  Respiratory: CTAB  Cardiovascular: RRR, S1S2, no m/r/g  Abdomen: soft, nontender, nondistended, normal bowel sounds  Extremities: no edema or cyanosis  Skin: normal color and turgor; no rash  Neurological: nonfocal    MEDICATIONS:  MEDICATIONS  (STANDING):  chlorhexidine 4% Liquid 1 Application(s) Topical <User Schedule>  digoxin     Tablet 0.25 milliGRAM(s) Oral daily  docusate sodium Liquid 100 milliGRAM(s) Oral three times a day  fentaNYL   Infusion. 1 MICROgram(s)/kG/Hr (5.3 mL/Hr) IV Continuous <Continuous>  midazolam Infusion 0.02 mG/kG/Hr (1.06 mL/Hr) IV Continuous <Continuous>  pantoprazole  Injectable 40 milliGRAM(s) IV Push two times a day  petrolatum Ophthalmic Ointment 1 Application(s) Both EYES two times a day  phenylephrine    Infusion 0.2 MICROgram(s)/kG/Min (1.988 mL/Hr) IV Continuous <Continuous>  piperacillin/tazobactam IVPB. 3.375 Gram(s) IV Intermittent every 8 hours  senna Syrup 10 milliLiter(s) Oral at bedtime  vancomycin  IVPB 1000 milliGRAM(s) IV Intermittent every 8 hours    MEDICATIONS  (PRN):  LORazepam   Injectable 2 milliGRAM(s) IV Push every 4 hours PRN Agitation      ALLERGIES:  Allergies    No Known Allergies    Intolerances        LABS:                        12.2   12.55 )-----------( 135      ( 05 Jun 2019 02:50 )             38.6     06-05    133<L>  |  98  |  23  ----------------------------<  146<H>  4.9   |  22  |  1.85<H>    Ca    8.4      05 Jun 2019 02:50  Phos  3.2     06-05  Mg     2.2     06-05    TPro  7.0  /  Alb  3.3  /  TBili  4.0<H>  /  DBili  x   /  AST  66<H>  /  ALT  23  /  AlkPhos  101  06-05    PT/INR - ( 05 Jun 2019 02:50 )   PT: 15.9 SEC;   INR: 1.38          PTT - ( 03 Jun 2019 17:20 )  PTT:35.1 SEC      RADIOLOGY & ADDITIONAL TESTS: Reviewed. Mg Hayward, PGY1   Pager [phone]    INTERVAL HPI/OVERNIGHT EVENTS: Bronched yesterday, blocker removed.    SUBJECTIVE: Patient seen and examined at bedside. Sedated.      OBJECTIVE:    VITAL SIGNS:  ICU Vital Signs Last 24 Hrs  T(C): 37.7 (05 Jun 2019 00:00), Max: 37.7 (04 Jun 2019 08:00)  T(F): 99.8 (05 Jun 2019 00:00), Max: 99.8 (04 Jun 2019 08:00)  HR: 84 (05 Jun 2019 04:31) (81 - 96)  BP: 116/73 (05 Jun 2019 03:00) (86/48 - 124/90)  BP(mean): 85 (05 Jun 2019 03:00) (60 - 99)  ABP: --  ABP(mean): --  RR: 20 (05 Jun 2019 03:00) (20 - 20)  SpO2: 92% (05 Jun 2019 04:31) (79% - 95%)    Mode: AC/ CMV (Assist Control/ Continuous Mandatory Ventilation), RR (machine): 20, TV (machine): 450, FiO2: 70, PEEP: 10, MAP: 16, PIP: 30    06-03 @ 07:01  -  06-04 @ 07:00  --------------------------------------------------------  IN: 2683.6 mL / OUT: 1815 mL / NET: 868.6 mL    06-04 @ 07:01 - 06-05 @ 06:39  --------------------------------------------------------  IN: 2021.4 mL / OUT: 870 mL / NET: 1151.4 mL      CAPILLARY BLOOD GLUCOSE          PHYSICAL EXAM:    General: NAD  HEENT: NCAT, PERRL, clear conjunctiva, mmm  Neck: supple, no JVD  Respiratory: CTAB  Cardiovascular: RRR, systolic murmur  Abdomen: soft, nontender, mild abd distention, normal bowel sounds  Extremities: no edema, mild bluish hue of finger tips  Skin: chronic RLE mottling  Neurological: nonfocal    MEDICATIONS:  MEDICATIONS  (STANDING):  chlorhexidine 4% Liquid 1 Application(s) Topical <User Schedule>  digoxin     Tablet 0.25 milliGRAM(s) Oral daily  docusate sodium Liquid 100 milliGRAM(s) Oral three times a day  fentaNYL   Infusion. 1 MICROgram(s)/kG/Hr (5.3 mL/Hr) IV Continuous <Continuous>  midazolam Infusion 0.02 mG/kG/Hr (1.06 mL/Hr) IV Continuous <Continuous>  pantoprazole  Injectable 40 milliGRAM(s) IV Push two times a day  petrolatum Ophthalmic Ointment 1 Application(s) Both EYES two times a day  phenylephrine    Infusion 0.2 MICROgram(s)/kG/Min (1.988 mL/Hr) IV Continuous <Continuous>  piperacillin/tazobactam IVPB. 3.375 Gram(s) IV Intermittent every 8 hours  senna Syrup 10 milliLiter(s) Oral at bedtime  vancomycin  IVPB 1000 milliGRAM(s) IV Intermittent every 8 hours    MEDICATIONS  (PRN):  LORazepam   Injectable 2 milliGRAM(s) IV Push every 4 hours PRN Agitation      ALLERGIES:  Allergies    No Known Allergies    Intolerances        LABS:                        12.2   12.55 )-----------( 135      ( 05 Jun 2019 02:50 )             38.6     06-05    133<L>  |  98  |  23  ----------------------------<  146<H>  4.9   |  22  |  1.85<H>    Ca    8.4      05 Jun 2019 02:50  Phos  3.2     06-05  Mg     2.2     06-05    TPro  7.0  /  Alb  3.3  /  TBili  4.0<H>  /  DBili  x   /  AST  66<H>  /  ALT  23  /  AlkPhos  101  06-05    PT/INR - ( 05 Jun 2019 02:50 )   PT: 15.9 SEC;   INR: 1.38          PTT - ( 03 Jun 2019 17:20 )  PTT:35.1 SEC      RADIOLOGY & ADDITIONAL TESTS: Reviewed. Mg Yesy, PGY1   Pager [phone]    INTERVAL HPI/OVERNIGHT EVENTS: Febrile. Bronched yesterday, blocker removed.    SUBJECTIVE: Patient seen and examined at bedside. Sedated.      OBJECTIVE:    VITAL SIGNS:  ICU Vital Signs Last 24 Hrs  T(C): 37.7 (05 Jun 2019 00:00), Max: 37.7 (04 Jun 2019 08:00)  T(F): 99.8 (05 Jun 2019 00:00), Max: 99.8 (04 Jun 2019 08:00)  HR: 84 (05 Jun 2019 04:31) (81 - 96)  BP: 116/73 (05 Jun 2019 03:00) (86/48 - 124/90)  BP(mean): 85 (05 Jun 2019 03:00) (60 - 99)  ABP: --  ABP(mean): --  RR: 20 (05 Jun 2019 03:00) (20 - 20)  SpO2: 92% (05 Jun 2019 04:31) (79% - 95%)    Mode: AC/ CMV (Assist Control/ Continuous Mandatory Ventilation), RR (machine): 20, TV (machine): 450, FiO2: 70, PEEP: 10, MAP: 16, PIP: 30    06-03 @ 07:01  -  06-04 @ 07:00  --------------------------------------------------------  IN: 2683.6 mL / OUT: 1815 mL / NET: 868.6 mL    06-04 @ 07:01 - 06-05 @ 06:39  --------------------------------------------------------  IN: 2021.4 mL / OUT: 870 mL / NET: 1151.4 mL      CAPILLARY BLOOD GLUCOSE          PHYSICAL EXAM:    General: NAD  HEENT: NCAT, PERRL, clear conjunctiva, mmm  Neck: supple, no JVD  Respiratory: CTAB  Cardiovascular: RRR, systolic murmur  Abdomen: soft, nontender, mild abd distention, normal bowel sounds  Extremities: no edema, mild bluish hue of finger tips  Skin: chronic RLE mottling  Neurological: nonfocal    MEDICATIONS:  MEDICATIONS  (STANDING):  chlorhexidine 4% Liquid 1 Application(s) Topical <User Schedule>  digoxin     Tablet 0.25 milliGRAM(s) Oral daily  docusate sodium Liquid 100 milliGRAM(s) Oral three times a day  fentaNYL   Infusion. 1 MICROgram(s)/kG/Hr (5.3 mL/Hr) IV Continuous <Continuous>  midazolam Infusion 0.02 mG/kG/Hr (1.06 mL/Hr) IV Continuous <Continuous>  pantoprazole  Injectable 40 milliGRAM(s) IV Push two times a day  petrolatum Ophthalmic Ointment 1 Application(s) Both EYES two times a day  phenylephrine    Infusion 0.2 MICROgram(s)/kG/Min (1.988 mL/Hr) IV Continuous <Continuous>  piperacillin/tazobactam IVPB. 3.375 Gram(s) IV Intermittent every 8 hours  senna Syrup 10 milliLiter(s) Oral at bedtime  vancomycin  IVPB 1000 milliGRAM(s) IV Intermittent every 8 hours    MEDICATIONS  (PRN):  LORazepam   Injectable 2 milliGRAM(s) IV Push every 4 hours PRN Agitation      ALLERGIES:  Allergies    No Known Allergies    Intolerances        LABS:                        12.2   12.55 )-----------( 135      ( 05 Jun 2019 02:50 )             38.6     06-05    133<L>  |  98  |  23  ----------------------------<  146<H>  4.9   |  22  |  1.85<H>    Ca    8.4      05 Jun 2019 02:50  Phos  3.2     06-05  Mg     2.2     06-05    TPro  7.0  /  Alb  3.3  /  TBili  4.0<H>  /  DBili  x   /  AST  66<H>  /  ALT  23  /  AlkPhos  101  06-05    PT/INR - ( 05 Jun 2019 02:50 )   PT: 15.9 SEC;   INR: 1.38          PTT - ( 03 Jun 2019 17:20 )  PTT:35.1 SEC      RADIOLOGY & ADDITIONAL TESTS: Reviewed.

## 2019-06-05 NOTE — CONSULT NOTE ADULT - ATTENDING COMMENTS
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 Rec mixing studies, factor levels and right UQ sono. Continue FFP PRN. Will f/u.

## 2019-06-05 NOTE — ADVANCED PRACTICE NURSE CONSULT - REASON FOR CONSULT
Patient seen on skin care rounds after wound care referral received for assessment of skin impairment and recommendations of topical management. Chart reviewed: Serum aWBC 12.55, INR current 1.38, INR 2.06 during admission requiring 4 units plasma, hypotensive during admission to 77/53 requiring vasopressor support, oxygen requirement % on ventilator via ETT, febrile 103.4 and hypothermic 95.5 during hospital stay, history of right leg DVT chronic mottling of right foot, [place] 10-21, BMI 17.3kg/m2. Patient H/O spleen absent, TAPVR, single ventricle (congential anomalies) multiple cardiac surgeries including Fontans procedure, heterotaxy syndrome with asplenia, RLE DVT 1 year ago on warfarin, chronic intermittent hemoptysis for five years. Admitted with hemoptysis.

## 2019-06-05 NOTE — PROGRESS NOTE PEDS - ASSESSMENT
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 He is now s/p IR embolization of right upper lobe aortopulmonary collateral vessels likely culprits for his hemoptysis.  Fontan physiology is preload dependent and dependent on unobstructed flow through PAs. Baseline saturation for him is in the upper 80s/low 90s likely related to veno-venous collateral vessels that serve as right to left shunting.  Known history of asplenia.     Plan-   - Ventilation and FiO2 for goal saturations >85%.   - Continue antibiotics; monitor renal parameters;   - Trend H/H; continue antibiotics as per MICU team (elevated WBC and fevers)  - s/p bronch 6/1 and 6/3 w/ endobronchial blocker placed for re-bleeding from RUL, plan for repeat bronch today to remove block and evaluate for bleeding and clots  - f/u coags: if persistently abnormal, FFP transfusion and consider hematology consultation  - if has rebleeding despite normalization of coags, will need to consider cardiac catheterization to evaluate the rest of the aorta to evaluate for aortopulmonary collateral vessels that may be leading to persistent bleeding; per our discussion with MICU yesterday at the time of the bronchoscopy, bleeding was isolated to the RUL as noted on Friday  - Wean down PEEP (increased PEEP will impede venous return to the Fontan circuit) and progress towards extubation.   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 He is now s/p IR embolization of right upper lobe aortopulmonary collateral vessels likely culprits for his hemoptysis.  Fontan physiology is preload dependent and dependent on unobstructed flow through PAs. Baseline saturation for him is in the upper 80s/low 90s likely related to veno-venous collateral vessels that serve as right to left shunting.  Known history of asplenia.     Plan-   - Ventilation and FiO2 for goal saturations >85%.   - Continue antibiotics; monitor renal parameters;   - Trend H/H; continue antibiotics as per MICU team (elevated WBC and fevers)  - f/u coags: if persistently abnormal, FFP transfusion and consider hematology consultation  - if has rebleeding despite normalization of coags, will need to consider cardiac catheterization to evaluate the rest of the aorta to evaluate for aortopulmonary collateral vessels that may be leading to persistent bleeding  - Wean down PEEP (increased PEEP will impede venous return to the Fontan circuit) and progress towards extubation.   - wean phenylephrine as tolerated  - would give IVFs given rise in Cr, elevated CK secondary to immobility  - with MELLO, would hold Digoxin  - renally dose all meds

## 2019-06-05 NOTE — PROVIDER CONTACT NOTE (OTHER) - ASSESSMENT
Right lateral thoracic trunk (ribs): DTPI measures 2cmx0.0ymw9gu. 100% purple/maroon discoloration, epidermis intact. Periwound skin with blanchable erythema that extends circumferentially 0.5cm. No induration, no increased warmth. No drainage. Goal of care: monitor for changes in tissue type.    Sacrococcygeum, DTPI, entire are of nonblanchable erythema [id number] with 4 areas of purple/maroon discoloration. Largest purple/maroon discoloration in sacral fold over coccyx that measures 2cmx0.2fkc1zn, 1cm from largest purple area from 12-6 o'clock there are 3 areas that all measures 0.5cmx0.3yjz7pg and area 1cm from one another. No induration, no increased warmth. No drainage. Goal of care: monitor for changes in tissue type.

## 2019-06-05 NOTE — PROGRESS NOTE ADULT - ATTENDING COMMENTS
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, RLE DVT 1 year ago on warfarin, hypoxic respiratory failure with massive hemoptysis s/p embolization re-bleed S/p bronchoscopy with endobronchial blocker in place    - sedated for comfort and vent coordination  - tolerating tube feeds   - surveillance bronchoscopy  - remains febrile continue ABx  - endobronchial blocker was removed   - will start bowel regimen as patient has not had any BMs recently  - INR elevated again, transfuse FFP will follow up with Heme    critical care time 40 minutes  case discussed in detail with parents at bedside

## 2019-06-06 LAB
ALBUMIN SERPL ELPH-MCNC: 2.8 G/DL — LOW (ref 3.3–5)
ALP SERPL-CCNC: 99 U/L — SIGNIFICANT CHANGE UP (ref 40–120)
ALT FLD-CCNC: 21 U/L — SIGNIFICANT CHANGE UP (ref 4–41)
ANION GAP SERPL CALC-SCNC: 11 MMO/L — SIGNIFICANT CHANGE UP (ref 7–14)
ANION GAP SERPL CALC-SCNC: 13 MMO/L — SIGNIFICANT CHANGE UP (ref 7–14)
ANION GAP SERPL CALC-SCNC: 9 MMO/L — SIGNIFICANT CHANGE UP (ref 7–14)
APTT BLD: 30.7 SEC — SIGNIFICANT CHANGE UP (ref 27.5–36.3)
APTT BLD: 32.5 SEC — SIGNIFICANT CHANGE UP (ref 27.5–36.3)
AST SERPL-CCNC: 50 U/L — HIGH (ref 4–40)
BACTERIA BLD CULT: SIGNIFICANT CHANGE UP
BACTERIA BLD CULT: SIGNIFICANT CHANGE UP
BASOPHILS # BLD AUTO: 0.08 K/UL — SIGNIFICANT CHANGE UP (ref 0–0.2)
BASOPHILS NFR BLD AUTO: 0.6 % — SIGNIFICANT CHANGE UP (ref 0–2)
BILIRUB SERPL-MCNC: 3.1 MG/DL — HIGH (ref 0.2–1.2)
BUN SERPL-MCNC: 35 MG/DL — HIGH (ref 7–23)
BUN SERPL-MCNC: 40 MG/DL — HIGH (ref 7–23)
BUN SERPL-MCNC: 41 MG/DL — HIGH (ref 7–23)
CALCIUM SERPL-MCNC: 8.3 MG/DL — LOW (ref 8.4–10.5)
CALCIUM SERPL-MCNC: 8.3 MG/DL — LOW (ref 8.4–10.5)
CALCIUM SERPL-MCNC: 9 MG/DL — SIGNIFICANT CHANGE UP (ref 8.4–10.5)
CHLORIDE SERPL-SCNC: 100 MMOL/L — SIGNIFICANT CHANGE UP (ref 98–107)
CHLORIDE SERPL-SCNC: 102 MMOL/L — SIGNIFICANT CHANGE UP (ref 98–107)
CHLORIDE SERPL-SCNC: 103 MMOL/L — SIGNIFICANT CHANGE UP (ref 98–107)
CO2 SERPL-SCNC: 19 MMOL/L — LOW (ref 22–31)
CO2 SERPL-SCNC: 23 MMOL/L — SIGNIFICANT CHANGE UP (ref 22–31)
CO2 SERPL-SCNC: 23 MMOL/L — SIGNIFICANT CHANGE UP (ref 22–31)
CREAT SERPL-MCNC: 2.45 MG/DL — HIGH (ref 0.5–1.3)
CREAT SERPL-MCNC: 2.47 MG/DL — HIGH (ref 0.5–1.3)
CREAT SERPL-MCNC: 2.48 MG/DL — HIGH (ref 0.5–1.3)
EOSINOPHIL # BLD AUTO: 1.07 K/UL — HIGH (ref 0–0.5)
EOSINOPHIL NFR BLD AUTO: 8.7 % — HIGH (ref 0–6)
FACT II CIRC INHIB PPP QL: 12.8 SEC — SIGNIFICANT CHANGE UP (ref 9.8–13.1)
FACT II CIRC INHIB PPP QL: SIGNIFICANT CHANGE UP SEC (ref 27.5–37.4)
GLUCOSE SERPL-MCNC: 125 MG/DL — HIGH (ref 70–99)
GLUCOSE SERPL-MCNC: 131 MG/DL — HIGH (ref 70–99)
GLUCOSE SERPL-MCNC: 153 MG/DL — HIGH (ref 70–99)
HCT VFR BLD CALC: 34.1 % — LOW (ref 39–50)
HGB BLD-MCNC: 10.7 G/DL — LOW (ref 13–17)
IMM GRANULOCYTES NFR BLD AUTO: 1 % — SIGNIFICANT CHANGE UP (ref 0–1.5)
INR BLD: 1.25 — HIGH (ref 0.88–1.17)
INR BLD: 1.33 — HIGH (ref 0.88–1.17)
INR BLD: 1.33 — HIGH (ref 0.88–1.17)
LYMPHOCYTES # BLD AUTO: 0.66 K/UL — LOW (ref 1–3.3)
LYMPHOCYTES # BLD AUTO: 5.3 % — LOW (ref 13–44)
MAGNESIUM SERPL-MCNC: 2.3 MG/DL — SIGNIFICANT CHANGE UP (ref 1.6–2.6)
MAGNESIUM SERPL-MCNC: 2.3 MG/DL — SIGNIFICANT CHANGE UP (ref 1.6–2.6)
MAGNESIUM SERPL-MCNC: 2.4 MG/DL — SIGNIFICANT CHANGE UP (ref 1.6–2.6)
MCHC RBC-ENTMCNC: 24.3 PG — LOW (ref 27–34)
MCHC RBC-ENTMCNC: 31.4 % — LOW (ref 32–36)
MCV RBC AUTO: 77.3 FL — LOW (ref 80–100)
MONOCYTES # BLD AUTO: 1.48 K/UL — HIGH (ref 0–0.9)
MONOCYTES NFR BLD AUTO: 12 % — SIGNIFICANT CHANGE UP (ref 2–14)
NEUTROPHILS # BLD AUTO: 8.94 K/UL — HIGH (ref 1.8–7.4)
NEUTROPHILS NFR BLD AUTO: 72.4 % — SIGNIFICANT CHANGE UP (ref 43–77)
NRBC # FLD: 0.02 K/UL — SIGNIFICANT CHANGE UP (ref 0–0)
PHOSPHATE SERPL-MCNC: 3 MG/DL — SIGNIFICANT CHANGE UP (ref 2.5–4.5)
PHOSPHATE SERPL-MCNC: 3.1 MG/DL — SIGNIFICANT CHANGE UP (ref 2.5–4.5)
PHOSPHATE SERPL-MCNC: 3.1 MG/DL — SIGNIFICANT CHANGE UP (ref 2.5–4.5)
PLATELET # BLD AUTO: 150 K/UL — SIGNIFICANT CHANGE UP (ref 150–400)
PMV BLD: SIGNIFICANT CHANGE UP FL (ref 7–13)
POTASSIUM SERPL-MCNC: 4.5 MMOL/L — SIGNIFICANT CHANGE UP (ref 3.5–5.3)
POTASSIUM SERPL-MCNC: 4.6 MMOL/L — SIGNIFICANT CHANGE UP (ref 3.5–5.3)
POTASSIUM SERPL-MCNC: 5.8 MMOL/L — HIGH (ref 3.5–5.3)
POTASSIUM SERPL-SCNC: 4.5 MMOL/L — SIGNIFICANT CHANGE UP (ref 3.5–5.3)
POTASSIUM SERPL-SCNC: 4.6 MMOL/L — SIGNIFICANT CHANGE UP (ref 3.5–5.3)
POTASSIUM SERPL-SCNC: 5.8 MMOL/L — HIGH (ref 3.5–5.3)
PROT SERPL-MCNC: 6.1 G/DL — SIGNIFICANT CHANGE UP (ref 6–8.3)
PROTHROM AB SERPL-ACNC: 14 SEC — HIGH (ref 9.8–13.1)
PROTHROM AB SERPL-ACNC: 14.9 SEC — HIGH (ref 9.8–13.1)
PROTHROM AB SERPL-ACNC: 14.9 SEC — HIGH (ref 9.8–13.1)
PROTHROMBIN TIME/NOMAL: 11.8 SEC — SIGNIFICANT CHANGE UP (ref 9.8–13.1)
PT INHIB SC 2 HR: 13.4 SEC — HIGH (ref 9.8–13.1)
RBC # BLD: 4.41 M/UL — SIGNIFICANT CHANGE UP (ref 4.2–5.8)
RBC # FLD: 22.4 % — HIGH (ref 10.3–14.5)
SODIUM SERPL-SCNC: 133 MMOL/L — LOW (ref 135–145)
SODIUM SERPL-SCNC: 134 MMOL/L — LOW (ref 135–145)
SODIUM SERPL-SCNC: 136 MMOL/L — SIGNIFICANT CHANGE UP (ref 135–145)
VANCOMYCIN FLD-MCNC: 31.4 UG/ML — CRITICAL HIGH
WBC # BLD: 12.35 K/UL — HIGH (ref 3.8–10.5)
WBC # FLD AUTO: 12.35 K/UL — HIGH (ref 3.8–10.5)

## 2019-06-06 PROCEDURE — 99233 SBSQ HOSP IP/OBS HIGH 50: CPT

## 2019-06-06 PROCEDURE — 99291 CRITICAL CARE FIRST HOUR: CPT

## 2019-06-06 RX ORDER — MIDAZOLAM HYDROCHLORIDE 1 MG/ML
2 INJECTION, SOLUTION INTRAMUSCULAR; INTRAVENOUS ONCE
Refills: 0 | Status: DISCONTINUED | OUTPATIENT
Start: 2019-06-06 | End: 2019-06-07

## 2019-06-06 RX ORDER — POLYETHYLENE GLYCOL 3350 17 G/17G
17 POWDER, FOR SOLUTION ORAL
Refills: 0 | Status: DISCONTINUED | OUTPATIENT
Start: 2019-06-06 | End: 2019-06-22

## 2019-06-06 RX ORDER — SODIUM CHLORIDE 9 MG/ML
500 INJECTION, SOLUTION INTRAVENOUS ONCE
Refills: 0 | Status: COMPLETED | OUTPATIENT
Start: 2019-06-06 | End: 2019-06-06

## 2019-06-06 RX ORDER — MIDAZOLAM HYDROCHLORIDE 1 MG/ML
4 INJECTION, SOLUTION INTRAMUSCULAR; INTRAVENOUS ONCE
Refills: 0 | Status: DISCONTINUED | OUTPATIENT
Start: 2019-06-06 | End: 2019-06-06

## 2019-06-06 RX ORDER — SODIUM CHLORIDE 9 MG/ML
1000 INJECTION, SOLUTION INTRAVENOUS
Refills: 0 | Status: DISCONTINUED | OUTPATIENT
Start: 2019-06-06 | End: 2019-06-07

## 2019-06-06 RX ORDER — DEXMEDETOMIDINE HYDROCHLORIDE IN 0.9% SODIUM CHLORIDE 4 UG/ML
0.2 INJECTION INTRAVENOUS
Qty: 200 | Refills: 0 | Status: DISCONTINUED | OUTPATIENT
Start: 2019-06-06 | End: 2019-06-07

## 2019-06-06 RX ADMIN — PANTOPRAZOLE SODIUM 40 MILLIGRAM(S): 20 TABLET, DELAYED RELEASE ORAL at 17:17

## 2019-06-06 RX ADMIN — SODIUM CHLORIDE 1000 MILLILITER(S): 9 INJECTION, SOLUTION INTRAVENOUS at 15:08

## 2019-06-06 RX ADMIN — SENNA PLUS 10 MILLILITER(S): 8.6 TABLET ORAL at 21:45

## 2019-06-06 RX ADMIN — Medication 1 APPLICATION(S): at 05:33

## 2019-06-06 RX ADMIN — PIPERACILLIN AND TAZOBACTAM 25 GRAM(S): 4; .5 INJECTION, POWDER, LYOPHILIZED, FOR SOLUTION INTRAVENOUS at 21:45

## 2019-06-06 RX ADMIN — CHLORHEXIDINE GLUCONATE 1 APPLICATION(S): 213 SOLUTION TOPICAL at 07:55

## 2019-06-06 RX ADMIN — PANTOPRAZOLE SODIUM 40 MILLIGRAM(S): 20 TABLET, DELAYED RELEASE ORAL at 05:33

## 2019-06-06 RX ADMIN — SODIUM CHLORIDE 2000 MILLILITER(S): 9 INJECTION, SOLUTION INTRAVENOUS at 04:00

## 2019-06-06 RX ADMIN — PIPERACILLIN AND TAZOBACTAM 25 GRAM(S): 4; .5 INJECTION, POWDER, LYOPHILIZED, FOR SOLUTION INTRAVENOUS at 05:33

## 2019-06-06 RX ADMIN — Medication 100 MILLIGRAM(S): at 21:45

## 2019-06-06 RX ADMIN — Medication 100 MILLIGRAM(S): at 14:05

## 2019-06-06 RX ADMIN — SODIUM CHLORIDE 100 MILLILITER(S): 9 INJECTION, SOLUTION INTRAVENOUS at 07:57

## 2019-06-06 RX ADMIN — FENTANYL CITRATE 5.3 MICROGRAM(S)/KG/HR: 50 INJECTION INTRAVENOUS at 10:58

## 2019-06-06 RX ADMIN — PIPERACILLIN AND TAZOBACTAM 25 GRAM(S): 4; .5 INJECTION, POWDER, LYOPHILIZED, FOR SOLUTION INTRAVENOUS at 14:04

## 2019-06-06 RX ADMIN — FENTANYL CITRATE 5.3 MICROGRAM(S)/KG/HR: 50 INJECTION INTRAVENOUS at 07:56

## 2019-06-06 RX ADMIN — DEXMEDETOMIDINE HYDROCHLORIDE IN 0.9% SODIUM CHLORIDE 2.65 MICROGRAM(S)/KG/HR: 4 INJECTION INTRAVENOUS at 07:56

## 2019-06-06 RX ADMIN — Medication 100 MILLIGRAM(S): at 05:32

## 2019-06-06 RX ADMIN — POLYETHYLENE GLYCOL 3350 17 GRAM(S): 17 POWDER, FOR SOLUTION ORAL at 11:51

## 2019-06-06 RX ADMIN — SODIUM CHLORIDE 1000 MILLILITER(S): 9 INJECTION, SOLUTION INTRAVENOUS at 06:18

## 2019-06-06 NOTE — PROGRESS NOTE PEDS - SUBJECTIVE AND OBJECTIVE BOX
INTERVAL HISTORY: Patient is critically ill with need for high PEEP on mechanical ventilation; no evidence of new bleeding and maintaining hematocrit. Worsening MELLO with uptrend of BUN/Creat. random Vancomycin level was high- hence being held. No new fevers.     RESPIRATORY SUPPORT: Mode: AC/ CMV (Assist Control/ Continuous Mandatory Ventilation), RR (machine): 20, FiO2: 70, PEEP: 10    I&O's Summary  2019 07:01  -  2019 07:00  --------------------------------------------------------  IN: 4918.2 mL / OUT: 945 mL / NET: 3973.2 mL    INTRAVASCULAR ACCESS: Peripheral IV    MEDICATIONS  (STANDING):  chlorhexidine 4% Liquid 1 Application(s) Topical <User Schedule>  dexmedetomidine Infusion 0.2 MICROgram(s)/kG/Hr (2.65 mL/Hr) IV Continuous <Continuous>  docusate sodium Liquid 100 milliGRAM(s) Oral three times a day  fentaNYL   Infusion. 1 MICROgram(s)/kG/Hr (5.3 mL/Hr) IV Continuous <Continuous>  lactated ringers. 1000 milliLiter(s) (100 mL/Hr) IV Continuous <Continuous>  pantoprazole  Injectable 40 milliGRAM(s) IV Push two times a day  petrolatum Ophthalmic Ointment 1 Application(s) Both EYES two times a day  phenylephrine    Infusion 0.2 MICROgram(s)/kG/Min (1.988 mL/Hr) IV Continuous <Continuous>  piperacillin/tazobactam IVPB. 3.375 Gram(s) IV Intermittent every 8 hours  polyethylene glycol 3350 17 Gram(s) Oral daily  senna Syrup 10 milliLiter(s) Oral at bedtime    PHYSICAL EXAMINATION:  ICU Vital Signs Last 24 Hrs  T(C): 36.6 (2019 07:52), Max: 36.6 (2019 12:00)  T(F): 97.9 (2019 07:52), Max: 97.9 (2019 07:52)  HR: 74 (2019 11:18) (69 - 83)  BP: 96/60 (2019 11:00) (90/51 - 120/57)  BP(mean): 61 (2019 11:00) (61 - 77)  RR: 20 (2019 11:00) (19 - 23)  SpO2: 90% (2019 11:18) (86% - 96%)    General - non-dysmorphic appearance, sedated and on mechanical ventilator  Skin - mottling of skin in the peripheries, with petechiae on bilateral LE; no desquamation  Eyes / ENT - no conjunctival injection, sclerae icteric, external ears & nares normal, mucous membranes moist.  Pulmonary - mechanically ventilated; no retractions, transmitted sounds on auscultation bilaterally, no wheezes, no rales.  Cardiovascular - tachycardia; regular rhythm, normal S1 & single S2, 2/6 HSM at LMSB, no rubs, no gallops, capillary refill < 2sec, + pectus  Gastrointestinal - soft, distended, non-tender, hepatomegaly + .  Neurologic / Psychiatric - sedated with Versed and Fentanyl.    LABORATORY TESTS:                          10.7  CBC:   12.35 )-----------( 150   (19 @ 02:25)                          34.1               134   |  102   |  35                 Ca: 8.3    BMP:   ----------------------------< 153    M.3   (19 @ 02:25)             4.5    |  23    | 2.48               Ph: 3.1      LFT:     TPro: 6.1 / Alb: 2.8 / TBili: 3.1 / DBili: x / AST: 50 / ALT: 21 / AlkPhos: 99   (19 @ 02:25)    COAG: PT: 14.0 / PTT: 32.5 / INR: 1.25   (19 @ 02:25)     CARDIAC MARKERS:             High-Sensitivity Troponin: x   (19 @ 02:50)             CK: 1152 / CKMB: 1.13   (19 @ 02:50)             Pro-BNP: x   (19 @ 02:50)    ABG:   pH: 7.43 / pCO2: 35 / pO2: 70 / HCO3: 24 / Base Excess: -0.4 / SaO2: 94.4 / Lactate: 1.2 / iCa: 1.16   (19 @ 03:10)    IMAGING STUDIES:  TTE Congential Anomalies (19 @ 17:28) >  Conclusions:  1. Moderate aortic regurgitation.  2. Systemic Right ventricle loops to the left.  The  Systemic Right ventricular function is mildly decreased.  Hypoplastic left ventricle.  3. Normal pericardium with no pericardial effusion.  Unable to comment on the Fontan.  Case and images discussed with adult congenital Dr. Geoffrey Plata as per his records, no sisgnificant change from  prior, should additional images be required please contact  adult congenital for an ECHO.

## 2019-06-06 NOTE — PROGRESS NOTE PEDS - ASSESSMENT
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 He is now s/p IR embolization of right upper lobe aortopulmonary collateral vessels likely culprits for his hemoptysis.  Fontan physiology is preload dependent and dependent on unobstructed flow through PAs. Baseline saturation for him is in the upper 80s/low 90s likely related to veno-venous collateral vessels that serve as right to left shunting.  Known history of asplenia.     Plan-   - Ventilation and FiO2 for goal saturations >85%. Progress towards extubation as per MICU team.   - Monitor renal parameters;   - Trend H/H; continue antibiotics as per MICU team; Vancomycin currently held.   - f/u coags: if persistently abnormal, FFP transfusion and consider hematology consultation  - if has rebleeding despite normalization of coags, will need to consider cardiac catheterization to evaluate the rest of the aorta to evaluate for aortopulmonary collateral vessels that may be leading to persistent bleeding  - Wean down PEEP (increased PEEP will impede venous return to the Fontan circuit) and progress towards extubation.   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 He is now s/p IR embolization of right upper lobe aortopulmonary collateral vessels likely culprits for his hemoptysis.  Fontan physiology is preload dependent and dependent on unobstructed flow through PAs. Baseline saturation for him is in the upper 80s/low 90s likely related to veno-venous collateral vessels that serve as right to left shunting.  Known history of asplenia.     Plan-   - Ventilation and FiO2 for goal saturations >85%. Progress towards extubation as toelrated.   - Monitor renal parameters;   - Trend H/H; continue antibiotics as per MICU team; Vancomycin currently held.   - f/u coags: if persistently abnormal, FFP transfusion and consider hematology consultation  - if has rebleeding despite normalization of coags, will need to consider cardiac catheterization to evaluate the rest of the aorta to evaluate for aortopulmonary collateral vessels that may be leading to persistent bleeding  - Wean down PEEP (increased PEEP will impede venous return to the Fontan circuit) and progress towards extubation.   - Wean phenylephrine as tolerated  - Continue hydration with IVFs given rise in Cr, elevated CK secondary to immobility  - With MELLO, would hold Digoxin  - Renally dose all meds

## 2019-06-06 NOTE — PROGRESS NOTE ADULT - ASSESSMENT
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    #Neuro: no active issues  - Sedated on fentanyl and versed gtt  - Careful sedation as pre-load dependent    #CV: Heterotaxy with congenital single atrium/ventricle s/p multiple cardiac surgeries including Fontan's  - Ectopy resolved after d/c levo gtt, c/w phenylephrine gtt, wean as tolerated  - Possible angiogram per cardiology  - Holding Coumadin in setting of recent bleed. INR 1.58 s/p PCC, FFP x4, vitamin K x1. Repeat 1U FFP today.  - c/w dig 0.25 daily  - LE dopplers with chronic RLE DVT (diagnosed 3/30/18, suspect provoked 2/2 sedentary status after hospitalization around that time for hematemesis, has been on coumadin per cards Dr. Mega Rojas)  - 1L LR over 10 hours     #Pulm: Hemoptysis s/p bronch 5/31 showing RUL bleeding. SpO2 at baseline high 80's given cardiopulmonary pathophysiology.  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 Gastritis and duodenal erosions were also seen possibly 2/2 ASA gastropathy.   - c/w tube feeds  - protonix 40 daily  - senna/colace/miralax for constipation    #ID  Initially febrile, elevated white count, no atelectasis on POCUS  - on empiric vanc/zosyn day 5, holding vanc in setting of elevated level  - f/u BCx NTD    #Renal  - MELLO resolved with IVF and pressor support, likely pre-renal in setting of poor PO intake, hemoptysis vs possible PATRICA.  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    #Neuro: no active issues  - dc'ed versed, sedated on fentanyl and precedex, wean fent  - Careful sedation as pre-load dependent    #CV: Heterotaxy with congenital single atrium/ventricle s/p multiple cardiac surgeries including Fontan's  - Ectopy resolved after d/c levo gtt, c/w phenylephrine gtt, wean as tolerated  - Possible angiogram per cardiology  - Holding Coumadin in setting of recent bleed. INR 1.25 s/p PCC, FFP x5, vitamin K x1  - hold dig 0.25 daily in setting of ARF  - LE dopplers 6/1 with chronic RLE DVT (diagnosed 3/30/18, suspect provoked 2/2 sedentary status after hospitalization around that time for hematemesis, has been on coumadin per cards Dr. Mega Rjoas), repeat in 1 week to assess if stable or enlarging  - 1L LR over 10 hours     #Pulm: Hemoptysis s/p bronch 5/31 showing RUL bleeding. SpO2 at baseline high 80's given cardiopulmonary pathophysiology.  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 Gastritis and duodenal erosions were also seen possibly 2/2 ASA gastropathy.   - c/w tube feeds, hold if tolerating CPAP for possible trial of extubation  - protonix 40 daily  - senna/colace/miralax for constipation    #ID  Initially febrile, elevated white count, no atelectasis on POCUS  - on empiric vanc/zosyn day 6, holding vanc in setting of supratherapeutic vanc, redose zosyn to q12 if CrCl <20  - f/u BCx NTD    #Renal  - MELLO initially resolved with IVF and pressor support, likely pre-renal in setting of poor PO intake, hemoptysis vs possible PATRICA. Now with ARF suspect 2/2 vancomycin nephrotoxicity vs possible PATRICA.  - Goal K>4, Mg>2, replete prn    #Endocrine  - FSBG q6    #Heme  - Reactive leukocytosis vs infection, started on empiric abx, monitor CBC  - Monitor INR, goal <1.5, elevated ?2/2 congestive hepatopathy from pHTN, fibrinogen elevated inconsistent with DIC  - Heme recs appreciated, ordered mixing studies and RUQ sono    #DVT ppx: SCDs    #GOC: full code

## 2019-06-06 NOTE — PROGRESS NOTE ADULT - SUBJECTIVE AND OBJECTIVE BOX
Mg Hayward, PGY1   Pager [phone]    INTERVAL HPI/OVERNIGHT EVENTS: Decreased UOP, responded to 500cc bolus x2. Is getting another 1L.    SUBJECTIVE: Patient seen and examined at bedside. Sedated.      OBJECTIVE:    VITAL SIGNS:  ICU Vital Signs Last 24 Hrs  T(C): 36.2 (06 Jun 2019 04:00), Max: 38.4 (05 Jun 2019 08:00)  T(F): 97.2 (06 Jun 2019 04:00), Max: 101.2 (05 Jun 2019 08:00)  HR: 73 (06 Jun 2019 06:00) (69 - 85)  BP: 90/54 (06 Jun 2019 06:00) (90/51 - 120/57)  BP(mean): 66 (06 Jun 2019 06:00) (61 - 81)  ABP: --  ABP(mean): --  RR: 20 (06 Jun 2019 06:00) (19 - 23)  SpO2: 90% (06 Jun 2019 06:00) (86% - 96%)    Mode: AC/ CMV (Assist Control/ Continuous Mandatory Ventilation), RR (machine): 20, TV (machine): 450, FiO2: 70, PEEP: 10, MAP: 16, PIP: 32    06-04 @ 07:01 - 06-05 @ 07:00  --------------------------------------------------------  IN: 2555.7 mL / OUT: 970 mL / NET: 1585.7 mL    06-05 @ 07:01 - 06-06 @ 06:52  --------------------------------------------------------  IN: 4918.2 mL / OUT: 945 mL / NET: 3973.2 mL      CAPILLARY BLOOD GLUCOSE          PHYSICAL EXAM:    General: NAD  HEENT: NCAT, PERRL, clear conjunctiva, mmm  Neck: supple, no JVD  Respiratory: CTAB  Cardiovascular: RRR, S1S2, no m/r/g  Abdomen: soft, nontender, nondistended, normal bowel sounds  Extremities: no edema or cyanosis  Skin: normal color and turgor; no rash  Neurological: nonfocal    MEDICATIONS:  MEDICATIONS  (STANDING):  chlorhexidine 4% Liquid 1 Application(s) Topical <User Schedule>  dexmedetomidine Infusion 0.2 MICROgram(s)/kG/Hr (2.65 mL/Hr) IV Continuous <Continuous>  digoxin     Tablet 0.25 milliGRAM(s) Oral daily  docusate sodium Liquid 100 milliGRAM(s) Oral three times a day  fentaNYL   Infusion. 1 MICROgram(s)/kG/Hr (5.3 mL/Hr) IV Continuous <Continuous>  lactated ringers. 1000 milliLiter(s) (100 mL/Hr) IV Continuous <Continuous>  lactated ringers. 1000 milliLiter(s) (100 mL/Hr) IV Continuous <Continuous>  pantoprazole  Injectable 40 milliGRAM(s) IV Push two times a day  petrolatum Ophthalmic Ointment 1 Application(s) Both EYES two times a day  phenylephrine    Infusion 0.2 MICROgram(s)/kG/Min (1.988 mL/Hr) IV Continuous <Continuous>  piperacillin/tazobactam IVPB. 3.375 Gram(s) IV Intermittent every 8 hours  polyethylene glycol 3350 17 Gram(s) Oral daily  senna Syrup 10 milliLiter(s) Oral at bedtime    MEDICATIONS  (PRN):  acetaminophen    Suspension .. 650 milliGRAM(s) Oral every 6 hours PRN Temp greater or equal to 38C (100.4F)  LORazepam   Injectable 2 milliGRAM(s) IV Push every 4 hours PRN Agitation      ALLERGIES:  Allergies    No Known Allergies    Intolerances        LABS:                        10.7   12.35 )-----------( 150      ( 06 Jun 2019 02:25 )             34.1     06-06    134<L>  |  102  |  35<H>  ----------------------------<  153<H>  4.5   |  23  |  2.48<H>    Ca    8.3<L>      06 Jun 2019 02:25  Phos  3.1     06-06  Mg     2.3     06-06    TPro  6.1  /  Alb  2.8<L>  /  TBili  3.1<H>  /  DBili  x   /  AST  50<H>  /  ALT  21  /  AlkPhos  99  06-06    PT/INR - ( 06 Jun 2019 02:25 )   PT: 14.0 SEC;   INR: 1.25          PTT - ( 06 Jun 2019 02:25 )  PTT:32.5 SEC      RADIOLOGY & ADDITIONAL TESTS: Reviewed. Mg Hayward, PGY1   Pager [phone]    INTERVAL HPI/OVERNIGHT EVENTS: Still elevated vanc level. Decreased UOP, responded to 500cc bolus x2. Is getting another 1L.    SUBJECTIVE: Patient seen and examined at bedside. Sedated.      OBJECTIVE:    VITAL SIGNS:  ICU Vital Signs Last 24 Hrs  T(C): 36.2 (06 Jun 2019 04:00), Max: 38.4 (05 Jun 2019 08:00)  T(F): 97.2 (06 Jun 2019 04:00), Max: 101.2 (05 Jun 2019 08:00)  HR: 73 (06 Jun 2019 06:00) (69 - 85)  BP: 90/54 (06 Jun 2019 06:00) (90/51 - 120/57)  BP(mean): 66 (06 Jun 2019 06:00) (61 - 81)  ABP: --  ABP(mean): --  RR: 20 (06 Jun 2019 06:00) (19 - 23)  SpO2: 90% (06 Jun 2019 06:00) (86% - 96%)    Mode: AC/ CMV (Assist Control/ Continuous Mandatory Ventilation), RR (machine): 20, TV (machine): 450, FiO2: 70, PEEP: 10, MAP: 16, PIP: 32    06-04 @ 07:01  -  06-05 @ 07:00  --------------------------------------------------------  IN: 2555.7 mL / OUT: 970 mL / NET: 1585.7 mL    06-05 @ 07:01  -  06-06 @ 06:52  --------------------------------------------------------  IN: 4918.2 mL / OUT: 945 mL / NET: 3973.2 mL      CAPILLARY BLOOD GLUCOSE          PHYSICAL EXAM:    General: NAD  HEENT: NCAT, PERRL, clear conjunctiva, mmm  Neck: supple, no JVD  Respiratory: CTAB  Cardiovascular: RRR, S1S2, no m/r/g  Abdomen: soft, nontender, nondistended, normal bowel sounds  Extremities: no edema or cyanosis  Skin: normal color and turgor; no rash  Neurological: nonfocal    MEDICATIONS:  MEDICATIONS  (STANDING):  chlorhexidine 4% Liquid 1 Application(s) Topical <User Schedule>  dexmedetomidine Infusion 0.2 MICROgram(s)/kG/Hr (2.65 mL/Hr) IV Continuous <Continuous>  digoxin     Tablet 0.25 milliGRAM(s) Oral daily  docusate sodium Liquid 100 milliGRAM(s) Oral three times a day  fentaNYL   Infusion. 1 MICROgram(s)/kG/Hr (5.3 mL/Hr) IV Continuous <Continuous>  lactated ringers. 1000 milliLiter(s) (100 mL/Hr) IV Continuous <Continuous>  lactated ringers. 1000 milliLiter(s) (100 mL/Hr) IV Continuous <Continuous>  pantoprazole  Injectable 40 milliGRAM(s) IV Push two times a day  petrolatum Ophthalmic Ointment 1 Application(s) Both EYES two times a day  phenylephrine    Infusion 0.2 MICROgram(s)/kG/Min (1.988 mL/Hr) IV Continuous <Continuous>  piperacillin/tazobactam IVPB. 3.375 Gram(s) IV Intermittent every 8 hours  polyethylene glycol 3350 17 Gram(s) Oral daily  senna Syrup 10 milliLiter(s) Oral at bedtime    MEDICATIONS  (PRN):  acetaminophen    Suspension .. 650 milliGRAM(s) Oral every 6 hours PRN Temp greater or equal to 38C (100.4F)  LORazepam   Injectable 2 milliGRAM(s) IV Push every 4 hours PRN Agitation      ALLERGIES:  Allergies    No Known Allergies    Intolerances        LABS:                        10.7   12.35 )-----------( 150      ( 06 Jun 2019 02:25 )             34.1     06-06    134<L>  |  102  |  35<H>  ----------------------------<  153<H>  4.5   |  23  |  2.48<H>    Ca    8.3<L>      06 Jun 2019 02:25  Phos  3.1     06-06  Mg     2.3     06-06    TPro  6.1  /  Alb  2.8<L>  /  TBili  3.1<H>  /  DBili  x   /  AST  50<H>  /  ALT  21  /  AlkPhos  99  06-06    PT/INR - ( 06 Jun 2019 02:25 )   PT: 14.0 SEC;   INR: 1.25          PTT - ( 06 Jun 2019 02:25 )  PTT:32.5 SEC      RADIOLOGY & ADDITIONAL TESTS: Reviewed. Mg Yesy, PGY1   Pager [phone]    INTERVAL HPI/OVERNIGHT EVENTS: Bronched yesterday, no active bleeding, RUL clots removed. Supratherapeutic vanc level. Decreased UOP 20cc/hr, responded to 500cc bolus x2, improved to 30cc/hr. Is getting another 1L over the day.    SUBJECTIVE: Patient seen and examined at bedside. Sedated.       OBJECTIVE:    VITAL SIGNS:  ICU Vital Signs Last 24 Hrs  T(C): 36.2 (06 Jun 2019 04:00), Max: 38.4 (05 Jun 2019 08:00)  T(F): 97.2 (06 Jun 2019 04:00), Max: 101.2 (05 Jun 2019 08:00)  HR: 73 (06 Jun 2019 06:00) (69 - 85)  BP: 90/54 (06 Jun 2019 06:00) (90/51 - 120/57)  BP(mean): 66 (06 Jun 2019 06:00) (61 - 81)  ABP: --  ABP(mean): --  RR: 20 (06 Jun 2019 06:00) (19 - 23)  SpO2: 90% (06 Jun 2019 06:00) (86% - 96%)    Mode: AC/ CMV (Assist Control/ Continuous Mandatory Ventilation), RR (machine): 20, TV (machine): 450, FiO2: 70, PEEP: 10, MAP: 16, PIP: 32    06-04 @ 07:01  -  06-05 @ 07:00  --------------------------------------------------------  IN: 2555.7 mL / OUT: 970 mL / NET: 1585.7 mL    06-05 @ 07:01  -  06-06 @ 06:52  --------------------------------------------------------  IN: 4918.2 mL / OUT: 945 mL / NET: 3973.2 mL      CAPILLARY BLOOD GLUCOSE          PHYSICAL EXAM:    General: NAD  HEENT: NCAT, PERRL, clear conjunctiva, mmm  Neck: supple, no JVD  Respiratory: mild b/l ronchi  Cardiovascular: RRR, S1S2, stable systolic murmur  Abdomen: soft, nontender, abd distention, normal bowel sounds  Extremities: mild extremity edema, stable mild bluish discoloration of finger tips  Skin: chronic RLE mottling  Neurological: sedated, nonfocal    MEDICATIONS:  MEDICATIONS  (STANDING):  chlorhexidine 4% Liquid 1 Application(s) Topical <User Schedule>  dexmedetomidine Infusion 0.2 MICROgram(s)/kG/Hr (2.65 mL/Hr) IV Continuous <Continuous>  digoxin     Tablet 0.25 milliGRAM(s) Oral daily  docusate sodium Liquid 100 milliGRAM(s) Oral three times a day  fentaNYL   Infusion. 1 MICROgram(s)/kG/Hr (5.3 mL/Hr) IV Continuous <Continuous>  lactated ringers. 1000 milliLiter(s) (100 mL/Hr) IV Continuous <Continuous>  lactated ringers. 1000 milliLiter(s) (100 mL/Hr) IV Continuous <Continuous>  pantoprazole  Injectable 40 milliGRAM(s) IV Push two times a day  petrolatum Ophthalmic Ointment 1 Application(s) Both EYES two times a day  phenylephrine    Infusion 0.2 MICROgram(s)/kG/Min (1.988 mL/Hr) IV Continuous <Continuous>  piperacillin/tazobactam IVPB. 3.375 Gram(s) IV Intermittent every 8 hours  polyethylene glycol 3350 17 Gram(s) Oral daily  senna Syrup 10 milliLiter(s) Oral at bedtime    MEDICATIONS  (PRN):  acetaminophen    Suspension .. 650 milliGRAM(s) Oral every 6 hours PRN Temp greater or equal to 38C (100.4F)  LORazepam   Injectable 2 milliGRAM(s) IV Push every 4 hours PRN Agitation      ALLERGIES:  Allergies    No Known Allergies    Intolerances        LABS:                        10.7   12.35 )-----------( 150      ( 06 Jun 2019 02:25 )             34.1     06-06    134<L>  |  102  |  35<H>  ----------------------------<  153<H>  4.5   |  23  |  2.48<H>    Ca    8.3<L>      06 Jun 2019 02:25  Phos  3.1     06-06  Mg     2.3     06-06    TPro  6.1  /  Alb  2.8<L>  /  TBili  3.1<H>  /  DBili  x   /  AST  50<H>  /  ALT  21  /  AlkPhos  99  06-06    PT/INR - ( 06 Jun 2019 02:25 )   PT: 14.0 SEC;   INR: 1.25          PTT - ( 06 Jun 2019 02:25 )  PTT:32.5 SEC      RADIOLOGY & ADDITIONAL TESTS: Reviewed.

## 2019-06-06 NOTE — PROGRESS NOTE ADULT - ATTENDING COMMENTS
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, RLE DVT 1 year ago on warfarin, hypoxic respiratory failure with massive hemoptysis s/p embolization re-bleed and endobronchial blocker placement now with fever and MELLO    - decrease sedation to assess mental status  - tolerating tube feeds, ? ileus continue bowel regimen  - remains febrile continue ABx cultures negative   - adjust ABx dose for creatinine clearance  - INR elevated again, transfuse FFP will follow up with Heme  - d/c digoxin follow level  - renal injury likely combination of volume depletion and contrast nephropathy    critical care time 40 minutes  case discussed with parents at bedside    critical care time 40 minutes  case discussed in detail with parents at bedside 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, RLE DVT 1 year ago on warfarin, hypoxic respiratory failure with massive hemoptysis s/p embolization re-bleed and endobronchial blocker placement now with fever and MELLO    - on Precedex with periods of agitation  - SBT   - continue bowel regimen, still no BM  - continue ABx adjusted for creatinine clearance    - follow up vancomycin and dig levels  - monitor coags  - no evidence of new bleed  - remains oliguric with increased creatinine despite volume resuscitation  - renal injury likely combination of volume depletion and contrast nephropathy  - will get renal evaluation today    POCUS today showed scattered B-lines anteriorly with consolidation pattern at both bases and ascites    critical care time 40 minutes  case discussed with parents at bedside    critical care time 40 minutes  case discussed in detail with parents at bedside 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, RLE DVT 1 year ago on warfarin, hypoxic respiratory failure with massive hemoptysis s/p embolization re-bleed and endobronchial blocker placement now with fever and MELLO    - on Precedex will d/c other sedation   - SBT when awake  - continue bowel regimen, abdomen distended with decreased bowel sounds  - continue ABx adjusted for creatinine clearance    - follow up vancomycin and dig levels  - monitor coags  - no evidence of new bleed  - remains oliguric with increased creatinine despite volume resuscitation  - renal injury likely combination of volume depletion and contrast nephropathy  - will get renal evaluation today    critical care time 40 minutes  case discussed in detail with parents at bedside

## 2019-06-07 DIAGNOSIS — N17.9 ACUTE KIDNEY FAILURE, UNSPECIFIED: ICD-10-CM

## 2019-06-07 LAB
ALBUMIN SERPL ELPH-MCNC: 3.1 G/DL — LOW (ref 3.3–5)
ALP SERPL-CCNC: 91 U/L — SIGNIFICANT CHANGE UP (ref 40–120)
ALT FLD-CCNC: 21 U/L — SIGNIFICANT CHANGE UP (ref 4–41)
ANION GAP SERPL CALC-SCNC: 13 MMO/L — SIGNIFICANT CHANGE UP (ref 7–14)
APPEARANCE UR: SIGNIFICANT CHANGE UP
APTT BLD: 34.6 SEC — SIGNIFICANT CHANGE UP (ref 27.5–36.3)
AST SERPL-CCNC: 44 U/L — HIGH (ref 4–40)
BACTERIA # UR AUTO: SIGNIFICANT CHANGE UP
BASE EXCESS BLDA CALC-SCNC: -2.2 MMOL/L — SIGNIFICANT CHANGE UP
BASOPHILS # BLD AUTO: 0.13 K/UL — SIGNIFICANT CHANGE UP (ref 0–0.2)
BASOPHILS NFR BLD AUTO: 1.1 % — SIGNIFICANT CHANGE UP (ref 0–2)
BILIRUB SERPL-MCNC: 3.7 MG/DL — HIGH (ref 0.2–1.2)
BILIRUB UR-MCNC: SIGNIFICANT CHANGE UP
BLOOD UR QL VISUAL: SIGNIFICANT CHANGE UP
BUN SERPL-MCNC: 44 MG/DL — HIGH (ref 7–23)
CALCIUM SERPL-MCNC: 8.7 MG/DL — SIGNIFICANT CHANGE UP (ref 8.4–10.5)
CHLORIDE BLDA-SCNC: 105 MMOL/L — SIGNIFICANT CHANGE UP (ref 96–108)
CHLORIDE SERPL-SCNC: 101 MMOL/L — SIGNIFICANT CHANGE UP (ref 98–107)
CHLORIDE UR-SCNC: < 20 MMOL/L — SIGNIFICANT CHANGE UP
CO2 SERPL-SCNC: 22 MMOL/L — SIGNIFICANT CHANGE UP (ref 22–31)
COLOR SPEC: SIGNIFICANT CHANGE UP
CREAT ?TM UR-MCNC: 148.4 MG/DL — SIGNIFICANT CHANGE UP
CREAT SERPL-MCNC: 2.64 MG/DL — HIGH (ref 0.5–1.3)
DIGOXIN SERPL-MCNC: 1.9 NG/ML — SIGNIFICANT CHANGE UP (ref 0.8–2)
EOSINOPHIL # BLD AUTO: 0.17 K/UL — SIGNIFICANT CHANGE UP (ref 0–0.5)
EOSINOPHIL NFR BLD AUTO: 1.5 % — SIGNIFICANT CHANGE UP (ref 0–6)
GLUCOSE BLDA-MCNC: 139 MG/DL — HIGH (ref 70–99)
GLUCOSE SERPL-MCNC: 114 MG/DL — HIGH (ref 70–99)
GLUCOSE UR-MCNC: NEGATIVE — SIGNIFICANT CHANGE UP
HCO3 BLDA-SCNC: 23 MMOL/L — SIGNIFICANT CHANGE UP (ref 22–26)
HCT VFR BLD CALC: 35.6 % — LOW (ref 39–50)
HCT VFR BLDA CALC: 35.6 % — LOW (ref 39–51)
HGB BLD-MCNC: 11.2 G/DL — LOW (ref 13–17)
HGB BLDA-MCNC: 11.5 G/DL — LOW (ref 13–17)
IMM GRANULOCYTES NFR BLD AUTO: 1.1 % — SIGNIFICANT CHANGE UP (ref 0–1.5)
INR BLD: 1.25 — HIGH (ref 0.88–1.17)
INR BLD: 1.31 — HIGH (ref 0.88–1.17)
KETONES UR-MCNC: NEGATIVE — SIGNIFICANT CHANGE UP
LACTATE BLDA-SCNC: 1.8 MMOL/L — SIGNIFICANT CHANGE UP (ref 0.5–2)
LEUKOCYTE ESTERASE UR-ACNC: NEGATIVE — SIGNIFICANT CHANGE UP
LYMPHOCYTES # BLD AUTO: 0.75 K/UL — LOW (ref 1–3.3)
LYMPHOCYTES # BLD AUTO: 6.4 % — LOW (ref 13–44)
MAGNESIUM SERPL-MCNC: 2.3 MG/DL — SIGNIFICANT CHANGE UP (ref 1.6–2.6)
MCHC RBC-ENTMCNC: 24.5 PG — LOW (ref 27–34)
MCHC RBC-ENTMCNC: 31.5 % — LOW (ref 32–36)
MCV RBC AUTO: 77.9 FL — LOW (ref 80–100)
MONOCYTES # BLD AUTO: 1.54 K/UL — HIGH (ref 0–0.9)
MONOCYTES NFR BLD AUTO: 13.2 % — SIGNIFICANT CHANGE UP (ref 2–14)
NEUTROPHILS # BLD AUTO: 8.98 K/UL — HIGH (ref 1.8–7.4)
NEUTROPHILS NFR BLD AUTO: 76.7 % — SIGNIFICANT CHANGE UP (ref 43–77)
NITRITE UR-MCNC: NEGATIVE — SIGNIFICANT CHANGE UP
NRBC # FLD: 0.03 K/UL — SIGNIFICANT CHANGE UP (ref 0–0)
OSMOLALITY UR: 404 MOSMO/KG — SIGNIFICANT CHANGE UP (ref 50–1200)
PCO2 BLDA: 35 MMHG — SIGNIFICANT CHANGE UP (ref 35–48)
PH BLDA: 7.41 PH — SIGNIFICANT CHANGE UP (ref 7.35–7.45)
PH UR: 6 — SIGNIFICANT CHANGE UP (ref 5–8)
PHOSPHATE SERPL-MCNC: 3.8 MG/DL — SIGNIFICANT CHANGE UP (ref 2.5–4.5)
PLATELET # BLD AUTO: 177 K/UL — SIGNIFICANT CHANGE UP (ref 150–400)
PMV BLD: SIGNIFICANT CHANGE UP FL (ref 7–13)
PO2 BLDA: 72 MMHG — LOW (ref 83–108)
POTASSIUM BLDA-SCNC: 4.3 MMOL/L — SIGNIFICANT CHANGE UP (ref 3.4–4.5)
POTASSIUM SERPL-MCNC: 5.1 MMOL/L — SIGNIFICANT CHANGE UP (ref 3.5–5.3)
POTASSIUM SERPL-SCNC: 5.1 MMOL/L — SIGNIFICANT CHANGE UP (ref 3.5–5.3)
POTASSIUM UR-SCNC: 55.2 MMOL/L — SIGNIFICANT CHANGE UP
PROT SERPL-MCNC: 5.9 G/DL — LOW (ref 6–8.3)
PROT UR-MCNC: 100 — HIGH
PROT UR-MCNC: 211.9 MG/DL — SIGNIFICANT CHANGE UP
PROTHROM AB SERPL-ACNC: 14.3 SEC — HIGH (ref 9.8–13.1)
PROTHROM AB SERPL-ACNC: 14.7 SEC — HIGH (ref 9.8–13.1)
RBC # BLD: 4.57 M/UL — SIGNIFICANT CHANGE UP (ref 4.2–5.8)
RBC # FLD: 22.9 % — HIGH (ref 10.3–14.5)
RBC CASTS # UR COMP ASSIST: SIGNIFICANT CHANGE UP (ref 0–?)
SAO2 % BLDA: 93.4 % — LOW (ref 95–99)
SODIUM BLDA-SCNC: 133 MMOL/L — LOW (ref 136–146)
SODIUM SERPL-SCNC: 136 MMOL/L — SIGNIFICANT CHANGE UP (ref 135–145)
SODIUM UR-SCNC: < 20 MMOL/L — SIGNIFICANT CHANGE UP
SP GR SPEC: 1.03 — SIGNIFICANT CHANGE UP (ref 1–1.04)
UROBILINOGEN FLD QL: HIGH
VANCOMYCIN FLD-MCNC: 20 UG/ML — SIGNIFICANT CHANGE UP
WBC # BLD: 11.7 K/UL — HIGH (ref 3.8–10.5)
WBC # FLD AUTO: 11.7 K/UL — HIGH (ref 3.8–10.5)
WBC UR QL: SIGNIFICANT CHANGE UP (ref 0–?)

## 2019-06-07 PROCEDURE — 99223 1ST HOSP IP/OBS HIGH 75: CPT | Mod: GC

## 2019-06-07 PROCEDURE — 99233 SBSQ HOSP IP/OBS HIGH 50: CPT

## 2019-06-07 PROCEDURE — 76705 ECHO EXAM OF ABDOMEN: CPT | Mod: 26

## 2019-06-07 PROCEDURE — 99291 CRITICAL CARE FIRST HOUR: CPT

## 2019-06-07 RX ORDER — FENTANYL CITRATE 50 UG/ML
50 INJECTION INTRAVENOUS ONCE
Refills: 0 | Status: DISCONTINUED | OUTPATIENT
Start: 2019-06-07 | End: 2019-06-07

## 2019-06-07 RX ORDER — DEXMEDETOMIDINE HYDROCHLORIDE IN 0.9% SODIUM CHLORIDE 4 UG/ML
0.5 INJECTION INTRAVENOUS
Qty: 200 | Refills: 0 | Status: DISCONTINUED | OUTPATIENT
Start: 2019-06-07 | End: 2019-06-09

## 2019-06-07 RX ORDER — SODIUM CHLORIDE 9 MG/ML
1000 INJECTION, SOLUTION INTRAVENOUS
Refills: 0 | Status: DISCONTINUED | OUTPATIENT
Start: 2019-06-07 | End: 2019-06-07

## 2019-06-07 RX ORDER — MIDAZOLAM HYDROCHLORIDE 1 MG/ML
4 INJECTION, SOLUTION INTRAMUSCULAR; INTRAVENOUS ONCE
Refills: 0 | Status: DISCONTINUED | OUTPATIENT
Start: 2019-06-07 | End: 2019-06-07

## 2019-06-07 RX ORDER — MIDAZOLAM HYDROCHLORIDE 1 MG/ML
2 INJECTION, SOLUTION INTRAMUSCULAR; INTRAVENOUS ONCE
Refills: 0 | Status: DISCONTINUED | OUTPATIENT
Start: 2019-06-07 | End: 2019-06-07

## 2019-06-07 RX ORDER — SODIUM CHLORIDE 9 MG/ML
1000 INJECTION INTRAMUSCULAR; INTRAVENOUS; SUBCUTANEOUS
Refills: 0 | Status: DISCONTINUED | OUTPATIENT
Start: 2019-06-07 | End: 2019-06-07

## 2019-06-07 RX ADMIN — FENTANYL CITRATE 50 MICROGRAM(S): 50 INJECTION INTRAVENOUS at 12:40

## 2019-06-07 RX ADMIN — PIPERACILLIN AND TAZOBACTAM 25 GRAM(S): 4; .5 INJECTION, POWDER, LYOPHILIZED, FOR SOLUTION INTRAVENOUS at 22:24

## 2019-06-07 RX ADMIN — PANTOPRAZOLE SODIUM 40 MILLIGRAM(S): 20 TABLET, DELAYED RELEASE ORAL at 06:02

## 2019-06-07 RX ADMIN — Medication 100 MILLIGRAM(S): at 14:15

## 2019-06-07 RX ADMIN — PIPERACILLIN AND TAZOBACTAM 25 GRAM(S): 4; .5 INJECTION, POWDER, LYOPHILIZED, FOR SOLUTION INTRAVENOUS at 14:15

## 2019-06-07 RX ADMIN — PANTOPRAZOLE SODIUM 40 MILLIGRAM(S): 20 TABLET, DELAYED RELEASE ORAL at 17:26

## 2019-06-07 RX ADMIN — PIPERACILLIN AND TAZOBACTAM 25 GRAM(S): 4; .5 INJECTION, POWDER, LYOPHILIZED, FOR SOLUTION INTRAVENOUS at 06:02

## 2019-06-07 RX ADMIN — DEXMEDETOMIDINE HYDROCHLORIDE IN 0.9% SODIUM CHLORIDE 6.62 MICROGRAM(S)/KG/HR: 4 INJECTION INTRAVENOUS at 22:25

## 2019-06-07 RX ADMIN — DEXMEDETOMIDINE HYDROCHLORIDE IN 0.9% SODIUM CHLORIDE 2.65 MICROGRAM(S)/KG/HR: 4 INJECTION INTRAVENOUS at 07:47

## 2019-06-07 RX ADMIN — Medication 100 MILLIGRAM(S): at 06:02

## 2019-06-07 RX ADMIN — POLYETHYLENE GLYCOL 3350 17 GRAM(S): 17 POWDER, FOR SOLUTION ORAL at 06:02

## 2019-06-07 RX ADMIN — SODIUM CHLORIDE 150 MILLILITER(S): 9 INJECTION, SOLUTION INTRAVENOUS at 11:00

## 2019-06-07 RX ADMIN — DEXMEDETOMIDINE HYDROCHLORIDE IN 0.9% SODIUM CHLORIDE 2.65 MICROGRAM(S)/KG/HR: 4 INJECTION INTRAVENOUS at 06:02

## 2019-06-07 RX ADMIN — MIDAZOLAM HYDROCHLORIDE 2 MILLIGRAM(S): 1 INJECTION, SOLUTION INTRAMUSCULAR; INTRAVENOUS at 11:01

## 2019-06-07 RX ADMIN — MIDAZOLAM HYDROCHLORIDE 4 MILLIGRAM(S): 1 INJECTION, SOLUTION INTRAMUSCULAR; INTRAVENOUS at 05:59

## 2019-06-07 RX ADMIN — CHLORHEXIDINE GLUCONATE 1 APPLICATION(S): 213 SOLUTION TOPICAL at 07:47

## 2019-06-07 RX ADMIN — Medication 10 MILLIGRAM(S): at 00:15

## 2019-06-07 RX ADMIN — MIDAZOLAM HYDROCHLORIDE 2 MILLIGRAM(S): 1 INJECTION, SOLUTION INTRAMUSCULAR; INTRAVENOUS at 00:00

## 2019-06-07 RX ADMIN — FENTANYL CITRATE 50 MICROGRAM(S): 50 INJECTION INTRAVENOUS at 12:23

## 2019-06-07 NOTE — PROGRESS NOTE ADULT - SUBJECTIVE AND OBJECTIVE BOX
Mg Hayward, PGY1   Pager [phone]    INTERVAL HPI/OVERNIGHT EVENTS:    SUBJECTIVE: Patient seen and examined at bedside.       OBJECTIVE:    VITAL SIGNS:  ICU Vital Signs Last 24 Hrs  T(C): 35.6 (07 Jun 2019 04:00), Max: 37.2 (06 Jun 2019 20:00)  T(F): 96.1 (07 Jun 2019 04:00), Max: 98.9 (06 Jun 2019 20:00)  HR: 76 (07 Jun 2019 06:00) (65 - 97)  BP: 122/71 (07 Jun 2019 06:00) (91/58 - 122/71)  BP(mean): 87 (07 Jun 2019 06:00) (61 - 94)  ABP: --  ABP(mean): --  RR: 23 (07 Jun 2019 06:00) (20 - 23)  SpO2: 85% (07 Jun 2019 06:00) (85% - 98%)    Mode: AC/ CMV (Assist Control/ Continuous Mandatory Ventilation), RR (machine): 20, TV (machine): 450, FiO2: 60, PEEP: 10, MAP: 17, PIP: 33    06-05 @ 07:01  -  06-06 @ 07:00  --------------------------------------------------------  IN: 4918.2 mL / OUT: 945 mL / NET: 3973.2 mL    06-06 @ 07:01  -  06-07 @ 06:53  --------------------------------------------------------  IN: 2619.2 mL / OUT: 650 mL / NET: 1969.2 mL      CAPILLARY BLOOD GLUCOSE          PHYSICAL EXAM:    General: NAD  HEENT: NCAT, PERRL, clear conjunctiva, mmm  Neck: supple, no JVD  Respiratory: CTAB  Cardiovascular: RRR, S1S2, no m/r/g  Abdomen: soft, nontender, nondistended, normal bowel sounds  Extremities: no edema or cyanosis  Skin: normal color and turgor; no rash  Neurological: nonfocal    MEDICATIONS:  MEDICATIONS  (STANDING):  chlorhexidine 4% Liquid 1 Application(s) Topical <User Schedule>  dexmedetomidine Infusion 0.2 MICROgram(s)/kG/Hr (2.65 mL/Hr) IV Continuous <Continuous>  docusate sodium Liquid 100 milliGRAM(s) Oral three times a day  lactated ringers. 1000 milliLiter(s) (100 mL/Hr) IV Continuous <Continuous>  pantoprazole  Injectable 40 milliGRAM(s) IV Push two times a day  petrolatum Ophthalmic Ointment 1 Application(s) Both EYES two times a day  piperacillin/tazobactam IVPB. 3.375 Gram(s) IV Intermittent every 8 hours  polyethylene glycol 3350 17 Gram(s) Oral two times a day  senna Syrup 10 milliLiter(s) Oral at bedtime    MEDICATIONS  (PRN):  acetaminophen    Suspension .. 650 milliGRAM(s) Oral every 6 hours PRN Temp greater or equal to 38C (100.4F)  LORazepam   Injectable 2 milliGRAM(s) IV Push every 4 hours PRN Agitation      ALLERGIES:  Allergies    No Known Allergies    Intolerances        LABS:                        11.2   11.70 )-----------( 177      ( 07 Jun 2019 03:10 )             35.6     06-07    136  |  101  |  44<H>  ----------------------------<  114<H>  5.1   |  22  |  2.64<H>    Ca    8.7      07 Jun 2019 03:10  Phos  3.8     06-07  Mg     2.3     06-07    TPro  5.9<L>  /  Alb  3.1<L>  /  TBili  3.7<H>  /  DBili  x   /  AST  44<H>  /  ALT  21  /  AlkPhos  91  06-07    PT/INR - ( 07 Jun 2019 03:10 )   PT: 14.7 SEC;   INR: 1.31          PTT - ( 06 Jun 2019 14:00 )  PTT:30.7 SEC      RADIOLOGY & ADDITIONAL TESTS: Reviewed. Mg Hayward, PGY1   Pager [phone]    INTERVAL HPI/OVERNIGHT EVENTS: KIMMY    SUBJECTIVE: Patient seen and examined at bedside. Sedated.      OBJECTIVE:    VITAL SIGNS:  ICU Vital Signs Last 24 Hrs  T(C): 35.6 (07 Jun 2019 04:00), Max: 37.2 (06 Jun 2019 20:00)  T(F): 96.1 (07 Jun 2019 04:00), Max: 98.9 (06 Jun 2019 20:00)  HR: 76 (07 Jun 2019 06:00) (65 - 97)  BP: 122/71 (07 Jun 2019 06:00) (91/58 - 122/71)  BP(mean): 87 (07 Jun 2019 06:00) (61 - 94)  ABP: --  ABP(mean): --  RR: 23 (07 Jun 2019 06:00) (20 - 23)  SpO2: 85% (07 Jun 2019 06:00) (85% - 98%)    Mode: AC/ CMV (Assist Control/ Continuous Mandatory Ventilation), RR (machine): 20, TV (machine): 450, FiO2: 60, PEEP: 10, MAP: 17, PIP: 33    06-05 @ 07:01  -  06-06 @ 07:00  --------------------------------------------------------  IN: 4918.2 mL / OUT: 945 mL / NET: 3973.2 mL    06-06 @ 07:01 - 06-07 @ 06:53  --------------------------------------------------------  IN: 2619.2 mL / OUT: 650 mL / NET: 1969.2 mL      CAPILLARY BLOOD GLUCOSE          PHYSICAL EXAM:    General: NAD  HEENT: NCAT, PERRL, clear conjunctiva, mmm  Neck: supple, no JVD  Respiratory: CTAB  Cardiovascular: RRR, S1S2, stable systolic murmur  Abdomen: soft, nontender, moderate abd distention, normal bowel sounds  Extremities: mild extremity edema, stable mild peripheral cyanosis  Skin: chronic RLE mottling  Neurological: sedated, nonfocal    MEDICATIONS:  MEDICATIONS  (STANDING):  chlorhexidine 4% Liquid 1 Application(s) Topical <User Schedule>  dexmedetomidine Infusion 0.2 MICROgram(s)/kG/Hr (2.65 mL/Hr) IV Continuous <Continuous>  docusate sodium Liquid 100 milliGRAM(s) Oral three times a day  lactated ringers. 1000 milliLiter(s) (100 mL/Hr) IV Continuous <Continuous>  pantoprazole  Injectable 40 milliGRAM(s) IV Push two times a day  petrolatum Ophthalmic Ointment 1 Application(s) Both EYES two times a day  piperacillin/tazobactam IVPB. 3.375 Gram(s) IV Intermittent every 8 hours  polyethylene glycol 3350 17 Gram(s) Oral two times a day  senna Syrup 10 milliLiter(s) Oral at bedtime    MEDICATIONS  (PRN):  acetaminophen    Suspension .. 650 milliGRAM(s) Oral every 6 hours PRN Temp greater or equal to 38C (100.4F)  LORazepam   Injectable 2 milliGRAM(s) IV Push every 4 hours PRN Agitation      ALLERGIES:  Allergies    No Known Allergies    Intolerances        LABS:                        11.2   11.70 )-----------( 177      ( 07 Jun 2019 03:10 )             35.6     06-07    136  |  101  |  44<H>  ----------------------------<  114<H>  5.1   |  22  |  2.64<H>    Ca    8.7      07 Jun 2019 03:10  Phos  3.8     06-07  Mg     2.3     06-07    TPro  5.9<L>  /  Alb  3.1<L>  /  TBili  3.7<H>  /  DBili  x   /  AST  44<H>  /  ALT  21  /  AlkPhos  91  06-07    PT/INR - ( 07 Jun 2019 03:10 )   PT: 14.7 SEC;   INR: 1.31          PTT - ( 06 Jun 2019 14:00 )  PTT:30.7 SEC      RADIOLOGY & ADDITIONAL TESTS: Reviewed.

## 2019-06-07 NOTE — PROGRESS NOTE ADULT - ATTENDING COMMENTS
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, RLE DVT 1 year ago on warfarin, hypoxic respiratory failure with massive hemoptysis s/p embolization re-bleed and endobronchial blocker placement now with fever and MELLO    - on Precedex with periods of agitation  - SBT   - continue bowel regimen  - continue ABx adjusted for creatinine clearance    - follow up vancomycin and dig levels  - monitor coags  - no evidence of new bleed  - remains oliguric with increased creatinine despite volume resuscitation  - renal injury likely combination of volume depletion and contrast nephropathy  - will get renal evaluation today    POCUS today showed scattered B-lines anteriorly with consolidation pattern at both bases and ascites    critical care time 40 minutes  case discussed with parents at bedside    critical care time 40 minutes  case discussed in detail with parents at bedside

## 2019-06-07 NOTE — PROGRESS NOTE ADULT - ASSESSMENT
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    #Neuro: no active issues  - dc'ed versed, sedated on fentanyl and precedex, wean fent  - Careful sedation as pre-load dependent    #CV: Heterotaxy with congenital single atrium/ventricle s/p multiple cardiac surgeries including Fontan's  - Ectopy resolved after d/c levo gtt, c/w phenylephrine gtt, wean as tolerated  - Possible angiogram per cardiology  - Holding Coumadin in setting of recent bleed. INR 1.25 s/p PCC, FFP x5, vitamin K x1  - hold dig 0.25 daily in setting of ARF  - LE dopplers 6/1 with chronic RLE DVT (diagnosed 3/30/18, suspect provoked 2/2 sedentary status after hospitalization around that time for hematemesis, has been on coumadin per cards Dr. Mega Rojas), repeat in 1 week to assess if stable or enlarging  - 1L LR over 10 hours     #Pulm: Hemoptysis s/p bronch 5/31 showing RUL bleeding. SpO2 at baseline high 80's given cardiopulmonary pathophysiology.  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 Gastritis and duodenal erosions were also seen possibly 2/2 ASA gastropathy.   - c/w tube feeds, hold if tolerating CPAP for possible trial of extubation  - protonix 40 daily  - senna/colace/miralax for constipation    #ID  Initially febrile, elevated white count, no atelectasis on POCUS  - on empiric vanc/zosyn day 6, holding vanc in setting of supratherapeutic vanc, redose zosyn to q12 if CrCl <20  - f/u BCx NTD    #Renal  - MELLO initially resolved with IVF and pressor support, likely pre-renal in setting of poor PO intake, hemoptysis vs possible PATRICA. Now with ARF suspect 2/2 vancomycin nephrotoxicity vs possible PATRICA.  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    #Neuro: no active issues  - dc'ed fent and versed, sedated on precedex  - Careful sedation as pre-load dependent    #CV: Heterotaxy with congenital single atrium/ventricle s/p multiple cardiac surgeries including Fontan's  - Ectopy resolved after d/c levo gtt, c/w phenylephrine gtt, wean as tolerated  - Possible angiogram per cardiology  - Holding Coumadin in setting of recent bleed. s/p PCC, FFP x5, vitamin K x1  - hold dig 0.25 daily in setting of ARF  - LE dopplers 6/1 with chronic RLE DVT (diagnosed 3/30/18, suspect provoked 2/2 sedentary status after hospitalization around that time for hematemesis, has been on coumadin per cards Dr. Mega Rojas), repeat dopplers ordered to assess if stable or enlarging  - IVC improved to 1.22cm, c/w LR@150cc    #Pulm: Hemoptysis s/p bronch 5/31 showing RUL bleeding. SpO2 at baseline high 80's given cardiopulmonary pathophysiology.  - s/p IR embolization 5/31 of R-sided aortopulmonary collaterals, R bronchial artery, multiple R thoracic intercostal arteries  - s/p bronch 6/1 w/ endobronchial blocker placed for re-bleeding from RUL, plan for repeat bronch to remove block and evaluate for bleeding and clots  - s/p re-bronch 6/4 with removal of blocker, suctioned out blood clots from RUL and mucus plugs from TONJA  - s/p re-bronch 6/5, no active bleeding, suctioned out blood clots from RUL  - will reconsult IR if persistent active bleeding  - no significant B-lines on POCUS  - CPAP trial today    #GI: 3/2018 upper endoscopy showed 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 Gastritis and duodenal erosions were also seen possibly 2/2 ASA gastropathy.   - c/w tube feeds, hold if tolerating CPAP for possible trial of extubation  - protonix 40 daily  - senna/colace/miralax for constipation    #ID  Initially febrile, elevated white count, no atelectasis on POCUS  - on empiric vanc/zosyn day 7, holding vanc in setting of supratherapeutic vanc, redose zosyn to q12 if CrCl <20  - f/u BCx NTD    #Renal  - MELLO initially resolved with IVF and pressor support, likely pre-renal in setting of poor PO intake, hemoptysis vs possible PATRICA. Now with ARF suspect 2/2 vancomycin nephrotoxicity vs possible PATRICA.  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    #Neuro: no active issues  - dc'ed fent and versed, sedated on precedex  - Careful sedation as pre-load dependent    #CV: Heterotaxy with congenital single atrium/ventricle s/p multiple cardiac surgeries including Fontan's  - Ectopy resolved after d/c levo gtt, c/w phenylephrine gtt, wean as tolerated  - Possible angiogram per cardiology  - Holding Coumadin in setting of recent bleed. s/p PCC, FFP x5, vitamin K x1  - hold dig 0.25 daily in setting of ARF  - LE dopplers 6/1 with chronic RLE DVT (diagnosed 3/30/18, suspect provoked 2/2 sedentary status after hospitalization around that time for hematemesis, has been on coumadin per cards Dr. Mega Rojas), repeat dopplers ordered to assess if stable or enlarging  - IVC improved to 1.22cm, c/w LR@150cc    #Pulm: Hemoptysis s/p bronch 5/31 showing RUL bleeding. SpO2 at baseline high 80's given cardiopulmonary pathophysiology.  - s/p IR embolization 5/31 of R-sided aortopulmonary collaterals, R bronchial artery, multiple R thoracic intercostal arteries  - s/p bronch 6/1 w/ endobronchial blocker placed for re-bleeding from RUL, plan for repeat bronch to remove block and evaluate for bleeding and clots  - s/p re-bronch 6/4 with removal of blocker, suctioned out blood clots from RUL and mucus plugs from TONJA  - s/p re-bronch 6/5, no active bleeding, suctioned out blood clots from RUL  - will reconsult IR if persistent active bleeding  - no significant B-lines on POCUS  - CPAP trial today    #GI: 3/2018 upper endoscopy showed 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 Gastritis and duodenal erosions were also seen possibly 2/2 ASA gastropathy.   - c/w tube feeds, hold if tolerating CPAP for possible trial of extubation  - protonix 40 daily  - senna/colace/miralax for constipation  - RUQ US: hepatomegaly, coarsening and nodularity of liver ?cirrhosis, mild upper abd ascites, mild biliary sludge    #ID  Initially febrile, elevated white count, no atelectasis on POCUS  - on empiric vanc/zosyn day 7, holding vanc in setting of supratherapeutic vanc, redose zosyn to q12 if CrCl <20  - f/u BCx NTD    #Renal  - MELLO initially resolved with IVF and pressor support, likely pre-renal in setting of poor PO intake, hemoptysis vs possible PATRICA. Now with ARF suspect 2/2 vancomycin nephrotoxicity vs possible PATRICA.  - Goal K>4, Mg>2, replete prn  - ARF with oliguria from hypotension precipitating MELLO and supratherapeutic vanc vs vanc toxicity  - Renal c/s    #Endocrine  - FSBG q6    #Heme  - Reactive leukocytosis vs infection, started on empiric abx, monitor CBC  - Monitor INR, goal <1.5, elevated ?2/2 congestive hepatopathy from pHTN, fibrinogen elevated inconsistent with DIC  - Heme recs appreciated    #DVT ppx: SCDs    #GOC: full code

## 2019-06-07 NOTE — CONSULT NOTE ADULT - ASSESSMENT
24M with congenital single ventricle with complex cardiac history history of extensive DVT in March 2018 thought to be provoked who presents with massive hemoptysis found to have abnormal coagulation studies.    Elevated INR  - check mixing study, depending on the results will check individual factor levels  - consider RUQ US to evaluate for congestive hepatopathy    History of DVT  - upon resolution of his acute illness/bleeding, will need to evaluate risks/benefits of re-starting coumadin. Input from his pediatric congenital heart disease specialist will be valuable.    Efrain Mcdonnell, PGY5  Hematology Fellow  Pager: [phone]
24 year old male with oliguric MELLO in setting of hypotension and vancomycin use.

## 2019-06-07 NOTE — CONSULT NOTE ADULT - PROBLEM SELECTOR RECOMMENDATION 9
Patient with MELLO in setting of hypotension and vancomycin use. On review of previous labs in [facility], Scr. noted to be WNL. Scr. on admission was noted to be WNL( 0.86) on 5/31/19. However Scr. was noted to be elevated at 1.85 on 6/5/19 and increased further to 2.64 today. UA shows 100 protein but no RBCs or WBCs seen. Urine electrolytes are suggestive of pre renal etiology. However patient noted to be oliguric at this time despite IV hydration. Patient with hemodynamically mediated MELLO from hypotension? Patient with ATN from hypotension and vancomycin use? Check spot urine TP/CR to quantify proteinuria. Can give intermittent IV lasix pushes if needed to maintain fluid balance. No plan for HD at this time but may require if renal function continues to worsen. Monitor Scr, I/O, and electrolytes. Avoid NSAIDs, RCAs, and other nephrotoxins.

## 2019-06-07 NOTE — CONSULT NOTE ADULT - SUBJECTIVE AND OBJECTIVE BOX
[facility] Division of Kidney Diseases & Hypertension  INITIAL CONSULT NOTE  [phone]--------------------------------------------------------------------------------    HPI: 24 year old male with h/o         PAST HISTORY  --------------------------------------------------------------------------------  PAST MEDICAL & SURGICAL HISTORY:  Spleen absent  TAPVR (total anomalous pulmonary venous return)  Heterotaxy syndrome with asplenia  Single ventricle  H/O heart surgery    FAMILY HISTORY:    PAST SOCIAL HISTORY:    ALLERGIES & MEDICATIONS  --------------------------------------------------------------------------------  Allergies    No Known Allergies    Intolerances      Standing Inpatient Medications  chlorhexidine 4% Liquid 1 Application(s) Topical <User Schedule>  dexmedetomidine Infusion 0.2 MICROgram(s)/kG/Hr IV Continuous <Continuous>  docusate sodium Liquid 100 milliGRAM(s) Oral three times a day  fentaNYL    Injectable 50 MICROGram(s) IV Push once  lactated ringers. 1000 milliLiter(s) IV Continuous <Continuous>  pantoprazole  Injectable 40 milliGRAM(s) IV Push two times a day  petrolatum Ophthalmic Ointment 1 Application(s) Both EYES two times a day  piperacillin/tazobactam IVPB. 3.375 Gram(s) IV Intermittent every 8 hours  polyethylene glycol 3350 17 Gram(s) Oral two times a day  senna Syrup 10 milliLiter(s) Oral at bedtime    PRN Inpatient Medications  acetaminophen    Suspension .. 650 milliGRAM(s) Oral every 6 hours PRN  LORazepam   Injectable 2 milliGRAM(s) IV Push every 4 hours PRN      REVIEW OF SYSTEMS  --------------------------------------------------------------------------------  Gen: No  fevers/chills, weakness  Skin: No rashes  Head/Eyes/Ears/Mouth: No headache; Normal hearing; Normal vision w/o blurriness;   Respiratory: No dyspnea, cough, wheezing, hemoptysis  CV: No chest pain,   GI: No abdominal pain, diarrhea, constipation, nausea, vomiting  : No increased frequency, dysuria, hematuria, nocturia  MSK: No joint pain/swelling;   Neuro: No dizziness/lightheadedness, weakness, seizures    All other systems were reviewed and are negative, except as noted.    VITALS/PHYSICAL EXAM  --------------------------------------------------------------------------------  T(C): 37.1 (06-07-19 @ 08:00), Max: 37.2 (06-06-19 @ 20:00)  HR: 63 (06-07-19 @ 12:00) (63 - 97)  BP: 108/68 (06-07-19 @ 12:00) (96/62 - 127/92)  RR: 25 (06-07-19 @ 12:00) (20 - 25)  SpO2: 84% (06-07-19 @ 12:00) (80% - 96%)  Wt(kg): --        06-06-19 @ 07:01  -  06-07-19 @ 07:00  --------------------------------------------------------  IN: 2637.8 mL / OUT: 650 mL / NET: 1987.8 mL    06-07-19 @ 07:01  -  06-07-19 @ 13:03  --------------------------------------------------------  IN: 396 mL / OUT: 95 mL / NET: 301 mL      Physical Exam:  	Gen: NAD, well-appearing  	HEENT: PERRL, supple neck  	Pulm: CTA B/L  	CV:  S1S2  	Abd: +BS, soft   	Ext: No B/L Lower ext edema  	Neuro: No focal deficits  	Skin: Warm, without rashes  	Vascular access:     LABS/STUDIES  --------------------------------------------------------------------------------              11.2   11.70 >-----------<  177      [06-07-19 @ 03:10]              35.6     136  |  101  |  44  ----------------------------<  114      [06-07-19 @ 03:10]  5.1   |  22  |  2.64        Ca     8.7     [06-07-19 @ 03:10]      Mg     2.3     [06-07-19 @ 03:10]      Phos  3.8     [06-07-19 @ 03:10]    TPro  5.9  /  Alb  3.1  /  TBili  3.7  /  DBili  x   /  AST  44  /  ALT  21  /  AlkPhos  91  [06-07-19 @ 03:10]    PT/INR: PT 14.7 , INR 1.31       [06-07-19 @ 03:10]  PTT: 30.7       [06-06-19 @ 14:00]      Creatinine Trend:  SCr 2.64 [06-07 @ 03:10]  SCr 2.45 [06-06 @ 15:28]  SCr 2.47 [06-06 @ 12:12]  SCr 2.48 [06-06 @ 02:25]  SCr 2.20 [06-05 @ 17:00]    Urinalysis - [06-07-19 @ 08:31]      Color DARK YELLOW / Appearance Lt TURBID / SG 1.028 / pH 6.0      Gluc NEGATIVE / Ketone NEGATIVE  / Bili SMALL / Urobili SMALL       Blood SMALL / Protein 100 / Leuk Est NEGATIVE / Nitrite NEGATIVE      RBC 2-5 / WBC 2-5 / Hyaline  / Gran  / Sq Epi  / Non Sq Epi  / Bacteria MOD    Urine Creatinine 148.40      [06-07-19 @ 08:06]  Urine Protein 211.9      [06-07-19 @ 08:06]  Urine Sodium < 20      [06-07-19 @ 08:06]  Urine Potassium 55.2      [06-07-19 @ 08:06]  Urine Chloride < 20      [06-07-19 @ 08:06]  Urine Osmolality 404      [06-07-19 @ 08:06] [facility] Division of Kidney Diseases & Hypertension  INITIAL CONSULT NOTE  [phone]--------------------------------------------------------------------------------    HPI: 24 year old male with h/o heterotaxy syndrome, TAPVR initially presented to [facility] on 5/30 with complaints of hemoptysis. Patient developed hypoxemic respiratory failure and was intubated. Patient was subsequently transferred to [facility] and had IR guided embolization as well. Patient is currently admitted for management of recurrent hemoptysis. Nephrology was consulted for elevated creatinine. On review of previous labs in [facility], Scr. noted to be WNL. Scr. on admission was noted to be WNL( 0.86) on 5/31/19. However Scr. was noted to be elevated at 1.85 on 6/5/19 and increased further to 2.64 today. Patient noted to have decreasing urine output. On chart review, patient noted to have multiple low BP readings since 6/4/19 and was on IV pressors. Patient also noted to have elevated vancomycin level(37.5) on 6/5/19 as well. Currently patient intubated and sedated; unable to obtain further history.       PAST HISTORY  --------------------------------------------------------------------------------  PAST MEDICAL & SURGICAL HISTORY:  Spleen absent  TAPVR (total anomalous pulmonary venous return)  Heterotaxy syndrome with asplenia  Single ventricle  H/O heart surgery    FAMILY HISTORY:    PAST SOCIAL HISTORY:    ALLERGIES & MEDICATIONS  --------------------------------------------------------------------------------  Allergies    No Known Allergies    Intolerances      Standing Inpatient Medications  chlorhexidine 4% Liquid 1 Application(s) Topical <User Schedule>  dexmedetomidine Infusion 0.2 MICROgram(s)/kG/Hr IV Continuous <Continuous>  docusate sodium Liquid 100 milliGRAM(s) Oral three times a day  fentaNYL    Injectable 50 MICROGram(s) IV Push once  lactated ringers. 1000 milliLiter(s) IV Continuous <Continuous>  pantoprazole  Injectable 40 milliGRAM(s) IV Push two times a day  petrolatum Ophthalmic Ointment 1 Application(s) Both EYES two times a day  piperacillin/tazobactam IVPB. 3.375 Gram(s) IV Intermittent every 8 hours  polyethylene glycol 3350 17 Gram(s) Oral two times a day  senna Syrup 10 milliLiter(s) Oral at bedtime    PRN Inpatient Medications  acetaminophen    Suspension .. 650 milliGRAM(s) Oral every 6 hours PRN  LORazepam   Injectable 2 milliGRAM(s) IV Push every 4 hours PRN      REVIEW OF SYSTEMS  --------------------------------------------------------------------------------  Unable to obtain.     VITALS/PHYSICAL EXAM  --------------------------------------------------------------------------------  T(C): 37.1 (06-07-19 @ 08:00), Max: 37.2 (06-06-19 @ 20:00)  HR: 63 (06-07-19 @ 12:00) (63 - 97)  BP: 108/68 (06-07-19 @ 12:00) (96/62 - 127/92)  RR: 25 (06-07-19 @ 12:00) (20 - 25)  SpO2: 84% (06-07-19 @ 12:00) (80% - 96%)  Wt(kg): --        06-06-19 @ 07:01  -  06-07-19 @ 07:00  --------------------------------------------------------  IN: 2637.8 mL / OUT: 650 mL / NET: 1987.8 mL    06-07-19 @ 07:01  -  06-07-19 @ 13:03  --------------------------------------------------------  IN: 396 mL / OUT: 95 mL / NET: 301 mL      Physical Exam:  	Gen: Intubated, on mechanical ventilator   	HEENT: ETT tube present.   	Pulm: CTA B/L  	CV:  S1S2  	Abd: +BS, soft   	Ext:  B/L Lower ext edema +  	Neuro: No focal deficits  	Skin: Warm, without rashes    LABS/STUDIES  --------------------------------------------------------------------------------              11.2   11.70 >-----------<  177      [06-07-19 @ 03:10]              35.6     136  |  101  |  44  ----------------------------<  114      [06-07-19 @ 03:10]  5.1   |  22  |  2.64        Ca     8.7     [06-07-19 @ 03:10]      Mg     2.3     [06-07-19 @ 03:10]      Phos  3.8     [06-07-19 @ 03:10]    TPro  5.9  /  Alb  3.1  /  TBili  3.7  /  DBili  x   /  AST  44  /  ALT  21  /  AlkPhos  91  [06-07-19 @ 03:10]    PT/INR: PT 14.7 , INR 1.31       [06-07-19 @ 03:10]  PTT: 30.7       [06-06-19 @ 14:00]      Creatinine Trend:  SCr 2.64 [06-07 @ 03:10]  SCr 2.45 [06-06 @ 15:28]  SCr 2.47 [06-06 @ 12:12]  SCr 2.48 [06-06 @ 02:25]  SCr 2.20 [06-05 @ 17:00]    Urinalysis - [06-07-19 @ 08:31]      Color DARK YELLOW / Appearance Lt TURBID / SG 1.028 / pH 6.0      Gluc NEGATIVE / Ketone NEGATIVE  / Bili SMALL / Urobili SMALL       Blood SMALL / Protein 100 / Leuk Est NEGATIVE / Nitrite NEGATIVE      RBC 2-5 / WBC 2-5 / Hyaline  / Gran  / Sq Epi  / Non Sq Epi  / Bacteria MOD    Urine Creatinine 148.40      [06-07-19 @ 08:06]  Urine Protein 211.9      [06-07-19 @ 08:06]  Urine Sodium < 20      [06-07-19 @ 08:06]  Urine Potassium 55.2      [06-07-19 @ 08:06]  Urine Chloride < 20      [06-07-19 @ 08:06]  Urine Osmolality 404      [06-07-19 @ 08:06] [facility] Division of Kidney Diseases & Hypertension  INITIAL CONSULT NOTE  [phone]--------------------------------------------------------------------------------    HPI: 24 year old male with h/o heterotaxy syndrome, TAPVR initially presented to [facility] on 5/30 with complaints of hemoptysis. Patient developed hypoxemic respiratory failure and was intubated. Patient was subsequently transferred to [facility] and had IR guided embolization as well. Patient is currently admitted for management of recurrent hemoptysis. Nephrology was consulted for elevated creatinine. On review of previous labs in [facility], Scr. noted to be WNL. Scr. on admission was noted to be WNL( 0.86) on 5/31/19. However Scr. was noted to be elevated at 1.85 on 6/5/19 and increased further to 2.64 today. Patient noted to have decreasing urine output. On chart review, patient noted to have multiple low BP readings since 6/4/19 and was on IV pressors. Patient also noted to have elevated vancomycin level(37.5) on 6/5/19 as well. Currently patient intubated and sedated; unable to obtain further history.       PAST HISTORY  --------------------------------------------------------------------------------  PAST MEDICAL & SURGICAL HISTORY:  Spleen absent  TAPVR (total anomalous pulmonary venous return)  Heterotaxy syndrome with asplenia  Single ventricle  H/O heart surgery    FAMILY HISTORY: non contributory    PAST SOCIAL HISTORY: no cigarettes, drugs or alcohol    ALLERGIES & MEDICATIONS  --------------------------------------------------------------------------------  Allergies    No Known Allergies    Intolerances      Standing Inpatient Medications  chlorhexidine 4% Liquid 1 Application(s) Topical <User Schedule>  dexmedetomidine Infusion 0.2 MICROgram(s)/kG/Hr IV Continuous <Continuous>  docusate sodium Liquid 100 milliGRAM(s) Oral three times a day  fentaNYL    Injectable 50 MICROGram(s) IV Push once  lactated ringers. 1000 milliLiter(s) IV Continuous <Continuous>  pantoprazole  Injectable 40 milliGRAM(s) IV Push two times a day  petrolatum Ophthalmic Ointment 1 Application(s) Both EYES two times a day  piperacillin/tazobactam IVPB. 3.375 Gram(s) IV Intermittent every 8 hours  polyethylene glycol 3350 17 Gram(s) Oral two times a day  senna Syrup 10 milliLiter(s) Oral at bedtime    PRN Inpatient Medications  acetaminophen    Suspension .. 650 milliGRAM(s) Oral every 6 hours PRN  LORazepam   Injectable 2 milliGRAM(s) IV Push every 4 hours PRN      REVIEW OF SYSTEMS  --------------------------------------------------------------------------------  Unable to obtain.     VITALS/PHYSICAL EXAM  --------------------------------------------------------------------------------  T(C): 37.1 (06-07-19 @ 08:00), Max: 37.2 (06-06-19 @ 20:00)  HR: 63 (06-07-19 @ 12:00) (63 - 97)  BP: 108/68 (06-07-19 @ 12:00) (96/62 - 127/92)  RR: 25 (06-07-19 @ 12:00) (20 - 25)  SpO2: 84% (06-07-19 @ 12:00) (80% - 96%)  Wt(kg): --        06-06-19 @ 07:01  -  06-07-19 @ 07:00  --------------------------------------------------------  IN: 2637.8 mL / OUT: 650 mL / NET: 1987.8 mL    06-07-19 @ 07:01  -  06-07-19 @ 13:03  --------------------------------------------------------  IN: 396 mL / OUT: 95 mL / NET: 301 mL      Physical Exam:  	Gen: Intubated, on mechanical ventilator   	HEENT: ETT tube present.   	Pulm: CTA B/L  	CV:  S1S2  	Abd: +BS, soft   	Ext:  B/L Lower ext edema +  	Neuro: No focal deficits  	Skin: Warm, without rashes    LABS/STUDIES  --------------------------------------------------------------------------------              11.2   11.70 >-----------<  177      [06-07-19 @ 03:10]              35.6     136  |  101  |  44  ----------------------------<  114      [06-07-19 @ 03:10]  5.1   |  22  |  2.64        Ca     8.7     [06-07-19 @ 03:10]      Mg     2.3     [06-07-19 @ 03:10]      Phos  3.8     [06-07-19 @ 03:10]    TPro  5.9  /  Alb  3.1  /  TBili  3.7  /  DBili  x   /  AST  44  /  ALT  21  /  AlkPhos  91  [06-07-19 @ 03:10]    PT/INR: PT 14.7 , INR 1.31       [06-07-19 @ 03:10]  PTT: 30.7       [06-06-19 @ 14:00]      Creatinine Trend:  SCr 2.64 [06-07 @ 03:10]  SCr 2.45 [06-06 @ 15:28]  SCr 2.47 [06-06 @ 12:12]  SCr 2.48 [06-06 @ 02:25]  SCr 2.20 [06-05 @ 17:00]    Urinalysis - [06-07-19 @ 08:31]      Color DARK YELLOW / Appearance Lt TURBID / SG 1.028 / pH 6.0      Gluc NEGATIVE / Ketone NEGATIVE  / Bili SMALL / Urobili SMALL       Blood SMALL / Protein 100 / Leuk Est NEGATIVE / Nitrite NEGATIVE      RBC 2-5 / WBC 2-5 / Hyaline  / Gran  / Sq Epi  / Non Sq Epi  / Bacteria MOD    Urine Creatinine 148.40      [06-07-19 @ 08:06]  Urine Protein 211.9      [06-07-19 @ 08:06]  Urine Sodium < 20      [06-07-19 @ 08:06]  Urine Potassium 55.2      [06-07-19 @ 08:06]  Urine Chloride < 20      [06-07-19 @ 08:06]  Urine Osmolality 404      [06-07-19 @ 08:06]

## 2019-06-07 NOTE — PROGRESS NOTE PEDS - ASSESSMENT
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 He is now s/p IR embolization of right upper lobe aortopulmonary collateral vessels likely culprits for his hemoptysis.  Fontan physiology is preload dependent and dependent on unobstructed flow through PAs. Baseline saturation for him is in the upper 80s/low 90s likely related to veno-venous collateral vessels that serve as right to left shunting.  Known history of asplenia.     Plan-   - Ventilation and FiO2 for goal saturations >85%. Progress towards extubation as toelrated.   - Monitor renal parameters;   - Trend H/H; continue antibiotics as per MICU team; Vancomycin currently held.   - f/u coags: if persistently abnormal, FFP transfusion and consider hematology consultation  - if has rebleeding despite normalization of coags, will need to consider cardiac catheterization to evaluate the rest of the aorta to evaluate for aortopulmonary collateral vessels that may be leading to persistent bleeding  - Wean down PEEP (increased PEEP will impede venous return to the Fontan circuit) and progress towards extubation.   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 He is now s/p IR embolization of right upper lobe aortopulmonary collateral vessels likely culprits for his hemoptysis and S/P endobronchial blocker placement. No new bleeding noted in the last week.  His ICU course is complicated by need for prolonged ventilation, evolving MELLO related to dehydration, elevated CK from immobilisation, possible sepsis (culture negative) and medications (Vancomycin); hepatic dysfunction.   Fontan physiology is preload dependent and dependent on unobstructed flow through PAs. Baseline saturation for him is in the upper 80s/low 90s likely related to veno-venous collateral vessels that serve as right to left shunting.  Known history of asplenia.     Plan-   - Ventilation and FiO2 for goal saturations >85%. Progress towards extubation as tolerated.   - Wean PEEP as tolerated, this will improve cardiac output by improving preload.    - Monitor renal parameters;   - Trend H/H; continue antibiotics as per MICU team; Vancomycin currently held.   - f/u coags: if persistently abnormal, FFP transfusion and consider hematology consultation  - if has rebleeding despite normalization of coags, will need to consider cardiac catheterization to evaluate the rest of the aorta to evaluate for aortopulmonary collateral vessels that may be leading to persistent bleeding  - Wean down PEEP (increased PEEP will impede venous return to the Fontan circuit) and progress towards extubation.   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 He is now s/p IR embolization of right upper lobe aortopulmonary collateral vessels likely culprits for his hemoptysis and S/P endobronchial blocker placement. No new bleeding noted in the last week.  His ICU course is complicated by need for prolonged ventilation, evolving MELLO related to dehydration, elevated CK from immobilisation, possible sepsis (culture negative) and medications (Vancomycin); hepatic dysfunction.   Fontan physiology is preload dependent and dependent on unobstructed flow through PAs. Baseline saturation for him is in the upper 80s/low 90s likely related to veno-venous collateral vessels that serve as right to left shunting.  Known history of asplenia.     Plan-   - Ventilation and FiO2 for goal saturations >85%. Progress towards extubation. There has not been evidence of rebleeding for days  - Wean PEEP as tolerated, this will improve cardiac output by improving Fontan flow   - would continue to give IVFs as labs suggest prerenal azotemia  - if has rebleeding despite normalization of coags, will need to consider cardiac catheterization to evaluate the rest of the aorta to evaluate for aortopulmonary collateral vessels that may be leading to persistent bleeding  - With MELLO, would hold Digoxin  - Renally dose all meds

## 2019-06-07 NOTE — PROGRESS NOTE PEDS - SUBJECTIVE AND OBJECTIVE BOX
INTERVAL HISTORY: No new fevers. Sedation has been weaned; with improved MAPs Phenylephrine has been stopped.     RESPIRATORY SUPPORT: Mode: AC/ CMV (Assist Control/ Continuous Mandatory Ventilation), RR (machine): 20, FiO2: 70, PEEP: 10    I&O's Summary  2019 07:01  -  2019 07:00  --------------------------------------------------------  IN: 2637.8 mL / OUT: 650 mL / NET: 1987.8 mL    INTRAVASCULAR ACCESS: Peripheral IV    MEDICATIONS  (STANDING):  chlorhexidine 4% Liquid 1 Application(s) Topical <User Schedule>  dexmedetomidine Infusion 0.2 MICROgram(s)/kG/Hr (2.65 mL/Hr) IV Continuous <Continuous>  docusate sodium Liquid 100 milliGRAM(s) Oral three times a day  fentaNYL    Injectable 50 MICROGram(s) IV Push once  lactated ringers. 1000 milliLiter(s) (150 mL/Hr) IV Continuous <Continuous>  pantoprazole  Injectable 40 milliGRAM(s) IV Push two times a day  petrolatum Ophthalmic Ointment 1 Application(s) Both EYES two times a day  piperacillin/tazobactam IVPB. 3.375 Gram(s) IV Intermittent every 8 hours  polyethylene glycol 3350 17 Gram(s) Oral two times a day  senna Syrup 10 milliLiter(s) Oral at bedtime    PHYSICAL EXAMINATION:  ICU Vital Signs Last 24 Hrs  T(C): 36.6 (2019 07:52), Max: 36.6 (2019 12:00)  T(F): 97.9 (2019 07:52), Max: 97.9 (2019 07:52)  HR: 74 (2019 11:18) (69 - 83)  BP: 96/60 (2019 11:00) (90/51 - 120/57)  BP(mean): 61 (2019 11:00) (61 - 77)  RR: 20 (2019 11:00) (19 - 23)  SpO2: 90% (2019 11:18) (86% - 96%)    General - non-dysmorphic appearance, sedated and on mechanical ventilator  Skin - mottling of skin in the peripheries, with petechiae on bilateral LE; no desquamation  Eyes / ENT - no conjunctival injection, sclerae icteric, external ears & nares normal, mucous membranes moist.  Pulmonary - mechanically ventilated; no retractions, transmitted sounds on auscultation bilaterally, no wheezes, no rales.  Cardiovascular - tachycardia; regular rhythm, normal S1 & single S2, 2/6 HSM at LMSB, no rubs, no gallops, capillary refill < 2sec, + pectus  Gastrointestinal - soft, distended, non-tender, hepatomegaly + .  Neurologic / Psychiatric - sedated with Versed and Fentanyl.    LABORATORY TESTS:                          10.7  CBC:   12.35 )-----------( 150   (19 @ 02:25)                          34.1               134   |  102   |  35                 Ca: 8.3    BMP:   ----------------------------< 153    M.3   (19 @ 02:25)             4.5    |  23    | 2.48               Ph: 3.1      LFT:     TPro: 6.1 / Alb: 2.8 / TBili: 3.1 / DBili: x / AST: 50 / ALT: 21 / AlkPhos: 99   (19 @ 02:25)    COAG: PT: 14.0 / PTT: 32.5 / INR: 1.25   (19 @ 02:25)     CARDIAC MARKERS:             High-Sensitivity Troponin: x   (19 @ 02:50)             CK: 1152 / CKMB: 1.13   (19 @ 02:50)             Pro-BNP: x   (19 @ 02:50)    ABG:   pH: 7.43 / pCO2: 35 / pO2: 70 / HCO3: 24 / Base Excess: -0.4 / SaO2: 94.4 / Lactate: 1.2 / iCa: 1.16   (19 @ 03:10)    IMAGING STUDIES:  TTE Congential Anomalies (19 @ 17:28) >  Conclusions:  1. Moderate aortic regurgitation.  2. Systemic Right ventricle loops to the left.  The  Systemic Right ventricular function is mildly decreased.  Hypoplastic left ventricle.  3. Normal pericardium with no pericardial effusion.  Unable to comment on the Fontan.  Case and images discussed with adult congenital Dr. Geoffrey Plata as per his records, no sisgnificant change from  prior, should additional images be required please contact  adult congenital for an ECHO. INTERVAL HISTORY: No new fevers. Sedation has been weaned; with improved MAPs Phenylephrine has been stopped.     RESPIRATORY SUPPORT: Mode: AC/ CMV (Assist Control/ Continuous Mandatory Ventilation), RR (machine): 20, FiO2: 70, PEEP: 10    I&O's Summary  2019 07:01  -  2019 07:00  --------------------------------------------------------  IN: 2637.8 mL / OUT: 650 mL / NET: 1987.8 mL    INTRAVASCULAR ACCESS: Peripheral IV    MEDICATIONS  (STANDING):  chlorhexidine 4% Liquid 1 Application(s) Topical <User Schedule>  dexmedetomidine Infusion 0.2 MICROgram(s)/kG/Hr (2.65 mL/Hr) IV Continuous <Continuous>  docusate sodium Liquid 100 milliGRAM(s) Oral three times a day  fentaNYL    Injectable 50 MICROGram(s) IV Push once  lactated ringers. 1000 milliLiter(s) (150 mL/Hr) IV Continuous <Continuous>  pantoprazole  Injectable 40 milliGRAM(s) IV Push two times a day  petrolatum Ophthalmic Ointment 1 Application(s) Both EYES two times a day  piperacillin/tazobactam IVPB. 3.375 Gram(s) IV Intermittent every 8 hours  polyethylene glycol 3350 17 Gram(s) Oral two times a day  senna Syrup 10 milliLiter(s) Oral at bedtime    PHYSICAL EXAMINATION:  ICU Vital Signs Last 24 Hrs  T(C): 35.6 (2019 12:00), Max: 37.2 (2019 20:00)  T(F): 96.1 (2019 12:00), Max: 98.9 (2019 20:00)  HR: 62 (2019 13:00) (62 - 97)  BP: 104/69 (2019 13:00) (96/62 - 127/92)  BP(mean): 79 (2019 13:00) (67 - 104)  RR: 21 (2019 13:00) (20 - 25)  SpO2: 86% (2019 13:00) (80% - 96%)    General - non-dysmorphic appearance, sedated and on mechanical ventilator; edematous  Skin - mottling of skin in the peripheries, with petechiae on bilateral LE; no desquamation  Eyes / ENT - no conjunctival injection, sclerae icteric, external ears & nares normal, mucous membranes moist.  Pulmonary - mechanically ventilated; no retractions, transmitted sounds on auscultation bilaterally, no wheezes, no rales.  Cardiovascular - tachycardia; regular rhythm, normal S1 & single S2, 2/6 HSM at LMSB, no rubs, no gallops, capillary refill < 2sec, + pectus  Gastrointestinal - soft, distended with ascites, non-tender, hepatomegaly + .  Neurologic / Psychiatric - sedated;.    LABORATORY TESTS:  LABORATORY TESTS:                          11.2  CBC:   11.70 )-----------( 177   (19 @ 03:10)                          35.6               136   |  101   |  44                 Ca: 8.7    BMP:   ----------------------------< 114    M.3   (19 @ 03:10)             5.1    |  22    | 2.64               Ph: 3.8      LFT:     TPro: 5.9 / Alb: 3.1 / TBili: 3.7 / DBili: x / AST: 44 / ALT: 21 / AlkPhos: 91   (19 @ 03:10)    COAG: PT: 14.7 / PTT: x / INR: 1.31   (19 @ 03:10)     CARDIAC MARKERS:             High-Sensitivity Troponin: x   (19 @ 02:50)             CK: 1152 / CKMB: 1.13   (19 @ 02:50)             Pro-BNP: x   (19 @ 02:50)    ABG:   pH: 7.43 / pCO2: 35 / pO2: 70 / HCO3: 24 / Base Excess: -0.4 / SaO2: 94.4 / Lactate: 1.2 / iCa: 1.16   (19 @ 03:10)    VBG:   pH: 7.34 / pCO2: 47 / pO2: 66 / HCO3: 23 / Base Excess: -0.5 / SaO2: 89.7   (19 @ 16:30)    IMAGING STUDIES:  TTE Congential Anomalies (19 @ 17:28) >  Conclusions:  1. Moderate aortic regurgitation.  2. Systemic Right ventricle loops to the left.  The  Systemic Right ventricular function is mildly decreased.  Hypoplastic left ventricle.  3. Normal pericardium with no pericardial effusion. INTERVAL HISTORY: No new fevers. Sedation has been weaned; with improved MAPs Phenylephrine has been stopped.     RESPIRATORY SUPPORT: Mode: AC/ CMV (Assist Control/ Continuous Mandatory Ventilation), RR (machine): 20, FiO2: 70, PEEP: 10    I&O's Summary  2019 07:01  -  2019 07:00  --------------------------------------------------------  IN: 2637.8 mL / OUT: 650 mL / NET: 1987.8 mL    INTRAVASCULAR ACCESS: Peripheral IV    MEDICATIONS  (STANDING):  chlorhexidine 4% Liquid 1 Application(s) Topical <User Schedule>  dexmedetomidine Infusion 0.2 MICROgram(s)/kG/Hr (2.65 mL/Hr) IV Continuous <Continuous>  docusate sodium Liquid 100 milliGRAM(s) Oral three times a day  fentaNYL    Injectable 50 MICROGram(s) IV Push once  lactated ringers. 1000 milliLiter(s) (150 mL/Hr) IV Continuous <Continuous>  pantoprazole  Injectable 40 milliGRAM(s) IV Push two times a day  petrolatum Ophthalmic Ointment 1 Application(s) Both EYES two times a day  piperacillin/tazobactam IVPB. 3.375 Gram(s) IV Intermittent every 8 hours  polyethylene glycol 3350 17 Gram(s) Oral two times a day  senna Syrup 10 milliLiter(s) Oral at bedtime    PHYSICAL EXAMINATION:  ICU Vital Signs Last 24 Hrs  T(C): 35.6 (2019 12:00), Max: 37.2 (2019 20:00)  T(F): 96.1 (2019 12:00), Max: 98.9 (2019 20:00)  HR: 62 (2019 13:00) (62 - 97)  BP: 104/69 (2019 13:00) (96/62 - 127/92)  BP(mean): 79 (2019 13:00) (67 - 104)  RR: 21 (2019 13:00) (20 - 25)  SpO2: 86% (2019 13:00) (80% - 96%)    General - non-dysmorphic appearance, sedated and on mechanical ventilator; edematous  Skin - mottling of skin in the peripheries, with petechiae on bilateral LE; no desquamation  Eyes / ENT - no conjunctival injection, sclerae icteric, external ears & nares normal, mucous membranes moist.  Pulmonary - mechanically ventilated; no retractions, transmitted sounds on auscultation bilaterally, no wheezes, no rales.  Cardiovascular - regular rhythm, normal S1 & single S2, 2/6 HSM at LMSB, no rubs, no gallops, capillary refill < 2sec, + pectus  Gastrointestinal - soft, distended with ascites, non-tender, hepatomegaly + .  Neurologic / Psychiatric - sedated    LABORATORY TESTS:                          11.2  CBC:   11.70 )-----------( 177   (19 @ 03:10)                          35.6               136   |  101   |  44                 Ca: 8.7    BMP:   ----------------------------< 114    M.3   (19 @ 03:10)             5.1    |  22    | 2.64               Ph: 3.8      LFT:     TPro: 5.9 / Alb: 3.1 / TBili: 3.7 / DBili: x / AST: 44 / ALT: 21 / AlkPhos: 91   (19 @ 03:10)    COAG: PT: 14.7 / PTT: x / INR: 1.31   (19 @ 03:10)     CARDIAC MARKERS:             High-Sensitivity Troponin: x   (19 @ 02:50)             CK: 1152 / CKMB: 1.13   (19 @ 02:50)             Pro-BNP: x   (19 @ 02:50)    ABG:   pH: 7.43 / pCO2: 35 / pO2: 70 / HCO3: 24 / Base Excess: -0.4 / SaO2: 94.4 / Lactate: 1.2 / iCa: 1.16   (19 @ 03:10)    VBG:   pH: 7.34 / pCO2: 47 / pO2: 66 / HCO3: 23 / Base Excess: -0.5 / SaO2: 89.7   (19 @ 16:30)    IMAGING STUDIES:  TTE Congential Anomalies (19 @ 17:28) >  Conclusions:  1. Moderate aortic regurgitation.  2. Systemic Right ventricle loops to the left.  The  Systemic Right ventricular function is mildly decreased.  Hypoplastic left ventricle.  3. Normal pericardium with no pericardial effusion.

## 2019-06-08 ENCOUNTER — OUTPATIENT (OUTPATIENT)
Dept: OUTPATIENT SERVICES | Age: 24
LOS: 1 days | Discharge: HOME | End: 2019-06-08

## 2019-06-08 DIAGNOSIS — Z98.89 OTHER SPECIFIED POSTPROCEDURAL STATES: Chronic | ICD-10-CM

## 2019-06-08 LAB
ALBUMIN SERPL ELPH-MCNC: 2.8 G/DL — LOW (ref 3.3–5)
ALP SERPL-CCNC: 78 U/L — SIGNIFICANT CHANGE UP (ref 40–120)
ALT FLD-CCNC: 21 U/L — SIGNIFICANT CHANGE UP (ref 4–41)
ANION GAP SERPL CALC-SCNC: 16 MMO/L — HIGH (ref 7–14)
APTT BLD: 31.2 SEC — SIGNIFICANT CHANGE UP (ref 27.5–36.3)
AST SERPL-CCNC: 44 U/L — HIGH (ref 4–40)
BACTERIA SPT RESP CULT: SIGNIFICANT CHANGE UP
BASOPHILS # BLD AUTO: 0.18 K/UL — SIGNIFICANT CHANGE UP (ref 0–0.2)
BASOPHILS NFR BLD AUTO: 1.3 % — SIGNIFICANT CHANGE UP (ref 0–2)
BILIRUB SERPL-MCNC: 4 MG/DL — HIGH (ref 0.2–1.2)
BUN SERPL-MCNC: 50 MG/DL — HIGH (ref 7–23)
CALCIUM SERPL-MCNC: 9.1 MG/DL — SIGNIFICANT CHANGE UP (ref 8.4–10.5)
CHLORIDE SERPL-SCNC: 100 MMOL/L — SIGNIFICANT CHANGE UP (ref 98–107)
CK SERPL-CCNC: 253 U/L — HIGH (ref 30–200)
CO2 SERPL-SCNC: 21 MMOL/L — LOW (ref 22–31)
CREAT SERPL-MCNC: 2.68 MG/DL — HIGH (ref 0.5–1.3)
EOSINOPHIL # BLD AUTO: 0.1 K/UL — SIGNIFICANT CHANGE UP (ref 0–0.5)
EOSINOPHIL NFR BLD AUTO: 0.7 % — SIGNIFICANT CHANGE UP (ref 0–6)
GLUCOSE SERPL-MCNC: 114 MG/DL — HIGH (ref 70–99)
HCT VFR BLD CALC: 35.3 % — LOW (ref 39–50)
HGB BLD-MCNC: 11.1 G/DL — LOW (ref 13–17)
IMM GRANULOCYTES NFR BLD AUTO: 1.1 % — SIGNIFICANT CHANGE UP (ref 0–1.5)
INR BLD: 1.25 — HIGH (ref 0.88–1.17)
LYMPHOCYTES # BLD AUTO: 0.94 K/UL — LOW (ref 1–3.3)
LYMPHOCYTES # BLD AUTO: 7 % — LOW (ref 13–44)
MAGNESIUM SERPL-MCNC: 2.4 MG/DL — SIGNIFICANT CHANGE UP (ref 1.6–2.6)
MCHC RBC-ENTMCNC: 24.1 PG — LOW (ref 27–34)
MCHC RBC-ENTMCNC: 31.4 % — LOW (ref 32–36)
MCV RBC AUTO: 76.7 FL — LOW (ref 80–100)
MONOCYTES # BLD AUTO: 1.52 K/UL — HIGH (ref 0–0.9)
MONOCYTES NFR BLD AUTO: 11.4 % — SIGNIFICANT CHANGE UP (ref 2–14)
NEUTROPHILS # BLD AUTO: 10.45 K/UL — HIGH (ref 1.8–7.4)
NEUTROPHILS NFR BLD AUTO: 78.5 % — HIGH (ref 43–77)
NRBC # FLD: 0.04 K/UL — SIGNIFICANT CHANGE UP (ref 0–0)
PHOSPHATE SERPL-MCNC: 3.6 MG/DL — SIGNIFICANT CHANGE UP (ref 2.5–4.5)
PLATELET # BLD AUTO: 246 K/UL — SIGNIFICANT CHANGE UP (ref 150–400)
PMV BLD: SIGNIFICANT CHANGE UP FL (ref 7–13)
POTASSIUM SERPL-MCNC: 4.4 MMOL/L — SIGNIFICANT CHANGE UP (ref 3.5–5.3)
POTASSIUM SERPL-SCNC: 4.4 MMOL/L — SIGNIFICANT CHANGE UP (ref 3.5–5.3)
PROT SERPL-MCNC: 6.4 G/DL — SIGNIFICANT CHANGE UP (ref 6–8.3)
PROTHROM AB SERPL-ACNC: 14 SEC — HIGH (ref 9.8–13.1)
RBC # BLD: 4.6 M/UL — SIGNIFICANT CHANGE UP (ref 4.2–5.8)
RBC # FLD: 22.6 % — HIGH (ref 10.3–14.5)
SODIUM SERPL-SCNC: 137 MMOL/L — SIGNIFICANT CHANGE UP (ref 135–145)
WBC # BLD: 13.34 K/UL — HIGH (ref 3.8–10.5)
WBC # FLD AUTO: 13.34 K/UL — HIGH (ref 3.8–10.5)

## 2019-06-08 PROCEDURE — 99291 CRITICAL CARE FIRST HOUR: CPT

## 2019-06-08 PROCEDURE — 36010 PLACE CATHETER IN VEIN: CPT

## 2019-06-08 PROCEDURE — 99233 SBSQ HOSP IP/OBS HIGH 50: CPT | Mod: GC

## 2019-06-08 PROCEDURE — 76937 US GUIDE VASCULAR ACCESS: CPT | Mod: 26

## 2019-06-08 PROCEDURE — 99233 SBSQ HOSP IP/OBS HIGH 50: CPT

## 2019-06-08 RX ORDER — SODIUM CHLORIDE 9 MG/ML
500 INJECTION, SOLUTION INTRAVENOUS
Refills: 0 | Status: DISCONTINUED | OUTPATIENT
Start: 2019-06-08 | End: 2019-06-09

## 2019-06-08 RX ORDER — SODIUM CHLORIDE 9 MG/ML
500 INJECTION, SOLUTION INTRAVENOUS
Refills: 0 | Status: COMPLETED | OUTPATIENT
Start: 2019-06-08 | End: 2019-06-08

## 2019-06-08 RX ADMIN — PANTOPRAZOLE SODIUM 40 MILLIGRAM(S): 20 TABLET, DELAYED RELEASE ORAL at 05:06

## 2019-06-08 RX ADMIN — SODIUM CHLORIDE 100 MILLILITER(S): 9 INJECTION, SOLUTION INTRAVENOUS at 17:07

## 2019-06-08 RX ADMIN — SODIUM CHLORIDE 100 MILLILITER(S): 9 INJECTION, SOLUTION INTRAVENOUS at 11:00

## 2019-06-08 RX ADMIN — PANTOPRAZOLE SODIUM 40 MILLIGRAM(S): 20 TABLET, DELAYED RELEASE ORAL at 17:26

## 2019-06-08 RX ADMIN — PIPERACILLIN AND TAZOBACTAM 25 GRAM(S): 4; .5 INJECTION, POWDER, LYOPHILIZED, FOR SOLUTION INTRAVENOUS at 05:05

## 2019-06-08 RX ADMIN — CHLORHEXIDINE GLUCONATE 1 APPLICATION(S): 213 SOLUTION TOPICAL at 09:00

## 2019-06-08 RX ADMIN — DEXMEDETOMIDINE HYDROCHLORIDE IN 0.9% SODIUM CHLORIDE 6.62 MICROGRAM(S)/KG/HR: 4 INJECTION INTRAVENOUS at 09:00

## 2019-06-08 NOTE — PROGRESS NOTE ADULT - ASSESSMENT
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 Admitted with hemoptysis s/p IR coil embolization of AP collateral vessels on 6/1 with no current evidence of recurrence of bleeding. Now extubated overnight to BIPAP. Ongoing MELLO with lab indices with prerenal azotemia. While total volume up, intravascularly depleted. Edema is third spaced and dependent- his lungs are clear.      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 Admitted with hemoptysis s/p IR coil embolization of AP collateral vessels on 6/1 with no current evidence of recurrence of bleeding. Now extubated overnight to BIPAP. Ongoing MELLO with lab indices with prerenal azotemia. While total volume up, intravascularly depleted. Edema is third spaced and dependent- his lungs are clear.      - wean precedex as tolerated, wean bipap as tolerated; target sats > 85%, if unable to wean support, okay for a trial of diuretics  - PT/OT, out of bed or at least sitting- will help mobilize some of this fluid  - incentive spirometry when able  - would give maintenance IVFs while NPO given Fontan physiology is preload dependent and with MELLO  - will continue to monitor for recurrence of hemoptysis now that extubated; eventually will need to consider adding back anticoagulation  - keep lytes K > 4, > Mg 2; will continue to monitor telemetry now and defer beta blockade

## 2019-06-08 NOTE — CHART NOTE - NSCHARTNOTEFT_GEN_A_CORE
Critically ill pt requiring PIV access for medical management. Under US guidance identified Lt UE vein, distal to AC and successfully placed 20g angiocath into vessel. Placement confirmed s/p with ultrasound and catheter determined to be in patent lumen of vein. Pt tolerated well w/o complication. Labs obtained at time of placement.

## 2019-06-08 NOTE — PROGRESS NOTE ADULT - SUBJECTIVE AND OBJECTIVE BOX
Mg Hayward, PGY1   Pager [phone]    INTERVAL HPI/OVERNIGHT EVENTS:    SUBJECTIVE: Patient seen and examined at bedside.       OBJECTIVE:    VITAL SIGNS:  ICU Vital Signs Last 24 Hrs  T(C): 36.2 (2019 04:00), Max: 37.1 (2019 08:00)  T(F): 97.1 (2019 04:00), Max: 98.7 (2019 08:00)  HR: 71 (2019 06:00) (62 - 801)  BP: 115/67 (2019 06:00) (98/67 - 127/92)  BP(mean): 80 (2019 06:00) (75 - 104)  ABP: --  ABP(mean): --  RR: 20 (2019 06:00) (20 - 32)  SpO2: 97% (:00) (80% - 100%)    Mode: standby     @ 07:  -  07 @ 07:00  --------------------------------------------------------  IN: 2637.8 mL / OUT: 650 mL / NET: 1987.8 mL    07 @ 07:01  -  08 @ 06:42  --------------------------------------------------------  IN: 1224.6 mL / OUT: 795 mL / NET: 429.6 mL      CAPILLARY BLOOD GLUCOSE          PHYSICAL EXAM:    General: NAD  HEENT: NCAT, PERRL, clear conjunctiva, mmm  Neck: supple, no JVD  Respiratory: CTAB  Cardiovascular: RRR, S1S2, no m/r/g  Abdomen: soft, nontender, nondistended, normal bowel sounds  Extremities: no edema or cyanosis  Skin: normal color and turgor; no rash  Neurological: nonfocal    MEDICATIONS:  MEDICATIONS  (STANDING):  chlorhexidine 4% Liquid 1 Application(s) Topical <User Schedule>  dexmedetomidine Infusion 0.5 MICROgram(s)/kG/Hr (6.625 mL/Hr) IV Continuous <Continuous>  docusate sodium Liquid 100 milliGRAM(s) Oral three times a day  pantoprazole  Injectable 40 milliGRAM(s) IV Push two times a day  piperacillin/tazobactam IVPB. 3.375 Gram(s) IV Intermittent every 8 hours  polyethylene glycol 3350 17 Gram(s) Oral two times a day  senna Syrup 10 milliLiter(s) Oral at bedtime    MEDICATIONS  (PRN):  acetaminophen    Suspension .. 650 milliGRAM(s) Oral every 6 hours PRN Temp greater or equal to 38C (100.4F)      ALLERGIES:  Allergies    No Known Allergies    Intolerances        LABS:                        11.1   13.34 )-----------( 246      ( 2019 01:00 )             35.3     06-08    137  |  100  |  50<H>  ----------------------------<  114<H>  4.4   |  21<L>  |  2.68<H>    Ca    9.1      2019 01:00  Phos  3.6     06-08  Mg     2.4     06-08    TPro  6.4  /  Alb  2.8<L>  /  TBili  4.0<H>  /  DBili  x   /  AST  44<H>  /  ALT  21  /  AlkPhos  78  06-08    PT/INR - ( 2019 01:00 )   PT: 14.0 SEC;   INR: 1.25          PTT - ( 2019 01:00 )  PTT:31.2 SEC  Urinalysis Basic - ( 2019 08:31 )    Color: DARK YELLOW / Appearance: Lt TURBID / S.028 / pH: 6.0  Gluc: NEGATIVE / Ketone: NEGATIVE  / Bili: SMALL / Urobili: SMALL   Blood: SMALL / Protein: 100 / Nitrite: NEGATIVE   Leuk Esterase: NEGATIVE / RBC: 2-5 / WBC 2-5   Sq Epi: x / Non Sq Epi: x / Bacteria: MOD        RADIOLOGY & ADDITIONAL TESTS: Reviewed. Mg Hayward, PGY1   Pager [phone]    INTERVAL HPI/OVERNIGHT EVENTS: Stopped fluids overnight, UOP improved to >50cc/hr.    SUBJECTIVE: Patient seen and examined at bedside. On bipap 10/5/55% and light sedation on precedex.      OBJECTIVE:    VITAL SIGNS:  ICU Vital Signs Last 24 Hrs  T(C): 36.2 (2019 04:00), Max: 37.1 (2019 08:00)  T(F): 97.1 (2019 04:00), Max: 98.7 (2019 08:00)  HR: 71 (2019 06:00) (62 - 801)  BP: 115/67 (2019 06:00) (98/67 - 127/92)  BP(mean): 80 (2019 06:00) (75 - 104)  ABP: --  ABP(mean): --  RR: 20 (2019 06:00) (20 - 32)  SpO2: 97% (2019 06:00) (80% - 100%)    Mode: standby     @ 07:07 @ 07:00  --------------------------------------------------------  IN: 2637.8 mL / OUT: 650 mL / NET: 1987.8 mL     @ 07:08 @ 06:42  --------------------------------------------------------  IN: 1224.6 mL / OUT: 795 mL / NET: 429.6 mL      CAPILLARY BLOOD GLUCOSE          PHYSICAL EXAM:    General: NAD  HEENT: NCAT, PERRL, clear conjunctiva, mmm  Neck: supple, no JVD  Respiratory: CTAB  Cardiovascular: RRR, S1S2, stable systolic murmur  Abdomen: soft, nontender, moderate abd distention, normal bowel sounds  Extremities: no edema, interval improvement of mild peripheral cyanosis  Skin: chronic RLE mottling  Neurological: nonfocal    MEDICATIONS:  MEDICATIONS  (STANDING):  chlorhexidine 4% Liquid 1 Application(s) Topical <User Schedule>  dexmedetomidine Infusion 0.5 MICROgram(s)/kG/Hr (6.625 mL/Hr) IV Continuous <Continuous>  docusate sodium Liquid 100 milliGRAM(s) Oral three times a day  pantoprazole  Injectable 40 milliGRAM(s) IV Push two times a day  piperacillin/tazobactam IVPB. 3.375 Gram(s) IV Intermittent every 8 hours  polyethylene glycol 3350 17 Gram(s) Oral two times a day  senna Syrup 10 milliLiter(s) Oral at bedtime    MEDICATIONS  (PRN):  acetaminophen    Suspension .. 650 milliGRAM(s) Oral every 6 hours PRN Temp greater or equal to 38C (100.4F)      ALLERGIES:  Allergies    No Known Allergies    Intolerances        LABS:                        11.1   13.34 )-----------( 246      ( 2019 01:00 )             35.3     06-08    137  |  100  |  50<H>  ----------------------------<  114<H>  4.4   |  21<L>  |  2.68<H>    Ca    9.1      2019 01:00  Phos  3.6     06-08  Mg     2.4     06-08    TPro  6.4  /  Alb  2.8<L>  /  TBili  4.0<H>  /  DBili  x   /  AST  44<H>  /  ALT  21  /  AlkPhos  78  06-08    PT/INR - ( 2019 01:00 )   PT: 14.0 SEC;   INR: 1.25          PTT - ( 2019 01:00 )  PTT:31.2 SEC  Urinalysis Basic - ( 2019 08:31 )    Color: DARK YELLOW / Appearance: Lt TURBID / S.028 / pH: 6.0  Gluc: NEGATIVE / Ketone: NEGATIVE  / Bili: SMALL / Urobili: SMALL   Blood: SMALL / Protein: 100 / Nitrite: NEGATIVE   Leuk Esterase: NEGATIVE / RBC: 2-5 / WBC 2-5   Sq Epi: x / Non Sq Epi: x / Bacteria: MOD        RADIOLOGY & ADDITIONAL TESTS: Reviewed.

## 2019-06-08 NOTE — PROGRESS NOTE ADULT - SUBJECTIVE AND OBJECTIVE BOX
Adult Congenital Progress Note    S: Extubated to BIPAP overnight. IVFs stopped overnight, UOP ~ 50 ml/hr. Afebrile overnight.    O:  ICU Vital Signs Last 24 Hrs  T(C): 36.2 (08 Jun 2019 04:00), Max: 36.6 (07 Jun 2019 16:00)  T(F): 97.1 (08 Jun 2019 04:00), Max: 97.9 (07 Jun 2019 20:00)  HR: 68 (08 Jun 2019 07:46) (62 - 801)  BP: 115/67 (08 Jun 2019 06:00) (98/67 - 127/92)  BP(mean): 80 (08 Jun 2019 06:00) (77 - 104)  ABP: --  ABP(mean): --  RR: 20 (08 Jun 2019 06:00) (20 - 32)  SpO2: 99% (08 Jun 2019 07:46) (80% - 100%)  telemetry: sinus rhythm to sinus tachy; PVCs, periods of ventricular bigeminy, rare ventricular couplets    laying in bed, on bipap  awakens to voice, nods to answer questions  mm dry  s1 single s2 2/6 HSM at LMSB/LLSB, pectus  clear lungs, diminished at bases  distended but soft abdomen, + hepatomegaly  scrotal edema, b/l AC swollen  warm feet b/l, venous congestion to plantar surface of right foot    labs  06-08    137  |  100  |  50<H>  ----------------------------<  114<H>  4.4   |  21<L>  |  2.68<H>    Ca    9.1      08 Jun 2019 01:00  Phos  3.6     06-08  Mg     2.4     06-08    TPro  6.4  /  Alb  2.8<L>  /  TBili  4.0<H>  /  DBili  x   /  AST  44<H>  /  ALT  21  /  AlkPhos  78  06-08                          11.1   13.34 )-----------( 246      ( 08 Jun 2019 01:00 )             35.3   PT/INR - ( 08 Jun 2019 01:00 )   PT: 14.0 SEC;   INR: 1.25          PTT - ( 08 Jun 2019 01:00 )  PTT:31.2 SEC

## 2019-06-08 NOTE — PROGRESS NOTE ADULT - PROBLEM SELECTOR PLAN 1
Patient with MELLO in setting of hypotension and vancomycin use. On review of previous labs in [facility], Scr. noted to be WNL. Scr. on admission was noted to be WNL( 0.86) on 5/31/19. However Scr. was noted to be elevated at 1.85 on 6/5/19 and increased further to 2.64 today. UA shows 100 protein but no RBCs or WBCs seen. Urine electrolytes are suggestive of pre renal etiology. However patient noted to be oliguric at this time despite IV hydration. Patient with hemodynamically mediated MELLO from hypotension? Patient with ATN from hypotension and vancomycin use? Check spot urine TP/CR to quantify proteinuria. Can give intermittent IV lasix pushes if needed to maintain fluid balance. No plan for HD at this time but may require if renal function continues to worsen. Monitor Scr, I/O, and electrolytes. Avoid NSAIDs, RCAs, and other nephrotoxins. Patient with MELLO in setting of hypotension and vancomycin use. On review of previous labs in [facility], Scr. noted to be WNL. Scr. on admission was noted to be WNL( 0.86) on 5/31/19. However Scr. was noted to be elevated at 1.85 on 6/5/19 and increased further to 2.64 yesterday (6/7/19) and stable at 2.68 today (6/8/19). UA shows 100 protein but no RBCs or WBCs seen. Urine electrolytes are suggestive of pre renal etiology. Patient urine output improved, currently non-oliguric. Patient with hemodynamically mediated MELLO from hypotension? Patient with ATN from hypotension and vancomycin use?. Spot urine TP/CR elevated at 1.42 on 6/7/19. Diuretics as per cardiology team. No plan for HD today. Monitor Scr, I/O, and electrolytes. Avoid NSAIDs, RCAs, and other nephrotoxins. Will need to consider initiation of HD if renal function continues to worsen. Patient with MELLO in setting of hypotension and vancomycin use. On review of previous labs in [facility], Scr noted to be WNL. Scr on admission was noted to be WNL (0.86) on 5/31/19. However, Scr was noted to be elevated at 1.85 on 6/5/19, increased further to 2.64 yesterday (6/7/19) and stable at 2.68 today (6/8/19). UA shows 100 protein but no RBCs or WBCs seen. Urine electrolytes are suggestive of pre renal etiology. Patient urine output improved, currently non-oliguric. Patient with ? hemodynamically mediated MELLO from hypotension. Patient with ? ATN from hypotension and vancomycin use. Spot urine TP/CR elevated at 1.42 on 6/7/19. Diuretics as per cardiology team. Monitor Scr, I/O, and electrolytes. Avoid NSAIDs, RCAs, and other nephrotoxins

## 2019-06-08 NOTE — PROGRESS NOTE ADULT - SUBJECTIVE AND OBJECTIVE BOX
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 on 5/30 with complaints of hemoptysis. Patient developed hypoxemic respiratory failure and was intubated. Patient was subsequently transferred to [facility] and had IR guided embolization as well. Patient is currently admitted for management of recurrent hemoptysis. Nephrology was consulted for elevated creatinine. On review of previous labs in [facility], Scr. noted to be WNL. Scr. on admission was noted to be WNL( 0.86) on 5/31/19. However Scr. was noted to be elevated at 1.85 on 6/5/19 and increased further to 2.64 on 6/7/19. Patient noted to have decreasing urine output. On chart review, patient noted to have multiple low BP readings since 6/4/19 and was on IV pressors. Patient also noted to have elevated vancomycin level (37.5) on 6/5/19 as well.     Pt. was seen and examined with mother at bedside, he was extubated yesterday, remains on BiPap and sedated; unable to obtain further history.      PAST HISTORY  --------------------------------------------------------------------------------  No significant changes to PMH, PSH, FHx, SHx, unless otherwise noted    ALLERGIES & MEDICATIONS  --------------------------------------------------------------------------------  Allergies    No Known Allergies    Intolerances      Standing Inpatient Medications  chlorhexidine 4% Liquid 1 Application(s) Topical <User Schedule>  dexmedetomidine Infusion 0.5 MICROgram(s)/kG/Hr IV Continuous <Continuous>  docusate sodium Liquid 100 milliGRAM(s) Oral three times a day  pantoprazole  Injectable 40 milliGRAM(s) IV Push two times a day  piperacillin/tazobactam IVPB. 3.375 Gram(s) IV Intermittent every 8 hours  polyethylene glycol 3350 17 Gram(s) Oral two times a day  senna Syrup 10 milliLiter(s) Oral at bedtime    PRN Inpatient Medications  acetaminophen    Suspension .. 650 milliGRAM(s) Oral every 6 hours PRN      REVIEW OF SYSTEMS  --------------------------------------------------------------------------------  Unable to obtain    VITALS/PHYSICAL EXAM  --------------------------------------------------------------------------------  T(C): 36.2 (06-08-19 @ 04:00), Max: 37.1 (06-07-19 @ 08:00)  HR: 71 (06-08-19 @ 06:00) (62 - 801)  BP: 115/67 (06-08-19 @ 06:00) (98/67 - 127/92)  RR: 20 (06-08-19 @ 06:00) (20 - 32)  SpO2: 97% (06-08-19 @ 06:00) (80% - 100%)  Wt(kg): --        06-07-19 @ 07:01  -  06-08-19 @ 07:00  --------------------------------------------------------  IN: 1224.6 mL / OUT: 795 mL / NET: 429.6 mL        Physical Exam:  	Gen: Sedated  	HEENT: Bipap mask  	Pulm: CTA B/L  	CV:  S1S2  	Abd: +BS, soft   	Ext: pitting B/L Lower ext edema +  	Neuro: Sedated  	Skin: Warm, without rashes      LABS/STUDIES  --------------------------------------------------------------------------------              11.1   13.34 >-----------<  246      [06-08-19 @ 01:00]              35.3     137  |  100  |  50  ----------------------------<  114      [06-08-19 @ 01:00]  4.4   |  21  |  2.68        Ca     9.1     [06-08-19 @ 01:00]      Mg     2.4     [06-08-19 @ 01:00]      Phos  3.6     [06-08-19 @ 01:00]    TPro  6.4  /  Alb  2.8  /  TBili  4.0  /  DBili  x   /  AST  44  /  ALT  21  /  AlkPhos  78  [06-08-19 @ 01:00]    PT/INR: PT 14.0 , INR 1.25       [06-08-19 @ 01:00]  PTT: 31.2       [06-08-19 @ 01:00]      Creatinine Trend:  SCr 2.68 [06-08 @ 01:00]  SCr 2.64 [06-07 @ 03:10]  SCr 2.45 [06-06 @ 15:28]  SCr 2.47 [06-06 @ 12:12]  SCr 2.48 [06-06 @ 02:25]    Urinalysis - [06-07-19 @ 08:31]      Color DARK YELLOW / Appearance Lt TURBID / SG 1.028 / pH 6.0      Gluc NEGATIVE / Ketone NEGATIVE  / Bili SMALL / Urobili SMALL       Blood SMALL / Protein 100 / Leuk Est NEGATIVE / Nitrite NEGATIVE      RBC 2-5 / WBC 2-5 / Hyaline  / Gran  / Sq Epi  / Non Sq Epi  / Bacteria MOD    Urine Creatinine 148.40      [06-07-19 @ 08:06]  Urine Protein 211.9      [06-07-19 @ 08:06]  Urine Sodium < 20      [06-07-19 @ 08:06]  Urine Potassium 55.2      [06-07-19 @ 08:06]  Urine Chloride < 20      [06-07-19 @ 08:06]  Urine Osmolality 404      [06-07-19 @ 08:06] [facility] DIVISION OF KIDNEY DISEASES AND HYPERTENSION -- FOLLOW UP NOTE  --------------------------------------------------------------------------------  HPI: 24 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 Patient developed hypoxemic respiratory failure and was intubated. Patient was subsequently transferred to [facility] and had IR guided embolization as well. Patient is currently admitted for management of recurrent hemoptysis. Nephrology was consulted for elevated creatinine. On review of previous labs in [facility], Scr. noted to be WNL. Scr. on admission was noted to be WNL( 0.86) on 5/31/19. However Scr. was noted to be elevated at 1.85 on 6/5/19 and increased further to 2.64 on 6/7/19. Patient noted to have decreasing urine output. On chart review, patient noted to have multiple low BP readings since 6/4/19 and was on IV pressors. Patient also noted to have elevated vancomycin level (37.5) on 6/5/19 as well.     Pt. was seen and examined with mother at bedside, he was extubated yesterday, remains on BiPap and sedated; unable to obtain further history.      PAST HISTORY  --------------------------------------------------------------------------------  No significant changes to PMH, PSH, FHx, SHx, unless otherwise noted    ALLERGIES & MEDICATIONS  --------------------------------------------------------------------------------  Allergies    No Known Allergies    Intolerances      Standing Inpatient Medications  chlorhexidine 4% Liquid 1 Application(s) Topical <User Schedule>  dexmedetomidine Infusion 0.5 MICROgram(s)/kG/Hr IV Continuous <Continuous>  docusate sodium Liquid 100 milliGRAM(s) Oral three times a day  pantoprazole  Injectable 40 milliGRAM(s) IV Push two times a day  piperacillin/tazobactam IVPB. 3.375 Gram(s) IV Intermittent every 8 hours  polyethylene glycol 3350 17 Gram(s) Oral two times a day  senna Syrup 10 milliLiter(s) Oral at bedtime    PRN Inpatient Medications  acetaminophen    Suspension .. 650 milliGRAM(s) Oral every 6 hours PRN      REVIEW OF SYSTEMS  --------------------------------------------------------------------------------  Unable to obtain    VITALS/PHYSICAL EXAM  --------------------------------------------------------------------------------  T(C): 36.2 (06-08-19 @ 04:00), Max: 37.1 (06-07-19 @ 08:00)  HR: 71 (06-08-19 @ 06:00) (62 - 801)  BP: 115/67 (06-08-19 @ 06:00) (98/67 - 127/92)  RR: 20 (06-08-19 @ 06:00) (20 - 32)  SpO2: 97% (06-08-19 @ 06:00) (80% - 100%)  Wt(kg): --        06-07-19 @ 07:01  -  06-08-19 @ 07:00  --------------------------------------------------------  IN: 1224.6 mL / OUT: 795 mL / NET: 429.6 mL        Physical Exam:  	Gen: Sedated  	HEENT: Bipap mask  	Pulm: CTA B/L  	CV:  S1S2  	Abd: +BS, soft   	Ext: trace B/L Lower ext edema +  	Neuro: Sedated  	Skin: Warm, without rashes      LABS/STUDIES  --------------------------------------------------------------------------------              11.1   13.34 >-----------<  246      [06-08-19 @ 01:00]              35.3     137  |  100  |  50  ----------------------------<  114      [06-08-19 @ 01:00]  4.4   |  21  |  2.68        Ca     9.1     [06-08-19 @ 01:00]      Mg     2.4     [06-08-19 @ 01:00]      Phos  3.6     [06-08-19 @ 01:00]    TPro  6.4  /  Alb  2.8  /  TBili  4.0  /  DBili  x   /  AST  44  /  ALT  21  /  AlkPhos  78  [06-08-19 @ 01:00]    PT/INR: PT 14.0 , INR 1.25       [06-08-19 @ 01:00]  PTT: 31.2       [06-08-19 @ 01:00]      Creatinine Trend:  SCr 2.68 [06-08 @ 01:00]  SCr 2.64 [06-07 @ 03:10]  SCr 2.45 [06-06 @ 15:28]  SCr 2.47 [06-06 @ 12:12]  SCr 2.48 [06-06 @ 02:25]    Urinalysis - [06-07-19 @ 08:31]      Color DARK YELLOW / Appearance Lt TURBID / SG 1.028 / pH 6.0      Gluc NEGATIVE / Ketone NEGATIVE  / Bili SMALL / Urobili SMALL       Blood SMALL / Protein 100 / Leuk Est NEGATIVE / Nitrite NEGATIVE      RBC 2-5 / WBC 2-5 / Hyaline  / Gran  / Sq Epi  / Non Sq Epi  / Bacteria MOD    Urine Creatinine 148.40      [06-07-19 @ 08:06]  Urine Protein 211.9      [06-07-19 @ 08:06]  Urine Sodium < 20      [06-07-19 @ 08:06]  Urine Potassium 55.2      [06-07-19 @ 08:06]  Urine Chloride < 20      [06-07-19 @ 08:06]  Urine Osmolality 404      [06-07-19 @ 08:06] 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 Patient developed hypoxemic respiratory failure and was intubated. Patient was subsequently transferred to [facility] and had IR guided embolization as well. Patient is currently admitted for management of recurrent hemoptysis. Nephrology was consulted for elevated creatinine. On review of previous labs in [facility], Scr. noted to be WNL. Scr. on admission was noted to be WNL( 0.86) on 5/31/19. However Scr. was noted to be elevated at 1.85 on 6/5/19 and increased further to 2.64 on 6/7/19. Patient noted to have decreasing urine output. On chart review, patient noted to have multiple low BP readings since 6/4/19 and was on IV pressors. Patient also noted to have elevated vancomycin level (37.5) on 6/5/19 as well.     Pt. was seen and examined with mother at bedside, he was extubated yesterday, remains on BiPap and sedated; unable to obtain further history.      PAST HISTORY  --------------------------------------------------------------------------------  No significant changes to PMH, PSH, FHx, SHx, unless otherwise noted    ALLERGIES & MEDICATIONS  --------------------------------------------------------------------------------  Allergies    No Known Allergies    Intolerances    Standing Inpatient Medications  chlorhexidine 4% Liquid 1 Application(s) Topical <User Schedule>  dexmedetomidine Infusion 0.5 MICROgram(s)/kG/Hr IV Continuous <Continuous>  docusate sodium Liquid 100 milliGRAM(s) Oral three times a day  pantoprazole  Injectable 40 milliGRAM(s) IV Push two times a day  piperacillin/tazobactam IVPB. 3.375 Gram(s) IV Intermittent every 8 hours  polyethylene glycol 3350 17 Gram(s) Oral two times a day  senna Syrup 10 milliLiter(s) Oral at bedtime    PRN Inpatient Medications  acetaminophen    Suspension .. 650 milliGRAM(s) Oral every 6 hours PRN    REVIEW OF SYSTEMS  --------------------------------------------------------------------------------  Unable to obtain    VITALS/PHYSICAL EXAM  --------------------------------------------------------------------------------  T(C): 36.2 (06-08-19 @ 04:00), Max: 37.1 (06-07-19 @ 08:00)  HR: 71 (06-08-19 @ 06:00) (62 - 801)  BP: 115/67 (06-08-19 @ 06:00) (98/67 - 127/92)  RR: 20 (06-08-19 @ 06:00) (20 - 32)  SpO2: 97% (06-08-19 @ 06:00) (80% - 100%)  Wt(kg): --    06-07-19 @ 07:01  -  06-08-19 @ 07:00  --------------------------------------------------------  IN: 1224.6 mL / OUT: 795 mL / NET: 429.6 mL    Physical Exam:  	Gen: Sedated  	HEENT: Bipap mask  	Pulm: CTA B/L  	CV:  S1S2  	Abd: +BS, soft   	Ext: trace B/L Lower ext edema +  	Neuro: Sedated  	Skin: Warm, without rashes    LABS/STUDIES  --------------------------------------------------------------------------------              11.1   13.34 >-----------<  246      [06-08-19 @ 01:00]              35.3     137  |  100  |  50  ----------------------------<  114      [06-08-19 @ 01:00]  4.4   |  21  |  2.68        Ca     9.1     [06-08-19 @ 01:00]      Mg     2.4     [06-08-19 @ 01:00]      Phos  3.6     [06-08-19 @ 01:00]    TPro  6.4  /  Alb  2.8  /  TBili  4.0  /  DBili  x   /  AST  44  /  ALT  21  /  AlkPhos  78  [06-08-19 @ 01:00]    Creatinine Trend:  SCr 2.68 [06-08 @ 01:00]  SCr 2.64 [06-07 @ 03:10]  SCr 2.45 [06-06 @ 15:28]  SCr 2.47 [06-06 @ 12:12]  SCr 2.48 [06-06 @ 02:25]    Urinalysis - [06-07-19 @ 08:31]      Color DARK YELLOW / Appearance Lt TURBID / SG 1.028 / pH 6.0      Gluc NEGATIVE / Ketone NEGATIVE  / Bili SMALL / Urobili SMALL       Blood SMALL / Protein 100 / Leuk Est NEGATIVE / Nitrite NEGATIVE      RBC 2-5 / WBC 2-5 / Hyaline  / Gran  / Sq Epi  / Non Sq Epi  / Bacteria MOD    Urine Creatinine 148.40      [06-07-19 @ 08:06]  Urine Protein 211.9      [06-07-19 @ 08:06]  Urine Sodium < 20      [06-07-19 @ 08:06]  Urine Potassium 55.2      [06-07-19 @ 08:06]  Urine Chloride < 20      [06-07-19 @ 08:06]  Urine Osmolality 404      [06-07-19 @ 08:06]

## 2019-06-08 NOTE — PROGRESS NOTE ADULT - ATTENDING COMMENTS
pt extubated yesterday to bilevel. pt with improved mental status from yesterday, still tachypneic. maintain NPO, IVFs today, monitoring for pulmonary edema. no hemoptysis. daily INR monitoring. Per mom, would not like pt to be on long-term anticoagulation anymore due to frequent hemoptysis episodes.

## 2019-06-08 NOTE — PROGRESS NOTE ADULT - ASSESSMENT
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    #Neuro: no active issues  - dc'ed fent and versed, sedated on precedex  - Careful sedation as pre-load dependent    #CV: Heterotaxy with congenital single atrium/ventricle s/p multiple cardiac surgeries including Fontan's  - Ectopy resolved after d/c levo gtt, c/w phenylephrine gtt, wean as tolerated  - Possible angiogram per cardiology  - Holding Coumadin in setting of recent bleed. s/p PCC, FFP x5, vitamin K x1  - hold dig 0.25 daily in setting of ARF  - LE dopplers 6/1 with chronic RLE DVT (diagnosed 3/30/18, suspect provoked 2/2 sedentary status after hospitalization around that time for hematemesis, has been on coumadin per cards Dr. Mega Rojas), repeat dopplers ordered to assess if stable or enlarging  - IVC improved to 1.22cm, c/w LR@150cc    #Pulm: Hemoptysis s/p bronch 5/31 showing RUL bleeding. SpO2 at baseline high 80's given cardiopulmonary pathophysiology.  - s/p IR embolization 5/31 of R-sided aortopulmonary collaterals, R bronchial artery, multiple R thoracic intercostal arteries  - s/p bronch 6/1 w/ endobronchial blocker placed for re-bleeding from RUL, plan for repeat bronch to remove block and evaluate for bleeding and clots  - s/p re-bronch 6/4 with removal of blocker, suctioned out blood clots from RUL and mucus plugs from TONJA  - s/p re-bronch 6/5, no active bleeding, suctioned out blood clots from RUL  - will reconsult IR if persistent active bleeding  - no significant B-lines on POCUS  - CPAP trial today    #GI: 3/2018 upper endoscopy showed 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 Gastritis and duodenal erosions were also seen possibly 2/2 ASA gastropathy.   - c/w tube feeds, hold if tolerating CPAP for possible trial of extubation  - protonix 40 daily  - senna/colace/miralax for constipation  - RUQ US: hepatomegaly, coarsening and nodularity of liver ?cirrhosis, mild upper abd ascites, mild biliary sludge    #ID  Initially febrile, elevated white count, no atelectasis on POCUS  - on empiric vanc/zosyn day 7, holding vanc in setting of supratherapeutic vanc, redose zosyn to q12 if CrCl <20  - f/u BCx NTD    #Renal  - MELLO initially resolved with IVF and pressor support, likely pre-renal in setting of poor PO intake, hemoptysis vs possible PATRICA. Now with ARF suspect 2/2 vancomycin nephrotoxicity vs possible PATRICA.  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 s/p PCC, FFP x5, vitamin K x1  - hold dig 0.25 daily in setting of ARF  - LE dopplers 6/1 with chronic RLE DVT (diagnosed 3/30/18, suspect provoked 2/2 sedentary status after hospitalization around that time for hematemesis, has been on coumadin per cards Dr. Mega Rojas), repeat dopplers ordered to assess if stable or enlarging  - IVC improved to 1.22cm s/p fluid resuscitation as of 6/7    #Pulm: Hemoptysis s/p bronch 5/31 showing RUL bleeding. SpO2 at baseline high 80's given cardiopulmonary pathophysiology.  - s/p IR embolization 5/31 of R-sided aortopulmonary collaterals, R bronchial artery, multiple R thoracic intercostal arteries  - s/p bronch 6/1 w/ endobronchial blocker placed for re-bleeding from RUL  - s/p re-bronch 6/4 with removal of blocker, suctioned out blood clots from RUL and mucus plugs from TONJA  - s/p re-bronch 6/5, no active bleeding, suctioned out blood clots from RUL  - will reconsult IR if persistent active bleeding  - no significant B-lines on POCUS  - s/p extubation to bipap on 6/7, currently on bipap 10/5/55% sating in mid 90s.    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 Gastritis and duodenal erosions were also seen possibly 2/2 ASA gastropathy.   - OGT removed 6/7  - protonix 40 daily  - senna/colace/miralax for constipation, had BM on 6/7  - RUQ US: hepatomegaly, coarsening and nodularity of liver ?cirrhosis, mild upper abd ascites, mild biliary sludge    #ID  Initially febrile, elevated white count, no atelectasis on POCUS  - on empiric zosyn day 8, redose zosyn to q12 if CrCl <20  - holding vanc in setting of recent supratherapeutic vanc and ARF  - BCx and sputum Cx NTD    #Renal  - MELLO initially resolved with IVF and pressor support, likely pre-renal in setting of poor PO intake, hemoptysis vs possible PATRICA. Now with ARF suspect 2/2 ?hemodynamically mediated MELLO vs vancomycin nephrotoxicity vs possible PATRICA.  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 s/p PCC, FFP x5, vitamin K x1  - Holding dig 0.25 daily in setting of ARF  - LE dopplers 6/1 with chronic RLE DVT (diagnosed 3/30/18, suspect provoked 2/2 sedentary status after hospitalization around that time for hematemesis, has been on coumadin per cards Dr. Mega Rojas), repeat dopplers ordered to assess if stable or enlarging  - IVC improved to 1.22cm s/p fluid resuscitation as of 6/7  - LR 500cc @100cc/hr to maintain intravascular volume while NPO    #Pulm: Hemoptysis s/p bronch 5/31 showing RUL bleeding. SpO2 at baseline high 80's given cardiopulmonary pathophysiology.  - s/p IR embolization 5/31 of R-sided aortopulmonary collaterals, R bronchial artery, multiple R thoracic intercostal arteries  - s/p bronch 6/1 w/ endobronchial blocker placed for re-bleeding from RUL  - s/p re-bronch 6/4 with removal of blocker, suctioned out blood clots from RUL and mucus plugs from TONJA  - s/p re-bronch 6/5, no active bleeding, suctioned out blood clots from RUL  - No recurrence of hemoptysis for past several days, will reconsult IR if persistent active bleeding  - Mild B-lines on POCUS  - s/p extubation to bipap on 6/7, currently on bipap 10/5/55% sating in mid 90s    #GI: 3/2018 upper endoscopy showed 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 Gastritis and duodenal erosions were also seen possibly 2/2 ASA gastropathy.   - OGT removed 6/7, NPO for now in setting of bipap and precedex  - protonix 40 daily  - senna/colace/miralax for constipation, had BM on 6/7  - RUQ US to eval for congestive hepatopathy: hepatomegaly, coarsening and nodularity of liver ?cirrhosis, mild upper abd ascites, mild biliary sludge    #ID  - Febrile with elevated white count earlier in admission after IR procedure and bronch  - s/p 7 days of empiric zosyn 6/7  - Supratherapeutic vanc resolved  - BCx and sputum Cx NTD    #Renal  - MELLO earlier in admission, initially resolved with IVF and pressor support, likely pre-renal in setting of poor PO intake/hemoptysis vs possible PATRICA. Now with ARF suspect 2/2 hemodynamically mediated MELLO vs vancomycin nephrotoxicity vs possible PATRICA.  - Goal K>4, Mg>2, replete prn  - Monitor Cr, monitor UOP, improved from 20-30cc/hr yesterday to 50cc/hr overnight s/p fluid resuscitation yesterday  - Renal recs appreciated, no HD at this time    #Endocrine  - No active issues    #Heme  - Leukocytosis, suspect reactive after extubation and agitation yesterday  - Monitor daily INR, goal <1.5, was elevated after multiple FFPs possibly 2/2 congestive hepatopathy from pHTN, fibrinogen elevated inconsistent with DIC    #DVT ppx: SCDs    #GOC: full code

## 2019-06-09 LAB
ALBUMIN SERPL ELPH-MCNC: 2.7 G/DL — LOW (ref 3.3–5)
ALP SERPL-CCNC: 82 U/L — SIGNIFICANT CHANGE UP (ref 40–120)
ALT FLD-CCNC: 18 U/L — SIGNIFICANT CHANGE UP (ref 4–41)
ANION GAP SERPL CALC-SCNC: 17 MMO/L — HIGH (ref 7–14)
APTT BLD: 33.1 SEC — SIGNIFICANT CHANGE UP (ref 27.5–36.3)
AST SERPL-CCNC: 38 U/L — SIGNIFICANT CHANGE UP (ref 4–40)
BASOPHILS # BLD AUTO: 0.23 K/UL — HIGH (ref 0–0.2)
BASOPHILS NFR BLD AUTO: 1.8 % — SIGNIFICANT CHANGE UP (ref 0–2)
BILIRUB SERPL-MCNC: 3.7 MG/DL — HIGH (ref 0.2–1.2)
BLD GP AB SCN SERPL QL: NEGATIVE — SIGNIFICANT CHANGE UP
BUN SERPL-MCNC: 51 MG/DL — HIGH (ref 7–23)
CALCIUM SERPL-MCNC: 8.9 MG/DL — SIGNIFICANT CHANGE UP (ref 8.4–10.5)
CHLORIDE SERPL-SCNC: 104 MMOL/L — SIGNIFICANT CHANGE UP (ref 98–107)
CO2 SERPL-SCNC: 20 MMOL/L — LOW (ref 22–31)
CREAT SERPL-MCNC: 2.53 MG/DL — HIGH (ref 0.5–1.3)
EOSINOPHIL # BLD AUTO: 0.19 K/UL — SIGNIFICANT CHANGE UP (ref 0–0.5)
EOSINOPHIL NFR BLD AUTO: 1.5 % — SIGNIFICANT CHANGE UP (ref 0–6)
GLUCOSE SERPL-MCNC: 98 MG/DL — SIGNIFICANT CHANGE UP (ref 70–99)
HCT VFR BLD CALC: 34.4 % — LOW (ref 39–50)
HGB BLD-MCNC: 11.1 G/DL — LOW (ref 13–17)
IMM GRANULOCYTES NFR BLD AUTO: 1.4 % — SIGNIFICANT CHANGE UP (ref 0–1.5)
INR BLD: 1.21 — HIGH (ref 0.88–1.17)
LYMPHOCYTES # BLD AUTO: 1.07 K/UL — SIGNIFICANT CHANGE UP (ref 1–3.3)
LYMPHOCYTES # BLD AUTO: 8.6 % — LOW (ref 13–44)
MAGNESIUM SERPL-MCNC: 2.5 MG/DL — SIGNIFICANT CHANGE UP (ref 1.6–2.6)
MCHC RBC-ENTMCNC: 24.1 PG — LOW (ref 27–34)
MCHC RBC-ENTMCNC: 32.3 % — SIGNIFICANT CHANGE UP (ref 32–36)
MCV RBC AUTO: 74.8 FL — LOW (ref 80–100)
MONOCYTES # BLD AUTO: 1.36 K/UL — HIGH (ref 0–0.9)
MONOCYTES NFR BLD AUTO: 10.9 % — SIGNIFICANT CHANGE UP (ref 2–14)
NEUTROPHILS # BLD AUTO: 9.46 K/UL — HIGH (ref 1.8–7.4)
NEUTROPHILS NFR BLD AUTO: 75.8 % — SIGNIFICANT CHANGE UP (ref 43–77)
NRBC # FLD: 0.04 K/UL — SIGNIFICANT CHANGE UP (ref 0–0)
PHOSPHATE SERPL-MCNC: 3.9 MG/DL — SIGNIFICANT CHANGE UP (ref 2.5–4.5)
PLATELET # BLD AUTO: 291 K/UL — SIGNIFICANT CHANGE UP (ref 150–400)
PMV BLD: SIGNIFICANT CHANGE UP FL (ref 7–13)
POTASSIUM SERPL-MCNC: 4.2 MMOL/L — SIGNIFICANT CHANGE UP (ref 3.5–5.3)
POTASSIUM SERPL-SCNC: 4.2 MMOL/L — SIGNIFICANT CHANGE UP (ref 3.5–5.3)
PROT SERPL-MCNC: 6.2 G/DL — SIGNIFICANT CHANGE UP (ref 6–8.3)
PROTHROM AB SERPL-ACNC: 13.5 SEC — HIGH (ref 9.8–13.1)
RBC # BLD: 4.6 M/UL — SIGNIFICANT CHANGE UP (ref 4.2–5.8)
RBC # FLD: 22.7 % — HIGH (ref 10.3–14.5)
RH IG SCN BLD-IMP: NEGATIVE — SIGNIFICANT CHANGE UP
SODIUM SERPL-SCNC: 141 MMOL/L — SIGNIFICANT CHANGE UP (ref 135–145)
WBC # BLD: 12.48 K/UL — HIGH (ref 3.8–10.5)
WBC # FLD AUTO: 12.48 K/UL — HIGH (ref 3.8–10.5)

## 2019-06-09 PROCEDURE — 99232 SBSQ HOSP IP/OBS MODERATE 35: CPT | Mod: GC

## 2019-06-09 PROCEDURE — 93970 EXTREMITY STUDY: CPT | Mod: 26

## 2019-06-09 PROCEDURE — 99291 CRITICAL CARE FIRST HOUR: CPT

## 2019-06-09 PROCEDURE — 74018 RADEX ABDOMEN 1 VIEW: CPT | Mod: 26

## 2019-06-09 PROCEDURE — 99233 SBSQ HOSP IP/OBS HIGH 50: CPT

## 2019-06-09 RX ORDER — FUROSEMIDE 40 MG
20 TABLET ORAL ONCE
Refills: 0 | Status: COMPLETED | OUTPATIENT
Start: 2019-06-09 | End: 2019-06-09

## 2019-06-09 RX ORDER — ALPRAZOLAM 0.25 MG
0.25 TABLET ORAL EVERY 8 HOURS
Refills: 0 | Status: DISCONTINUED | OUTPATIENT
Start: 2019-06-09 | End: 2019-06-13

## 2019-06-09 RX ADMIN — Medication 100 MILLIGRAM(S): at 15:05

## 2019-06-09 RX ADMIN — PANTOPRAZOLE SODIUM 40 MILLIGRAM(S): 20 TABLET, DELAYED RELEASE ORAL at 05:22

## 2019-06-09 RX ADMIN — DEXMEDETOMIDINE HYDROCHLORIDE IN 0.9% SODIUM CHLORIDE 6.62 MICROGRAM(S)/KG/HR: 4 INJECTION INTRAVENOUS at 05:22

## 2019-06-09 RX ADMIN — PANTOPRAZOLE SODIUM 40 MILLIGRAM(S): 20 TABLET, DELAYED RELEASE ORAL at 18:11

## 2019-06-09 RX ADMIN — CHLORHEXIDINE GLUCONATE 1 APPLICATION(S): 213 SOLUTION TOPICAL at 08:31

## 2019-06-09 RX ADMIN — DEXMEDETOMIDINE HYDROCHLORIDE IN 0.9% SODIUM CHLORIDE 6.62 MICROGRAM(S)/KG/HR: 4 INJECTION INTRAVENOUS at 08:31

## 2019-06-09 RX ADMIN — Medication 20 MILLIGRAM(S): at 15:05

## 2019-06-09 NOTE — PROGRESS NOTE ADULT - PROBLEM SELECTOR PLAN 1
Patient with MELLO in setting of hypotension and vancomycin use. On review of previous labs in [facility], [place], Scr noted to be WNL. Scr on admission was noted to be WNL (0.86) on 5/31/19. However, Scr was noted to be elevated at 1.85 on 6/5/19, increased further to 2.68 yesterday (6/8/19) and improved to 2.53 today (6/9/19). UA shows 100 protein but no RBCs or WBCs seen. Urine electrolytes are suggestive of pre renal etiology. Patient urine output improved, currently non-oliguric. Patient with ? hemodynamically mediated MELLO from hypotension. Patient with ? ATN from hypotension and vancomycin use. Spot urine TP/CR elevated at 1.42 on 6/7/19. Diuretics as per cardiology team. Monitor Scr, I/O, and electrolytes. Avoid NSAIDs, RCAs, and other nephrotoxins Pt. with MELLO in setting of hypotension and vancomycin use. Scr on admission was  WNL (0.86) on 5/31/19, increased to 2.68 yesterday (6/8/19) and improved to 2.53 today (6/9/19). UA shows 100 protein but no RBCs or WBCs seen. Urine electrolytes are suggestive of pre-renal MELLO. Patient with ? hemodynamically mediated MELLO from hypotension. Patient with ? ATN from hypotension and vancomycin use. Spot urine TP/CR elevated at 1.42 on 6/7/19. Pt. currently non-oliguric. Diuretics as per cardiology team. Monitor Scr, I/O, and electrolytes. Avoid NSAIDs, RCAs, and other nephrotoxins

## 2019-06-09 NOTE — CHART NOTE - NSCHARTNOTEFT_GEN_A_CORE
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 Patient was initially admitted to MICU at [facility] for monitoring with hemoptysis. He was started on Hycodan standing, morphine prn for pain with hemoptysis. After admission, he had intermittent hemoptysis with approximately 200 cc's bright red blood measured overnight. Coumadin was held, and he received Kcentra x 1. He is still pending LE doppler to assess old RLE DVT for resolution. He was continued on his home digoxin 0.25 mg daily. GI evaluated the patient and did not recommend EGD at this time to assess possible cervical esophageal varices seen on 3/2018 EGD. They did recommend protonix 40mg IVP BID which was started.  CT scan showed patchy upper lobe opacity which may represent patient's known hemorrhage. CT was discussed with IR; IR does not recommend attempt at embolization as no localized bleed. Multidisciplinary discussion held between MICU, Adult congenital cardiology and CT surgery and [facility]. [facility]     In the MICU, patient underwent IR embolization on 05/31 s/p multiple right sided aortopulmonary collaterals embolization, right bronchial artery embolization and multiple right thoracic intercostal arteries embolization. Patient required intubation on 05/31 for hypoxic respiratory failure secondary to persistent hemoptysis. Patient required endobronchial blocker in RUL to control bleeding. Patient was also started on empiric antibiotic Vanc, Zosyn and Azithro for presumed infection due to hypothermia and leukocytosis. Course complicated by rebleeding, requiring treatment with tranexamic acid and correcting coagulopathy with Kcentra and FFP. Patient had multiple bronch in the MICU 6/1, 6/4 with most recent bronch on 6/5 with no active bleeding. Course complicated by oliguria with increasing creatine despite volume resuscitation. Renal was consulted and patient was given intermittent doses of IV Lasix to main fluid balance. Patient was extubated on 6/7, currently on 3 L NC with Oxygen saturation >85%. Patient was found to have abdominal distention s/p abdomina xray with no signs of obstruction.     Vital Signs Last 24 Hrs  T(C): 37.3 (09 Jun 2019 16:00), Max: 37.3 (09 Jun 2019 12:00)  T(F): 99.1 (09 Jun 2019 16:00), Max: 99.1 (09 Jun 2019 12:00)  HR: 89 (09 Jun 2019 19:00) (66 - 91)  BP: 139/69 (09 Jun 2019 19:00) (104/86 - 139/69)  BP(mean): 87 (09 Jun 2019 19:00) (79 - 94)  RR: 30 (09 Jun 2019 19:00) (16 - 40)  SpO2: 93% (09 Jun 2019 19:00) (87% - 97%)  I&O's Summary    08 Jun 2019 07:01  -  09 Jun 2019 07:00  --------------------------------------------------------  IN: 1283.1 mL / OUT: 1095 mL / NET: 188.1 mL    09 Jun 2019 07:01  -  09 Jun 2019 19:42  --------------------------------------------------------  IN: 44 mL / OUT: 785 mL / NET: -741 mL        MEDICATIONS  (STANDING):  chlorhexidine 4% Liquid 1 Application(s) Topical <User Schedule>  docusate sodium Liquid 100 milliGRAM(s) Oral three times a day  pantoprazole  Injectable 40 milliGRAM(s) IV Push two times a day  polyethylene glycol 3350 17 Gram(s) Oral two times a day  senna Syrup 10 milliLiter(s) Oral at bedtime    MEDICATIONS  (PRN):  acetaminophen    Suspension .. 650 milliGRAM(s) Oral every 6 hours PRN Temp greater or equal to 38C (100.4F)  ALPRAZolam 0.25 milliGRAM(s) Oral every 8 hours PRN Anxiety  bisacodyl Suppository 10 milliGRAM(s) Rectal daily PRN Constipation        LABS                                            11.1                  Neurophils% (auto):   75.8   (06-09 @ 05:31):    12.48)-----------(291          Lymphocytes% (auto):  8.6                                           34.4                   Eosinphils% (auto):   1.5      Manual%: Neutrophils x    ; Lymphocytes x    ; Eosinophils x    ; Bands%: x    ; Blasts x                                    141    |  104    |  51                  Calcium: 8.9   / iCa: x      (06-09 @ 05:31)    ----------------------------<  98        Magnesium: 2.5                              4.2     |  20     |  2.53             Phosphorous: 3.9      TPro  6.2    /  Alb  2.7    /  TBili  3.7    /  DBili  x      /  AST  38     /  ALT  18     /  AlkPhos  82     09 Jun 2019 05:31    ( 06-09 @ 05:31 )   PT: 13.5 SEC;   INR: 1.21   aPTT: 33.1 SEC    ASSESSMENT & PLAN:     25 y/o M with 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    #Neuro: no active issues  - On precedex for agitation  - Careful sedation as pre-load dependent  - Xanax PRN for anxiety    #CV: Heterotaxy with congenital single atrium/ventricle s/p multiple cardiac surgeries including Fontan's  - Weaned off pressors 6/7  - Possible angiogram per cardiology  - Holding Coumadin in setting of recent bleed. s/p PCC, FFP x5, vitamin K x1  - Holding dig 0.25 daily in setting of ARF  - LE dopplers 6/1 with chronic RLE DVT (diagnosed 3/30/18, suspect provoked 2/2 sedentary status after hospitalization around that time for hematemesis, has been on coumadin per cards Dr. Mega Rojas), repeat dopplers ordered to assess if stable or enlarging  - IVC improved to 1.22cm s/p fluid resuscitation as of 6/7  - trial lasix 20mg x1    #Pulm: Hemoptysis s/p bronch 5/31 showing RUL bleeding. SpO2 at baseline high 80's given cardiopulmonary pathophysiology.  - s/p IR embolization 5/31 of R-sided aortopulmonary collaterals, R bronchial artery, multiple R thoracic intercostal arteries  - s/p bronch 6/1 w/ endobronchial blocker placed for re-bleeding from RUL  - s/p re-bronch 6/4 with removal of blocker, suctioned out blood clots from RUL and mucus plugs from TONJA  - s/p re-bronch 6/5, no active bleeding, suctioned out blood clots from RUL  - No recurrence of hemoptysis for past several days, will reconsult IR if persistent active bleeding  - Mild B-lines on POCUS  - s/p extubation to bipap on 6/7, now off bipap 6/9  - on venti mask    #GI: 3/2018 upper endoscopy showed 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 Gastritis and duodenal erosions were also seen possibly 2/2 ASA gastropathy.   - OGT removed 6/7, NPO for now in setting of bipap and precedex  - protonix 40 daily  - senna/colace/miralax for constipation, had BM on 6/7  - RUQ US to eval for congestive hepatopathy: hepatomegaly, coarsening and nodularity of liver ?cirrhosis, mild upper abd ascites, mild biliary sludge  - AXR without significant stool burden; no SBO    #ID  - Febrile with elevated white count earlier in admission after IR procedure and bronch  - s/p 7 days of empiric zosyn 6/7  - Supratherapeutic vanc resolved  - BCx and sputum Cx NTD    #Renal  - MELLO earlier in admission, initially resolved with IVF and pressor support, likely pre-renal in setting of poor PO intake/hemoptysis vs possible PATRICA. Now with ARF suspect 2/2 hemodynamically mediated MELLO vs vancomycin nephrotoxicity vs possible PATRICA.  - Goal K>4, Mg>2, replete prn  - Monitor Cr, monitor UOP, improved from 20-30cc/hr yesterday to 50cc/hr overnight s/p fluid resuscitation yesterday  - Renal recs appreciated, no HD at this time    #Endocrine  - No active issues    #Heme  - Leukocytosis, suspect reactive after extubation and agitation   - Monitor daily INR, goal <1.5, was elevated after multiple FFPs possibly 2/2 congestive hepatopathy from pHTN, fibrinogen elevated inconsistent with DIC    #DVT ppx: SCD      #GOC: full code      For Followup:  - Monitor Cr and UOP   - Monitor daily INR  - Follow up on ped-cardiology recs

## 2019-06-09 NOTE — PHYSICAL THERAPY INITIAL EVALUATION ADULT - PERTINENT HX OF CURRENT PROBLEM, REHAB EVAL
admitted with recurrent hemoptysis, on blood thinners.  Pt. with hx. of congenital 2 chamber heart, undergone multiple cardiac Sx.  Underwent Embolization by IR.  s/p Bronchoscopy on 6/4 for removal of clots

## 2019-06-09 NOTE — PROGRESS NOTE ADULT - SUBJECTIVE AND OBJECTIVE BOX
Angel Villalba, PGY1  Pager: [phone]      Patient is a 24y old  Male who presents with a chief complaint of Hemoptysis (2019 08:03)      SUBJECTIVE / OVERNIGHT EVENTS:    MEDICATIONS  (STANDING):  chlorhexidine 4% Liquid 1 Application(s) Topical <User Schedule>  dexmedetomidine Infusion 0.5 MICROgram(s)/kG/Hr (6.625 mL/Hr) IV Continuous <Continuous>  docusate sodium Liquid 100 milliGRAM(s) Oral three times a day  lactated ringers. 500 milliLiter(s) (100 mL/Hr) IV Continuous <Continuous>  pantoprazole  Injectable 40 milliGRAM(s) IV Push two times a day  polyethylene glycol 3350 17 Gram(s) Oral two times a day  senna Syrup 10 milliLiter(s) Oral at bedtime    MEDICATIONS  (PRN):  acetaminophen    Suspension .. 650 milliGRAM(s) Oral every 6 hours PRN Temp greater or equal to 38C (100.4F)      Vital Signs Last 24 Hrs  T(C): 36.3 (2019 00:00), Max: 36.8 (2019 16:00)  T(F): 97.3 (2019 00:00), Max: 98.2 (2019 16:00)  HR: 67 (2019 06:00) (66 - 94)  BP: 121/70 (2019 06:00) (108/67 - 154/70)  BP(mean): 83 (2019 06:00) (78 - 96)  RR: 25 (2019 06:00) (16 - 37)  SpO2: 92% (2019 06:00) (87% - 99%)  CAPILLARY BLOOD GLUCOSE        I&O's Summary    2019 07:01  -  2019 07:00  --------------------------------------------------------  IN: 1272.1 mL / OUT: 1095 mL / NET: 177.1 mL        PHYSICAL EXAM:  GENERAL: NAD   CHEST/LUNG: CTABL ; No wheeze  HEART: RRR; No murmurs  ABDOMEN: Soft, Nontender, Nondistended; Bowel sounds present  :    EXTREMITIES:  No edema  PSYCH: AAOx  NEUROLOGY: no gross focal deficits    LABS:                        11.1   12.48 )-----------( 291      ( 2019 05:31 )             34.4         141  |  104  |  51<H>  ----------------------------<  98  4.2   |  20<L>  |  2.53<H>    Ca    8.9      2019 05:31  Phos  3.9       Mg     2.5         TPro  6.2  /  Alb  2.7<L>  /  TBili  3.7<H>  /  DBili  x   /  AST  38  /  ALT  18  /  AlkPhos  82      PT/INR - ( 2019 05:31 )   PT: 13.5 SEC;   INR: 1.21          PTT - ( 2019 05:31 )  PTT:33.1 SEC  CARDIAC MARKERS ( 2019 01:00 )  x     / x     / 253 u/L / x     / x          Urinalysis Basic - ( 2019 08:31 )    Color: DARK YELLOW / Appearance: Lt TURBID / S.028 / pH: 6.0  Gluc: NEGATIVE / Ketone: NEGATIVE  / Bili: SMALL / Urobili: SMALL   Blood: SMALL / Protein: 100 / Nitrite: NEGATIVE   Leuk Esterase: NEGATIVE / RBC: 2-5 / WBC 2-5   Sq Epi: x / Non Sq Epi: x / Bacteria: MOD        Microbiology;        RADIOLOGY & ADDITIONAL TESTS: Angel Orly, PGY1  Pager: [phone]      Patient is a 24y old  Male who presents with a chief complaint of Hemoptysis (2019 08:03)      SUBJECTIVE / OVERNIGHT EVENTS:    Patient doing well off bipap and on face tent. Successfully did bedside swallow.     MEDICATIONS  (STANDING):  chlorhexidine 4% Liquid 1 Application(s) Topical <User Schedule>  dexmedetomidine Infusion 0.5 MICROgram(s)/kG/Hr (6.625 mL/Hr) IV Continuous <Continuous>  docusate sodium Liquid 100 milliGRAM(s) Oral three times a day  lactated ringers. 500 milliLiter(s) (100 mL/Hr) IV Continuous <Continuous>  pantoprazole  Injectable 40 milliGRAM(s) IV Push two times a day  polyethylene glycol 3350 17 Gram(s) Oral two times a day  senna Syrup 10 milliLiter(s) Oral at bedtime    MEDICATIONS  (PRN):  acetaminophen    Suspension .. 650 milliGRAM(s) Oral every 6 hours PRN Temp greater or equal to 38C (100.4F)      Vital Signs Last 24 Hrs  T(C): 36.3 (2019 00:00), Max: 36.8 (2019 16:00)  T(F): 97.3 (2019 00:00), Max: 98.2 (2019 16:00)  HR: 67 (2019 06:00) (66 - 94)  BP: 121/70 (2019 06:00) (108/67 - 154/70)  BP(mean): 83 (2019 06:00) (78 - 96)  RR: 25 (2019 06:00) (16 - 37)  SpO2: 92% (2019 06:00) (87% - 99%)  CAPILLARY BLOOD GLUCOSE        I&O's Summary    2019 07:01  -  2019 07:00  --------------------------------------------------------  IN: 1272.1 mL / OUT: 1095 mL / NET: 177.1 mL        PHYSICAL EXAM:  GENERAL: NAD   CHEST/LUNG: CTABL ; No wheeze  HEART: RRR; No murmurs  ABDOMEN: Marked distension, nontender; Bowel sounds present  EXTREMITIES:  pitting edema  PSYCH: AAOx3  NEUROLOGY: no gross focal deficits    LABS:                        11.1   12.48 )-----------( 291      ( 2019 05:31 )             34.4         141  |  104  |  51<H>  ----------------------------<  98  4.2   |  20<L>  |  2.53<H>    Ca    8.9      2019 05:31  Phos  3.9       Mg     2.5         TPro  6.2  /  Alb  2.7<L>  /  TBili  3.7<H>  /  DBili  x   /  AST  38  /  ALT  18  /  AlkPhos  82  -    PT/INR - ( 2019 05:31 )   PT: 13.5 SEC;   INR: 1.21          PTT - ( 2019 05:31 )  PTT:33.1 SEC  CARDIAC MARKERS ( 2019 01:00 )  x     / x     / 253 u/L / x     / x          Urinalysis Basic - ( 2019 08:31 )    Color: DARK YELLOW / Appearance: Lt TURBID / S.028 / pH: 6.0  Gluc: NEGATIVE / Ketone: NEGATIVE  / Bili: SMALL / Urobili: SMALL   Blood: SMALL / Protein: 100 / Nitrite: NEGATIVE   Leuk Esterase: NEGATIVE / RBC: 2-5 / WBC 2-5   Sq Epi: x / Non Sq Epi: x / Bacteria: MOD        Microbiology;        RADIOLOGY & ADDITIONAL TESTS:

## 2019-06-09 NOTE — PROGRESS NOTE ADULT - ASSESSMENT
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 s/p PCC, FFP x5, vitamin K x1  - Holding dig 0.25 daily in setting of ARF  - LE dopplers 6/1 with chronic RLE DVT (diagnosed 3/30/18, suspect provoked 2/2 sedentary status after hospitalization around that time for hematemesis, has been on coumadin per cards Dr. Mega Rojas), repeat dopplers ordered to assess if stable or enlarging  - IVC improved to 1.22cm s/p fluid resuscitation as of 6/7  - LR 500cc @100cc/hr to maintain intravascular volume while NPO    #Pulm: Hemoptysis s/p bronch 5/31 showing RUL bleeding. SpO2 at baseline high 80's given cardiopulmonary pathophysiology.  - s/p IR embolization 5/31 of R-sided aortopulmonary collaterals, R bronchial artery, multiple R thoracic intercostal arteries  - s/p bronch 6/1 w/ endobronchial blocker placed for re-bleeding from RUL  - s/p re-bronch 6/4 with removal of blocker, suctioned out blood clots from RUL and mucus plugs from TONJA  - s/p re-bronch 6/5, no active bleeding, suctioned out blood clots from RUL  - No recurrence of hemoptysis for past several days, will reconsult IR if persistent active bleeding  - Mild B-lines on POCUS  - s/p extubation to bipap on 6/7, currently on bipap 10/5/55% sating in mid 90s    #GI: 3/2018 upper endoscopy showed 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 Gastritis and duodenal erosions were also seen possibly 2/2 ASA gastropathy.   - OGT removed 6/7, NPO for now in setting of bipap and precedex  - protonix 40 daily  - senna/colace/miralax for constipation, had BM on 6/7  - RUQ US to eval for congestive hepatopathy: hepatomegaly, coarsening and nodularity of liver ?cirrhosis, mild upper abd ascites, mild biliary sludge    #ID  - Febrile with elevated white count earlier in admission after IR procedure and bronch  - s/p 7 days of empiric zosyn 6/7  - Supratherapeutic vanc resolved  - BCx and sputum Cx NTD    #Renal  - MELLO earlier in admission, initially resolved with IVF and pressor support, likely pre-renal in setting of poor PO intake/hemoptysis vs possible PATRICA. Now with ARF suspect 2/2 hemodynamically mediated MELLO vs vancomycin nephrotoxicity vs possible PATRICA.  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    #Neuro: no active issues  - On precedex for agitation  - Careful sedation as pre-load dependent  - Xanax PRN for anxiety    #CV: Heterotaxy with congenital single atrium/ventricle s/p multiple cardiac surgeries including Fontan's  - Weaned off pressors 6/7  - Possible angiogram per cardiology  - Holding Coumadin in setting of recent bleed. s/p PCC, FFP x5, vitamin K x1  - Holding dig 0.25 daily in setting of ARF  - LE dopplers 6/1 with chronic RLE DVT (diagnosed 3/30/18, suspect provoked 2/2 sedentary status after hospitalization around that time for hematemesis, has been on coumadin per cards Dr. Mega Rojas), repeat dopplers ordered to assess if stable or enlarging  - IVC improved to 1.22cm s/p fluid resuscitation as of 6/7  - trial lasix 20mg x1    #Pulm: Hemoptysis s/p bronch 5/31 showing RUL bleeding. SpO2 at baseline high 80's given cardiopulmonary pathophysiology.  - s/p IR embolization 5/31 of R-sided aortopulmonary collaterals, R bronchial artery, multiple R thoracic intercostal arteries  - s/p bronch 6/1 w/ endobronchial blocker placed for re-bleeding from RUL  - s/p re-bronch 6/4 with removal of blocker, suctioned out blood clots from RUL and mucus plugs from TONJA  - s/p re-bronch 6/5, no active bleeding, suctioned out blood clots from RUL  - No recurrence of hemoptysis for past several days, will reconsult IR if persistent active bleeding  - Mild B-lines on POCUS  - s/p extubation to bipap on 6/7, now off bipap 6/9  - on venti mask    #GI: 3/2018 upper endoscopy showed 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 Gastritis and duodenal erosions were also seen possibly 2/2 ASA gastropathy.   - OGT removed 6/7, NPO for now in setting of bipap and precedex  - protonix 40 daily  - senna/colace/miralax for constipation, had BM on 6/7  - RUQ US to eval for congestive hepatopathy: hepatomegaly, coarsening and nodularity of liver ?cirrhosis, mild upper abd ascites, mild biliary sludge  - AXR without significant stool burden; no SBO    #ID  - Febrile with elevated white count earlier in admission after IR procedure and bronch  - s/p 7 days of empiric zosyn 6/7  - Supratherapeutic vanc resolved  - BCx and sputum Cx NTD    #Renal  - MELLO earlier in admission, initially resolved with IVF and pressor support, likely pre-renal in setting of poor PO intake/hemoptysis vs possible PATRICA. Now with ARF suspect 2/2 hemodynamically mediated MELLO vs vancomycin nephrotoxicity vs possible PATRICA.  - Goal K>4, Mg>2, replete prn  - Monitor Cr, monitor UOP, improved from 20-30cc/hr yesterday to 50cc/hr overnight s/p fluid resuscitation yesterday  - Renal recs appreciated, no HD at this time    #Endocrine  - No active issues    #Heme  - Leukocytosis, suspect reactive after extubation and agitation   - Monitor daily INR, goal <1.5, was elevated after multiple FFPs possibly 2/2 congestive hepatopathy from pHTN, fibrinogen elevated inconsistent with DIC    #DVT ppx: SCDs    #GOC: full code

## 2019-06-09 NOTE — PROGRESS NOTE ADULT - SUBJECTIVE AND OBJECTIVE BOX
[facility] Progress Note    S: Remained on BIPAP for most of the day. Walked in his room. Eager to drink water.    O:  ICU Vital Signs Last 24 Hrs  T(C): 36.5 (09 Jun 2019 08:00), Max: 36.8 (08 Jun 2019 16:00)  T(F): 97.7 (09 Jun 2019 08:00), Max: 98.2 (08 Jun 2019 16:00)  HR: 66 (09 Jun 2019 09:00) (66 - 94)  BP: 104/86 (09 Jun 2019 09:00) (104/86 - 154/70)  BP(mean): 94 (09 Jun 2019 09:00) (78 - 96)  ABP: --  ABP(mean): --  RR: 18 (09 Jun 2019 09:00) (16 - 37)  SpO2: 87% (09 Jun 2019 09:00) (87% - 97%)    laying in bed, tachypneic on facemask  mmm  s1 single s2, 3/6 HSM at [facility], pectus  coarse lung sounds, tachypneic, decreased at bases  abdomen soft but distended  + dependent edema- scrotum, back, legs  warm distally  chronic RLE venous stasis changes    06-09    141  |  104  |  51<H>  ----------------------------<  98  4.2   |  20<L>  |  2.53<H>    Ca    8.9      09 Jun 2019 05:31  Phos  3.9     06-09  Mg     2.5     06-09    TPro  6.2  /  Alb  2.7<L>  /  TBili  3.7<H>  /  DBili  x   /  AST  38  /  ALT  18  /  AlkPhos  82  06-09                          11.1   12.48 )-----------( 291      ( 09 Jun 2019 05:31 )             34.4   PT/INR - ( 09 Jun 2019 05:31 )   PT: 13.5 SEC;   INR: 1.21          PTT - ( 09 Jun 2019 05:31 )  PTT:33.1 SEC    telemetry:  sinus rhythm with 1st degree av block  frequent PVCs yesterday afternoon, around midnight and early morning- ventricular bigemeny, occasional couplets

## 2019-06-09 NOTE — PROGRESS NOTE ADULT - SUBJECTIVE AND OBJECTIVE BOX
[facility] DIVISION OF KIDNEY DISEASES AND HYPERTENSION -- FOLLOW UP NOTE  --------------------------------------------------------------------------------  HPI: 24 year old male with h/o heterotaxy syndrome, TAPVR initially presented to [facility] on 5/30 with complaints of hemoptysis. Patient developed hypoxemic respiratory failure and was intubated. Patient was subsequently transferred to [facility] and had IR guided embolization as well. Patient is currently admitted for management of recurrent hemoptysis. Nephrology was consulted for elevated creatinine. On review of previous labs in [facility], Scr. noted to be WNL. Scr. on admission was noted to be WNL( 0.86) on 5/31/19. However Scr. was noted to be elevated at 1.85 on 6/5/19 and increased further to 2.64 on 6/7/19. Patient noted to have decreasing urine output. On chart review, patient noted to have multiple low BP readings since 6/4/19 and was on IV pressors. Patient also noted to have elevated vancomycin level (37.5) on 6/5/19 as well. Pt. was extubated on 6/7/19.    Pt. was seen and examined with father at bedside, remains on BiPap and sedated; unable to obtain further history. Received 1 L NS 0.9% yesterday.       PAST HISTORY  --------------------------------------------------------------------------------  No significant changes to PMH, PSH, FHx, SHx, unless otherwise noted    ALLERGIES & MEDICATIONS  --------------------------------------------------------------------------------  Allergies    No Known Allergies    Intolerances      Standing Inpatient Medications  chlorhexidine 4% Liquid 1 Application(s) Topical <User Schedule>  dexmedetomidine Infusion 0.5 MICROgram(s)/kG/Hr IV Continuous <Continuous>  docusate sodium Liquid 100 milliGRAM(s) Oral three times a day  lactated ringers. 500 milliLiter(s) IV Continuous <Continuous>  pantoprazole  Injectable 40 milliGRAM(s) IV Push two times a day  polyethylene glycol 3350 17 Gram(s) Oral two times a day  senna Syrup 10 milliLiter(s) Oral at bedtime    PRN Inpatient Medications  acetaminophen    Suspension .. 650 milliGRAM(s) Oral every 6 hours PRN      REVIEW OF SYSTEMS  --------------------------------------------------------------------------------  Unable to obtain    VITALS/PHYSICAL EXAM  --------------------------------------------------------------------------------  T(C): 36.3 (06-09-19 @ 00:00), Max: 36.8 (06-08-19 @ 16:00)  HR: 67 (06-09-19 @ 06:00) (66 - 94)  BP: 121/70 (06-09-19 @ 06:00) (108/67 - 154/70)  RR: 25 (06-09-19 @ 06:00) (16 - 37)  SpO2: 92% (06-09-19 @ 06:00) (82% - 99%)  Wt(kg): --        06-07-19 @ 07:01  -  06-08-19 @ 07:00  --------------------------------------------------------  IN: 1224.6 mL / OUT: 795 mL / NET: 429.6 mL    06-08-19 @ 07:01  -  06-09-19 @ 06:55  --------------------------------------------------------  IN: 1272.1 mL / OUT: 1095 mL / NET: 177.1 mL        Physical Exam:  	Gen: Sedated  	HEENT: Bipap mask  	Pulm: CTA B/L  	CV:  S1S2  	Abd: +BS, soft   	Ext: pitting B/L Lower ext edema +  	Neuro: Sedated  	Skin: Warm, without rashes    LABS/STUDIES  --------------------------------------------------------------------------------              11.1   12.48 >-----------<  291      [06-09-19 @ 05:31]              34.4     141  |  104  |  51  ----------------------------<  98      [06-09-19 @ 05:31]  4.2   |  20  |  2.53        Ca     8.9     [06-09-19 @ 05:31]      Mg     2.5     [06-09-19 @ 05:31]      Phos  3.9     [06-09-19 @ 05:31]    TPro  6.2  /  Alb  2.7  /  TBili  3.7  /  DBili  x   /  AST  38  /  ALT  18  /  AlkPhos  82  [06-09-19 @ 05:31]    PT/INR: PT 13.5 , INR 1.21       [06-09-19 @ 05:31]  PTT: 33.1       [06-09-19 @ 05:31]          [06-08-19 @ 01:00]    Creatinine Trend:  SCr 2.53 [06-09 @ 05:31]  SCr 2.68 [06-08 @ 01:00]  SCr 2.64 [06-07 @ 03:10]  SCr 2.45 [06-06 @ 15:28]  SCr 2.47 [06-06 @ 12:12]    Urinalysis - [06-07-19 @ 08:31]      Color DARK YELLOW / Appearance Lt TURBID / SG 1.028 / pH 6.0      Gluc NEGATIVE / Ketone NEGATIVE  / Bili SMALL / Urobili SMALL       Blood SMALL / Protein 100 / Leuk Est NEGATIVE / Nitrite NEGATIVE      RBC 2-5 / WBC 2-5 / Hyaline  / Gran  / Sq Epi  / Non Sq Epi  / Bacteria MOD    Urine Creatinine 148.40      [06-07-19 @ 08:06]  Urine Protein 211.9      [06-07-19 @ 08:06]  Urine Sodium < 20      [06-07-19 @ 08:06]  Urine Potassium 55.2      [06-07-19 @ 08:06]  Urine Chloride < 20      [06-07-19 @ 08:06]  Urine Osmolality 404      [06-07-19 @ 08:06] [facility] DIVISION OF KIDNEY DISEASES AND HYPERTENSION -- FOLLOW UP NOTE  --------------------------------------------------------------------------------  HPI: 24 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 Patient developed hypoxemic respiratory failure and was intubated. Patient was subsequently transferred to [facility] and had IR guided embolization as well. Patient is currently admitted for management of recurrent hemoptysis. Nephrology was consulted for elevated creatinine. On review of previous labs in [facility], Scr. noted to be WNL. Scr. on admission was noted to be WNL( 0.86) on 5/31/19. However Scr. was noted to be elevated at 1.85 on 6/5/19 and increased further to 2.64 on 6/7/19. Patient noted to have decreasing urine output. On chart review, patient noted to have multiple low BP readings since 6/4/19 and was on IV pressors. Patient also noted to have elevated vancomycin level (37.5) on 6/5/19 as well. Pt. was extubated on 6/7/19.    Pt. was seen and examined with father at bedside, remains on BiPAP and sedated; unable to obtain further history. Received 1 L NS 0.9% yesterday.     PAST HISTORY  --------------------------------------------------------------------------------  No significant changes to PMH, PSH, FHx, SHx, unless otherwise noted    ALLERGIES & MEDICATIONS  --------------------------------------------------------------------------------  Allergies    No Known Allergies    Intolerances    Standing Inpatient Medications  chlorhexidine 4% Liquid 1 Application(s) Topical <User Schedule>  dexmedetomidine Infusion 0.5 MICROgram(s)/kG/Hr IV Continuous <Continuous>  docusate sodium Liquid 100 milliGRAM(s) Oral three times a day  lactated ringers. 500 milliLiter(s) IV Continuous <Continuous>  pantoprazole  Injectable 40 milliGRAM(s) IV Push two times a day  polyethylene glycol 3350 17 Gram(s) Oral two times a day  senna Syrup 10 milliLiter(s) Oral at bedtime    PRN Inpatient Medications  acetaminophen    Suspension .. 650 milliGRAM(s) Oral every 6 hours PRN    REVIEW OF SYSTEMS  --------------------------------------------------------------------------------  Unable to obtain    VITALS/PHYSICAL EXAM  --------------------------------------------------------------------------------  T(C): 36.3 (06-09-19 @ 00:00), Max: 36.8 (06-08-19 @ 16:00)  HR: 67 (06-09-19 @ 06:00) (66 - 94)  BP: 121/70 (06-09-19 @ 06:00) (108/67 - 154/70)  RR: 25 (06-09-19 @ 06:00) (16 - 37)  SpO2: 92% (06-09-19 @ 06:00) (82% - 99%)  Wt(kg): --    06-07-19 @ 07:01  -  06-08-19 @ 07:00  --------------------------------------------------------  IN: 1224.6 mL / OUT: 795 mL / NET: 429.6 mL    06-08-19 @ 07:01  -  06-09-19 @ 06:55  --------------------------------------------------------  IN: 1272.1 mL / OUT: 1095 mL / NET: 177.1 mL    Physical Exam:  	Gen: Sedated  	HEENT: Bipap mask  	Pulm: CTA B/L  	CV:  S1S2  	Abd: +BS, soft   	Ext: pitting B/L Lower ext edema +  	Neuro: Sedated  	Skin: Warm, without rashes    LABS/STUDIES  --------------------------------------------------------------------------------              11.1   12.48 >-----------<  291      [06-09-19 @ 05:31]              34.4     141  |  104  |  51  ----------------------------<  98      [06-09-19 @ 05:31]  4.2   |  20  |  2.53        Ca     8.9     [06-09-19 @ 05:31]      Mg     2.5     [06-09-19 @ 05:31]      Phos  3.9     [06-09-19 @ 05:31]    TPro  6.2  /  Alb  2.7  /  TBili  3.7  /  DBili  x   /  AST  38  /  ALT  18  /  AlkPhos  82  [06-09-19 @ 05:31]          [06-08-19 @ 01:00]    Creatinine Trend:  SCr 2.53 [06-09 @ 05:31]  SCr 2.68 [06-08 @ 01:00]  SCr 2.64 [06-07 @ 03:10]  SCr 2.45 [06-06 @ 15:28]  SCr 2.47 [06-06 @ 12:12]    Urinalysis - [06-07-19 @ 08:31]      Color DARK YELLOW / Appearance Lt TURBID / SG 1.028 / pH 6.0      Gluc NEGATIVE / Ketone NEGATIVE  / Bili SMALL / Urobili SMALL       Blood SMALL / Protein 100 / Leuk Est NEGATIVE / Nitrite NEGATIVE      RBC 2-5 / WBC 2-5 / Hyaline  / Gran  / Sq Epi  / Non Sq Epi  / Bacteria MOD    Urine Creatinine 148.40      [06-07-19 @ 08:06]  Urine Protein 211.9      [06-07-19 @ 08:06]  Urine Sodium < 20      [06-07-19 @ 08:06]  Urine Potassium 55.2      [06-07-19 @ 08:06]  Urine Chloride < 20      [06-07-19 @ 08:06]  Urine Osmolality 404      [06-07-19 @ 08:06]

## 2019-06-09 NOTE — PROGRESS NOTE ADULT - ATTENDING COMMENTS
pt clinically improving, on NC O2, doing well. gentle diuresis. pt tolerated bedside swallow, resume PO miralax, dc precedex. no evidence of SBO on abdominal xray.

## 2019-06-10 ENCOUNTER — APPOINTMENT (OUTPATIENT)
Dept: PEDIATRIC CARDIOLOGY | Facility: CLINIC | Age: 24
End: 2019-06-10

## 2019-06-10 DIAGNOSIS — Z29.9 ENCOUNTER FOR PROPHYLACTIC MEASURES, UNSPECIFIED: ICD-10-CM

## 2019-06-10 DIAGNOSIS — K29.70 GASTRITIS, UNSPECIFIED, WITHOUT BLEEDING: ICD-10-CM

## 2019-06-10 LAB
ALBUMIN SERPL ELPH-MCNC: 2.9 G/DL — LOW (ref 3.3–5)
ALP SERPL-CCNC: 110 U/L — SIGNIFICANT CHANGE UP (ref 40–120)
ALT FLD-CCNC: 20 U/L — SIGNIFICANT CHANGE UP (ref 4–41)
ANION GAP SERPL CALC-SCNC: 16 MMO/L — HIGH (ref 7–14)
ANISOCYTOSIS BLD QL: SIGNIFICANT CHANGE UP
APTT BLD: 30.8 SEC — SIGNIFICANT CHANGE UP (ref 27.5–36.3)
AST SERPL-CCNC: 38 U/L — SIGNIFICANT CHANGE UP (ref 4–40)
BASOPHILS # BLD AUTO: 0.14 K/UL — SIGNIFICANT CHANGE UP (ref 0–0.2)
BASOPHILS NFR BLD AUTO: 1.1 % — SIGNIFICANT CHANGE UP (ref 0–2)
BASOPHILS NFR SPEC: 0 % — SIGNIFICANT CHANGE UP (ref 0–2)
BILIRUB SERPL-MCNC: 3 MG/DL — HIGH (ref 0.2–1.2)
BLASTS # FLD: 0 % — SIGNIFICANT CHANGE UP (ref 0–0)
BUN SERPL-MCNC: 47 MG/DL — HIGH (ref 7–23)
CALCIUM SERPL-MCNC: 8.9 MG/DL — SIGNIFICANT CHANGE UP (ref 8.4–10.5)
CHLORIDE SERPL-SCNC: 105 MMOL/L — SIGNIFICANT CHANGE UP (ref 98–107)
CO2 SERPL-SCNC: 23 MMOL/L — SIGNIFICANT CHANGE UP (ref 22–31)
CREAT SERPL-MCNC: 2.37 MG/DL — HIGH (ref 0.5–1.3)
DACRYOCYTES BLD QL SMEAR: SLIGHT — SIGNIFICANT CHANGE UP
EOSINOPHIL # BLD AUTO: 0.01 K/UL — SIGNIFICANT CHANGE UP (ref 0–0.5)
EOSINOPHIL NFR BLD AUTO: 0.1 % — SIGNIFICANT CHANGE UP (ref 0–6)
EOSINOPHIL NFR FLD: 0 % — SIGNIFICANT CHANGE UP (ref 0–6)
GIANT PLATELETS BLD QL SMEAR: PRESENT — SIGNIFICANT CHANGE UP
GLUCOSE SERPL-MCNC: 129 MG/DL — HIGH (ref 70–99)
HCT VFR BLD CALC: 33.3 % — LOW (ref 39–50)
HGB BLD-MCNC: 10.9 G/DL — LOW (ref 13–17)
HOWELL-JOLLY BOD BLD QL SMEAR: PRESENT — SIGNIFICANT CHANGE UP
HYPOCHROMIA BLD QL: SLIGHT — SIGNIFICANT CHANGE UP
IMM GRANULOCYTES NFR BLD AUTO: 2.1 % — HIGH (ref 0–1.5)
INR BLD: 1.16 — SIGNIFICANT CHANGE UP (ref 0.88–1.17)
LYMPHOCYTES # BLD AUTO: 0.83 K/UL — LOW (ref 1–3.3)
LYMPHOCYTES # BLD AUTO: 6.3 % — LOW (ref 13–44)
LYMPHOCYTES NFR SPEC AUTO: 1.8 % — LOW (ref 13–44)
MACROCYTES BLD QL: SLIGHT — SIGNIFICANT CHANGE UP
MAGNESIUM SERPL-MCNC: 2.3 MG/DL — SIGNIFICANT CHANGE UP (ref 1.6–2.6)
MCHC RBC-ENTMCNC: 24.7 PG — LOW (ref 27–34)
MCHC RBC-ENTMCNC: 32.7 % — SIGNIFICANT CHANGE UP (ref 32–36)
MCV RBC AUTO: 75.3 FL — LOW (ref 80–100)
METAMYELOCYTES # FLD: 0 % — SIGNIFICANT CHANGE UP (ref 0–1)
MICROCYTES BLD QL: SIGNIFICANT CHANGE UP
MONOCYTES # BLD AUTO: 1.64 K/UL — HIGH (ref 0–0.9)
MONOCYTES NFR BLD AUTO: 12.5 % — SIGNIFICANT CHANGE UP (ref 2–14)
MONOCYTES NFR BLD: 6.2 % — SIGNIFICANT CHANGE UP (ref 2–9)
MYELOCYTES NFR BLD: 0 % — SIGNIFICANT CHANGE UP (ref 0–0)
NEUTROPHIL AB SER-ACNC: 91.1 % — HIGH (ref 43–77)
NEUTROPHILS # BLD AUTO: 10.27 K/UL — HIGH (ref 1.8–7.4)
NEUTROPHILS NFR BLD AUTO: 77.9 % — HIGH (ref 43–77)
NEUTS BAND # BLD: 0 % — SIGNIFICANT CHANGE UP (ref 0–6)
NRBC # BLD: 1 /100WBC — SIGNIFICANT CHANGE UP
NRBC # FLD: 0.03 K/UL — SIGNIFICANT CHANGE UP (ref 0–0)
OTHER - HEMATOLOGY %: 0 — SIGNIFICANT CHANGE UP
PHOSPHATE SERPL-MCNC: 3.8 MG/DL — SIGNIFICANT CHANGE UP (ref 2.5–4.5)
PLATELET # BLD AUTO: 375 K/UL — SIGNIFICANT CHANGE UP (ref 150–400)
PLATELET COUNT - ESTIMATE: NORMAL — SIGNIFICANT CHANGE UP
PMV BLD: SIGNIFICANT CHANGE UP FL (ref 7–13)
POIKILOCYTOSIS BLD QL AUTO: SIGNIFICANT CHANGE UP
POLYCHROMASIA BLD QL SMEAR: SLIGHT — SIGNIFICANT CHANGE UP
POTASSIUM SERPL-MCNC: 3.4 MMOL/L — LOW (ref 3.5–5.3)
POTASSIUM SERPL-SCNC: 3.4 MMOL/L — LOW (ref 3.5–5.3)
PROMYELOCYTES # FLD: 0 % — SIGNIFICANT CHANGE UP (ref 0–0)
PROT SERPL-MCNC: 6.4 G/DL — SIGNIFICANT CHANGE UP (ref 6–8.3)
PROTHROM AB SERPL-ACNC: 13.3 SEC — HIGH (ref 9.8–13.1)
RBC # BLD: 4.42 M/UL — SIGNIFICANT CHANGE UP (ref 4.2–5.8)
RBC # FLD: 22.5 % — HIGH (ref 10.3–14.5)
SCHISTOCYTES BLD QL AUTO: SLIGHT — SIGNIFICANT CHANGE UP
SICKLE CELLS BLD QL SMEAR: SLIGHT — SIGNIFICANT CHANGE UP
SODIUM SERPL-SCNC: 144 MMOL/L — SIGNIFICANT CHANGE UP (ref 135–145)
TARGETS BLD QL SMEAR: SIGNIFICANT CHANGE UP
VARIANT LYMPHS # BLD: 0.9 % — SIGNIFICANT CHANGE UP
WBC # BLD: 13.16 K/UL — HIGH (ref 3.8–10.5)
WBC # FLD AUTO: 13.16 K/UL — HIGH (ref 3.8–10.5)

## 2019-06-10 PROCEDURE — 93010 ELECTROCARDIOGRAM REPORT: CPT

## 2019-06-10 PROCEDURE — 99233 SBSQ HOSP IP/OBS HIGH 50: CPT | Mod: GC

## 2019-06-10 PROCEDURE — 99232 SBSQ HOSP IP/OBS MODERATE 35: CPT | Mod: GC

## 2019-06-10 PROCEDURE — 99233 SBSQ HOSP IP/OBS HIGH 50: CPT

## 2019-06-10 RX ORDER — FUROSEMIDE 40 MG
20 TABLET ORAL DAILY
Refills: 0 | Status: COMPLETED | OUTPATIENT
Start: 2019-06-10 | End: 2019-06-12

## 2019-06-10 RX ORDER — POTASSIUM CHLORIDE 20 MEQ
20 PACKET (EA) ORAL ONCE
Refills: 0 | Status: COMPLETED | OUTPATIENT
Start: 2019-06-10 | End: 2019-06-10

## 2019-06-10 RX ADMIN — PANTOPRAZOLE SODIUM 40 MILLIGRAM(S): 20 TABLET, DELAYED RELEASE ORAL at 18:38

## 2019-06-10 RX ADMIN — Medication 20 MILLIEQUIVALENT(S): at 11:52

## 2019-06-10 RX ADMIN — Medication 20 MILLIGRAM(S): at 11:53

## 2019-06-10 RX ADMIN — CHLORHEXIDINE GLUCONATE 1 APPLICATION(S): 213 SOLUTION TOPICAL at 06:18

## 2019-06-10 RX ADMIN — PANTOPRAZOLE SODIUM 40 MILLIGRAM(S): 20 TABLET, DELAYED RELEASE ORAL at 06:20

## 2019-06-10 NOTE — PROGRESS NOTE ADULT - SUBJECTIVE AND OBJECTIVE BOX
[facility] Division of Kidney Diseases & Hypertension  FOLLOW UP NOTE  [phone]--------------------------------------------------------------------------------    HPI: 24 year old male with h/o heterotaxy syndrome, TAPVR initially presented to [facility] on 5/30 with complaints of hemoptysis. Patient developed hypoxemic respiratory failure and was intubated. Patient was subsequently transferred to [facility] and had IR guided embolization as well. Patient is currently admitted for management of recurrent hemoptysis. Nephrology was consulted for elevated creatinine. On review of previous labs in [facility], Scr. noted to be WNL. Scr. on admission was noted to be WNL( 0.86) on 5/31/19. However Scr. was noted to be elevated at 1.85 on 6/5/19 and increased further to 2.64 on 6/7/19. Patient noted to have decreasing urine output. On chart review, patient noted to have multiple low BP readings since 6/4/19 and was on IV pressors. Patient also noted to have elevated vancomycin level (37.5) on 6/5/19 as well. Pt. was extubated on 6/7/19.    Pt. was seen and examined with mother at bedside. Patient is currently on nasal cannula. Denies SOB, CP, abdominal pain, nausea, or vomiting. UOP noted to be ~985 cc over past 24 hours.     PAST HISTORY  --------------------------------------------------------------------------------  No significant changes to PMH, PSH, FHx, SHx, unless otherwise noted    ALLERGIES & MEDICATIONS  --------------------------------------------------------------------------------  Allergies    No Known Allergies    Intolerances      Standing Inpatient Medications  chlorhexidine 4% Liquid 1 Application(s) Topical <User Schedule>  docusate sodium Liquid 100 milliGRAM(s) Oral three times a day  furosemide   Injectable 20 milliGRAM(s) IV Push daily  pantoprazole  Injectable 40 milliGRAM(s) IV Push two times a day  polyethylene glycol 3350 17 Gram(s) Oral two times a day  senna Syrup 10 milliLiter(s) Oral at bedtime    PRN Inpatient Medications  acetaminophen    Suspension .. 650 milliGRAM(s) Oral every 6 hours PRN  ALPRAZolam 0.25 milliGRAM(s) Oral every 8 hours PRN  bisacodyl Suppository 10 milliGRAM(s) Rectal daily PRN      REVIEW OF SYSTEMS  --------------------------------------------------------------------------------    Gen: fatigue +  Resp: no SOB  CV: no CP  GI: no abdominal pain  MSK: no edema    All other systems were reviewed and are negative, except as noted.    VITALS/PHYSICAL EXAM  --------------------------------------------------------------------------------  T(C): 36.8 (06-10-19 @ 10:39), Max: 37.3 (06-09-19 @ 16:00)  HR: 88 (06-10-19 @ 12:12) (74 - 97)  BP: 128/74 (06-10-19 @ 10:39) (117/70 - 139/69)  RR: 20 (06-10-19 @ 10:39) (20 - 40)  SpO2: 90% (06-10-19 @ 12:12) (89% - 100%)  Wt(kg): --        06-09-19 @ 07:01  -  06-10-19 @ 07:00  --------------------------------------------------------  IN: 44 mL / OUT: 985 mL / NET: -941 mL      Physical Exam:  	  	Gen: NAD  	HEENT: sclera anicteric   	Pulm: CTA B/L  	CV:  S1S2  	Abd: +BS, soft   	Ext: pitting B/L Lower ext edema +  	Neuro: Awake   	Skin: Warm, without rashes    LABS/STUDIES  --------------------------------------------------------------------------------              10.9   13.16 >-----------<  375      [06-10-19 @ 04:15]              33.3     144  |  105  |  47  ----------------------------<  129      [06-10-19 @ 04:15]  3.4   |  23  |  2.37        Ca     8.9     [06-10-19 @ 04:15]      Mg     2.3     [06-10-19 @ 04:15]      Phos  3.8     [06-10-19 @ 04:15]    TPro  6.4  /  Alb  2.9  /  TBili  3.0  /  DBili  x   /  AST  38  /  ALT  20  /  AlkPhos  110  [06-10-19 @ 04:15]    PT/INR: PT 13.3 , INR 1.16       [06-10-19 @ 04:15]  PTT: 30.8       [06-10-19 @ 04:15]      Creatinine Trend:  SCr 2.37 [06-10 @ 04:15]  SCr 2.53 [06-09 @ 05:31]  SCr 2.68 [06-08 @ 01:00]  SCr 2.64 [06-07 @ 03:10]  SCr 2.45 [06-06 @ 15:28]    Urinalysis - [06-07-19 @ 08:31]      Color DARK YELLOW / Appearance Lt TURBID / SG 1.028 / pH 6.0      Gluc NEGATIVE / Ketone NEGATIVE  / Bili SMALL / Urobili SMALL       Blood SMALL / Protein 100 / Leuk Est NEGATIVE / Nitrite NEGATIVE      RBC 2-5 / WBC 2-5 / Hyaline  / Gran  / Sq Epi  / Non Sq Epi  / Bacteria MOD    Urine Creatinine 148.40      [06-07-19 @ 08:06]  Urine Protein 211.9      [06-07-19 @ 08:06]  Urine Sodium < 20      [06-07-19 @ 08:06]  Urine Potassium 55.2      [06-07-19 @ 08:06]  Urine Chloride < 20      [06-07-19 @ 08:06]  Urine Osmolality 404      [06-07-19 @ 08:06]

## 2019-06-10 NOTE — PROGRESS NOTE ADULT - ASSESSMENT
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    #Neuro: no active issues  - On precedex for agitation  - Careful sedation as pre-load dependent  - Xanax PRN for anxiety    #CV: Heterotaxy with congenital single atrium/ventricle s/p multiple cardiac surgeries including Fontan's  - Weaned off pressors 6/7  - Possible angiogram per cardiology  - Holding Coumadin in setting of recent bleed. s/p PCC, FFP x5, vitamin K x1  - Holding dig 0.25 daily in setting of ARF  - LE dopplers 6/1 with chronic RLE DVT (diagnosed 3/30/18, suspect provoked 2/2 sedentary status after hospitalization around that time for hematemesis, has been on coumadin per cards Dr. Mega Rojas), repeat dopplers ordered to assess if stable or enlarging  - IVC improved to 1.22cm s/p fluid resuscitation as of 6/7  - trial lasix 20mg x1    #Pulm: Hemoptysis s/p bronch 5/31 showing RUL bleeding. SpO2 at baseline high 80's given cardiopulmonary pathophysiology.  - s/p IR embolization 5/31 of R-sided aortopulmonary collaterals, R bronchial artery, multiple R thoracic intercostal arteries  - s/p bronch 6/1 w/ endobronchial blocker placed for re-bleeding from RUL  - s/p re-bronch 6/4 with removal of blocker, suctioned out blood clots from RUL and mucus plugs from TONJA  - s/p re-bronch 6/5, no active bleeding, suctioned out blood clots from RUL  - No recurrence of hemoptysis for past several days, will reconsult IR if persistent active bleeding  - Mild B-lines on POCUS  - s/p extubation to bipap on 6/7, now off bipap 6/9  - on venti mask    #GI: 3/2018 upper endoscopy showed 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 Gastritis and duodenal erosions were also seen possibly 2/2 ASA gastropathy.   - OGT removed 6/7, NPO for now in setting of bipap and precedex  - protonix 40 daily  - senna/colace/miralax for constipation, had BM on 6/7  - RUQ US to eval for congestive hepatopathy: hepatomegaly, coarsening and nodularity of liver ?cirrhosis, mild upper abd ascites, mild biliary sludge  - AXR without significant stool burden; no SBO    #ID  - Febrile with elevated white count earlier in admission after IR procedure and bronch  - s/p 7 days of empiric zosyn 6/7  - Supratherapeutic vanc resolved  - BCx and sputum Cx NTD    #Renal  - MELLO earlier in admission, initially resolved with IVF and pressor support, likely pre-renal in setting of poor PO intake/hemoptysis vs possible PATRICA. Now with ARF suspect 2/2 hemodynamically mediated MELLO vs vancomycin nephrotoxicity vs possible PATRICA.  - Goal K>4, Mg>2, replete prn  - Monitor Cr, monitor UOP, improved from 20-30cc/hr yesterday to 50cc/hr overnight s/p fluid resuscitation yesterday  - Renal recs appreciated, no HD at this time    #Endocrine  - No active issues    #Heme  - Leukocytosis, suspect reactive after extubation and agitation   - Monitor daily INR, goal <1.5, was elevated after multiple FFPs possibly 2/2 congestive hepatopathy from pHTN, fibrinogen elevated inconsistent with DIC    #DVT ppx: SCDs    #GOC: full code 25 y/o M with 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            #Renal          #GOC: full code 23 y/o M with 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

## 2019-06-10 NOTE — PROGRESS NOTE PEDS - ASSESSMENT
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 He is now s/p IR embolization of right upper lobe aortopulmonary collateral vessels lwhich were the most likely culprits for his acute on chronic hemoptysis as well as S/P endobronchial blocker placement. No new bleeding noted in the last week.  MELLO seems to be slowly improving.      Plan-   -Continue diuretics at present  -Congenital sonographers to repeat his echocardiogram tomorrow-  - Will eventually need anticoagulation given prior history of extensive DVT and Fontan completion- discussed with family again with primary cardiologist, Dr. 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 He is now s/p IR embolization of right upper lobe aortopulmonary collateral vessels lwhich were the most likely culprits for his acute on chronic hemoptysis as well as S/P endobronchial blocker placement. No new bleeding noted in the last week. MELLO seems to be slowly improving.      Plan-   -Continue lasix 20 mg IV daily for now  - will obtain congenital cardiac echo (We will order)  -Congenital sonographers to repeat his echocardiogram tomorrow-  - Will eventually need anticoagulation given prior history of extensive DVT and Fontan completion- discussed with family again with primary cardiologist, Dr. Rojas  - For eventual hemodynamic/interventional cath after recovers from this acute illness (as outpatient)  - Continue pulmonary rehab, OT and PT

## 2019-06-10 NOTE — PROGRESS NOTE ADULT - PROBLEM SELECTOR PLAN 1
Hemoptysis s/p bronch 5/31 showing RUL bleeding. SpO2 at baseline high 80's given cardiopulmonary pathophysiology.  - s/p IR embolization 5/31 of R-sided aortopulmonary collaterals, R bronchial artery, multiple R thoracic intercostal arteries  - s/p bronch 6/1 w/ endobronchial blocker placed for re-bleeding from RUL  - s/p re-bronch 6/4 with removal of blocker, suctioned out blood clots from RUL and mucus plugs from TONJA  - s/p re-bronch 6/5, no active bleeding, suctioned out blood clots from RUL  - No recurrence of hemoptysis for past several days, will reconsult IR if persistent active bleeding  - D/W peds cardiology consider restarting AC in few days

## 2019-06-10 NOTE — PROGRESS NOTE ADULT - PROBLEM SELECTOR PLAN 4
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 Gastritis and duodenal erosions were also seen possibly 2/2 ASA gastropathy.   - OGT removed 6/7, NPO for now in setting of bipap and precedex  - protonix 40 daily

## 2019-06-10 NOTE — PROGRESS NOTE ADULT - PROBLEM SELECTOR PLAN 5
- SCD's due to hemoptysis /bleeding - MELLO earlier in admission, initially resolved with IVF and pressor support, likely pre-renal in setting of poor PO intake/hemoptysis vs possible PATRICA. Now with ARF suspect 2/2 hemodynamically mediated MELLO vs vancomycin nephrotoxicity vs possible PATRICA.  - Goal K>4, Mg>2, replete prn  - Monitor Cr, monitor UOP, improved   - Renal recs appreciated, no HD at this time

## 2019-06-10 NOTE — PROGRESS NOTE ADULT - SUBJECTIVE AND OBJECTIVE BOX
CHIEF COMPLAINT:    Interval Events:    REVIEW OF SYSTEMS:  Constitutional:   Eyes:  ENT:  CV:  Resp:  GI:  :  MSK:  Integumentary:  Neurological:  Psychiatric:  Endocrine:  Hematologic/Lymphatic:  Allergic/Immunologic:  [ ] All other systems negative  [ ] Unable to assess ROS because ________    OBJECTIVE:  ICU Vital Signs Last 24 Hrs  T(C): 36.7 (10 Aaron 2019 06:00), Max: 37.3 (09 Jun 2019 12:00)  T(F): 98.1 (10 Aaron 2019 06:00), Max: 99.1 (09 Jun 2019 12:00)  HR: 88 (10 Aaron 2019 06:00) (66 - 97)  BP: 136/75 (10 Aaron 2019 06:00) (104/86 - 139/69)  BP(mean): 88 (09 Jun 2019 21:00) (79 - 94)  ABP: --  ABP(mean): --  RR: 20 (10 Aaron 2019 06:00) (18 - 40)  SpO2: 92% (10 Aaron 2019 06:00) (87% - 100%)        06-09 @ 07:01  -  06-10 @ 07:00  --------------------------------------------------------  IN: 44 mL / OUT: 985 mL / NET: -941 mL      CAPILLARY BLOOD GLUCOSE          PHYSICAL EXAM:  General:   HEENT:   Lymph Nodes:  Neck:   Respiratory:   Cardiovascular:   Abdomen:   Extremities:   Skin:   Neurological:  Psychiatry:    HOSPITAL MEDICATIONS:  MEDICATIONS  (STANDING):  chlorhexidine 4% Liquid 1 Application(s) Topical <User Schedule>  docusate sodium Liquid 100 milliGRAM(s) Oral three times a day  pantoprazole  Injectable 40 milliGRAM(s) IV Push two times a day  polyethylene glycol 3350 17 Gram(s) Oral two times a day  senna Syrup 10 milliLiter(s) Oral at bedtime    MEDICATIONS  (PRN):  acetaminophen    Suspension .. 650 milliGRAM(s) Oral every 6 hours PRN Temp greater or equal to 38C (100.4F)  ALPRAZolam 0.25 milliGRAM(s) Oral every 8 hours PRN Anxiety  bisacodyl Suppository 10 milliGRAM(s) Rectal daily PRN Constipation      LABS:                        10.9   13.16 )-----------( 375      ( 10 Aaron 2019 04:15 )             33.3     06-10    144  |  105  |  47<H>  ----------------------------<  129<H>  3.4<L>   |  23  |  2.37<H>    Ca    8.9      10 Aaron 2019 04:15  Phos  3.8     06-10  Mg     2.3     06-10    TPro  6.4  /  Alb  2.9<L>  /  TBili  3.0<H>  /  DBili  x   /  AST  38  /  ALT  20  /  AlkPhos  110  06-10    PT/INR - ( 10 Aaron 2019 04:15 )   PT: 13.3 SEC;   INR: 1.16          PTT - ( 10 Aaron 2019 04:15 )  PTT:30.8 SEC          MICROBIOLOGY:     RADIOLOGY:  [ ] Reviewed and interpreted by me    PULMONARY FUNCTION TESTS:    EKG: CHIEF COMPLAINT: Hemoptysis    Interval Events: None overnight, generalized weakness    REVIEW OF SYSTEMS:  Constitutional:   Eyes:  ENT:  CV:  Resp:  GI:  :  MSK:  Integumentary:  Neurological:  Psychiatric:  Endocrine:  Hematologic/Lymphatic:  Allergic/Immunologic:  [x ] All other systems negative  [ ] Unable to assess ROS because ________    OBJECTIVE:  ICU Vital Signs Last 24 Hrs  T(C): 36.7 (10 Aaron 2019 06:00), Max: 37.3 (09 Jun 2019 12:00)  T(F): 98.1 (10 Aaron 2019 06:00), Max: 99.1 (09 Jun 2019 12:00)  HR: 88 (10 Aaron 2019 06:00) (66 - 97)  BP: 136/75 (10 Aaron 2019 06:00) (104/86 - 139/69)  BP(mean): 88 (09 Jun 2019 21:00) (79 - 94)  ABP: --  ABP(mean): --  RR: 20 (10 Aaron 2019 06:00) (18 - 40)  SpO2: 92% (10 Aaron 2019 06:00) (87% - 100%)        06-09 @ 07:01  -  06-10 @ 07:00  --------------------------------------------------------  IN: 44 mL / OUT: 985 mL / NET: -941 mL      CAPILLARY BLOOD GLUCOSE        PHYSICAL EXAM:  GENERAL: NAD   CHEST/LUNG: CTABL ; No wheeze or rhonchi  HEART: RRR; No murmurs  ABDOMEN: distended, nontender; Bowel sounds present  EXTREMITIES:  pitting edema  PSYCH: AAOx3, mildly anxious  NEUROLOGY: no gross focal deficits      HOSPITAL MEDICATIONS:  MEDICATIONS  (STANDING):  chlorhexidine 4% Liquid 1 Application(s) Topical <User Schedule>  docusate sodium Liquid 100 milliGRAM(s) Oral three times a day  pantoprazole  Injectable 40 milliGRAM(s) IV Push two times a day  polyethylene glycol 3350 17 Gram(s) Oral two times a day  senna Syrup 10 milliLiter(s) Oral at bedtime    MEDICATIONS  (PRN):  acetaminophen    Suspension .. 650 milliGRAM(s) Oral every 6 hours PRN Temp greater or equal to 38C (100.4F)  ALPRAZolam 0.25 milliGRAM(s) Oral every 8 hours PRN Anxiety  bisacodyl Suppository 10 milliGRAM(s) Rectal daily PRN Constipation      LABS:                        10.9   13.16 )-----------( 375      ( 10 Aaron 2019 04:15 )             33.3     06-10    144  |  105  |  47<H>  ----------------------------<  129<H>  3.4<L>   |  23  |  2.37<H>    Ca    8.9      10 Aaron 2019 04:15  Phos  3.8     06-10  Mg     2.3     06-10    TPro  6.4  /  Alb  2.9<L>  /  TBili  3.0<H>  /  DBili  x   /  AST  38  /  ALT  20  /  AlkPhos  110  06-10    PT/INR - ( 10 Aaron 2019 04:15 )   PT: 13.3 SEC;   INR: 1.16          PTT - ( 10 Aaron 2019 04:15 )  PTT:30.8 SEC          MICROBIOLOGY:     RADIOLOGY:  [x ] Reviewed and interpreted by me  < from: Xray Chest 1 View- PORTABLE-Urgent (06.05.19 @ 11:20) >  EXAM:  XR CHEST PORTABLE URGENT 1V        PROCEDURE DATE:  Jun 5 2019         INTERPRETATION:  TIME OF EXAM: June 5, 2019 at 11:00 AM    CLINICAL INFORMATION: Post line removal.    TECHNIQUE:   Portable chest    INTERPRETATION:     Since the last study, an enteric tube has been introduced and it courses   down the esophagus with its tip not included on this study but tube is   directed to the right side in the presence of a suspected right-sided   stomach.    Radiopaque coils are seen overlying the right upper lobe secondary to   recent procedure. The lungs are clear. The heart is not enlarged and   there is no effusion or pneumothorax.      COMPARISON:  May 31      IMPRESSION:  Clear lungs with endotracheal and enteric tube in place post   coil embolization.      MARLA JAFFE M.D., ATTENDING RADIOLOGIST  This document has been electronically signed. Jun 5 2019  1:53PM        < end of copied text > CHIEF COMPLAINT: Hemoptysis    Interval Events: None overnight, generalized weakness    REVIEW OF SYSTEMS:  Constitutional: no fever or chills   CV: Denies   Resp: + HAGER   GI: Denies   MSK: Weakness sec to bedrest   [x ] All other systems negative      OBJECTIVE:  ICU Vital Signs Last 24 Hrs  T(C): 36.7 (10 Aaron 2019 06:00), Max: 37.3 (09 Jun 2019 12:00)  T(F): 98.1 (10 Aaron 2019 06:00), Max: 99.1 (09 Jun 2019 12:00)  HR: 88 (10 Aaron 2019 06:00) (66 - 97)  BP: 136/75 (10 Aaron 2019 06:00) (104/86 - 139/69)  BP(mean): 88 (09 Jun 2019 21:00) (79 - 94)  ABP: --  ABP(mean): --  RR: 20 (10 Aaron 2019 06:00) (18 - 40)  SpO2: 92% (10 Aaron 2019 06:00) (87% - 100%)        06-09 @ 07:01  -  06-10 @ 07:00  --------------------------------------------------------  IN: 44 mL / OUT: 985 mL / NET: -941 mL      CAPILLARY BLOOD GLUCOSE        PHYSICAL EXAM:  GENERAL: NAD   CHEST/LUNG: CTABL ; No wheeze or rhonchi  HEART: RRR; No murmurs  ABDOMEN: distended, nontender; Bowel sounds present  EXTREMITIES:  pitting edema  PSYCH: AAOx3, mildly anxious  NEUROLOGY: no gross focal deficits      HOSPITAL MEDICATIONS:  MEDICATIONS  (STANDING):  chlorhexidine 4% Liquid 1 Application(s) Topical <User Schedule>  docusate sodium Liquid 100 milliGRAM(s) Oral three times a day  pantoprazole  Injectable 40 milliGRAM(s) IV Push two times a day  polyethylene glycol 3350 17 Gram(s) Oral two times a day  senna Syrup 10 milliLiter(s) Oral at bedtime    MEDICATIONS  (PRN):  acetaminophen    Suspension .. 650 milliGRAM(s) Oral every 6 hours PRN Temp greater or equal to 38C (100.4F)  ALPRAZolam 0.25 milliGRAM(s) Oral every 8 hours PRN Anxiety  bisacodyl Suppository 10 milliGRAM(s) Rectal daily PRN Constipation      LABS:                        10.9   13.16 )-----------( 375      ( 10 Aaorn 2019 04:15 )             33.3     06-10    144  |  105  |  47<H>  ----------------------------<  129<H>  3.4<L>   |  23  |  2.37<H>    Ca    8.9      10 Aaron 2019 04:15  Phos  3.8     06-10  Mg     2.3     06-10    TPro  6.4  /  Alb  2.9<L>  /  TBili  3.0<H>  /  DBili  x   /  AST  38  /  ALT  20  /  AlkPhos  110  06-10    PT/INR - ( 10 Aaron 2019 04:15 )   PT: 13.3 SEC;   INR: 1.16          PTT - ( 10 Aaron 2019 04:15 )  PTT:30.8 SEC          MICROBIOLOGY:     RADIOLOGY:  [x ] Reviewed and interpreted by me  < from: Xray Chest 1 View- PORTABLE-Urgent (06.05.19 @ 11:20) >  EXAM:  XR CHEST PORTABLE URGENT 1V        PROCEDURE DATE:  Jun 5 2019         INTERPRETATION:  TIME OF EXAM: June 5, 2019 at 11:00 AM    CLINICAL INFORMATION: Post line removal.    TECHNIQUE:   Portable chest    INTERPRETATION:     Since the last study, an enteric tube has been introduced and it courses   down the esophagus with its tip not included on this study but tube is   directed to the right side in the presence of a suspected right-sided   stomach.    Radiopaque coils are seen overlying the right upper lobe secondary to   recent procedure. The lungs are clear. The heart is not enlarged and   there is no effusion or pneumothorax.      COMPARISON:  May 31      IMPRESSION:  Clear lungs with endotracheal and enteric tube in place post   coil embolization.      MARLA JAFFE M.D., ATTENDING RADIOLOGIST  This document has been electronically signed. Jun 5 2019  1:53PM        < end of copied text >

## 2019-06-10 NOTE — PROGRESS NOTE ADULT - PROBLEM SELECTOR PLAN 1
Pt. with MELLO in setting of hypotension and vancomycin use. Scr on admission was  WNL (0.86) on 5/31/19, increased to 2.68 on 6/8/19 and improved to 2.37 today (6/10/19). UA shows 100 protein but no RBCs or WBCs seen. Urine electrolytes are suggestive of pre-renal MELLO. Patient with ? hemodynamically mediated MELLO from hypotension. Patient with ? ATN from hypotension and vancomycin use. Spot urine TP/CR elevated at 1.42 on 6/7/19. Pt. currently non-oliguric. Diuretics as per cardiology team. Monitor Scr, I/O, and electrolytes. Avoid NSAIDs, RCAs, and other nephrotoxins

## 2019-06-10 NOTE — PROGRESS NOTE ADULT - PROBLEM SELECTOR PLAN 2
-  off pressors 6/7  - Possible angiogram per cardiology  - Holding Coumadin in setting of recent bleed. s/p PCC, FFP x5, vitamin K x1  - Holding dig 0.25 daily in setting of ARF  - LE dopplers repeated  with chronic RLE DVT (diagnosed 3/30/18, suspect provoked 2/2 sedentary status after hospitalization around that time for hematemesis, has been on coumadin per cards Dr. Mega Rojas)- no change   - trial lasix 20mg daily -  off pressors 6/7  - Possible angiogram per cardiology  - Holding Coumadin in setting of recent bleed. s/p PCC, FFP x5, vitamin K x1  - Holding dig 0.25 daily in setting of ARF  - LE dopplers repeated  with chronic RLE DVT (diagnosed 3/30/18, suspect provoked 2/2 sedentary status after hospitalization around that time for hematemesis, has been on coumadin per cards Dr. Mega Rojas)- no change   - lasix 20mg daily

## 2019-06-10 NOTE — PROGRESS NOTE PEDS - SUBJECTIVE AND OBJECTIVE BOX
INTERVAL HISTORY: Moved to floor on nasal cannula O2. Kang does not endorse new symptoms    RESPIRATORY SUPPORT: Nasal cannula 4LPM    I&O's Summary    2019 07:01  -  10 Aaron 2019 07:00  --------------------------------------------------------  IN: 44 mL / OUT: 985 mL / NET: -941 mL    INTRAVASCULAR ACCESS: Peripheral IV    MEDICATIONS  (STANDING):  chlorhexidine 4% Liquid 1 Application(s) Topical <User Schedule>  docusate sodium Liquid 100 milliGRAM(s) Oral three times a day  furosemide   Injectable 20 milliGRAM(s) IV Push daily  pantoprazole  Injectable 40 milliGRAM(s) IV Push two times a day  polyethylene glycol 3350 17 Gram(s) Oral two times a day  senna Syrup 10 milliLiter(s) Oral at bedtime      PHYSICAL EXAMINATION:  Vital Signs Last 24 Hrs  T(C): 36.9 (10 Aaron 2019 18:12), Max: 37.6 (10 Aaron 2019 16:17)  T(F): 98.4 (10 Aaron 2019 18:12), Max: 99.6 (10 Aaron 2019 16:17)  HR: 90 (10 Aaron 2019 16:30) (88 - 97)  BP: 133/77 (10 Aaron 2019 16:17) (128/71 - 139/69)  BP(mean): 88 (2019 21:00) (87 - 88)  RR: 20 (10 Aaron 2019 16:17) (20 - 36)  SpO2: 92% (10 Aaron 2019 16:30) (90% - 100%)  General - non-dysmorphic appearance, awake, tachypnea with labored breathing  Skin - beds sores+ on back  Eyes / ENT - no conjunctival injection, sclerae icteric, external ears & nares normal, mucous membranes moist.  Pulmonary - mechanically ventilated; no retractions, transmitted sounds on auscultation bilaterally, no wheezes, no rales.  Cardiovascular - regular rhythm, normal S1 & single S2, 2/6 HSM at LMSB, no rubs, no gallops, capillary refill < 2sec, + pectus  Gastrointestinal - soft, distended , non-tender, hepatomegaly + .  Neurologic / Psychiatric - sedated    LABORATORY TESTS:                          10.9  CBC:   13.16 )-----------( 375   (06-10-19 @ 04:15)                          33.3               144   |  105   |  47                 Ca: 8.9    BMP:   ----------------------------< 129    M.3   (06-10-19 @ 04:15)             3.4    |  23    | 2.37               Ph: 3.8      LFT:     TPro: 6.4 / Alb: 2.9 / TBili: 3.0 / DBili: x / AST: 38 / ALT: 20 / AlkPhos: 110   (06-10-19 @ 04:15)    COAG: PT: 13.3 / PTT: 30.8 / INR: 1.16   (06-10-19 @ 04:15)     CARDIAC MARKERS:             High-Sensitivity Troponin: x   (19 @ 01:00)             CK: 253 / CKMB: x   (19 @ 01:00)             Pro-BNP: x   (19 @ 01:00)  CARDIAC MARKERS:             High-Sensitivity Troponin: x   (19 @ 02:50)             CK: 1152 / CKMB: 1.13   (19 @ 02:50)             Pro-BNP: x   (19 @ 02:50)    ABG:   pH: 7.41 / pCO2: 35 / pO2: 72 / HCO3: 23 / Base Excess: -2.2 / SaO2: 93.4 / Lactate: 1.8 / iCa: x   (19 @ 16:42)    IMAGING STUDIES:  TTE Congential Anomalies (19 @ 17:28) >  Conclusions:  1. Moderate aortic regurgitation.  2. Systemic Right ventricle loops to the left.  The  Systemic Right ventricular function is mildly decreased.  Hypoplastic left ventricle.  3. Normal pericardium with no pericardial effusion. INTERVAL HISTORY: Moved to floor on nasal cannula O2. Kang does not endorse new symptoms    RESPIRATORY SUPPORT: Nasal cannula 4LPM    I&O's Summary    2019 07:01  -  10 Aaron 2019 07:00  --------------------------------------------------------  IN: 44 mL / OUT: 985 mL / NET: -941 mL    INTRAVASCULAR ACCESS: Peripheral IV    MEDICATIONS  (STANDING):  chlorhexidine 4% Liquid 1 Application(s) Topical <User Schedule>  docusate sodium Liquid 100 milliGRAM(s) Oral three times a day  furosemide   Injectable 20 milliGRAM(s) IV Push daily  pantoprazole  Injectable 40 milliGRAM(s) IV Push two times a day  polyethylene glycol 3350 17 Gram(s) Oral two times a day  senna Syrup 10 milliLiter(s) Oral at bedtime      PHYSICAL EXAMINATION:  Vital Signs Last 24 Hrs  T(C): 36.9 (10 Aaron 2019 18:12), Max: 37.6 (10 Aaron 2019 16:17)  T(F): 98.4 (10 Aaron 2019 18:12), Max: 99.6 (10 Aaron 2019 16:17)  HR: 90 (10 Aaron 2019 16:30) (88 - 97)  BP: 133/77 (10 Aaron 2019 16:17) (128/71 - 139/69)  BP(mean): 88 (2019 21:00) (87 - 88)  RR: 20 (10 Aaron 2019 16:17) (20 - 36)  SpO2: 92% (10 Aaron 2019 16:30) (90% - 100%)  General - non-dysmorphic appearance, awake, tachypnea with labored breathing  Skin - beds sores+ on back  Eyes / ENT - no conjunctival injection, sclerae icteric, external ears & nares normal, mucous membranes moist.  Pulmonary - tachypneic, coarse sounds, decreased at bases  Cardiovascular - regular rhythm, normal S1 & single S2, 2/6 HSM at LMSB, no rubs, no gallops, capillary refill < 2sec, + pectus, R>L LE edema, chronic RLE venous stasis changes  Gastrointestinal - soft, distended , non-tender, hepatomegaly + .  Neurologic / Psychiatric - awake, alert    LABORATORY TESTS:                          10.9  CBC:   13.16 )-----------( 375   (06-10-19 @ 04:15)                          33.3               144   |  105   |  47                 Ca: 8.9    BMP:   ----------------------------< 129    M.3   (06-10-19 @ 04:15)             3.4    |  23    | 2.37               Ph: 3.8      LFT:     TPro: 6.4 / Alb: 2.9 / TBili: 3.0 / DBili: x / AST: 38 / ALT: 20 / AlkPhos: 110   (06-10-19 @ 04:15)    COAG: PT: 13.3 / PTT: 30.8 / INR: 1.16   (06-10-19 @ 04:15)     CARDIAC MARKERS:             High-Sensitivity Troponin: x   (19 @ 01:00)             CK: 253 / CKMB: x   (19 @ 01:00)             Pro-BNP: x   (19 @ 01:00)  CARDIAC MARKERS:             High-Sensitivity Troponin: x   (19 @ 02:50)             CK: 1152 / CKMB: 1.13   (19 @ 02:50)             Pro-BNP: x   (19 @ 02:50)    ABG:   pH: 7.41 / pCO2: 35 / pO2: 72 / HCO3: 23 / Base Excess: -2.2 / SaO2: 93.4 / Lactate: 1.8 / iCa: x   (19 @ 16:42)    IMAGING STUDIES:  TTE Congential Anomalies (19 @ 17:28) >  Conclusions:  1. Moderate aortic regurgitation.  2. Systemic Right ventricle loops to the left.  The  Systemic Right ventricular function is mildly decreased.  Hypoplastic left ventricle.  3. Normal pericardium with no pericardial effusion.

## 2019-06-10 NOTE — PROGRESS NOTE ADULT - ATTENDING COMMENTS
Patient seen and examined. Was transferred from MICU last evening.   Clinically improving, on NC, no further hemoptysis.   Still with abdominal distension - stable, nontender.  Diuresing - will continue with daily Lasix and monitor output and electrolytes  Appreciate Peds cardiology f/u - plan for restarting AC later this week and possible cardiac cath. EKG to monitor for ectopy  MELLO - appreciate renal follow up. Will follow UO and SCr.  PT/OT, generally weak, out of bed to chair.  DVT ppx - SCDs  d/w mother and patient at bedside with RCU team.

## 2019-06-10 NOTE — CHART NOTE - NSCHARTNOTEFT_GEN_A_CORE
Source: Patient , Family , nursing, EMR, NP     Diet : Pureed    Patient & his Mom  reports dislike to the texture modified diet, medical team ( NP ) made aware , suggested swallow study for the feasibility of advancing diet . Pt. taking < 50% of the meals . Pt. alert , oriented , noted 2+ generalized edema , pt. w/ R lateral thoracic TRUNK DTI, Sacrococcygeum DTI.      Current Weight: - 66.8 kg on 6/9/19;  UBW - 112 KG, Admit WT. - 117 KG ; 53 KG ON 5/31/19 ; IBW - 72.5 kg    Pertinent Medications: MEDICATIONS  (STANDING):  chlorhexidine 4% Liquid 1 Application(s) Topical <User Schedule>  docusate sodium Liquid 100 milliGRAM(s) Oral three times a day  furosemide   Injectable 20 milliGRAM(s) IV Push daily  pantoprazole  Injectable 40 milliGRAM(s) IV Push two times a day  polyethylene glycol 3350 17 Gram(s) Oral two times a day  senna Syrup 10 milliLiter(s) Oral at bedtime    MEDICATIONS  (PRN):  acetaminophen    Suspension .. 650 milliGRAM(s) Oral every 6 hours PRN Temp greater or equal to 38C (100.4F)  ALPRAZolam 0.25 milliGRAM(s) Oral every 8 hours PRN Anxiety  bisacodyl Suppository 10 milliGRAM(s) Rectal daily PRN Constipation    Pertinent Labs:  06-10 Na144 mmol/L Glu 129 mg/dL<H> K+ 3.4 mmol/L<L> Cr  2.37 mg/dL<H> BUN 47 mg/dL<H> 06-10 Phos 3.8 mg/dL 06-10 Alb 2.9 g/dL<L>      Estimated Needs:  1815 kcal/d         Previous Nutrition Diagnosis: Inadequate Oral Intake        New Nutrition Diagnosis: severe Malnutrition    in the context of acute illness  evidenced by 2+ edema / severe fat loss& muscle waisting / < 50% nutrition intake 9 days     Recommend - Swallow evaluation and advancing the texture of the diet ; PO supplement - Vital Shake 2/d ; MVI daily .    Monitoring and Evaluation:  PO intake , Tolerance to diet prescription & feasibility to advance the texture , weights and follow up per protocol

## 2019-06-11 LAB
ALBUMIN SERPL ELPH-MCNC: 3.2 G/DL — LOW (ref 3.3–5)
ALP SERPL-CCNC: 111 U/L — SIGNIFICANT CHANGE UP (ref 40–120)
ALT FLD-CCNC: 19 U/L — SIGNIFICANT CHANGE UP (ref 4–41)
ANION GAP SERPL CALC-SCNC: 13 MMO/L — SIGNIFICANT CHANGE UP (ref 7–14)
APTT BLD: 32.2 SEC — SIGNIFICANT CHANGE UP (ref 27.5–36.3)
AST SERPL-CCNC: 33 U/L — SIGNIFICANT CHANGE UP (ref 4–40)
BASOPHILS # BLD AUTO: 0.12 K/UL — SIGNIFICANT CHANGE UP (ref 0–0.2)
BASOPHILS NFR BLD AUTO: 0.9 % — SIGNIFICANT CHANGE UP (ref 0–2)
BILIRUB SERPL-MCNC: 2.4 MG/DL — HIGH (ref 0.2–1.2)
BUN SERPL-MCNC: 34 MG/DL — HIGH (ref 7–23)
CALCIUM SERPL-MCNC: 8.9 MG/DL — SIGNIFICANT CHANGE UP (ref 8.4–10.5)
CHLORIDE SERPL-SCNC: 107 MMOL/L — SIGNIFICANT CHANGE UP (ref 98–107)
CO2 SERPL-SCNC: 25 MMOL/L — SIGNIFICANT CHANGE UP (ref 22–31)
CREAT SERPL-MCNC: 1.98 MG/DL — HIGH (ref 0.5–1.3)
EOSINOPHIL # BLD AUTO: 0.11 K/UL — SIGNIFICANT CHANGE UP (ref 0–0.5)
EOSINOPHIL NFR BLD AUTO: 0.8 % — SIGNIFICANT CHANGE UP (ref 0–6)
GLUCOSE SERPL-MCNC: 123 MG/DL — HIGH (ref 70–99)
HCT VFR BLD CALC: 33.1 % — LOW (ref 39–50)
HGB BLD-MCNC: 10.6 G/DL — LOW (ref 13–17)
IMM GRANULOCYTES NFR BLD AUTO: 2 % — HIGH (ref 0–1.5)
INR BLD: 1.25 — HIGH (ref 0.88–1.17)
LYMPHOCYTES # BLD AUTO: 1.04 K/UL — SIGNIFICANT CHANGE UP (ref 1–3.3)
LYMPHOCYTES # BLD AUTO: 7.9 % — LOW (ref 13–44)
MAGNESIUM SERPL-MCNC: 2.2 MG/DL — SIGNIFICANT CHANGE UP (ref 1.6–2.6)
MCHC RBC-ENTMCNC: 24.5 PG — LOW (ref 27–34)
MCHC RBC-ENTMCNC: 32 % — SIGNIFICANT CHANGE UP (ref 32–36)
MCV RBC AUTO: 76.4 FL — LOW (ref 80–100)
MONOCYTES # BLD AUTO: 1.98 K/UL — HIGH (ref 0–0.9)
MONOCYTES NFR BLD AUTO: 15.1 % — HIGH (ref 2–14)
NEUTROPHILS # BLD AUTO: 9.64 K/UL — HIGH (ref 1.8–7.4)
NEUTROPHILS NFR BLD AUTO: 73.3 % — SIGNIFICANT CHANGE UP (ref 43–77)
NRBC # FLD: 0.04 K/UL — SIGNIFICANT CHANGE UP (ref 0–0)
PHOSPHATE SERPL-MCNC: 3.3 MG/DL — SIGNIFICANT CHANGE UP (ref 2.5–4.5)
PLATELET # BLD AUTO: 406 K/UL — HIGH (ref 150–400)
PMV BLD: 13.1 FL — HIGH (ref 7–13)
POTASSIUM SERPL-MCNC: 3.7 MMOL/L — SIGNIFICANT CHANGE UP (ref 3.5–5.3)
POTASSIUM SERPL-SCNC: 3.7 MMOL/L — SIGNIFICANT CHANGE UP (ref 3.5–5.3)
PROT SERPL-MCNC: 6.6 G/DL — SIGNIFICANT CHANGE UP (ref 6–8.3)
PROTHROM AB SERPL-ACNC: 14 SEC — HIGH (ref 9.8–13.1)
RBC # BLD: 4.33 M/UL — SIGNIFICANT CHANGE UP (ref 4.2–5.8)
RBC # FLD: 23.6 % — HIGH (ref 10.3–14.5)
SODIUM SERPL-SCNC: 145 MMOL/L — SIGNIFICANT CHANGE UP (ref 135–145)
WBC # BLD: 13.15 K/UL — HIGH (ref 3.8–10.5)
WBC # FLD AUTO: 13.15 K/UL — HIGH (ref 3.8–10.5)

## 2019-06-11 PROCEDURE — 93320 DOPPLER ECHO COMPLETE: CPT | Mod: 26

## 2019-06-11 PROCEDURE — 93325 DOPPLER ECHO COLOR FLOW MAPG: CPT | Mod: 26

## 2019-06-11 PROCEDURE — 99233 SBSQ HOSP IP/OBS HIGH 50: CPT | Mod: GC

## 2019-06-11 PROCEDURE — 99232 SBSQ HOSP IP/OBS MODERATE 35: CPT | Mod: GC

## 2019-06-11 PROCEDURE — 99233 SBSQ HOSP IP/OBS HIGH 50: CPT | Mod: 25

## 2019-06-11 PROCEDURE — 93303 ECHO TRANSTHORACIC: CPT | Mod: 26

## 2019-06-11 RX ORDER — POTASSIUM CHLORIDE 20 MEQ
40 PACKET (EA) ORAL DAILY
Refills: 0 | Status: COMPLETED | OUTPATIENT
Start: 2019-06-11 | End: 2019-06-13

## 2019-06-11 RX ADMIN — CHLORHEXIDINE GLUCONATE 1 APPLICATION(S): 213 SOLUTION TOPICAL at 05:08

## 2019-06-11 RX ADMIN — Medication 40 MILLIEQUIVALENT(S): at 12:47

## 2019-06-11 RX ADMIN — PANTOPRAZOLE SODIUM 40 MILLIGRAM(S): 20 TABLET, DELAYED RELEASE ORAL at 17:52

## 2019-06-11 RX ADMIN — PANTOPRAZOLE SODIUM 40 MILLIGRAM(S): 20 TABLET, DELAYED RELEASE ORAL at 05:07

## 2019-06-11 RX ADMIN — Medication 20 MILLIGRAM(S): at 05:07

## 2019-06-11 NOTE — PROGRESS NOTE ADULT - PROBLEM SELECTOR PLAN 1
Hemoptysis s/p bronch 5/31 showing RUL bleeding. SpO2 at baseline high 80's given cardiopulmonary pathophysiology.  - s/p IR embolization 5/31 of R-sided aortopulmonary collaterals, R bronchial artery, multiple R thoracic intercostal arteries  - s/p bronch 6/1 w/ endobronchial blocker placed for re-bleeding from RUL  - s/p re-bronch 6/4 with removal of blocker, suctioned out blood clots from RUL and mucus plugs from TONJA  - s/p re-bronch 6/5, no active bleeding, suctioned out blood clots from RUL  - No recurrence of hemoptysis for past several days, will reconsult IR if persistent active bleeding  - D/W peds cardiology consider restarting AC in few days Hemoptysis s/p bronch 5/31 showing RUL bleeding. SpO2 at baseline high 80's given cardiopulmonary pathophysiology.  - s/p IR embolization 5/31 of R-sided aortopulmonary collaterals, R bronchial artery, multiple R thoracic intercostal arteries  - s/p bronch 6/1 w/ endobronchial blocker placed for re-bleeding from RUL  - s/p re-bronch 6/4 with removal of blocker, suctioned out blood clots from RUL and mucus plugs from TONJA  - s/p re-bronch 6/5, no active bleeding, suctioned out blood clots from RUL  - No recurrence of hemoptysis for past several days, will reconsult IR if persistent active bleeding  - D/W peds cardiology consider restarting AC - d/w pt and mother and will decide

## 2019-06-11 NOTE — SWALLOW BEDSIDE ASSESSMENT ADULT - ADDITIONAL RECOMMENDATIONS
1. MD to reconsult this department if changes in medical status should change.   2. This department to continue to follow for speech and swallowing therapy as schedule permits.

## 2019-06-11 NOTE — CHART NOTE - NSCHARTNOTEFT_GEN_A_CORE
Hematology initially consulted due to abnormal coags. Mixing study attempted, however, baseline PTT and INR were normal therefore mix was not performed. No further workup recommended at this time for near-normal INR. Regarding long-term anticoagulation, defer to patient's pediatric cardiologist regarding continuation of coumadin or switching to lovenox as ultimately he will be managing this complex patient.    Efrain Mcdonnell, PGY5  Hematology Fellow  Pager: [phone]

## 2019-06-11 NOTE — SWALLOW BEDSIDE ASSESSMENT ADULT - ASR SWALLOW ASPIRATION MONITOR
throat clearing/upper respiratory infection/oral hygiene/change of breathing pattern/position upright (90Y)/cough/fever/gurgly voice/pneumonia

## 2019-06-11 NOTE — SWALLOW BEDSIDE ASSESSMENT ADULT - SLP PERTINENT HISTORY OF CURRENT PROBLEM
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 Pending bronchoscopy

## 2019-06-11 NOTE — PROGRESS NOTE ADULT - ASSESSMENT
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

## 2019-06-11 NOTE — PROGRESS NOTE PEDS - SUBJECTIVE AND OBJECTIVE BOX
INTERVAL HISTORY: Continues on nasal cannula    RESPIRATORY SUPPORT: Nasal cannula 3LPM    I&O's Summary  10 Aaron 2019 07:01  -  2019 06:45  --------------------------------------------------------  IN: 0 mL / OUT: 100 mL / NET: -100 mL      INTRAVASCULAR ACCESS: Peripheral IV    MEDICATIONS  (STANDING):  chlorhexidine 4% Liquid 1 Application(s) Topical <User Schedule>  docusate sodium Liquid 100 milliGRAM(s) Oral three times a day  furosemide   Injectable 20 milliGRAM(s) IV Push daily  pantoprazole  Injectable 40 milliGRAM(s) IV Push two times a day  polyethylene glycol 3350 17 Gram(s) Oral two times a day  senna Syrup 10 milliLiter(s) Oral at bedtime      PHYSICAL EXAMINATION:  Vital Signs Last 24 Hrs  T(C): 36.8 (2019 05:05), Max: 37.6 (10 Aaron 2019 16:17)  T(F): 98.3 (2019 05:05), Max: 99.6 (10 Aaron 2019 16:17)  HR: 87 (2019 05:05) (85 - 95)  BP: 132/80 (2019 05:05) (128/74 - 138/84)  RR: 20 (2019 05:05) (20 - 20)  SpO2: 93% (2019 05:05) (90% - 99%)  General - non-dysmorphic appearance, awake, tachypnea with labored breathing  Skin - beds sores+ on back  Eyes / ENT - no conjunctival injection, sclerae icteric, external ears & nares normal, mucous membranes moist.  Pulmonary - tachypneic, coarse sounds, decreased at bases  Cardiovascular - regular rhythm, normal S1 & single S2, 2/6 HSM at LMSB, no rubs, no gallops, capillary refill < 2sec, + pectus, R>L LE edema, chronic RLE venous stasis changes  Gastrointestinal - soft, distended , non-tender, hepatomegaly + .  Neurologic / Psychiatric - awake, alert    LABORATORY TESTS:                          10.9  CBC:   13.16 )-----------( 375   (06-10-19 @ 04:15)                          33.3               144   |  105   |  47                 Ca: 8.9    BMP:   ----------------------------< 129    M.3   (06-10-19 @ 04:15)             3.4    |  23    | 2.37               Ph: 3.8      LFT:     TPro: 6.4 / Alb: 2.9 / TBili: 3.0 / DBili: x / AST: 38 / ALT: 20 / AlkPhos: 110   (06-10-19 @ 04:15)    COAG: PT: 13.3 / PTT: 30.8 / INR: 1.16   (06-10-19 @ 04:15)     CARDIAC MARKERS:             High-Sensitivity Troponin: x   (19 @ 01:00)             CK: 253 / CKMB: x   (19 @ 01:00)             Pro-BNP: x   (19 @ 01:00)  CARDIAC MARKERS:             High-Sensitivity Troponin: x   (19 @ 02:50)             CK: 1152 / CKMB: 1.13   (19 @ 02:50)             Pro-BNP: x   (19 @ 02:50)    ABG:   pH: 7.41 / pCO2: 35 / pO2: 72 / HCO3: 23 / Base Excess: -2.2 / SaO2: 93.4 / Lactate: 1.8 / iCa: x   (19 @ 16:42)    IMAGING STUDIES:  TTE Congential Anomalies (19 @ 17:28) >  Conclusions:  1. Moderate aortic regurgitation.  2. Systemic Right ventricle loops to the left.  The  Systemic Right ventricular function is mildly decreased.  Hypoplastic left ventricle.  3. Normal pericardium with no pericardial effusion. INTERVAL HISTORY: Continues on nasal cannula, sitting up in chair , endorses feeling better    RESPIRATORY SUPPORT: Nasal cannula 3LPM    I&O's Summary  10 Aaron 2019 07:01  -  2019 06:45  --------------------------------------------------------  IN: 0 mL / OUT: 100 mL / NET: -100 mL      INTRAVASCULAR ACCESS: Peripheral IV    MEDICATIONS  (STANDING):  chlorhexidine 4% Liquid 1 Application(s) Topical <User Schedule>  docusate sodium Liquid 100 milliGRAM(s) Oral three times a day  furosemide   Injectable 20 milliGRAM(s) IV Push daily  pantoprazole  Injectable 40 milliGRAM(s) IV Push two times a day  polyethylene glycol 3350 17 Gram(s) Oral two times a day  senna Syrup 10 milliLiter(s) Oral at bedtime      PHYSICAL EXAMINATION:  Vital Signs Last 24 Hrs  T(C): 36.8 (2019 05:05), Max: 37.6 (10 Aaron 2019 16:17)  T(F): 98.3 (2019 05:05), Max: 99.6 (10 Aaron 2019 16:17)  HR: 87 (2019 05:05) (85 - 95)  BP: 132/80 (2019 05:05) (128/74 - 138/84)  RR: 20 (2019 05:05) (20 - 20)  SpO2: 93% (2019 05:05) (90% - 99%)  General - non-dysmorphic appearance, awake, tachypnea with labored breathing  Skin - beds sores+ on back  Eyes / ENT - no conjunctival injection, sclerae icteric, external ears & nares normal, mucous membranes moist.  Pulmonary - tachypneic, coarse sounds, decreased at bases  Cardiovascular - regular rhythm, normal S1 & single S2, 2/6 HSM at LMSB, no rubs, no gallops, capillary refill < 2sec, + pectus, R>L LE edema, chronic RLE venous stasis changes  Gastrointestinal - soft, distended , non-tender, hepatomegaly + .  Neurologic / Psychiatric - awake, alert    LABORATORY TESTS:                          10.9  CBC:   13.16 )-----------( 375   (06-10-19 @ 04:15)                          33.3               144   |  105   |  47                 Ca: 8.9    BMP:   ----------------------------< 129    M.3   (06-10-19 @ 04:15)             3.4    |  23    | 2.37               Ph: 3.8      LFT:     TPro: 6.4 / Alb: 2.9 / TBili: 3.0 / DBili: x / AST: 38 / ALT: 20 / AlkPhos: 110   (06-10-19 @ 04:15)    COAG: PT: 13.3 / PTT: 30.8 / INR: 1.16   (06-10-19 @ 04:15)     CARDIAC MARKERS:             High-Sensitivity Troponin: x   (19 @ 01:00)             CK: 253 / CKMB: x   (19 @ 01:00)             Pro-BNP: x   (19 @ 01:00)  CARDIAC MARKERS:             High-Sensitivity Troponin: x   (19 @ 02:50)             CK: 1152 / CKMB: 1.13   (19 @ 02:50)             Pro-BNP: x   (19 @ 02:50)    ABG:   pH: 7.41 / pCO2: 35 / pO2: 72 / HCO3: 23 / Base Excess: -2.2 / SaO2: 93.4 / Lactate: 1.8 / iCa: x   (19 @ 16:42)    IMAGING STUDIES:  < from: Echocardiogram, Pediatric (18 @ 11:37) >  Summary:   1. Heterotaxy syndrome.   2. Atrial situs Inversus; L-Ventricular loop; D-Malposition of the great arteries.   3. Double outlet right ventricle.   4. Mitral valve atresia.   5. Pulmonary valve atresia.   6. Status post placement of bilateral bidirectional Narinder shunts.   7. Status post extracardiac fenestrated Fontan conduit.   8. Status post device closure of Fontan fenestration.   9. Low velocity color Doppler flow was demonstrated in the right SVC and Fontan conduit. The left SVC was not well visualized.  10. 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  11. One pulmonary vein from each side is visualized entering the atria, with no evidence of obstruction.  12. Redundant chordae noted on the tricuspid valve and mild tricuspid valve prolapse.  13. There are multiple jets of tricuspid regurgitation which is cumulatively moderate.  14. Dilated right ventricle.  15. There is adequate systolic excursion of the right ventricular free wall compared to the hypokinetic septal wall.  16. 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  17. Qualitatively depressed left ventricular systolic function.  18. No pericardial effusion.  19. Study was significantly limited by poor acoustic windows.      19 PRELIM:  No interval change in function, worsened AV valve regurgitation compared to prior INTERVAL HISTORY: Continues on nasal cannula, sitting up in chair , endorses feeling better    RESPIRATORY SUPPORT: Nasal cannula 3LPM    I&O's Summary  10 Aaron 2019 07:01  -  11 Jun 2019 06:45  --------------------------------------------------------  IN: 0 mL / OUT: 100 mL / NET: -100 mL      INTRAVASCULAR ACCESS: Peripheral IV    MEDICATIONS  (STANDING):  chlorhexidine 4% Liquid 1 Application(s) Topical <User Schedule>  docusate sodium Liquid 100 milliGRAM(s) Oral three times a day  furosemide   Injectable 20 milliGRAM(s) IV Push daily  pantoprazole  Injectable 40 milliGRAM(s) IV Push two times a day  polyethylene glycol 3350 17 Gram(s) Oral two times a day  senna Syrup 10 milliLiter(s) Oral at bedtime      PHYSICAL EXAMINATION:  Vital Signs Last 24 Hrs  T(C): 36.8 (11 Jun 2019 05:05), Max: 37.6 (10 Aaron 2019 16:17)  T(F): 98.3 (11 Jun 2019 05:05), Max: 99.6 (10 Aaron 2019 16:17)  HR: 87 (11 Jun 2019 05:05) (85 - 95)  BP: 132/80 (11 Jun 2019 05:05) (128/74 - 138/84)  RR: 20 (11 Jun 2019 05:05) (20 - 20)  SpO2: 93% (11 Jun 2019 05:05) (90% - 99%)  General - non-dysmorphic appearance, awake, tachypnea with labored breathing  Skin - beds sores+ on back  Eyes / ENT - no conjunctival injection, sclerae icteric, external ears & nares normal, mucous membranes moist.  Pulmonary - tachypneic, coarse sounds, decreased at bases  Cardiovascular - regular rhythm, normal S1 & single S2, 2/6 HSM at LMSB, no rubs, no gallops, capillary refill < 2sec, + pectus, R>L LE edema, chronic RLE venous stasis changes  Gastrointestinal - soft, distended , non-tender, hepatomegaly + .  Neurologic / Psychiatric - awake, alert    LABORATORY TESTS:       06-11    145  |  107  |  34<H>  ----------------------------<  123<H>  3.7   |  25  |  1.98<H>    Ca    8.9      11 Jun 2019 05:47  Phos  3.3     06-11  Mg     2.2     06-11    TPro  6.6  /  Alb  3.2<L>  /  TBili  2.4<H>  /  DBili  x   /  AST  33  /  ALT  19  /  AlkPhos  111  06-11  	                        10.6   13.15 )-----------( 406      ( 11 Jun 2019 05:47 )             33.1     PT/INR - ( 11 Jun 2019 05:47 )   PT: 14.0 SEC;   INR: 1.25          PTT - ( 11 Jun 2019 05:47 )  PTT:32.2 SEC  CARDIAC MARKERS:             High-Sensitivity Troponin: x   (06-08-19 @ 01:00)             CK: 253 / CKMB: x   (06-08-19 @ 01:00)             Pro-BNP: x   (06-08-19 @ 01:00)  CARDIAC MARKERS:             High-Sensitivity Troponin: x   (06-05-19 @ 02:50)             CK: 1152 / CKMB: 1.13   (06-05-19 @ 02:50)             Pro-BNP: x   (06-05-19 @ 02:50)    ABG:   pH: 7.41 / pCO2: 35 / pO2: 72 / HCO3: 23 / Base Excess: -2.2 / SaO2: 93.4 / Lactate: 1.8 / iCa: x   (06-07-19 @ 16:42)    IMAGING STUDIES:

## 2019-06-11 NOTE — PROGRESS NOTE ADULT - PROBLEM SELECTOR PLAN 4
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 Gastritis and duodenal erosions were also seen possibly 2/2 ASA gastropathy.   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 Gastritis and duodenal erosions were also seen possibly 2/2 ASA gastropathy.   - OGT removed 6/7, soft diet /speech swallow to eval for upgrade of diet   - protonix 40 daily

## 2019-06-11 NOTE — PROGRESS NOTE ADULT - SUBJECTIVE AND OBJECTIVE BOX
[facility] Division of Kidney Diseases & Hypertension  FOLLOW UP NOTE  [phone]--------------------------------------------------------------------------------    HPI: 24 year old male with h/o heterotaxy syndrome, TAPVR initially presented to [facility] on 5/30 with complaints of hemoptysis. Patient developed hypoxemic respiratory failure and was intubated. Patient was subsequently transferred to [facility] and had IR guided embolization as well. Patient is currently admitted for management of recurrent hemoptysis. Nephrology was consulted for elevated creatinine. On review of previous labs in [facility], Scr. noted to be WNL. Scr. on admission was noted to be WNL( 0.86) on 5/31/19. However Scr. was noted to be elevated at 1.85 on 6/5/19 and increased further to 2.64 on 6/7/19. Patient noted to have decreasing urine output. On chart review, patient noted to have multiple low BP readings since 6/4/19 and was on IV pressors. Patient also noted to have elevated vancomycin level (37.5) on 6/5/19 as well. Pt. was extubated on 6/7/19.    Pt. was seen and examined with mother at bedside. Patient is currently on nasal cannula. Denies SOB, CP, abdominal pain, nausea, or vomiting.     PAST HISTORY  --------------------------------------------------------------------------------  No significant changes to PMH, PSH, FHx, SHx, unless otherwise noted    ALLERGIES & MEDICATIONS  --------------------------------------------------------------------------------  Allergies    No Known Allergies    Intolerances      Standing Inpatient Medications  chlorhexidine 4% Liquid 1 Application(s) Topical <User Schedule>  docusate sodium Liquid 100 milliGRAM(s) Oral three times a day  furosemide   Injectable 20 milliGRAM(s) IV Push daily  pantoprazole  Injectable 40 milliGRAM(s) IV Push two times a day  polyethylene glycol 3350 17 Gram(s) Oral two times a day  potassium chloride    Tablet ER 40 milliEquivalent(s) Oral daily  senna Syrup 10 milliLiter(s) Oral at bedtime    PRN Inpatient Medications  acetaminophen    Suspension .. 650 milliGRAM(s) Oral every 6 hours PRN  ALPRAZolam 0.25 milliGRAM(s) Oral every 8 hours PRN  bisacodyl Suppository 10 milliGRAM(s) Rectal daily PRN      REVIEW OF SYSTEMS  --------------------------------------------------------------------------------    Gen: fatigue +  Resp: no SOB  CV: no CP  GI: no abdominal pain  MSK: no edema    All other systems were reviewed and are negative, except as noted.    VITALS/PHYSICAL EXAM  --------------------------------------------------------------------------------  T(C): 36.7 (06-11-19 @ 09:51), Max: 37.6 (06-10-19 @ 16:17)  HR: 89 (06-11-19 @ 10:09) (85 - 95)  BP: 131/79 (06-11-19 @ 09:51) (130/73 - 138/84)  RR: 20 (06-11-19 @ 09:51) (20 - 20)  SpO2: 87% (06-11-19 @ 10:09) (87% - 95%)  Wt(kg): --        06-10-19 @ 07:01  -  06-11-19 @ 07:00  --------------------------------------------------------  IN: 0 mL / OUT: 100 mL / NET: -100 mL      Physical Exam:  	  Gen: NAD  	HEENT: sclera anicteric   	Pulm: CTA B/L  	CV:  S1S2  	Abd: +BS, soft   	Ext: pitting B/L Lower ext edema +  	Neuro: Awake   	Skin: Warm, without rashes      LABS/STUDIES  --------------------------------------------------------------------------------              10.6   13.15 >-----------<  406      [06-11-19 @ 05:47]              33.1     145  |  107  |  34  ----------------------------<  123      [06-11-19 @ 05:47]  3.7   |  25  |  1.98        Ca     8.9     [06-11-19 @ 05:47]      Mg     2.2     [06-11-19 @ 05:47]      Phos  3.3     [06-11-19 @ 05:47]    TPro  6.6  /  Alb  3.2  /  TBili  2.4  /  DBili  x   /  AST  33  /  ALT  19  /  AlkPhos  111  [06-11-19 @ 05:47]    PT/INR: PT 14.0 , INR 1.25       [06-11-19 @ 05:47]  PTT: 32.2       [06-11-19 @ 05:47]      Creatinine Trend:  SCr 1.98 [06-11 @ 05:47]  SCr 2.37 [06-10 @ 04:15]  SCr 2.53 [06-09 @ 05:31]  SCr 2.68 [06-08 @ 01:00]  SCr 2.64 [06-07 @ 03:10]    Urinalysis - [06-07-19 @ 08:31]      Color DARK YELLOW / Appearance Lt TURBID / SG 1.028 / pH 6.0      Gluc NEGATIVE / Ketone NEGATIVE  / Bili SMALL / Urobili SMALL       Blood SMALL / Protein 100 / Leuk Est NEGATIVE / Nitrite NEGATIVE      RBC 2-5 / WBC 2-5 / Hyaline  / Gran  / Sq Epi  / Non Sq Epi  / Bacteria MOD    Urine Creatinine 148.40      [06-07-19 @ 08:06]  Urine Protein 211.9      [06-07-19 @ 08:06]  Urine Sodium < 20      [06-07-19 @ 08:06]  Urine Potassium 55.2      [06-07-19 @ 08:06]  Urine Chloride < 20      [06-07-19 @ 08:06]  Urine Osmolality 404      [06-07-19 @ 08:06]

## 2019-06-11 NOTE — SWALLOW BEDSIDE ASSESSMENT ADULT - SWALLOW EVAL: RECOMMENDED FEEDING/EATING TECHNIQUES
maintain upright posture during/after eating for 30 mins/no straws/small sips/bites/alternate food with liquid/position upright (90 degrees)

## 2019-06-11 NOTE — SWALLOW BEDSIDE ASSESSMENT ADULT - COMMENTS
Patient seen upright in chair.  Mother at bedside.  Patient responding with short answers, vocal quality noted to be weak.    s/p IR embolization of right upper lobe aortopulmonary collateral vessels which were the most likely culprits for his acute on chronic hemoptysis as well as S/P endobronchial blocker placement.     intubated 5/31/2019-6/7/2019 5/31: bronch showed RUL posterior segment bleeding, subsequently intubated for bleeding and hypoxia from baseline upper 80s down to 60s, pending IR eval.  O/N s/p IR embolization of multiple vessels  6/1: got IR embolization overnight. stopped midazolam this AM and given 500cc LR for hypotension this AM. Switching Levoped to phenylephrine. Hemoptysis today, placed endobronchial block, pending removal in few hours. FFP for elevated INR. Started on empiric vanc zosyn for leukocytosis and fever.  6/2: Duplex LE negative for acute DVT. Showed chronic versus partial DVT is appreciated at the mid to distal right femoral vein. Bronch revealed continued bleeding, given 2U FFP. Coags ordered BID - if INR >1.5, patient needs 2U FFP. Per IR, would be difficult to re-embolize.  6/3: pending re-bronch  6/4: 1U FFP, s/p bronch with removal of endoblocker, suctioned RUL blood clots and TONJA mucus plugs

## 2019-06-11 NOTE — PROGRESS NOTE ADULT - ATTENDING COMMENTS
Patient seen and examined.    Clinically improving, on NC, no further hemoptysis.   Still with abdominal distension - stable, nontender.  Diuresing - will continue with daily Lasix and monitor output and electrolytes  Appreciate Peds cardiology f/u - plan for restarting AC in the next 24 hours and possible cardiac cath. MELLO - appreciate renal follow up. Will follow UO and SCr.  PT eval, generally weak, out of bed to chair.  DVT ppx - SCDs  d/w mother and patient at bedside with RCU team.     I was physically present for the key portions of the evaluation and management (E/M) service provided.  I agree with the above history, physical, and plan which I have reviewed and edited where appropriate.

## 2019-06-11 NOTE — SWALLOW BEDSIDE ASSESSMENT ADULT - SWALLOW EVAL: DIAGNOSIS
Patient presented with oral stage for puree, mechanical soft solids, solids and thin liquids marked by adequate bolus formation, transfer and clearance.  Patient presented with mild increased mastication and slight oral residue for solids.  Patient presented with mild pharyngeal stage dysphagia for puree, mechanical soft solids, solids, and thin liquids marked by prompt swallow trigger, mildly reduced hyolaryngeal elevation, and no overt s/s of penetration/aspiration.  Patient with slightly increased work of breathing for solids due to the requirement of additional mastication, therefore at this time, it is recommended to initiate mechanical soft solids and thin liquids.

## 2019-06-11 NOTE — PROGRESS NOTE ADULT - PROBLEM SELECTOR PLAN 2
-  off pressors 6/7  - Possible angiogram per cardiology  - Holding Coumadin in setting of recent bleed. s/p PCC, FFP x5, vitamin K x1  - Holding dig 0.25 daily in setting of ARF  - LE dopplers repeated  with chronic RLE DVT (diagnosed 3/30/18, suspect provoked 2/2 sedentary status after hospitalization around that time for hematemesis, has been on coumadin per cards Dr. Mega Rojas)- no change   - lasix 20mg daily -  off pressors 6/7  - Possible angiogram per cardiology  - Holding Coumadin in setting of recent bleed. s/p PCC, FFP x5, vitamin K x1  - Holding dig 0.25 daily in setting of ARF  - LE dopplers repeated  with chronic RLE DVT (diagnosed 3/30/18, suspect provoked 2/2 sedentary status after hospitalization around that time for hematemesis, has been on coumadin per cards Dr. Mega Rojas)- no change   - lasix 20mg daily- add KCL for 3 days

## 2019-06-11 NOTE — PROGRESS NOTE ADULT - PROBLEM SELECTOR PLAN 1
Pt. with MELLO in setting of hypotension and vancomycin use. Scr on admission was  WNL (0.86) on 5/31/19, increased to 2.68 on 6/8/19 and improved to 1.98 today (6/11/19). UA shows 100 protein but no RBCs or WBCs seen. Urine electrolytes are suggestive of pre-renal MELLO. Patient with ? hemodynamically mediated MELLO from hypotension. Patient with ? ATN from hypotension and vancomycin use. Spot urine TP/CR elevated at 1.42 on 6/7/19. Pt. currently non-oliguric. Monitor Scr, I/O, and electrolytes. Avoid NSAIDs, RCAs, and other nephrotoxins. Will sign off for now; please recall if needed.

## 2019-06-11 NOTE — PROGRESS NOTE PEDS - ASSESSMENT
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 He is now s/p IR embolization of right upper lobe aortopulmonary collateral vessels lwhich were the most likely culprits for his acute on chronic hemoptysis as well as S/P endobronchial blocker placement. No new bleeding noted in the last week. MELLO seems to be slowly improving.      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 He is now s/p IR embolization of right upper lobe aortopulmonary collateral vessels lwhich were the most likely culprits for his acute on chronic hemoptysis as well as S/P endobronchial blocker placement. No new bleeding noted in the last week. MELLO seems to be slowly improving.      Plan-   -Continue lasix 20 mg IV daily for now  -Encourage pulmonary rehab - out of bed, ambulation ,incentive spirometry  -Encourage physical rehab  - Will need anticoagulation given prior history of extensive DVT and Fontan completion- discussed with Kang again. To initiate heparin drip today, plan to discharge on coumadin. Monitor INR. Watch for hemoptysis after re-initiating anticoagulation  - For eventual hemodynamic/interventional cath after recovers from this acute illness (as outpatient)

## 2019-06-11 NOTE — PROGRESS NOTE ADULT - SUBJECTIVE AND OBJECTIVE BOX
CHIEF COMPLAINT:    Interval Events:    REVIEW OF SYSTEMS:  Constitutional:   Eyes:  ENT:  CV:  Resp:  GI:  :  MSK:  Integumentary:  Neurological:  Psychiatric:  Endocrine:  Hematologic/Lymphatic:  Allergic/Immunologic:  [ ] All other systems negative  [ ] Unable to assess ROS because ________    OBJECTIVE:  ICU Vital Signs Last 24 Hrs  T(C): 36.8 (11 Jun 2019 05:05), Max: 37.6 (10 Aaron 2019 16:17)  T(F): 98.3 (11 Jun 2019 05:05), Max: 99.6 (10 Aaron 2019 16:17)  HR: 90 (11 Jun 2019 07:22) (85 - 95)  BP: 132/80 (11 Jun 2019 05:05) (128/74 - 138/84)  BP(mean): --  ABP: --  ABP(mean): --  RR: 20 (11 Jun 2019 07:22) (20 - 20)  SpO2: 90% (11 Jun 2019 07:22) (90% - 95%)        06-10 @ 07:01  -  06-11 @ 07:00  --------------------------------------------------------  IN: 0 mL / OUT: 100 mL / NET: -100 mL      CAPILLARY BLOOD GLUCOSE          PHYSICAL EXAM:  General:   HEENT:   Lymph Nodes:  Neck:   Respiratory:   Cardiovascular:   Abdomen:   Extremities:   Skin:   Neurological:  Psychiatry:    HOSPITAL MEDICATIONS:  MEDICATIONS  (STANDING):  chlorhexidine 4% Liquid 1 Application(s) Topical <User Schedule>  docusate sodium Liquid 100 milliGRAM(s) Oral three times a day  furosemide   Injectable 20 milliGRAM(s) IV Push daily  pantoprazole  Injectable 40 milliGRAM(s) IV Push two times a day  polyethylene glycol 3350 17 Gram(s) Oral two times a day  senna Syrup 10 milliLiter(s) Oral at bedtime    MEDICATIONS  (PRN):  acetaminophen    Suspension .. 650 milliGRAM(s) Oral every 6 hours PRN Temp greater or equal to 38C (100.4F)  ALPRAZolam 0.25 milliGRAM(s) Oral every 8 hours PRN Anxiety  bisacodyl Suppository 10 milliGRAM(s) Rectal daily PRN Constipation      LABS:                        10.6   13.15 )-----------( 406      ( 11 Jun 2019 05:47 )             33.1     06-11    145  |  107  |  34<H>  ----------------------------<  123<H>  3.7   |  25  |  1.98<H>    Ca    8.9      11 Jun 2019 05:47  Phos  3.3     06-11  Mg     2.2     06-11    TPro  6.6  /  Alb  3.2<L>  /  TBili  2.4<H>  /  DBili  x   /  AST  33  /  ALT  19  /  AlkPhos  111  06-11    PT/INR - ( 11 Jun 2019 05:47 )   PT: 14.0 SEC;   INR: 1.25          PTT - ( 11 Jun 2019 05:47 )  PTT:32.2 SEC          MICROBIOLOGY:     RADIOLOGY:  [ ] Reviewed and interpreted by me    PULMONARY FUNCTION TESTS:    EKG: CHIEF COMPLAINT: Patient is a 24y old  Male who presents with a chief complaint of S/P Fontan (11 Jun 2019 06:44)      Interval Events: none overnight     REVIEW OF SYSTEMS:  Constitutional: no fever or chills   CV: Denies   Resp: + HAGER   GI: Denies + BM last night   :  MSK: Weakness   [ x] All other systems negative  [ ] Unable to assess ROS because ________    OBJECTIVE:  ICU Vital Signs Last 24 Hrs  T(C): 36.8 (11 Jun 2019 05:05), Max: 37.6 (10 Aaron 2019 16:17)  T(F): 98.3 (11 Jun 2019 05:05), Max: 99.6 (10 Aaron 2019 16:17)  HR: 90 (11 Jun 2019 07:22) (85 - 95)  BP: 132/80 (11 Jun 2019 05:05) (128/74 - 138/84)  BP(mean): --  ABP: --  ABP(mean): --  RR: 20 (11 Jun 2019 07:22) (20 - 20)  SpO2: 90% (11 Jun 2019 07:22) (90% - 95%)        06-10 @ 07:01  -  06-11 @ 07:00  --------------------------------------------------------  IN: 0 mL / OUT: 100 mL / NET: -100 mL      CAPILLARY BLOOD GLUCOSE            HOSPITAL MEDICATIONS:  MEDICATIONS  (STANDING):  chlorhexidine 4% Liquid 1 Application(s) Topical <User Schedule>  docusate sodium Liquid 100 milliGRAM(s) Oral three times a day  furosemide   Injectable 20 milliGRAM(s) IV Push daily  pantoprazole  Injectable 40 milliGRAM(s) IV Push two times a day  polyethylene glycol 3350 17 Gram(s) Oral two times a day  senna Syrup 10 milliLiter(s) Oral at bedtime    MEDICATIONS  (PRN):  acetaminophen    Suspension .. 650 milliGRAM(s) Oral every 6 hours PRN Temp greater or equal to 38C (100.4F)  ALPRAZolam 0.25 milliGRAM(s) Oral every 8 hours PRN Anxiety  bisacodyl Suppository 10 milliGRAM(s) Rectal daily PRN Constipation      LABS:                        10.6   13.15 )-----------( 406      ( 11 Jun 2019 05:47 )             33.1     06-11    145  |  107  |  34<H>  ----------------------------<  123<H>  3.7   |  25  |  1.98<H>    Ca    8.9      11 Jun 2019 05:47  Phos  3.3     06-11  Mg     2.2     06-11    TPro  6.6  /  Alb  3.2<L>  /  TBili  2.4<H>  /  DBili  x   /  AST  33  /  ALT  19  /  AlkPhos  111  06-11    PT/INR - ( 11 Jun 2019 05:47 )   PT: 14.0 SEC;   INR: 1.25          PTT - ( 11 Jun 2019 05:47 )  PTT:32.2 SEC          MICROBIOLOGY:     RADIOLOGY:  [ ] Reviewed and interpreted by me    PULMONARY FUNCTION TESTS:    EKG: CHIEF COMPLAINT: Patient is a 24y old  Male who presents with a chief complaint of S/P Fontan (11 Jun 2019 06:44)      Interval Events: none overnight, sitting up in a chair this morning, appears comfortable    REVIEW OF SYSTEMS:  Constitutional: no fever or chills   CV: Denies   Resp: + HAGER   GI: Denies + BM last night   :  MSK: Weakness   [ x] All other systems negative  [ ] Unable to assess ROS because ________    OBJECTIVE:  ICU Vital Signs Last 24 Hrs  T(C): 36.8 (11 Jun 2019 05:05), Max: 37.6 (10 Aaron 2019 16:17)  T(F): 98.3 (11 Jun 2019 05:05), Max: 99.6 (10 Aaron 2019 16:17)  HR: 90 (11 Jun 2019 07:22) (85 - 95)  BP: 132/80 (11 Jun 2019 05:05) (128/74 - 138/84)  BP(mean): --  ABP: --  ABP(mean): --  RR: 20 (11 Jun 2019 07:22) (20 - 20)  SpO2: 90% (11 Jun 2019 07:22) (90% - 95%)        06-10 @ 07:01  -  06-11 @ 07:00  --------------------------------------------------------  IN: 0 mL / OUT: 100 mL / NET: -100 mL      CAPILLARY BLOOD GLUCOSE            HOSPITAL MEDICATIONS:  MEDICATIONS  (STANDING):  chlorhexidine 4% Liquid 1 Application(s) Topical <User Schedule>  docusate sodium Liquid 100 milliGRAM(s) Oral three times a day  furosemide   Injectable 20 milliGRAM(s) IV Push daily  pantoprazole  Injectable 40 milliGRAM(s) IV Push two times a day  polyethylene glycol 3350 17 Gram(s) Oral two times a day  senna Syrup 10 milliLiter(s) Oral at bedtime    MEDICATIONS  (PRN):  acetaminophen    Suspension .. 650 milliGRAM(s) Oral every 6 hours PRN Temp greater or equal to 38C (100.4F)  ALPRAZolam 0.25 milliGRAM(s) Oral every 8 hours PRN Anxiety  bisacodyl Suppository 10 milliGRAM(s) Rectal daily PRN Constipation      LABS:                        10.6   13.15 )-----------( 406      ( 11 Jun 2019 05:47 )             33.1     06-11    145  |  107  |  34<H>  ----------------------------<  123<H>  3.7   |  25  |  1.98<H>    Ca    8.9      11 Jun 2019 05:47  Phos  3.3     06-11  Mg     2.2     06-11    TPro  6.6  /  Alb  3.2<L>  /  TBili  2.4<H>  /  DBili  x   /  AST  33  /  ALT  19  /  AlkPhos  111  06-11    PT/INR - ( 11 Jun 2019 05:47 )   PT: 14.0 SEC;   INR: 1.25          PTT - ( 11 Jun 2019 05:47 )  PTT:32.2 SEC          MICROBIOLOGY:     RADIOLOGY:  [ ] Reviewed and interpreted by me    PULMONARY FUNCTION TESTS:    EKG:

## 2019-06-11 NOTE — PROGRESS NOTE ADULT - PROBLEM SELECTOR PLAN 5
- MELLO earlier in admission, initially resolved with IVF and pressor support, likely pre-renal in setting of poor PO intake/hemoptysis vs possible PATRICA. Now with ARF suspect 2/2 hemodynamically mediated MELLO vs vancomycin nephrotoxicity vs possible PATRICA.  - Goal K>4, Mg>2, replete prn  - Monitor Cr, monitor UOP, improved   - Renal recs appreciated, no HD at this time

## 2019-06-12 DIAGNOSIS — N17.9 ACUTE KIDNEY FAILURE, UNSPECIFIED: ICD-10-CM

## 2019-06-12 LAB
ALBUMIN SERPL ELPH-MCNC: 3.5 G/DL — SIGNIFICANT CHANGE UP (ref 3.3–5)
ALP SERPL-CCNC: 123 U/L — HIGH (ref 40–120)
ALT FLD-CCNC: 22 U/L — SIGNIFICANT CHANGE UP (ref 4–41)
ANION GAP SERPL CALC-SCNC: 12 MMO/L — SIGNIFICANT CHANGE UP (ref 7–14)
APTT BLD: 31.2 SEC — SIGNIFICANT CHANGE UP (ref 27.5–36.3)
APTT BLD: 45.3 SEC — HIGH (ref 27.5–36.3)
AST SERPL-CCNC: 33 U/L — SIGNIFICANT CHANGE UP (ref 4–40)
BASOPHILS # BLD AUTO: 0.16 K/UL — SIGNIFICANT CHANGE UP (ref 0–0.2)
BASOPHILS NFR BLD AUTO: 1 % — SIGNIFICANT CHANGE UP (ref 0–2)
BILIRUB SERPL-MCNC: 2.5 MG/DL — HIGH (ref 0.2–1.2)
BUN SERPL-MCNC: 28 MG/DL — HIGH (ref 7–23)
CALCIUM SERPL-MCNC: 9.3 MG/DL — SIGNIFICANT CHANGE UP (ref 8.4–10.5)
CHLORIDE SERPL-SCNC: 105 MMOL/L — SIGNIFICANT CHANGE UP (ref 98–107)
CO2 SERPL-SCNC: 25 MMOL/L — SIGNIFICANT CHANGE UP (ref 22–31)
CREAT SERPL-MCNC: 1.64 MG/DL — HIGH (ref 0.5–1.3)
EOSINOPHIL # BLD AUTO: 0.09 K/UL — SIGNIFICANT CHANGE UP (ref 0–0.5)
EOSINOPHIL NFR BLD AUTO: 0.6 % — SIGNIFICANT CHANGE UP (ref 0–6)
GLUCOSE SERPL-MCNC: 136 MG/DL — HIGH (ref 70–99)
HCT VFR BLD CALC: 35.2 % — LOW (ref 39–50)
HCT VFR BLD CALC: 36.7 % — LOW (ref 39–50)
HGB BLD-MCNC: 11.3 G/DL — LOW (ref 13–17)
HGB BLD-MCNC: 11.7 G/DL — LOW (ref 13–17)
IMM GRANULOCYTES NFR BLD AUTO: 1.8 % — HIGH (ref 0–1.5)
INR BLD: 1.22 — HIGH (ref 0.88–1.17)
LYMPHOCYTES # BLD AUTO: 0.98 K/UL — LOW (ref 1–3.3)
LYMPHOCYTES # BLD AUTO: 6.2 % — LOW (ref 13–44)
MAGNESIUM SERPL-MCNC: 2 MG/DL — SIGNIFICANT CHANGE UP (ref 1.6–2.6)
MCHC RBC-ENTMCNC: 24.9 PG — LOW (ref 27–34)
MCHC RBC-ENTMCNC: 25.1 PG — LOW (ref 27–34)
MCHC RBC-ENTMCNC: 31.9 % — LOW (ref 32–36)
MCHC RBC-ENTMCNC: 32.1 % — SIGNIFICANT CHANGE UP (ref 32–36)
MCV RBC AUTO: 78 FL — LOW (ref 80–100)
MCV RBC AUTO: 78.1 FL — LOW (ref 80–100)
MONOCYTES # BLD AUTO: 2.41 K/UL — HIGH (ref 0–0.9)
MONOCYTES NFR BLD AUTO: 15.2 % — HIGH (ref 2–14)
NEUTROPHILS # BLD AUTO: 11.94 K/UL — HIGH (ref 1.8–7.4)
NEUTROPHILS NFR BLD AUTO: 75.2 % — SIGNIFICANT CHANGE UP (ref 43–77)
NRBC # FLD: 0.06 K/UL — SIGNIFICANT CHANGE UP (ref 0–0)
NRBC # FLD: 0.07 K/UL — SIGNIFICANT CHANGE UP (ref 0–0)
PHOSPHATE SERPL-MCNC: 2.7 MG/DL — SIGNIFICANT CHANGE UP (ref 2.5–4.5)
PLATELET # BLD AUTO: 472 K/UL — HIGH (ref 150–400)
PLATELET # BLD AUTO: 491 K/UL — HIGH (ref 150–400)
PMV BLD: 12.7 FL — SIGNIFICANT CHANGE UP (ref 7–13)
PMV BLD: 12.8 FL — SIGNIFICANT CHANGE UP (ref 7–13)
POTASSIUM SERPL-MCNC: 3.9 MMOL/L — SIGNIFICANT CHANGE UP (ref 3.5–5.3)
POTASSIUM SERPL-SCNC: 3.9 MMOL/L — SIGNIFICANT CHANGE UP (ref 3.5–5.3)
PROT SERPL-MCNC: 7 G/DL — SIGNIFICANT CHANGE UP (ref 6–8.3)
PROTHROM AB SERPL-ACNC: 14 SEC — HIGH (ref 9.8–13.1)
RBC # BLD: 4.51 M/UL — SIGNIFICANT CHANGE UP (ref 4.2–5.8)
RBC # BLD: 4.7 M/UL — SIGNIFICANT CHANGE UP (ref 4.2–5.8)
RBC # FLD: 23.6 % — HIGH (ref 10.3–14.5)
RBC # FLD: 23.7 % — HIGH (ref 10.3–14.5)
SODIUM SERPL-SCNC: 142 MMOL/L — SIGNIFICANT CHANGE UP (ref 135–145)
WBC # BLD: 15.86 K/UL — HIGH (ref 3.8–10.5)
WBC # BLD: 17.35 K/UL — HIGH (ref 3.8–10.5)
WBC # FLD AUTO: 15.86 K/UL — HIGH (ref 3.8–10.5)
WBC # FLD AUTO: 17.35 K/UL — HIGH (ref 3.8–10.5)

## 2019-06-12 PROCEDURE — 99233 SBSQ HOSP IP/OBS HIGH 50: CPT | Mod: GC

## 2019-06-12 PROCEDURE — 99233 SBSQ HOSP IP/OBS HIGH 50: CPT

## 2019-06-12 RX ORDER — FUROSEMIDE 40 MG
20 TABLET ORAL
Refills: 0 | Status: DISCONTINUED | OUTPATIENT
Start: 2019-06-12 | End: 2019-06-13

## 2019-06-12 RX ORDER — HEPARIN SODIUM 5000 [USP'U]/ML
800 INJECTION INTRAVENOUS; SUBCUTANEOUS
Qty: 25000 | Refills: 0 | Status: DISCONTINUED | OUTPATIENT
Start: 2019-06-12 | End: 2019-06-13

## 2019-06-12 RX ADMIN — HEPARIN SODIUM 8 UNIT(S)/HR: 5000 INJECTION INTRAVENOUS; SUBCUTANEOUS at 11:13

## 2019-06-12 RX ADMIN — CHLORHEXIDINE GLUCONATE 1 APPLICATION(S): 213 SOLUTION TOPICAL at 11:13

## 2019-06-12 RX ADMIN — Medication 40 MILLIEQUIVALENT(S): at 11:21

## 2019-06-12 RX ADMIN — PANTOPRAZOLE SODIUM 40 MILLIGRAM(S): 20 TABLET, DELAYED RELEASE ORAL at 05:44

## 2019-06-12 RX ADMIN — Medication 20 MILLIGRAM(S): at 05:44

## 2019-06-12 RX ADMIN — HEPARIN SODIUM 9 UNIT(S)/HR: 5000 INJECTION INTRAVENOUS; SUBCUTANEOUS at 19:15

## 2019-06-12 RX ADMIN — PANTOPRAZOLE SODIUM 40 MILLIGRAM(S): 20 TABLET, DELAYED RELEASE ORAL at 17:57

## 2019-06-12 RX ADMIN — Medication 20 MILLIGRAM(S): at 17:57

## 2019-06-12 NOTE — PROGRESS NOTE ADULT - PROBLEM SELECTOR PLAN 5
- MELLO likely pre-renal in setting of poor PO intake/hemoptysis vs possible PATRICA vs vanco   - making good urine  - electrolytes stable  - creatinine improving, cont to monitor   - renal note appreciated

## 2019-06-12 NOTE — PROGRESS NOTE ADULT - ASSESSMENT
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

## 2019-06-12 NOTE — PROGRESS NOTE ADULT - ATTENDING COMMENTS
Patient seen and examined with RCU team, mother at bedside.   Was started on Heparin drip this morning - goal PTT 50-60 and closely monitor for any signs of bleeding.  Sitting in a chair, more comfortable but generally weak - c/w PT. Diuresis - Lasix 20 mg BID  Echo pending  d/w Peds cardio, Dr. Plata

## 2019-06-12 NOTE — PROGRESS NOTE ADULT - PROBLEM SELECTOR PLAN 4
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

## 2019-06-12 NOTE — PROGRESS NOTE PEDS - ASSESSMENT
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 He is now s/p IR embolization of right upper lobe aortopulmonary collateral vessels lwhich were the most likely culprits for his acute on chronic hemoptysis as well as S/P endobronchial blocker placement. No new bleeding noted in the last week. MELLO seems to be slowly improving.      Plan-   -Continue lasix : switch to 20 mg PO BID  -Encourage pulmonary rehab - out of bed, ambulation ,incentive spirometry  -Encourage physical rehab  -Continue heparin drip while inpatient,  plan to discharge on coumadin. Target PTT 50-60 on heparin and will aim for INR 2 on coumadin. 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 He is now s/p IR embolization of right upper lobe aortopulmonary collateral vessels lwhich were the most likely culprits for his acute on chronic hemoptysis as well as S/P endobronchial blocker placement. No new bleeding noted in the last week. MELLO now improving      Plan-   -Continue lasix : switch to 20 mg PO BID  -Encourage pulmonary rehab - out of bed, ambulation ,incentive spirometry  -Encourage physical rehab  -Continue heparin drip while inpatient,  plan to discharge on coumadin. Target PTT 50-60 on heparin and will aim for INR 2 on coumadin. Watch for hemoptysis after re-initiating anticoagulation  - For eventual hemodynamic/interventional cath after recovers from this acute illness (as outpatient)

## 2019-06-12 NOTE — PROGRESS NOTE ADULT - SUBJECTIVE AND OBJECTIVE BOX
CHIEF COMPLAINT: Patient is a 24y old  Male who presents with a chief complaint of S/P Fontan (11 Jun 2019 06:44)    Interval Events:      REVIEW OF SYSTEMS:  Constitutional:   Eyes:  ENT:  CV:  Resp:  GI:  :  MSK:  Integumentary:  Neurological:  Psychiatric:  Endocrine:  Hematologic/Lymphatic:  Allergic/Immunologic:  [ ] All other systems negative  [ ] Unable to assess ROS because ________      OBJECTIVE:  ICU Vital Signs Last 24 Hrs  T(C): 36.7 (12 Jun 2019 05:29), Max: 37.2 (11 Jun 2019 22:00)  T(F): 98 (12 Jun 2019 05:29), Max: 98.9 (11 Jun 2019 22:00)  HR: 89 (12 Jun 2019 07:22) (85 - 94)  BP: 132/82 (12 Jun 2019 05:29) (128/81 - 147/91)  BP(mean): --  ABP: --  ABP(mean): --  RR: 22 (12 Jun 2019 05:29) (19 - 22)  SpO2: 95% (12 Jun 2019 07:22) (85% - 95%)    HOSPITAL MEDICATIONS:  MEDICATIONS  (STANDING):  chlorhexidine 4% Liquid 1 Application(s) Topical <User Schedule>  docusate sodium Liquid 100 milliGRAM(s) Oral three times a day  pantoprazole  Injectable 40 milliGRAM(s) IV Push two times a day  polyethylene glycol 3350 17 Gram(s) Oral two times a day  potassium chloride    Tablet ER 40 milliEquivalent(s) Oral daily  senna Syrup 10 milliLiter(s) Oral at bedtime    MEDICATIONS  (PRN):  acetaminophen    Suspension .. 650 milliGRAM(s) Oral every 6 hours PRN Temp greater or equal to 38C (100.4F)  ALPRAZolam 0.25 milliGRAM(s) Oral every 8 hours PRN Anxiety  bisacodyl Suppository 10 milliGRAM(s) Rectal daily PRN Constipation      LABS:                        10.6   13.15 )-----------( 406      ( 11 Jun 2019 05:47 )             33.1     06-11    145  |  107  |  34<H>  ----------------------------<  123<H>  3.7   |  25  |  1.98<H>    Ca    8.9      11 Jun 2019 05:47  Phos  3.3     06-11  Mg     2.2     06-11    TPro  6.6  /  Alb  3.2<L>  /  TBili  2.4<H>  /  DBili  x   /  AST  33  /  ALT  19  /  AlkPhos  111  06-11    PT/INR - ( 11 Jun 2019 05:47 )   PT: 14.0 SEC;   INR: 1.25          PTT - ( 11 Jun 2019 05:47 )  PTT:32.2 SEC          MICROBIOLOGY:     RADIOLOGY:  [ ] Reviewed and interpreted by me    PULMONARY FUNCTION TESTS:    EKG: CHIEF COMPLAINT: Patient is a 24y old  Male who presents with a chief complaint of S/P Fontan (11 Jun 2019 06:44)    Interval Events: none overnight - feels ok this am      REVIEW OF SYSTEMS:  Constitutional: no complaints  CV: denies chest pain  Resp: mild HAGER  GI: denies  [x] All other systems negative  [ ] Unable to assess ROS because ________      OBJECTIVE:  ICU Vital Signs Last 24 Hrs  T(C): 36.7 (12 Jun 2019 05:29), Max: 37.2 (11 Jun 2019 22:00)  T(F): 98 (12 Jun 2019 05:29), Max: 98.9 (11 Jun 2019 22:00)  HR: 89 (12 Jun 2019 07:22) (85 - 94)  BP: 132/82 (12 Jun 2019 05:29) (128/81 - 147/91)  BP(mean): --  ABP: --  ABP(mean): --  RR: 22 (12 Jun 2019 05:29) (19 - 22)  SpO2: 95% (12 Jun 2019 07:22) (85% - 95%)    HOSPITAL MEDICATIONS:  MEDICATIONS  (STANDING):  chlorhexidine 4% Liquid 1 Application(s) Topical <User Schedule>  docusate sodium Liquid 100 milliGRAM(s) Oral three times a day  pantoprazole  Injectable 40 milliGRAM(s) IV Push two times a day  polyethylene glycol 3350 17 Gram(s) Oral two times a day  potassium chloride    Tablet ER 40 milliEquivalent(s) Oral daily  senna Syrup 10 milliLiter(s) Oral at bedtime    MEDICATIONS  (PRN):  acetaminophen    Suspension .. 650 milliGRAM(s) Oral every 6 hours PRN Temp greater or equal to 38C (100.4F)  ALPRAZolam 0.25 milliGRAM(s) Oral every 8 hours PRN Anxiety  bisacodyl Suppository 10 milliGRAM(s) Rectal daily PRN Constipation      LABS:             Complete Blood Count + Automated Diff in AM (06.12.19 @ 09:53)    Nucleated RBC #: 0.06 K/uL    WBC Count: 15.86 K/uL    RBC Count: 4.51 M/uL    Hemoglobin: 11.3 g/dL    Hematocrit: 35.2 %    Mean Cell Volume: 78.0 fL    Mean Cell Hemoglobin: 25.1 pg    Mean Cell Hemoglobin Conc: 32.1 %    Red Cell Distrib Width: 23.7 %    Platelet Count - Automated: 472 K/uL    MPV: 12.7 fl    Auto Neutrophil #: 11.94 K/uL    Auto Lymphocyte #: 0.98 K/uL    Auto Monocyte #: 2.41 K/uL    Auto Eosinophil #: 0.09 K/uL    Auto Basophil #: 0.16 K/uL    Auto Neutrophil %: 75.2 %    Auto Lymphocyte %: 6.2 %    Auto Monocyte %: 15.2 %    Auto Eosinophil %: 0.6 %    Auto Basophil %: 1.0 %    Auto Immature Granulocyte %: 1.8: (Includes meta, myelo and promyelocytes) %    Comprehensive Metabolic, Mg + Phosphorus (06.12.19 @ 09:53)    eGFR if : 66 mL/min    eGFR if Non : 57: The units for eGFR are ml/min/1.73m2 (normalized body  surface area). The eGFR is calculated from a serum  creatinine using the CKD-EPI equation. Other variables  required for calculation are race, age and sex. Among  patients with chronic kidney disease (CKD), the eGFR is  useful in determining the stage of disease according to  KDOQI CKD classification. All eGFR results are reported  numerically with the following interpretation.    GFR  (ml/min/1.73 m2)          W/KIDNEY DAMAGE    W/O KIDNEY DMG  ==========================================================  >= 90.......................Stage 1..............Normal  60-89.......................Stage 2...........Decreased GFR  30-59.......................Stage 3..............Stage 3  15-29.......................Stage 4..............Stage 4  < 15........................Stage 5..............Stage 5    Each stage of CKD assumes that the associated GFR level  has been in effect for at least 3 months. Determination of  stages one and two (with eGFR > 59ml/min/m2) requires  estimation of kidney damage for at least 3 months as  defined by structural or functional abnormalities.    Limitations: All estimates of GFR will be less accurate  for patients at extremes of muscle mass (including but  not limited to frail elderly, critically ill, or cancer  patients), those with unusual diets, and those with  conditions associated with reduced secretion or  extrarenal elimination of creatinine. The eGFR equation  is not recommended for use in patients with unstable  creatinine levels. mL/min    Phosphorus Level, Serum: 2.7 mg/dL    Sodium, Serum: 142 mmol/L    Potassium, Serum: 3.9 mmol/L    Chloride, Serum: 105 mmol/L    Carbon Dioxide, Serum: 25 mmol/L    Anion Gap, Serum: 12 mmo/L    Blood Urea Nitrogen, Serum: 28 mg/dL    Creatinine, Serum: 1.64 mg/dL    Glucose, Serum: 136 mg/dL    Calcium, Total Serum: 9.3 mg/dL    Protein Total, Serum: 7.0 g/dL    Albumin, Serum: 3.5 g/dL    Bilirubin Total, Serum: 2.5 mg/dL    Alkaline Phosphatase, Serum: 123 u/L    Aspartate Aminotransferase (AST/SGOT): 33 u/L    Alanine Aminotransferase (ALT/SGPT): 22 u/L    Magnesium, Serum: 2.0 mg/dL

## 2019-06-12 NOTE — PROGRESS NOTE ADULT - PROBLEM SELECTOR PLAN 2
- peds cards note appreciated  - cont lasix and diuresis  - DVT study negative   - will start heparin today, low dose patient specific and work up to full dose AC  - holding dig 0.25 daily in setting of ARF  - cont to monitor

## 2019-06-12 NOTE — PROGRESS NOTE PEDS - SUBJECTIVE AND OBJECTIVE BOX
INTERVAL HISTORY: Continues on nasal cannula, heparin started this morning. Had a nosebleed x 1    RESPIRATORY SUPPORT: Nasal cannula 3-5LPM    I&O's Summary : None available    INTRAVASCULAR ACCESS: Peripheral IV    MEDICATIONS  (STANDING):  chlorhexidine 4% Liquid 1 Application(s) Topical <User Schedule>  docusate sodium Liquid 100 milliGRAM(s) Oral three times a day  furosemide    Tablet 20 milliGRAM(s) Oral two times a day  heparin  Infusion 800 Unit(s)/Hr (8 mL/Hr) IV Continuous <Continuous>  pantoprazole  Injectable 40 milliGRAM(s) IV Push two times a day  polyethylene glycol 3350 17 Gram(s) Oral two times a day  potassium chloride    Tablet ER 40 milliEquivalent(s) Oral daily  senna Syrup 10 milliLiter(s) Oral at bedtime    PHYSICAL EXAMINATION:  ICU Vital Signs Last 24 Hrs  T(C): 36.8 (2019 13:38), Max: 37.2 (2019 22:00)  T(F): 98.2 (2019 13:38), Max: 98.9 (2019 22:00)  HR: 92 (2019 13:38) (85 - 97)  BP: 124/81 (2019 13:38) (124/81 - 147/91)  RR: 20 (2019 13:38) (19 - 22)  SpO2: 92% (2019 13:38) (85% - 95%)  General - non-dysmorphic appearance, awake, improved tachypnea with less labored breathing  Skin - beds sores+ on back  Eyes / ENT - no conjunctival injection, sclerae icteric, external ears & nares normal, mucous membranes moist.  Pulmonary - tachypneic, coarse sounds, decreased at bases  Cardiovascular - regular rhythm, normal S1 & single S2, 2/6 HSM at LMSB, no rubs, no gallops, capillary refill < 2sec, + pectus, R>L LE edema, chronic RLE venous stasis changes  Gastrointestinal - soft, distended , non-tender, hepatomegaly + .  Neurologic / Psychiatric - awake, alert    LABORATORY TESTS:                          11.3  CBC:   15.86 )-----------( 472   (19 @ 09:53)                          35.2               142   |  105   |  28                 Ca: 9.3    BMP:   ----------------------------< 136    M.0   (19 @ 09:53)             3.9    |  25    | 1.64               Ph: 2.7      LFT:     TPro: 7.0 / Alb: 3.5 / TBili: 2.5 / DBili: x / AST: 33 / ALT: 22 / AlkPhos: 123   (19 @ 09:53)    COAG: PT: 14.0 / PTT: 31.2 / INR: 1.22   (19 @ 09:53)     CARDIAC MARKERS:             High-Sensitivity Troponin: x   (19 @ 01:00)             CK: 253 / CKMB: x   (19 @ 01:00)             Pro-BNP: x   (19 @ 01:00)  CARDIAC MARKERS:             High-Sensitivity Troponin: x   (19 @ 02:50)             CK: 1152 / CKMB: 1.13   (19 @ 02:50)             Pro-BNP: x   (19 @ 02:50)    ABG:   pH: 7.41 / pCO2: 35 / pO2: 72 / HCO3: 23 / Base Excess: -2.2 / SaO2: 93.4 / Lactate: 1.8 / iCa: x   (19 @ 16:42)      IMAGING STUDIES:  Echocardiogram 19: pending final report. INTERVAL HISTORY: Continues on nasal cannula, heparin started this morning. Had a nosebleed x 1 prior to heparin- thought to be 2/2 nasal cannula.    RESPIRATORY SUPPORT: Nasal cannula 3-5LPM    I&O's Summary : None available    INTRAVASCULAR ACCESS: Peripheral IV    MEDICATIONS  (STANDING):  chlorhexidine 4% Liquid 1 Application(s) Topical <User Schedule>  docusate sodium Liquid 100 milliGRAM(s) Oral three times a day  furosemide    Tablet 20 milliGRAM(s) Oral two times a day  heparin  Infusion 800 Unit(s)/Hr (8 mL/Hr) IV Continuous <Continuous>  pantoprazole  Injectable 40 milliGRAM(s) IV Push two times a day  polyethylene glycol 3350 17 Gram(s) Oral two times a day  potassium chloride    Tablet ER 40 milliEquivalent(s) Oral daily  senna Syrup 10 milliLiter(s) Oral at bedtime    PHYSICAL EXAMINATION:  ICU Vital Signs Last 24 Hrs  T(C): 36.8 (2019 13:38), Max: 37.2 (2019 22:00)  T(F): 98.2 (2019 13:38), Max: 98.9 (2019 22:00)  HR: 92 (2019 13:38) (85 - 97)  BP: 124/81 (2019 13:38) (124/81 - 147/91)  RR: 20 (2019 13:38) (19 - 22)  SpO2: 92% (2019 13:38) (85% - 95%)  General - non-dysmorphic appearance, awake, improved tachypnea with less labored breathing  Skin - beds sores+ on back  Eyes / ENT - no conjunctival injection, sclerae icteric, external ears & nares normal, mucous membranes moist.  Pulmonary - tachypneic, coarse sounds, decreased at bases  Cardiovascular - regular rhythm, normal S1 & single S2, 2/6 HSM at LMSB, no rubs, no gallops, capillary refill < 2sec, + pectus, R>L LE edema, chronic RLE venous stasis changes  Gastrointestinal - soft, distended , non-tender, hepatomegaly + .  Neurologic / Psychiatric - awake, alert    LABORATORY TESTS:                          11.3  CBC:   15.86 )-----------( 472   (19 @ 09:53)                          35.2               142   |  105   |  28                 Ca: 9.3    BMP:   ----------------------------< 136    M.0   (19 @ 09:53)             3.9    |  25    | 1.64               Ph: 2.7      LFT:     TPro: 7.0 / Alb: 3.5 / TBili: 2.5 / DBili: x / AST: 33 / ALT: 22 / AlkPhos: 123   (19 @ 09:53)    COAG: PT: 14.0 / PTT: 31.2 / INR: 1.22   (19 @ 09:53)     CARDIAC MARKERS:             High-Sensitivity Troponin: x   (19 @ 01:00)             CK: 253 / CKMB: x   (19 @ 01:00)             Pro-BNP: x   (19 @ 01:00)  CARDIAC MARKERS:             High-Sensitivity Troponin: x   (19 @ 02:50)             CK: 1152 / CKMB: 1.13   (19 @ 02:50)             Pro-BNP: x   (19 @ 02:50)    ABG:   pH: 7.41 / pCO2: 35 / pO2: 72 / HCO3: 23 / Base Excess: -2.2 / SaO2: 93.4 / Lactate: 1.8 / iCa: x   (19 @ 16:42)    IMAGING STUDIES:  Echocardiogram 19: severe systemic tricuspid regurgitation, depressed systemic right ventricular function; visualized portions of Fontan appear patent

## 2019-06-12 NOTE — PROGRESS NOTE ADULT - PROBLEM SELECTOR PLAN 1
- hemoptysis s/p bronch 5/31 showing RUL bleeding  - s/p IR embolization 5/31 of R-sided aortopulmonary collaterals, R bronchial artery, multiple R thoracic intercostal arteries  - s/p bronch 6/1 with endobronchial blocker placed for re-bleeding from RUL  - s/p re-bronch 6/4 with removal of blocker, suctioned out blood clots from RUL and mucus plugs from TONJA  - s/p re-bronch 6/5, no active bleeding, suctioned out blood clots from RUL  - no further bleeding at this time  - heparin gtt restarted - cont to monitor for rebleeding

## 2019-06-13 ENCOUNTER — TRANSCRIPTION ENCOUNTER (OUTPATIENT)
Age: 24
End: 2019-06-13

## 2019-06-13 LAB
ANION GAP SERPL CALC-SCNC: 15 MMO/L — HIGH (ref 7–14)
APTT BLD: 28 SEC — SIGNIFICANT CHANGE UP (ref 27.5–36.3)
APTT BLD: 33.4 SEC — SIGNIFICANT CHANGE UP (ref 27.5–36.3)
APTT BLD: 35.6 SEC — SIGNIFICANT CHANGE UP (ref 27.5–36.3)
APTT BLD: 37.5 SEC — HIGH (ref 27.5–36.3)
APTT BLD: 46.2 SEC — HIGH (ref 27.5–36.3)
BUN SERPL-MCNC: 23 MG/DL — SIGNIFICANT CHANGE UP (ref 7–23)
CALCIUM SERPL-MCNC: 9.5 MG/DL — SIGNIFICANT CHANGE UP (ref 8.4–10.5)
CHLORIDE SERPL-SCNC: 103 MMOL/L — SIGNIFICANT CHANGE UP (ref 98–107)
CO2 SERPL-SCNC: 22 MMOL/L — SIGNIFICANT CHANGE UP (ref 22–31)
CREAT SERPL-MCNC: 1.56 MG/DL — HIGH (ref 0.5–1.3)
GLUCOSE SERPL-MCNC: 132 MG/DL — HIGH (ref 70–99)
HCT VFR BLD CALC: 36.2 % — LOW (ref 39–50)
HCT VFR BLD CALC: 37.5 % — LOW (ref 39–50)
HGB BLD-MCNC: 11.5 G/DL — LOW (ref 13–17)
HGB BLD-MCNC: 11.8 G/DL — LOW (ref 13–17)
MCHC RBC-ENTMCNC: 24.5 PG — LOW (ref 27–34)
MCHC RBC-ENTMCNC: 24.7 PG — LOW (ref 27–34)
MCHC RBC-ENTMCNC: 31.5 % — LOW (ref 32–36)
MCHC RBC-ENTMCNC: 31.8 % — LOW (ref 32–36)
MCV RBC AUTO: 77.2 FL — LOW (ref 80–100)
MCV RBC AUTO: 78.6 FL — LOW (ref 80–100)
NRBC # FLD: 0.04 K/UL — SIGNIFICANT CHANGE UP (ref 0–0)
NRBC # FLD: 0.06 K/UL — SIGNIFICANT CHANGE UP (ref 0–0)
NT-PROBNP SERPL-SCNC: 5185 PG/ML — SIGNIFICANT CHANGE UP
PLATELET # BLD AUTO: 501 K/UL — HIGH (ref 150–400)
PLATELET # BLD AUTO: 566 K/UL — HIGH (ref 150–400)
PMV BLD: 13 FL — SIGNIFICANT CHANGE UP (ref 7–13)
PMV BLD: 13.1 FL — HIGH (ref 7–13)
POTASSIUM SERPL-MCNC: 4.1 MMOL/L — SIGNIFICANT CHANGE UP (ref 3.5–5.3)
POTASSIUM SERPL-SCNC: 4.1 MMOL/L — SIGNIFICANT CHANGE UP (ref 3.5–5.3)
RBC # BLD: 4.69 M/UL — SIGNIFICANT CHANGE UP (ref 4.2–5.8)
RBC # BLD: 4.77 M/UL — SIGNIFICANT CHANGE UP (ref 4.2–5.8)
RBC # FLD: 23.9 % — HIGH (ref 10.3–14.5)
RBC # FLD: 24.2 % — HIGH (ref 10.3–14.5)
SODIUM SERPL-SCNC: 140 MMOL/L — SIGNIFICANT CHANGE UP (ref 135–145)
WBC # BLD: 17.71 K/UL — HIGH (ref 3.8–10.5)
WBC # BLD: 19.2 K/UL — HIGH (ref 3.8–10.5)
WBC # FLD AUTO: 17.71 K/UL — HIGH (ref 3.8–10.5)
WBC # FLD AUTO: 19.2 K/UL — HIGH (ref 3.8–10.5)

## 2019-06-13 PROCEDURE — 99233 SBSQ HOSP IP/OBS HIGH 50: CPT | Mod: GC

## 2019-06-13 PROCEDURE — 71045 X-RAY EXAM CHEST 1 VIEW: CPT | Mod: 26

## 2019-06-13 PROCEDURE — 99233 SBSQ HOSP IP/OBS HIGH 50: CPT

## 2019-06-13 RX ORDER — HEPARIN SODIUM 5000 [USP'U]/ML
INJECTION INTRAVENOUS; SUBCUTANEOUS
Qty: 25000 | Refills: 0 | Status: DISCONTINUED | OUTPATIENT
Start: 2019-06-13 | End: 2019-06-13

## 2019-06-13 RX ORDER — HEPARIN SODIUM 5000 [USP'U]/ML
1150 INJECTION INTRAVENOUS; SUBCUTANEOUS
Qty: 25000 | Refills: 0 | Status: DISCONTINUED | OUTPATIENT
Start: 2019-06-13 | End: 2019-06-22

## 2019-06-13 RX ORDER — FUROSEMIDE 40 MG
40 TABLET ORAL ONCE
Refills: 0 | Status: COMPLETED | OUTPATIENT
Start: 2019-06-13 | End: 2019-06-13

## 2019-06-13 RX ORDER — FUROSEMIDE 40 MG
20 TABLET ORAL THREE TIMES A DAY
Refills: 0 | Status: DISCONTINUED | OUTPATIENT
Start: 2019-06-13 | End: 2019-06-14

## 2019-06-13 RX ORDER — ALPRAZOLAM 0.25 MG
0.25 TABLET ORAL EVERY 8 HOURS
Refills: 0 | Status: DISCONTINUED | OUTPATIENT
Start: 2019-06-13 | End: 2019-06-20

## 2019-06-13 RX ADMIN — Medication 20 MILLIGRAM(S): at 17:46

## 2019-06-13 RX ADMIN — PANTOPRAZOLE SODIUM 40 MILLIGRAM(S): 20 TABLET, DELAYED RELEASE ORAL at 17:47

## 2019-06-13 RX ADMIN — HEPARIN SODIUM 1150 UNIT(S)/HR: 5000 INJECTION INTRAVENOUS; SUBCUTANEOUS at 10:00

## 2019-06-13 RX ADMIN — Medication 40 MILLIEQUIVALENT(S): at 12:45

## 2019-06-13 RX ADMIN — HEPARIN SODIUM 10 UNIT(S)/HR: 5000 INJECTION INTRAVENOUS; SUBCUTANEOUS at 02:53

## 2019-06-13 RX ADMIN — HEPARIN SODIUM 1250 UNIT(S)/HR: 5000 INJECTION INTRAVENOUS; SUBCUTANEOUS at 16:19

## 2019-06-13 RX ADMIN — PANTOPRAZOLE SODIUM 40 MILLIGRAM(S): 20 TABLET, DELAYED RELEASE ORAL at 05:01

## 2019-06-13 RX ADMIN — Medication 20 MILLIGRAM(S): at 12:46

## 2019-06-13 RX ADMIN — Medication 20 MILLIGRAM(S): at 21:40

## 2019-06-13 RX ADMIN — HEPARIN SODIUM 1450 UNIT(S)/HR: 5000 INJECTION INTRAVENOUS; SUBCUTANEOUS at 23:27

## 2019-06-13 RX ADMIN — Medication 0.25 MILLIGRAM(S): at 02:18

## 2019-06-13 RX ADMIN — HEPARIN SODIUM 1000 UNIT(S)/HR: 5000 INJECTION INTRAVENOUS; SUBCUTANEOUS at 09:16

## 2019-06-13 RX ADMIN — Medication 40 MILLIGRAM(S): at 05:01

## 2019-06-13 RX ADMIN — CHLORHEXIDINE GLUCONATE 1 APPLICATION(S): 213 SOLUTION TOPICAL at 05:03

## 2019-06-13 NOTE — DISCHARGE NOTE PROVIDER - NSDCCPCAREPLAN_GEN_ALL_CORE_FT
PRINCIPAL DISCHARGE DIAGNOSIS  Diagnosis: Hemoptysis  Assessment and Plan of Treatment: Underwent IR proceudre for embolization on 6/1.  No further bleeding noted.      SECONDARY DISCHARGE DIAGNOSES  Diagnosis: Heterotaxy syndrome with asplenia  Assessment and Plan of Treatment:     Diagnosis: MELLO (acute kidney injury)  Assessment and Plan of Treatment: Creatinine improved. PRINCIPAL DISCHARGE DIAGNOSIS  Diagnosis: Hemoptysis  Assessment and Plan of Treatment: Underwent IR proceudre for embolization on 6/1.  No further bleeding noted.  Continue to monitor.      SECONDARY DISCHARGE DIAGNOSES  Diagnosis: Heterotaxy syndrome with asplenia  Assessment and Plan of Treatment:     Diagnosis: MELLO (acute kidney injury)  Assessment and Plan of Treatment: Creatinine improved. PRINCIPAL DISCHARGE DIAGNOSIS  Diagnosis: Hemoptysis  Assessment and Plan of Treatment: Underwent IR proceudre for embolization on 6/1.  No further bleeding noted.  Continue to monitor.      SECONDARY DISCHARGE DIAGNOSES  Diagnosis: Heterotaxy syndrome with asplenia  Assessment and Plan of Treatment:     Diagnosis: DVT (deep venous thrombosis)  Assessment and Plan of Treatment: ContinueCoumadin. INR on discharge day----- Follow up with your PCP for further evaluation and management. Please call to make an appointment within 1-2 weeks of discharge.    Diagnosis: MELLO (acute kidney injury)  Assessment and Plan of Treatment: Creatinine improved. Continue medications. Follow up with your PCP for further evaluation and management. Please call to make an appointment within 1-2 weeks of discharge. PRINCIPAL DISCHARGE DIAGNOSIS  Diagnosis: Hemoptysis  Assessment and Plan of Treatment: Underwent IR proceudre for embolization on 6/1.  No further bleeding noted.  Continue to monitor.      SECONDARY DISCHARGE DIAGNOSES  Diagnosis: Heterotaxy syndrome with asplenia  Assessment and Plan of Treatment: Continue Coumadin. INR on discharge day-----   Continue Lasix 20mg daily   Follow up with your PCP for further evaluation and management. Please call to make an appointment within 1-2 weeks of discharge.    Diagnosis: DVT (deep venous thrombosis)  Assessment and Plan of Treatment: Continue Coumadin. INR on discharge day----- Follow up with your PCP for further evaluation and management. Please call to make an appointment within 1-2 weeks of discharge.    Diagnosis: MELLO (acute kidney injury)  Assessment and Plan of Treatment: Creatinine improved. Continue medications. Follow up with your PCP for further evaluation and management. Please call to make an appointment within 1-2 weeks of discharge. PRINCIPAL DISCHARGE DIAGNOSIS  Diagnosis: Hemoptysis  Assessment and Plan of Treatment: Underwent IR proceudre for embolization on 6/1.  No further bleeding noted.  Continue to monitor.      SECONDARY DISCHARGE DIAGNOSES  Diagnosis: Heterotaxy syndrome with asplenia  Assessment and Plan of Treatment: Continue Coumadin. INR on discharge day-----   Continue Lasix 20mg daily   Follow up with your PCP for further evaluation and management. Please call to make an appointment Monday for INR check    Diagnosis: DVT (deep venous thrombosis)  Assessment and Plan of Treatment: Continue Coumadin. INR on discharge day----- Follow up with your PCP for further evaluation and management. Please call to make an appointment Monday for INR check    Diagnosis: MELLO (acute kidney injury)  Assessment and Plan of Treatment: Creatinine improved. Continue medications. Follow up with your PCP for further evaluation and management. Please call to make an appointment within 1-2 weeks of discharge. PRINCIPAL DISCHARGE DIAGNOSIS  Diagnosis: Hemoptysis  Assessment and Plan of Treatment: Underwent IR procedure for embolization on 6/1.  No further bleeding. Continue to monitor.      SECONDARY DISCHARGE DIAGNOSES  Diagnosis: DVT (deep venous thrombosis)  Assessment and Plan of Treatment: Continue Coumadin. INR on discharge day 1.6.    Follow up w/Dr. Plata   You must have INR drawn  on Monday 6/24  Continue Lovenox 80 mg daily and Daily injection and Coumadin 6 mg   until you discuss INR on 6/24  You can walk in to [facility] lab near you in AM on 6/28 and call Dr. Plata early afternoon for COumadin and Lovenox instructions****    Diagnosis: Heterotaxy syndrome with asplenia  Assessment and Plan of Treatment: Continue Lasix 20mg daily   Follow up with your PCP for further evaluation and management. Please call to make an appointment    Diagnosis: MELLO (acute kidney injury)  Assessment and Plan of Treatment: Creatinine improved. Continue medications. Follow up with your PCP for further evaluation and management. Please call to make an appointment within 1-2 weeks of discharge.

## 2019-06-13 NOTE — PROGRESS NOTE ADULT - ATTENDING COMMENTS
Patient seen and examined with RCU team, mother at bedside.   Hemoptysis resolved  Was started on Heparin drip 6/12 - goal PTT 50-60 and closely monitor for any signs of bleeding.  c/w PT. Diuresis - Lasix 20 mg BID

## 2019-06-13 NOTE — DISCHARGE NOTE PROVIDER - HOSPITAL COURSE
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 Patient was initially admitted to MICU at [facility] for monitoring with hemoptysis. He was started on Hycodan standing, morphine prn for pain with hemoptysis. After admission, he had intermittent hemoptysis with approximately 200 cc's bright red blood measured overnight. Coumadin was held, and he received Kcentra x 1. He is still pending LE doppler to assess old RLE DVT for resolution. He was continued on his home digoxin 0.25 mg daily. GI evaluated the patient and did not recommend EGD at this time to assess possible cervical esophageal varices seen on 3/2018 EGD. They did recommend protonix 40mg IVP BID which was started.  CT scan showed patchy upper lobe opacity which may represent patient's known hemorrhage. CT was discussed with IR; IR does not recommend attempt at embolization as no localized bleed. Multidisciplinary discussion held between MICU, Adult congenital cardiology and CT surgery and decision was made to transfer patient to [facility] MICU for further care. Patient admitted to [facility] MICU on 05/31.         In the MICU, patient underwent IR embolization on 05/31 s/p multiple right sided aortopulmonary collaterals embolization, right bronchial artery embolization and multiple right thoracic intercostal arteries embolization. Patient required intubation on 05/31 for hypoxic respiratory failure secondary to persistent hemoptysis. Patient required endobronchial blocker in RUL to control bleeding. Patient was also started on empiric antibiotic Vanc, Zosyn and Azithro for presumed infection due to hypothermia and leukocytosis. Course complicated by rebleeding, requiring treatment with tranexamic acid and correcting coagulopathy with Kcentra and FFP. Patient had multiple bronch in the MICU 6/1, 6/4 with most recent bronch on 6/5 with no active bleeding. Course complicated by oliguria with increasing creatine despite volume resuscitation. Renal was consulted and patient was given intermittent doses of IV Lasix to main fluid balance. Patient was extubated on 6/7, currently on 3 L NC with Oxygen saturation >85%. Patient was found to have abdominal distention s/p abdomina xray with no signs of obstruction.         Pt was tx to RCU on 6/10.    Continued on BIPAP PRN and HS, lasix.    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 Patient was initially admitted to MICU at [facility] for monitoring with hemoptysis. He was started on Hycodan standing, morphine prn for pain with hemoptysis. After admission, he had intermittent hemoptysis with approximately 200 cc's bright red blood measured overnight. Coumadin was held, and he received Kcentra x 1. He is still pending LE doppler to assess old RLE DVT for resolution. He was continued on his home digoxin 0.25 mg daily. GI evaluated the patient and did not recommend EGD at this time to assess possible cervical esophageal varices seen on 3/2018 EGD. They did recommend protonix 40mg IVP BID which was started.  CT scan showed patchy upper lobe opacity which may represent patient's known hemorrhage. CT was discussed with IR; IR does not recommend attempt at embolization as no localized bleed. Multidisciplinary discussion held between MICU, Adult congenital cardiology and CT surgery and decision was made to transfer patient to [facility] MICU for further care. Patient admitted to [facility] MICU on 05/31.         In the MICU, patient underwent IR embolization on 05/31 s/p multiple right sided aortopulmonary collaterals embolization, right bronchial artery embolization and multiple right thoracic intercostal arteries embolization. Patient required intubation on 05/31 for hypoxic respiratory failure secondary to persistent hemoptysis. Patient required endobronchial blocker in RUL to control bleeding. Patient was also started on empiric antibiotic Vanc, Zosyn and Azithro for presumed infection due to hypothermia and leukocytosis. Course complicated by rebleeding, requiring treatment with tranexamic acid and correcting coagulopathy with Kcentra and FFP. Patient had multiple bronch in the MICU 6/1, 6/4 with most recent bronch on 6/5 with no active bleeding. Course complicated by oliguria with increasing creatine despite volume resuscitation. Renal was consulted and patient was given intermittent doses of IV Lasix to main fluid balance. Patient was extubated on 6/7, currently on 3 L NC with Oxygen saturation >85%. Patient was found to have abdominal distention s/p abdomina xray with no signs of obstruction.         Pt was tx to RCU on 6/10.    Continued on BIPAP PRN and HS, lasix.    Heparin was restarted on 6/12 as the request of cardiology - no bleeding noted - started bridge to coumadin on 6/14-.    To keep INR low normal around 2.    PT recs home PT            dispo: Mr. Cortez is a 23 YO M with Congenital Heart Disease/ Heterotacy s/p Multiple Cardiac Surgies and Fontan's Procedure, RLE DVT roughly 1 year ago on Coumadin and Incorrect PE Diagnosis on 2014 s/p Eliquis x 1 week before developing intermittent hemoptysis x 5 years of which resolved. The patient presented to [facility] for recurrent episodes of Hemoptysis with Acute Hypoxic Respiratory Distress. Mr. Cortez was placed on oxygen in the ED and MICU called. Patient admitted to MICU and s/p Bronchoscopy on 5/31 showing RUL Posterior segmental bleeding. The patient was subsequently intubated on 5/31 for airway protection given hemoptysis in setting of hypoxia and respiratory distress. IR consulted and embolization of multiple vessel performed. Sedation/ Versed weaned off and Pressors switched from Levophed ot Phenylephrine on 6/1. The patient's INR with history of Coumadin use was noted to be elevated and the patient was multiple doses of FFPs were given. In the setting of SupraINR and Hemoptysis EndoBronchial Block placed on 6/1 to control bleeding. Mr. Cortez's hospital course was further complicated with fever spike on 6/1 and started on Zosyn and Vancomycin empirically; blood cultures drawn and noted to be negative. Duplex of LE was noted to be negative for acute findings and noted with chronic partial DVT to the mid-distal R Femoral Vein noted. Given acute bleed, AC held for now and respiratory stabilization and hemostasis taken as primary responsibility. HH remained stable and repeat Bronchoscopy performed on 6/4 with removal of EndoBronchial Block and suctioning of RUL Blood Clots and TONJA Mucous Plug. Fever spike again noted on 6/5 likely post procedural. BAL cultures noted to be negative as well. During ICU, the patient was also noted with abdominal distention, evaluated by US ABDOMEN noted with nodular liver but normal LFTs. Abdominal distention noted with gas passing and (+) BMs. Sedeation weaned off on 6/6and Vancomycin NY'ed as no MRSA coverage warranted. On 6/7, Mr. Cortez was extubated to NIPPV with BiPAP. On BiPAP, the patient was noted with agitation and mild sedation provided with Precedex eaned as tolerated. SpO2 remained stable and BiPAP weaned to VentiMASK and then NC. Patient seen by SS and mechanical soft diet started. Abdominal distention remained. AXR noted with distended loops but no SBO and patient tolerated diet well. Mr. Cortez was then transferred to the RCU for continued monitoring and management.         In the RCU, Mr. Cortez noted with SBO and CXR with atelectasis likely second to physical debilitation. BIPAP used while asleep and NC during the day with improved. Ambulation as tolerated encouraged and Pediatric Cardiology followed throughout. Lasix continued as IV with noted increased in CRE/ MELLO and changed to PO 20mg BID with renal function monitored. HH remained stable. Hemoptysis resolved and Heparin/ Coumadin GTT for known DVT and now subtherapeutic INR started. Bridge continued until therapeutic INR on ___.         On ____, case discussed with  ______ and patient is medically stable and cleare for discharge. Mr. 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 The patient presented to [facility] for recurrent episodes of Hemoptysis with Acute Hypoxic Respiratory Distress. Mr. Cortez was placed on oxygen in the ED and MICU called. Patient admitted to MICU and s/p Bronchoscopy on 5/31 showing RUL Posterior segmental bleeding. The patient was subsequently intubated on 5/31 for airway protection given hemoptysis in setting of hypoxia and respiratory distress. IR consulted and embolization of multiple vessel performed. Sedation/ Versed weaned off and Pressors switched from Levophed ot Phenylephrine on 6/1. The patient's INR with history of Coumadin use was noted to be elevated and the patient was multiple doses of FFPs were given. In the setting of SupraINR and Hemoptysis EndoBronchial Block placed on 6/1 to control bleeding. Mr. oCrtez's hospital course was further complicated with fever spike on 6/1 and started on Zosyn and Vancomycin empirically; blood cultures drawn and noted to be negative. Duplex of LE was noted to be negative for acute findings and noted with chronic partial DVT to the mid-distal R Femoral Vein noted. Given acute bleed, AC held for now and respiratory stabilization and hemostasis taken as primary responsibility. HH remained stable and repeat Bronchoscopy performed on 6/4 with removal of EndoBronchial Block and suctioning of RUL Blood Clots and TONJA Mucous Plug. Fever spike again noted on 6/5 likely post procedural. BAL cultures noted to be negative as well. During ICU, the patient was also noted with abdominal distention, evaluated by US ABDOMEN noted with nodular liver but normal LFTs. Abdominal distention noted with gas passing and (+) BMs. Sedeation weaned off on 6/6and Vancomycin MA'ed as no MRSA coverage warranted. On 6/7, Mr. Cortez was extubated to NIPPV with BiPAP. On BiPAP, the patient was noted with agitation and mild sedation provided with Precedex eaned as tolerated. SpO2 remained stable and BiPAP weaned to VentiMASK and then NC. Patient seen by SS and mechanical soft diet started. Abdominal distention remained. AXR noted with distended loops but no SBO and patient tolerated diet well. Mr. Cortez was then transferred to the RCU for continued monitoring and management.         In the RCU, Mr. Cortez noted with SBO and CXR with atelectasis likely second to physical debilitation. BIPAP used while asleep and NC during the day with improved. Ambulation as tolerated encouraged and Pediatric Cardiology followed throughout. Lasix continued as IV with noted increased in CRE/ MELLO and changed to PO 20mg BID with renal function monitored. HH remained stable. Hemoptysis resolved and Heparin/ Coumadin GTT for known DVT and now subtherapeutic INR started. Bridge continued until therapeutic INR >2. on DC INR 1.6         On 6/22/2019 case discussed with Dr. Plata who is OK to follow up INR on Monday 6/24, pt to be dc on Lovenox 80 mg daily and Daily Coumadin 6 mg      Patient is medically stable and clear for discharge.

## 2019-06-13 NOTE — DISCHARGE NOTE PROVIDER - CARE PROVIDER_API CALL
Mega Rojas (MD)  Adult Congenital Heart Disease; Pediatric Cardiology; Pediatrics  [address] [address] [place] [address]  Phone: [phone]  Fax: [phone]  Follow Up Time: Mega Rojas (MD)  Adult Congenital Heart Disease; Pediatric Cardiology; Pediatrics  [address] [address] [place] [address]  Phone: [phone]  Fax: [phone]  Follow Up Time: 1-3 days Mega Rojas)  Adult Congenital Heart Disease; Pediatric Cardiology; Pediatrics  [address] [place] [address]  Phone: [phone]  Fax: [phone]  Follow Up Time: 1-3 days    Geoffrey Plata)  Internal Medicine; Pediatric Cardiology; Pediatrics  [address] [place] [address], [address]  [place] [address]  Phone: [phone]  Fax: [phone]  Follow Up Time:

## 2019-06-13 NOTE — PROVIDER CONTACT NOTE (OTHER) - ACTION/TREATMENT ORDERED:
PA made aware. Pt assessed by PA. O2 increased to 6L NC. Will continue to monitor. PA made aware. Pt assessed by PA. O2 increased to 6L NC. Chest x-ray as per ordered. Will continue to monitor.

## 2019-06-13 NOTE — CHART NOTE - NSCHARTNOTEFT_GEN_A_CORE
Notified by RN patient w Notified by RN patient desating to 79-80% on 4L NC.   At bedside, patient appears anxious, verbalizes that he is upset because he can't control what's going on.   STAT CXR ordered and patient given prn Xanax.   According to the previous notes- patient with hx of Heterotaxy syndrome with asplenia, single ventricle, and of recent IR embolization 5/31 of R-sided aortopulmonary collaterals, R bronchial artery, multiple R thoracic intercostal arteries with acute desaturation to 79-80% on 4L NC. Patient refusing BiPAP, was placed on 9-10L NC with good improvement to 85%- patient looks comfortable, resting with mother at bedside. Denies any shortness of breath, chest pain or any other symptoms. Despite the increased O2 requirement he states he does not feel SOB.     General: A & O X 3, pleasant, however upset, anxious appearing.   Heart: Tachycardic   Lungs: Crackles noted L>R     Discussed with radiologist -preliminary CXR demonstrates Pulmonary edema with small trace L sided pleural effusion. In comparison to previous CXR which showed clear lungs.     Ordered patient Lasix 40 mg IVP x 1   Pro-BNP added to AM labs   ECHO pending     Will continue to monitor and try to slowly wean down supplemental O2 as tolerated by patient.

## 2019-06-13 NOTE — PROGRESS NOTE PEDS - ATTENDING COMMENTS
CXR overnight showed worsening airspace disease and pleural effusions as compared to prior film- effusions were noted on our echocardiogram earlier this week.  Increase diuretics today and add back BIPAP when asleep- I stressed the importance to Kang and his mother  over the course of the day, he was able to walk two laps around the unit and his mentation and work of breathing were better
moved to the floor  tachypneic on 4 liters- discussed with him need to get out of bed, incentive spirometry, and some positive pressure when sleeping  dependent edema a little better- will continue Lasix  discussed anticoagulation again with Kang, his mother, and Dr. Rojas at bedside
remains critically ill  no evidence of active bleeding- hopeful to extubate if bronchoscopy without bleeding this afternoon and able to lift sedation  would wean PEEP to lowest tolerable and improve Fontan flow  acute bump in Cr again- elevated CK, vasodilation from fever, behind on volume---> would give IVFs, wean PEEP as above, dose all meds renally  okay to hold digoxin while with MELLO    medium to longer term, will need to restart anticoagulation. Patients with Fontan physiology are at risk for thrombosis and those with a history of prior thromboembolism carry a higher rate of morbidity and mortality.  We (myself and Dr. Rojas) discussed the eventual use of Lovenox once extubated with closer monitoring for bleeding
breathing less labored  mobilizing fluid  pt/ot, incentive spirometer  continue diuretics  watching closely on heparin drip
continues to mobilize fluids with addition of lasix, will continue gentle diuresis while Cr improves  renal function improving as is congestive hepatopathy  worse tricuspid regurgitation on echo today- will continue to diurese and once MELLO resolves, will consider initiation of ace vs arb  had another long discussion of risks vs benefits of restarting anticoagulation and discussed variety of options- DOACs are a limited option at present given the lack of reversal agents, lovenox will require twice daily injections, coumadin will require lab testing but he has been on coumadin before; after discussion with Kang, his mother, and their discussion with the father, will plan to start heparin drip for now and monitor for rebleeding

## 2019-06-13 NOTE — DISCHARGE NOTE PROVIDER - CARE PROVIDERS DIRECT ADDRESSES
,[email or web address] of [email or web address]

## 2019-06-13 NOTE — PROVIDER CONTACT NOTE (OTHER) - SITUATION
PT O2 79-80% on 4LNC. Patient denies SOB. Repositioned pt, HOB elevated, Pulse ox probe changed. Refusing BIPAP, explained risk and benefits. Pt anxious, Xanax po PRN given as per order.

## 2019-06-13 NOTE — PROGRESS NOTE PEDS - SUBJECTIVE AND OBJECTIVE BOX
INTERVAL HISTORY: Overnight had interval anxiety and desaturations to mid 80's, CXR showed increase pulmonary edema, and opacification likely effusions with areas of atelectasis. Received lasix IV 40 mg x1 and xanax overnight.  Kang was groggy this morning, but by afternoon had been able to walk. Refused BiPAP.  WBC count this am 17, climbed to 19 this pm. Afebrile.   Continues to be on heparin drip.    RESPIRATORY SUPPORT: Nasal cannula 5LPM    I&O's Summary : None available    INTRAVASCULAR ACCESS: Peripheral IV    MEDICATIONS  (STANDING):  chlorhexidine 4% Liquid 1 Application(s) Topical <User Schedule>  docusate sodium Liquid 100 milliGRAM(s) Oral three times a day  furosemide   Injectable 20 milliGRAM(s) IV Push three times a day  heparin  Infusion. 1150 Unit(s)/Hr (11.5 mL/Hr) IV Continuous <Continuous>  pantoprazole  Injectable 40 milliGRAM(s) IV Push two times a day  polyethylene glycol 3350 17 Gram(s) Oral two times a day  senna Syrup 10 milliLiter(s) Oral at bedtime    PHYSICAL EXAMINATION:  ICU Vital Signs Last 24 Hrs  T(C): 36.8 (13 Jun 2019 12:40), Max: 37.8 (13 Jun 2019 05:00)  T(F): 98.3 (13 Jun 2019 12:40), Max: 100 (13 Jun 2019 05:00)  HR: 97 (13 Jun 2019 17:43) (87 - 120)  BP: 126/69 (13 Jun 2019 17:43) (120/90 - 126/69)  RR: 22 (13 Jun 2019 13:00) (20 - 22)  SpO2: 85% (13 Jun 2019 13:00) (85% - 92%)  General - non-dysmorphic appearance, awake, tachypnea with labored breathing  Skin - beds sores+ on back  Eyes / ENT - no conjunctival injection, sclerae icteric, external ears & nares normal, mucous membranes moist.  Pulmonary - tachypneic, coarse sounds, decreased at bases with b/l crackles L>R  Cardiovascular - regular rhythm, normal S1 & single S2, 2/6 HSM at LMSB, no rubs, no gallops, capillary refill < 2sec, + pectus, R>L LE edema, chronic RLE venous stasis changes  Gastrointestinal - soft, distended , non-tender, hepatomegaly + .  Neurologic / Psychiatric - awake    LABORATORY TESTS:                          11.8  CBC:   19.20 )-----------( 566   (06-13-19 @ 15:40)                          37.5               140   |  103   |  23                 Ca: 9.5    BMP:   ----------------------------< 132    Mg: x     (06-13-19 @ 06:40)             4.1    |  22    | 1.56               Ph: x        LFT:     TPro: 7.0 / Alb: 3.5 / TBili: 2.5 / DBili: x / AST: 33 / ALT: 22 / AlkPhos: 123   (06-12-19 @ 09:53)    COAG: PT: x / PTT: 46.2 / INR: x   (06-13-19 @ 15:40)     CARDIAC MARKERS:             High-Sensitivity Troponin: x   (06-13-19 @ 06:40)             CK: x / CKMB: x   (06-13-19 @ 06:40)             Pro-BNP: 5185   (06-13-19 @ 06:40)  CARDIAC MARKERS:             High-Sensitivity Troponin: x   (06-08-19 @ 01:00)             CK: 253 / CKMB: x   (06-08-19 @ 01:00)             Pro-BNP: x   (06-08-19 @ 01:00)  CARDIAC MARKERS:             High-Sensitivity Troponin: x   (06-05-19 @ 02:50)             CK: 1152 / CKMB: 1.13   (06-05-19 @ 02:50)             Pro-BNP: x   (06-05-19 @ 02:50)    ABG:   pH: 7.41 / pCO2: 35 / pO2: 72 / HCO3: 23 / Base Excess: -2.2 / SaO2: 93.4 / Lactate: 1.8 / iCa: x   (06-07-19 @ 16:42)    IMAGING STUDIES:  Congenital Echocardiogram 6/11/19: severe systemic tricuspid regurgitation, depressed systemic right ventricular function; visualized portions of Fontan appear patent

## 2019-06-13 NOTE — PROGRESS NOTE ADULT - PROBLEM SELECTOR PLAN 2
- peds cards note appreciated  - cont lasix and diuresis  - increase to 20mg TID as per cards today as pt with respiratory distress overnight  - DVT study negative   - will start heparin today, low dose patient specific and work up to full dose AC  - holding dig 0.25 daily in setting of ARF  - nasal cannula 5L during day  - BIPAP PRN and while sleeping  - cont to monitor

## 2019-06-13 NOTE — PROGRESS NOTE ADULT - NEUROLOGICAL DETAILS
responds to verbal commands/alert and oriented x 3/responds to pain
responds to pain/responds to verbal commands/alert and oriented x 3
alert and oriented x 3
alert and oriented x 3

## 2019-06-13 NOTE — PROGRESS NOTE ADULT - SUBJECTIVE AND OBJECTIVE BOX
CHIEF COMPLAINT: Patient is a 24y old  Male who presents with a chief complaint of S/P Fontan (12 Jun 2019 14:29)    Interval Events:      REVIEW OF SYSTEMS:  Constitutional:   Eyes:  ENT:  CV:  Resp:  GI:  :  MSK:  Integumentary:  Neurological:  Psychiatric:  Endocrine:  Hematologic/Lymphatic:  Allergic/Immunologic:  [ ] All other systems negative  [ ] Unable to assess ROS because ________      OBJECTIVE:  ICU Vital Signs Last 24 Hrs  T(C): 37.8 (13 Jun 2019 05:00), Max: 37.8 (13 Jun 2019 05:00)  T(F): 100 (13 Jun 2019 05:00), Max: 100 (13 Jun 2019 05:00)  HR: 102 (13 Jun 2019 05:00) (87 - 102)  BP: 120/90 (13 Jun 2019 05:00) (120/90 - 131/79)  BP(mean): --  ABP: --  ABP(mean): --  RR: 20 (13 Jun 2019 05:00) (20 - 20)  SpO2: 85% (13 Jun 2019 05:00) (85% - 92%)    06-12 @ 07:01  -  06-13 @ 07:00  --------------------------------------------------------  IN: 780 mL / OUT: 1000 mL / NET: -220 mL    HOSPITAL MEDICATIONS:  MEDICATIONS  (STANDING):  chlorhexidine 4% Liquid 1 Application(s) Topical <User Schedule>  docusate sodium Liquid 100 milliGRAM(s) Oral three times a day  furosemide    Tablet 20 milliGRAM(s) Oral two times a day  heparin  Infusion 800 Unit(s)/Hr (10 mL/Hr) IV Continuous <Continuous>  pantoprazole  Injectable 40 milliGRAM(s) IV Push two times a day  polyethylene glycol 3350 17 Gram(s) Oral two times a day  potassium chloride    Tablet ER 40 milliEquivalent(s) Oral daily  senna Syrup 10 milliLiter(s) Oral at bedtime    MEDICATIONS  (PRN):  acetaminophen    Suspension .. 650 milliGRAM(s) Oral every 6 hours PRN Temp greater or equal to 38C (100.4F)  ALPRAZolam 0.25 milliGRAM(s) Oral every 8 hours PRN Anxiety  bisacodyl Suppository 10 milliGRAM(s) Rectal daily PRN Constipation      LABS:                        11.7   17.35 )-----------( 491      ( 12 Jun 2019 18:00 )             36.7     06-12    142  |  105  |  28<H>  ----------------------------<  136<H>  3.9   |  25  |  1.64<H>    Ca    9.3      12 Jun 2019 09:53  Phos  2.7     06-12  Mg     2.0     06-12    TPro  7.0  /  Alb  3.5  /  TBili  2.5<H>  /  DBili  x   /  AST  33  /  ALT  22  /  AlkPhos  123<H>  06-12    PT/INR - ( 12 Jun 2019 09:53 )   PT: 14.0 SEC;   INR: 1.22          PTT - ( 13 Jun 2019 02:13 )  PTT:33.4 SEC          MICROBIOLOGY:     RADIOLOGY:  [ ] Reviewed and interpreted by me    PULMONARY FUNCTION TESTS:    EKG: CHIEF COMPLAINT: Patient is a 24y old  Male who presents with a chief complaint of S/P Fontan (12 Jun 2019 14:29)    Interval Events: resp distress overnight requiring additional lasix      REVIEW OF SYSTEMS:  Constitutional: feels tired this am  CV: denies  Resp: HAGER  GI: denies  [x] All other systems negative  [ ] Unable to assess ROS because ________      OBJECTIVE:  ICU Vital Signs Last 24 Hrs  T(C): 37.8 (13 Jun 2019 05:00), Max: 37.8 (13 Jun 2019 05:00)  T(F): 100 (13 Jun 2019 05:00), Max: 100 (13 Jun 2019 05:00)  HR: 102 (13 Jun 2019 05:00) (87 - 102)  BP: 120/90 (13 Jun 2019 05:00) (120/90 - 131/79)  BP(mean): --  ABP: --  ABP(mean): --  RR: 20 (13 Jun 2019 05:00) (20 - 20)  SpO2: 85% (13 Jun 2019 05:00) (85% - 92%)    06-12 @ 07:01  -  06-13 @ 07:00  --------------------------------------------------------  IN: 780 mL / OUT: 1000 mL / NET: -220 mL    HOSPITAL MEDICATIONS:  MEDICATIONS  (STANDING):  chlorhexidine 4% Liquid 1 Application(s) Topical <User Schedule>  docusate sodium Liquid 100 milliGRAM(s) Oral three times a day  furosemide    Tablet 20 milliGRAM(s) Oral two times a day  heparin  Infusion 800 Unit(s)/Hr (10 mL/Hr) IV Continuous <Continuous>  pantoprazole  Injectable 40 milliGRAM(s) IV Push two times a day  polyethylene glycol 3350 17 Gram(s) Oral two times a day  potassium chloride    Tablet ER 40 milliEquivalent(s) Oral daily  senna Syrup 10 milliLiter(s) Oral at bedtime    MEDICATIONS  (PRN):  acetaminophen    Suspension .. 650 milliGRAM(s) Oral every 6 hours PRN Temp greater or equal to 38C (100.4F)  ALPRAZolam 0.25 milliGRAM(s) Oral every 8 hours PRN Anxiety  bisacodyl Suppository 10 milliGRAM(s) Rectal daily PRN Constipation      LABS:             Complete Blood Count in AM (06.13.19 @ 06:40)    WBC Count: 17.71 K/uL    RBC Count: 4.69 M/uL    Hemoglobin: 11.5 g/dL    Hematocrit: 36.2 %    Mean Cell Volume: 77.2 fL    Mean Cell Hemoglobin: 24.5 pg    Mean Cell Hemoglobin Conc: 31.8 %    Red Cell Distrib Width: 23.9 %    Platelet Count - Automated: 501 K/uL    MPV: 13.0 fl    Nucleated RBC #: 0.06 K/uL    Basic Metabolic Panel in AM (06.13.19 @ 06:40)    Sodium, Serum: 140 mmol/L    Potassium, Serum: 4.1 mmol/L    Chloride, Serum: 103 mmol/L    Carbon Dioxide, Serum: 22 mmol/L    Anion Gap, Serum: 15 mmo/L    Blood Urea Nitrogen, Serum: 23 mg/dL    Creatinine, Serum: 1.56 mg/dL    Glucose, Serum: 132 mg/dL    Calcium, Total Serum: 9.5 mg/dL    eGFR if Non : 61: The units for eGFR are ml/min/1.73m2 (normalized body  surface area). The eGFR is calculated from a serum  creatinine using the CKD-EPI equation. Other variables  required for calculation are race, age and sex. Among  patients with chronic kidney disease (CKD), the eGFR is  useful in determining the stage of disease according to  KDOQI CKD classification. All eGFR results are reported  numerically with the following interpretation.    GFR  (ml/min/1.73 m2)          W/KIDNEY DAMAGE    W/O KIDNEY DMG  ==========================================================  >= 90.......................Stage 1..............Normal  60-89.......................Stage 2...........Decreased GFR  30-59.......................Stage 3..............Stage 3  15-29.......................Stage 4..............Stage 4  < 15........................Stage 5..............Stage 5    Each stage of CKD assumes that the associated GFR level  has been in effect for at least 3 months. Determination of  stages one and two (with eGFR > 59ml/min/m2) requires  estimation of kidney damage for at least 3 months as  defined by structural or functional abnormalities.    Limitations: All estimates of GFR will be less accurate  for patients at extremes of muscle mass (including but  not limited to frail elderly, critically ill, or cancer  patients), those with unusual diets, and those with  conditions associated with reduced secretion or  extrarenal elimination of creatinine. The eGFR equation  is not recommended for use in patients with unstable  creatinine levels. mL/min    eGFR if : 71 mL/min    PTT - ( 13 Jun 2019 02:13 )  PTT:33.4 SEC

## 2019-06-13 NOTE — DISCHARGE NOTE PROVIDER - PROVIDER TOKENS
PROVIDER:[TOKEN:[[id number]]] PROVIDER:[TOKEN:[[id number]],FOLLOWUP:[1-3 days]] PROVIDER:[TOKEN:[[id number]],FOLLOWUP:[1-3 days]],PROVIDER:[TOKEN:[[id number]]]

## 2019-06-13 NOTE — PROGRESS NOTE ADULT - PROBLEM SELECTOR PLAN 1
- hemoptysis s/p bronch 5/31 showing RUL bleeding  - s/p IR embolization 5/31 of R-sided aortopulmonary collaterals, R bronchial artery, multiple R thoracic intercostal arteries  - s/p bronch 6/1 with endobronchial blocker placed for re-bleeding from RUL  - s/p re-bronch 6/4 with removal of blocker, suctioned out blood clots from RUL and mucus plugs from TONJA  - s/p re-bronch 6/5, no active bleeding, suctioned out blood clots from RUL  - no further bleeding at this time  - increase heparin gtt to full dose - cont to monitor for bleeding  - if no bleeding, will start coumadin tomorrow and bridge from heparin

## 2019-06-13 NOTE — PROGRESS NOTE ADULT - MS EXT PE MLT D E PC
no cyanosis/no clubbing/no pedal edema
pedal edema/no cyanosis/no clubbing
pedal edema/no cyanosis
pedal edema/no cyanosis

## 2019-06-13 NOTE — PROGRESS NOTE PEDS - ASSESSMENT
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 He is now s/p IR embolization of right upper lobe aortopulmonary collateral vessels which were the most likely culprits for his acute on chronic hemoptysis as well as S/P endobronchial blocker placement. Currently inpatient for 2 weeks, extubated 5 days ago.  No new bleeding noted on heparin reinitiation on 6/12/19.      Plan-   -Continue lasix increasing to 20 mg IV q8h  -If continues to have tachypnea, increased WOB and desaturations (baseline outpatient saturations are low 90's), please consider positive pressure ventilation. May require escalation of care to MICU and addition of milrinone drip  -Encourage pulmonary rehab - out of bed, ambulation ,incentive spirometry  -Encourage physical rehab  -Continue heparin drip while inpatient,  plan to discharge on coumadin. Target PTT 50-60 on heparin and will aim for INR 2 on coumadin. Watch for hemoptysis after re-initiating anticoagulation  - For eventual hemodynamic/interventional cath after recovers from this acute illness (as outpatient)

## 2019-06-13 NOTE — PROGRESS NOTE ADULT - PROBLEM SELECTOR PLAN 3
- tolerating nasal cannula 5L pulse ox acceptable above 85%   - BIPAP HS - pt spoken to about wearing while sleeping, both he and mother agreed  - digoxin on hold

## 2019-06-14 LAB
ANION GAP SERPL CALC-SCNC: 16 MMO/L — HIGH (ref 7–14)
APTT BLD: 39 SEC — HIGH (ref 27.5–36.3)
APTT BLD: 55.9 SEC — HIGH (ref 27.5–36.3)
APTT BLD: 57.2 SEC — HIGH (ref 27.5–36.3)
BUN SERPL-MCNC: 25 MG/DL — HIGH (ref 7–23)
CALCIUM SERPL-MCNC: 9.7 MG/DL — SIGNIFICANT CHANGE UP (ref 8.4–10.5)
CHLORIDE SERPL-SCNC: 101 MMOL/L — SIGNIFICANT CHANGE UP (ref 98–107)
CO2 SERPL-SCNC: 24 MMOL/L — SIGNIFICANT CHANGE UP (ref 22–31)
CREAT SERPL-MCNC: 1.61 MG/DL — HIGH (ref 0.5–1.3)
GLUCOSE SERPL-MCNC: 114 MG/DL — HIGH (ref 70–99)
HCT VFR BLD CALC: 37.4 % — LOW (ref 39–50)
HGB BLD-MCNC: 11.7 G/DL — LOW (ref 13–17)
MCHC RBC-ENTMCNC: 24.4 PG — LOW (ref 27–34)
MCHC RBC-ENTMCNC: 31.3 % — LOW (ref 32–36)
MCV RBC AUTO: 78.1 FL — LOW (ref 80–100)
NRBC # FLD: 0.03 K/UL — SIGNIFICANT CHANGE UP (ref 0–0)
PLATELET # BLD AUTO: 527 K/UL — HIGH (ref 150–400)
PMV BLD: 13 FL — SIGNIFICANT CHANGE UP (ref 7–13)
POTASSIUM SERPL-MCNC: 4.1 MMOL/L — SIGNIFICANT CHANGE UP (ref 3.5–5.3)
POTASSIUM SERPL-SCNC: 4.1 MMOL/L — SIGNIFICANT CHANGE UP (ref 3.5–5.3)
RBC # BLD: 4.79 M/UL — SIGNIFICANT CHANGE UP (ref 4.2–5.8)
RBC # FLD: 23.9 % — HIGH (ref 10.3–14.5)
SODIUM SERPL-SCNC: 141 MMOL/L — SIGNIFICANT CHANGE UP (ref 135–145)
WBC # BLD: 17.17 K/UL — HIGH (ref 3.8–10.5)
WBC # FLD AUTO: 17.17 K/UL — HIGH (ref 3.8–10.5)

## 2019-06-14 PROCEDURE — 99233 SBSQ HOSP IP/OBS HIGH 50: CPT | Mod: GC

## 2019-06-14 PROCEDURE — 99233 SBSQ HOSP IP/OBS HIGH 50: CPT

## 2019-06-14 RX ORDER — WARFARIN SODIUM 2.5 MG/1
2 TABLET ORAL ONCE
Refills: 0 | Status: COMPLETED | OUTPATIENT
Start: 2019-06-14 | End: 2019-06-14

## 2019-06-14 RX ORDER — FUROSEMIDE 40 MG
20 TABLET ORAL
Refills: 0 | Status: DISCONTINUED | OUTPATIENT
Start: 2019-06-14 | End: 2019-06-16

## 2019-06-14 RX ADMIN — Medication 100 MILLIGRAM(S): at 14:16

## 2019-06-14 RX ADMIN — SENNA PLUS 10 MILLILITER(S): 8.6 TABLET ORAL at 22:29

## 2019-06-14 RX ADMIN — Medication 20 MILLIGRAM(S): at 17:38

## 2019-06-14 RX ADMIN — WARFARIN SODIUM 2 MILLIGRAM(S): 2.5 TABLET ORAL at 17:38

## 2019-06-14 RX ADMIN — PANTOPRAZOLE SODIUM 40 MILLIGRAM(S): 20 TABLET, DELAYED RELEASE ORAL at 17:40

## 2019-06-14 RX ADMIN — HEPARIN SODIUM 1750 UNIT(S)/HR: 5000 INJECTION INTRAVENOUS; SUBCUTANEOUS at 15:58

## 2019-06-14 RX ADMIN — Medication 100 MILLIGRAM(S): at 22:29

## 2019-06-14 RX ADMIN — PANTOPRAZOLE SODIUM 40 MILLIGRAM(S): 20 TABLET, DELAYED RELEASE ORAL at 05:35

## 2019-06-14 RX ADMIN — Medication 20 MILLIGRAM(S): at 05:35

## 2019-06-14 RX ADMIN — POLYETHYLENE GLYCOL 3350 17 GRAM(S): 17 POWDER, FOR SOLUTION ORAL at 17:30

## 2019-06-14 RX ADMIN — HEPARIN SODIUM 1650 UNIT(S)/HR: 5000 INJECTION INTRAVENOUS; SUBCUTANEOUS at 07:10

## 2019-06-14 RX ADMIN — HEPARIN SODIUM 1850 UNIT(S)/HR: 5000 INJECTION INTRAVENOUS; SUBCUTANEOUS at 23:46

## 2019-06-14 RX ADMIN — CHLORHEXIDINE GLUCONATE 1 APPLICATION(S): 213 SOLUTION TOPICAL at 05:35

## 2019-06-14 NOTE — PROGRESS NOTE ADULT - ATTENDING COMMENTS
Patient seen and examined with RCU team, mother at bedside.   Hemoptysis resolved  Was started on Heparin drip 6/12 - goal PTT 50-60 and closely monitor for any signs of bleeding.  c/w PT. Diuresis - Lasix 20 mg BID Patient seen and examined with RCU team, mother at bedside.   Hemoptysis resolved  Was started on Heparin drip 6/12 - goal PTT 50-60 and closely monitor for any signs of bleeding. Will start coumadin 2mg tonight  c/w PT. Diuresis - Lasix 20 mg BID

## 2019-06-14 NOTE — PROGRESS NOTE PEDS - SUBJECTIVE AND OBJECTIVE BOX
INTERVAL HISTORY:   Tolerated BiPAP overnight, pending am CXR and labs . Sats better on BiPAP-> 92%  Continues to be on heparin drip.    RESPIRATORY SUPPORT: BiPAP 10/5 Fio2 60%      I&O's Summary : None available    INTRAVASCULAR ACCESS: Peripheral IV    MEDICATIONS  (STANDING):  chlorhexidine 4% Liquid 1 Application(s) Topical <User Schedule>  docusate sodium Liquid 100 milliGRAM(s) Oral three times a day  furosemide   Injectable 20 milliGRAM(s) IV Push three times a day  heparin  Infusion. 1150 Unit(s)/Hr (11.5 mL/Hr) IV Continuous <Continuous>  pantoprazole  Injectable 40 milliGRAM(s) IV Push two times a day  polyethylene glycol 3350 17 Gram(s) Oral two times a day  senna Syrup 10 milliLiter(s) Oral at bedtime      PHYSICAL EXAMINATION:  Vital Signs Last 24 Hrs  T(C): 37.5 (14 Jun 2019 05:31), Max: 37.6 (13 Jun 2019 21:39)  T(F): 99.5 (14 Jun 2019 05:31), Max: 99.7 (13 Jun 2019 21:39)  HR: 97 (14 Jun 2019 05:31) (90 - 120)  BP: 119/69 (14 Jun 2019 05:31) (118/73 - 126/69)  BP(mean): --  RR: 19 (14 Jun 2019 05:31) (19 - 22)  SpO2: 86% (14 Jun 2019 05:31) (85% - 92%)  General - non-dysmorphic appearance, awake, tachypnea with labored breathing  Skin - beds sores+ on back  Eyes / ENT - no conjunctival injection, sclerae icteric, external ears & nares normal, mucous membranes moist.  Pulmonary - tachypneic, coarse sounds, decreased at bases with b/l crackles L>R  Cardiovascular - regular rhythm, normal S1 & single S2, 2/6 HSM at LMSB, no rubs, no gallops, capillary refill < 2sec, + pectus, R>L LE edema, chronic RLE venous stasis changes  Gastrointestinal - soft, distended , non-tender, hepatomegaly + .  Neurologic / Psychiatric - awake    LABORATORY TESTS:                          11.8  CBC:   19.20 )-----------( 566   (06-13-19 @ 15:40)                          37.5               140   |  103   |  23                 Ca: 9.5    BMP:   ----------------------------< 132    Mg: x     (06-13-19 @ 06:40)             4.1    |  22    | 1.56               Ph: x        LFT:     TPro: 7.0 / Alb: 3.5 / TBili: 2.5 / DBili: x / AST: 33 / ALT: 22 / AlkPhos: 123   (06-12-19 @ 09:53)    COAG: PT: x / PTT: 46.2 / INR: x   (06-13-19 @ 15:40)     CARDIAC MARKERS:             High-Sensitivity Troponin: x   (06-13-19 @ 06:40)             CK: x / CKMB: x   (06-13-19 @ 06:40)             Pro-BNP: 5185   (06-13-19 @ 06:40)  CARDIAC MARKERS:             High-Sensitivity Troponin: x   (06-08-19 @ 01:00)             CK: 253 / CKMB: x   (06-08-19 @ 01:00)             Pro-BNP: x   (06-08-19 @ 01:00)  CARDIAC MARKERS:             High-Sensitivity Troponin: x   (06-05-19 @ 02:50)             CK: 1152 / CKMB: 1.13   (06-05-19 @ 02:50)             Pro-BNP: x   (06-05-19 @ 02:50)    ABG:   pH: 7.41 / pCO2: 35 / pO2: 72 / HCO3: 23 / Base Excess: -2.2 / SaO2: 93.4 / Lactate: 1.8 / iCa: x   (06-07-19 @ 16:42)    IMAGING STUDIES:  Congenital Echocardiogram 6/11/19: severe systemic tricuspid regurgitation, depressed systemic right ventricular function; visualized portions of Fontan appear patent INTERVAL HISTORY:   Tolerated BiPAP overnight, pending am CXR and labs . Sats better on BiPAP-> 92%  Continues to be on heparin drip.    RESPIRATORY SUPPORT: BiPAP 10/5 Fio2 60%  NC when awake    I&O's Summary : None available    INTRAVASCULAR ACCESS: Peripheral IV    MEDICATIONS  (STANDING):  chlorhexidine 4% Liquid 1 Application(s) Topical <User Schedule>  docusate sodium Liquid 100 milliGRAM(s) Oral three times a day  furosemide   Injectable 20 milliGRAM(s) IV Push three times a day  heparin  Infusion. 1150 Unit(s)/Hr (11.5 mL/Hr) IV Continuous <Continuous>  pantoprazole  Injectable 40 milliGRAM(s) IV Push two times a day  polyethylene glycol 3350 17 Gram(s) Oral two times a day  senna Syrup 10 milliLiter(s) Oral at bedtime      PHYSICAL EXAMINATION:  Vital Signs Last 24 Hrs  T(C): 37.5 (14 Jun 2019 05:31), Max: 37.6 (13 Jun 2019 21:39)  T(F): 99.5 (14 Jun 2019 05:31), Max: 99.7 (13 Jun 2019 21:39)  HR: 97 (14 Jun 2019 05:31) (90 - 120)  BP: 119/69 (14 Jun 2019 05:31) (118/73 - 126/69)  BP(mean): --  RR: 19 (14 Jun 2019 05:31) (19 - 22)  SpO2: 86% (14 Jun 2019 05:31) (85% - 92%)  General - non-dysmorphic appearance, awake, tachypnea  Skin - beds sores+ on back  Eyes / ENT - no conjunctival injection, sclerae icteric, external ears & nares normal, mucous membranes moist.  Pulmonary - tachypneic, coarse sounds, decreased at bases with b/l crackles  Cardiovascular - regular rhythm, normal S1 & single S2, 2/6 HSM at LMSB, no rubs, no gallops, capillary refill < 2sec, + pectus, R>L LE edema, chronic RLE venous stasis changes  Gastrointestinal - soft, distended , non-tender, hepatomegaly +   Neurologic / Psychiatric - awake    LABORATORY TESTS:                          11.8  CBC:   19.20 )-----------( 566   (06-13-19 @ 15:40)                          37.5               140   |  103   |  23                 Ca: 9.5    BMP:   ----------------------------< 132    Mg: x     (06-13-19 @ 06:40)             4.1    |  22    | 1.56               Ph: x        LFT:     TPro: 7.0 / Alb: 3.5 / TBili: 2.5 / DBili: x / AST: 33 / ALT: 22 / AlkPhos: 123   (06-12-19 @ 09:53)    COAG: PT: x / PTT: 46.2 / INR: x   (06-13-19 @ 15:40)     CARDIAC MARKERS:             High-Sensitivity Troponin: x   (06-13-19 @ 06:40)             CK: x / CKMB: x   (06-13-19 @ 06:40)             Pro-BNP: 5185   (06-13-19 @ 06:40)  CARDIAC MARKERS:             High-Sensitivity Troponin: x   (06-08-19 @ 01:00)             CK: 253 / CKMB: x   (06-08-19 @ 01:00)             Pro-BNP: x   (06-08-19 @ 01:00)  CARDIAC MARKERS:             High-Sensitivity Troponin: x   (06-05-19 @ 02:50)             CK: 1152 / CKMB: 1.13   (06-05-19 @ 02:50)             Pro-BNP: x   (06-05-19 @ 02:50)    ABG:   pH: 7.41 / pCO2: 35 / pO2: 72 / HCO3: 23 / Base Excess: -2.2 / SaO2: 93.4 / Lactate: 1.8 / iCa: x   (06-07-19 @ 16:42)    IMAGING STUDIES:  Congenital Echocardiogram 6/11/19: severe systemic tricuspid regurgitation, depressed systemic right ventricular function; visualized portions of Fontan appear patent

## 2019-06-14 NOTE — PROGRESS NOTE ADULT - SUBJECTIVE AND OBJECTIVE BOX
CHIEF COMPLAINT: Patient is a 24y old  Male who presents with a chief complaint of S/P Fontan (14 Jun 2019 05:32)    Interval Events:      REVIEW OF SYSTEMS:  Constitutional:   Eyes:  ENT:  CV:  Resp:  GI:  :  MSK:  Integumentary:  Neurological:  Psychiatric:  Endocrine:  Hematologic/Lymphatic:  Allergic/Immunologic:  [ ] All other systems negative  [ ] Unable to assess ROS because ________      OBJECTIVE:  ICU Vital Signs Last 24 Hrs  T(C): 37.5 (14 Jun 2019 05:31), Max: 37.6 (13 Jun 2019 21:39)  T(F): 99.5 (14 Jun 2019 05:31), Max: 99.7 (13 Jun 2019 21:39)  HR: 97 (14 Jun 2019 05:31) (90 - 120)  BP: 119/69 (14 Jun 2019 05:31) (118/73 - 126/69)  BP(mean): --  ABP: --  ABP(mean): --  RR: 19 (14 Jun 2019 05:31) (19 - 22)  SpO2: 86% (14 Jun 2019 05:31) (85% - 92%)    06-12 @ 07:01 - 06-13 @ 07:00  --------------------------------------------------------  IN: 780 mL / OUT: 1000 mL / NET: -220 mL    06-13 @ 07:01 - 06-14 @ 06:53  --------------------------------------------------------  IN: 640 mL / OUT: 1200 mL / NET: -560 mL    HOSPITAL MEDICATIONS:  MEDICATIONS  (STANDING):  chlorhexidine 4% Liquid 1 Application(s) Topical <User Schedule>  docusate sodium Liquid 100 milliGRAM(s) Oral three times a day  furosemide   Injectable 20 milliGRAM(s) IV Push three times a day  heparin  Infusion. 1150 Unit(s)/Hr (11.5 mL/Hr) IV Continuous <Continuous>  pantoprazole  Injectable 40 milliGRAM(s) IV Push two times a day  polyethylene glycol 3350 17 Gram(s) Oral two times a day  senna Syrup 10 milliLiter(s) Oral at bedtime    MEDICATIONS  (PRN):  acetaminophen    Suspension .. 650 milliGRAM(s) Oral every 6 hours PRN Temp greater or equal to 38C (100.4F)  ALPRAZolam 0.25 milliGRAM(s) Oral every 8 hours PRN Anxiety  bisacodyl Suppository 10 milliGRAM(s) Rectal daily PRN Constipation      LABS:                        11.7   17.17 )-----------( 527      ( 14 Jun 2019 05:55 )             37.4     06-13    140  |  103  |  23  ----------------------------<  132<H>  4.1   |  22  |  1.56<H>    Ca    9.5      13 Jun 2019 06:40  Phos  2.7     06-12  Mg     2.0     06-12    TPro  7.0  /  Alb  3.5  /  TBili  2.5<H>  /  DBili  x   /  AST  33  /  ALT  22  /  AlkPhos  123<H>  06-12    PT/INR - ( 12 Jun 2019 09:53 )   PT: 14.0 SEC;   INR: 1.22          PTT - ( 14 Jun 2019 05:55 )  PTT:39.0 SEC          MICROBIOLOGY:     RADIOLOGY:  [ ] Reviewed and interpreted by me    PULMONARY FUNCTION TESTS:    EKG:

## 2019-06-14 NOTE — PROVIDER CONTACT NOTE (OTHER) - ACTION/TREATMENT ORDERED:
PA made aware. Follow heparin gtt full coag protocol. New rate 14.5ml/hr. Next aPTT in 6hrs. Will continue to monitor.

## 2019-06-14 NOTE — PROGRESS NOTE PEDS - ASSESSMENT
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 He is now s/p IR embolization of right upper lobe aortopulmonary collateral vessels which were the most likely culprits for his acute on chronic hemoptysis as well as S/P endobronchial blocker placement. Currently inpatient for 2 weeks, extubated 5 days ago.  No new bleeding noted on heparin reinitiation on 6/12/19.      Plan-   -Continue lasix 20 mg IV q8h  -Continues to have tachypnea, increased WOB and desaturations (baseline outpatient saturations are low 90's), please consider positive pressure ventilation especially while asleep. May require escalation of care to MICU and addition of milrinone drip   -Encourage pulmonary rehab - out of bed, ambulation ,incentive spirometry  -Encourage physical rehab  -Continue heparin drip while inpatient,  plan to discharge on coumadin. Target PTT 50-60 on heparin and will aim for INR 2 on coumadin. 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 He is now s/p IR embolization of right upper lobe aortopulmonary collateral vessels which were the most likely culprits for his acute on chronic hemoptysis as well as S/P endobronchial blocker placement. Extubated on 6/7/19 and working on pulmonary rehab and volume overload.  No new bleeding noted on heparin reinitiation on 6/12/19.      Plan-   - given rise in BUN/Cr, okay to switch lasix to 20 mg IV q12h  - encourage out of bed as tolerated, physical therapy, BIPAP when sleeping  - d/w pulmonary, to start coumadin today, goal INR 2  - once MELLO resolves, will add aldactone given ascites and ace vs arb given function and systemic AV valve regurgitation

## 2019-06-14 NOTE — PROVIDER CONTACT NOTE (OTHER) - RECOMMENDATIONS
as per heparin gtt protocol, increase rate by 2ml/hr
Continue low flow mattress, turn and position q2h 3M applied zflow pillow in use

## 2019-06-15 LAB
ANION GAP SERPL CALC-SCNC: 15 MMO/L — HIGH (ref 7–14)
APTT BLD: 58 SEC — HIGH (ref 27.5–36.3)
APTT BLD: 59.1 SEC — HIGH (ref 27.5–36.3)
BUN SERPL-MCNC: 31 MG/DL — HIGH (ref 7–23)
CALCIUM SERPL-MCNC: 9.8 MG/DL — SIGNIFICANT CHANGE UP (ref 8.4–10.5)
CHLORIDE SERPL-SCNC: 101 MMOL/L — SIGNIFICANT CHANGE UP (ref 98–107)
CO2 SERPL-SCNC: 25 MMOL/L — SIGNIFICANT CHANGE UP (ref 22–31)
CREAT SERPL-MCNC: 1.58 MG/DL — HIGH (ref 0.5–1.3)
GLUCOSE SERPL-MCNC: 109 MG/DL — HIGH (ref 70–99)
HCT VFR BLD CALC: 34.2 % — LOW (ref 39–50)
HGB BLD-MCNC: 11.1 G/DL — LOW (ref 13–17)
INR BLD: 1.3 — HIGH (ref 0.88–1.17)
MCHC RBC-ENTMCNC: 25.1 PG — LOW (ref 27–34)
MCHC RBC-ENTMCNC: 32.5 % — SIGNIFICANT CHANGE UP (ref 32–36)
MCV RBC AUTO: 77.4 FL — LOW (ref 80–100)
NRBC # FLD: 0.03 K/UL — SIGNIFICANT CHANGE UP (ref 0–0)
PLATELET # BLD AUTO: 515 K/UL — HIGH (ref 150–400)
PMV BLD: 14 FL — HIGH (ref 7–13)
POTASSIUM SERPL-MCNC: 3.7 MMOL/L — SIGNIFICANT CHANGE UP (ref 3.5–5.3)
POTASSIUM SERPL-SCNC: 3.7 MMOL/L — SIGNIFICANT CHANGE UP (ref 3.5–5.3)
PROTHROM AB SERPL-ACNC: 14.5 SEC — HIGH (ref 9.8–13.1)
RBC # BLD: 4.42 M/UL — SIGNIFICANT CHANGE UP (ref 4.2–5.8)
RBC # FLD: 23.9 % — HIGH (ref 10.3–14.5)
SODIUM SERPL-SCNC: 141 MMOL/L — SIGNIFICANT CHANGE UP (ref 135–145)
WBC # BLD: 15.91 K/UL — HIGH (ref 3.8–10.5)
WBC # FLD AUTO: 15.91 K/UL — HIGH (ref 3.8–10.5)

## 2019-06-15 PROCEDURE — 99233 SBSQ HOSP IP/OBS HIGH 50: CPT | Mod: GC

## 2019-06-15 PROCEDURE — 99233 SBSQ HOSP IP/OBS HIGH 50: CPT

## 2019-06-15 RX ORDER — WARFARIN SODIUM 2.5 MG/1
3 TABLET ORAL ONCE
Refills: 0 | Status: COMPLETED | OUTPATIENT
Start: 2019-06-15 | End: 2019-06-15

## 2019-06-15 RX ADMIN — HEPARIN SODIUM 1850 UNIT(S)/HR: 5000 INJECTION INTRAVENOUS; SUBCUTANEOUS at 07:46

## 2019-06-15 RX ADMIN — PANTOPRAZOLE SODIUM 40 MILLIGRAM(S): 20 TABLET, DELAYED RELEASE ORAL at 06:48

## 2019-06-15 RX ADMIN — Medication 100 MILLIGRAM(S): at 06:48

## 2019-06-15 RX ADMIN — PANTOPRAZOLE SODIUM 40 MILLIGRAM(S): 20 TABLET, DELAYED RELEASE ORAL at 17:15

## 2019-06-15 RX ADMIN — WARFARIN SODIUM 3 MILLIGRAM(S): 2.5 TABLET ORAL at 17:20

## 2019-06-15 RX ADMIN — HEPARIN SODIUM 1850 UNIT(S)/HR: 5000 INJECTION INTRAVENOUS; SUBCUTANEOUS at 13:30

## 2019-06-15 RX ADMIN — POLYETHYLENE GLYCOL 3350 17 GRAM(S): 17 POWDER, FOR SOLUTION ORAL at 06:48

## 2019-06-15 RX ADMIN — CHLORHEXIDINE GLUCONATE 1 APPLICATION(S): 213 SOLUTION TOPICAL at 06:49

## 2019-06-15 RX ADMIN — Medication 20 MILLIGRAM(S): at 06:48

## 2019-06-15 RX ADMIN — Medication 20 MILLIGRAM(S): at 17:20

## 2019-06-15 NOTE — PROGRESS NOTE ADULT - SUBJECTIVE AND OBJECTIVE BOX
CHIEF COMPLAINT:    Interval Events:    REVIEW OF SYSTEMS:  Constitutional:   Eyes:  ENT:  CV:  Resp:  GI:  :  MSK:  Integumentary:  Neurological:  Psychiatric:  Endocrine:  Hematologic/Lymphatic:  Allergic/Immunologic:  [ ] All other systems negative  [ ] Unable to assess ROS because ________    OBJECTIVE:  ICU Vital Signs Last 24 Hrs  T(C): 37 (15 Aaron 2019 06:45), Max: 38 (14 Jun 2019 22:40)  T(F): 98.6 (15 Aaron 2019 06:45), Max: 100.4 (14 Jun 2019 22:40)  HR: 90 (15 Aaron 2019 07:25) (87 - 102)  BP: 124/63 (15 Aaron 2019 06:45) (113/70 - 126/75)  BP(mean): --  ABP: --  ABP(mean): --  RR: 18 (15 Aaron 2019 06:45) (18 - 19)  SpO2: 91% (15 Aaron 2019 07:25) (88% - 96%)        CAPILLARY BLOOD GLUCOSE          PHYSICAL EXAM:  General:   HEENT:   Lymph Nodes:  Neck:   Respiratory:   Cardiovascular:   Abdomen:   Extremities:   Skin:   Neurological:  Psychiatry:    HOSPITAL MEDICATIONS:  MEDICATIONS  (STANDING):  chlorhexidine 4% Liquid 1 Application(s) Topical <User Schedule>  docusate sodium Liquid 100 milliGRAM(s) Oral three times a day  furosemide   Injectable 20 milliGRAM(s) IV Push two times a day  heparin  Infusion. 1150 Unit(s)/Hr (11.5 mL/Hr) IV Continuous <Continuous>  pantoprazole  Injectable 40 milliGRAM(s) IV Push two times a day  polyethylene glycol 3350 17 Gram(s) Oral two times a day  senna Syrup 10 milliLiter(s) Oral at bedtime  warfarin 3 milliGRAM(s) Oral once    MEDICATIONS  (PRN):  acetaminophen    Suspension .. 650 milliGRAM(s) Oral every 6 hours PRN Temp greater or equal to 38C (100.4F)  ALPRAZolam 0.25 milliGRAM(s) Oral every 8 hours PRN Anxiety  bisacodyl Suppository 10 milliGRAM(s) Rectal daily PRN Constipation      LABS:                        11.1   15.91 )-----------( 515      ( 15 Aaron 2019 06:30 )             34.2     06-15    141  |  101  |  31<H>  ----------------------------<  109<H>  3.7   |  25  |  1.58<H>    Ca    9.8      15 Aaron 2019 06:30      PT/INR - ( 15 Aaron 2019 06:30 )   PT: 14.5 SEC;   INR: 1.30          PTT - ( 15 Aaron 2019 06:30 )  PTT:59.1 SEC          MICROBIOLOGY:     RADIOLOGY:  [ ] Reviewed and interpreted by me    PULMONARY FUNCTION TESTS:    EKG: CHIEF COMPLAINT: c/o feeling tired    Interval Events: no events overnight    REVIEW OF SYSTEMS:  Constitutional:   Eyes:  ENT:  CV: denies chest pain  Resp: denies shortness of breath  GI: denies abd pain  :  MSK:  Integumentary:  Neurological:  Psychiatric:  Endocrine:  Hematologic/Lymphatic:  Allergic/Immunologic:  [x ] All other systems negative  [ ] Unable to assess ROS because ________    OBJECTIVE:  ICU Vital Signs Last 24 Hrs  T(C): 37 (15 Aaron 2019 06:45), Max: 38 (14 Jun 2019 22:40)  T(F): 98.6 (15 Aaron 2019 06:45), Max: 100.4 (14 Jun 2019 22:40)  HR: 90 (15 Aaron 2019 07:25) (87 - 102)  BP: 124/63 (15 Aaron 2019 06:45) (113/70 - 126/75)  BP(mean): --  ABP: --  ABP(mean): --  RR: 18 (15 Aaron 2019 06:45) (18 - 19)  SpO2: 91% (15 Aaron 2019 07:25) (88% - 96%)    HOSPITAL MEDICATIONS:  MEDICATIONS  (STANDING):  chlorhexidine 4% Liquid 1 Application(s) Topical <User Schedule>  docusate sodium Liquid 100 milliGRAM(s) Oral three times a day  furosemide   Injectable 20 milliGRAM(s) IV Push two times a day  heparin  Infusion. 1150 Unit(s)/Hr (11.5 mL/Hr) IV Continuous <Continuous>  pantoprazole  Injectable 40 milliGRAM(s) IV Push two times a day  polyethylene glycol 3350 17 Gram(s) Oral two times a day  senna Syrup 10 milliLiter(s) Oral at bedtime  warfarin 3 milliGRAM(s) Oral once    MEDICATIONS  (PRN):  acetaminophen    Suspension .. 650 milliGRAM(s) Oral every 6 hours PRN Temp greater or equal to 38C (100.4F)  ALPRAZolam 0.25 milliGRAM(s) Oral every 8 hours PRN Anxiety  bisacodyl Suppository 10 milliGRAM(s) Rectal daily PRN Constipation      LABS:                        11.1   15.91 )-----------( 515      ( 15 Aaron 2019 06:30 )             34.2     06-15    141  |  101  |  31<H>  ----------------------------<  109<H>  3.7   |  25  |  1.58<H>    Ca    9.8      15 Aaron 2019 06:30      PT/INR - ( 15 Aaron 2019 06:30 )   PT: 14.5 SEC;   INR: 1.30          PTT - ( 15 Aaron 2019 06:30 )  PTT:59.1 SEC          MICROBIOLOGY:     RADIOLOGY:  [ ] Reviewed and interpreted by me    PULMONARY FUNCTION TESTS:    EKG:

## 2019-06-15 NOTE — PROGRESS NOTE ADULT - PROBLEM SELECTOR PLAN 1
- hemoptysis s/p bronch 5/31 showing RUL bleeding  - s/p IR embolization 5/31 of R-sided aortopulmonary collaterals, R bronchial artery, multiple R thoracic intercostal arteries  - s/p bronch 6/1 with endobronchial blocker placed for re-bleeding from RUL  - s/p re-bronch 6/4 with removal of blocker, suctioned out blood clots from RUL and mucus plugs from TONJA  - s/p re-bronch 6/5, no active bleeding, suctioned out blood clots from RUL  - no further bleeding at this time  - increase heparin gtt to full dose - cont to monitor for bleeding  - if no bleeding, will start coumadin tomorrow and bridge from heparin - hemoptysis s/p bronch 5/31 showing RUL bleeding  - s/p IR embolization 5/31 of R-sided aortopulmonary collaterals, R bronchial artery, multiple R thoracic intercostal arteries  - s/p bronch 6/1 with endobronchial blocker placed for re-bleeding from RUL  - s/p re-bronch 6/4 with removal of blocker, suctioned out blood clots from RUL and mucus plugs from TONJA  - s/p re-bronch 6/5, no active bleeding, suctioned out blood clots from RUL  - no further bleeding at this time  - increase heparin gtt to full dose - cont to monitor for bleeding  - Coumadin started, will bridge with Heparin until INR therapeutic

## 2019-06-15 NOTE — PROGRESS NOTE ADULT - ASSESSMENT
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  Bleeding improved once coagulopathy reversed and now no recurrence of bleeding noted since heparin was started earlier this week.  Ongoing issues include respiratory insufficiency, volume overload, and renal insufficiency.    #Fontan physiology with hemoptysis s/p IR coiling.  Patients with Fontan completion with a history of prior thrombosis are at increased risk of morbidity and mortality.  - continue heparin drip and coumadin, goal INR 2.     #Respiratory insufficiency- multifactorial from poor respiratory mechanics (critical illness muscle weakness, abnormal chest wall anatomy/restrictive lung disease) and volume overload.   - continue Lasix 20 mg IV BID- please obtain daily weights and keep ins/outs  - discussed with nursing, patient and mother again this morning- needs to wear bipap when sleeping and napping.  Needs to open up atelectasis and needs the help to blow off CO2.    - given the duration of his hospitalization and waxing/waning mental status, it is imperative he is able to sleep and get pulmonary support.   - Tmax 100.4. If continues to rise, would cover with abx given last CXR report and history of asplenia  - would repeat CXR later today after on BIPAP to see if evolution of retrocardiac opacities- i suspect this is atelectasis and volume overload    #MELLO. Multifactorial.   - follow up morning labs  - lasix 20 mg IV BID for now

## 2019-06-15 NOTE — PROGRESS NOTE ADULT - PROBLEM SELECTOR PLAN 3
- tolerating nasal cannula 5L pulse ox acceptable above 85%   - BIPAP HS - pt spoken to about wearing while sleeping, both he and mother agreed  - digoxin on hold - tolerating nasal cannula 5L when awake, pulse ox acceptable above 85%   - BIPAP HS - pt spoken to about wearing while sleeping, both he and mother agreed  - digoxin on hold

## 2019-06-15 NOTE — PROGRESS NOTE ADULT - SUBJECTIVE AND OBJECTIVE BOX
[facility] Follow Up Note    S: restless night of little sleep secondary to neighbor's alarms. refused to wear bipap as wanted to sleep.  Ambulated yesterday during the day with walker around the unit.    O:  ICU Vital Signs Last 24 Hrs  T(C): 37 (15 Aaron 2019 06:45), Max: 38 (14 Jun 2019 22:40)  T(F): 98.6 (15 Aaron 2019 06:45), Max: 100.4 (14 Jun 2019 22:40)  HR: 90 (15 Aaron 2019 06:45) (87 - 102)  BP: 124/63 (15 Aaron 2019 06:45) (113/70 - 126/75)  BP(mean): --  ABP: --  ABP(mean): --  RR: 18 (15 Aaron 2019 06:45) (18 - 19)  SpO2: 90% (15 Aaron 2019 06:45) (88% - 96%)    laying in bed, on nasal cannula  mmm  s1 single s2 2-3/6 HSM at [facility]  tachypnea, crackles b/l, decreased at bases  abdomen soft but distended  warm distally, b/l LE edema, R>L      labs for the morning pending  PT/INR - ( 15 Aaron 2019 06:30 )   PT: 14.5 SEC;   INR: 1.30          PTT - ( 15 Aaron 2019 06:30 )  PTT:59.1 SEC

## 2019-06-16 DIAGNOSIS — I82.409 ACUTE EMBOLISM AND THROMBOSIS OF UNSPECIFIED DEEP VEINS OF UNSPECIFIED LOWER EXTREMITY: ICD-10-CM

## 2019-06-16 LAB
ANION GAP SERPL CALC-SCNC: 15 MMO/L — HIGH (ref 7–14)
APTT BLD: 79.6 SEC — HIGH (ref 27.5–36.3)
BUN SERPL-MCNC: 35 MG/DL — HIGH (ref 7–23)
CALCIUM SERPL-MCNC: 9.2 MG/DL — SIGNIFICANT CHANGE UP (ref 8.4–10.5)
CHLORIDE SERPL-SCNC: 101 MMOL/L — SIGNIFICANT CHANGE UP (ref 98–107)
CO2 SERPL-SCNC: 25 MMOL/L — SIGNIFICANT CHANGE UP (ref 22–31)
CREAT SERPL-MCNC: 1.62 MG/DL — HIGH (ref 0.5–1.3)
GLUCOSE SERPL-MCNC: 98 MG/DL — SIGNIFICANT CHANGE UP (ref 70–99)
HCT VFR BLD CALC: 33.3 % — LOW (ref 39–50)
HGB BLD-MCNC: 10.7 G/DL — LOW (ref 13–17)
INR BLD: 1.32 — HIGH (ref 0.88–1.17)
MCHC RBC-ENTMCNC: 25 PG — LOW (ref 27–34)
MCHC RBC-ENTMCNC: 32.1 % — SIGNIFICANT CHANGE UP (ref 32–36)
MCV RBC AUTO: 77.8 FL — LOW (ref 80–100)
NRBC # FLD: 0.03 K/UL — SIGNIFICANT CHANGE UP (ref 0–0)
PLATELET # BLD AUTO: 601 K/UL — HIGH (ref 150–400)
PMV BLD: 13.3 FL — HIGH (ref 7–13)
POTASSIUM SERPL-MCNC: 3.7 MMOL/L — SIGNIFICANT CHANGE UP (ref 3.5–5.3)
POTASSIUM SERPL-SCNC: 3.7 MMOL/L — SIGNIFICANT CHANGE UP (ref 3.5–5.3)
PROTHROM AB SERPL-ACNC: 15.2 SEC — HIGH (ref 9.8–13.1)
RBC # BLD: 4.28 M/UL — SIGNIFICANT CHANGE UP (ref 4.2–5.8)
RBC # FLD: 23.9 % — HIGH (ref 10.3–14.5)
SODIUM SERPL-SCNC: 141 MMOL/L — SIGNIFICANT CHANGE UP (ref 135–145)
WBC # BLD: 12.68 K/UL — HIGH (ref 3.8–10.5)
WBC # FLD AUTO: 12.68 K/UL — HIGH (ref 3.8–10.5)

## 2019-06-16 PROCEDURE — 99233 SBSQ HOSP IP/OBS HIGH 50: CPT | Mod: 25

## 2019-06-16 PROCEDURE — 99233 SBSQ HOSP IP/OBS HIGH 50: CPT | Mod: GC

## 2019-06-16 PROCEDURE — 71045 X-RAY EXAM CHEST 1 VIEW: CPT | Mod: 26

## 2019-06-16 RX ORDER — FUROSEMIDE 40 MG
20 TABLET ORAL
Refills: 0 | Status: DISCONTINUED | OUTPATIENT
Start: 2019-06-16 | End: 2019-06-20

## 2019-06-16 RX ORDER — PANTOPRAZOLE SODIUM 20 MG/1
40 TABLET, DELAYED RELEASE ORAL
Refills: 0 | Status: DISCONTINUED | OUTPATIENT
Start: 2019-06-16 | End: 2019-06-22

## 2019-06-16 RX ORDER — WARFARIN SODIUM 2.5 MG/1
3 TABLET ORAL ONCE
Refills: 0 | Status: COMPLETED | OUTPATIENT
Start: 2019-06-16 | End: 2019-06-16

## 2019-06-16 RX ADMIN — HEPARIN SODIUM 1850 UNIT(S)/HR: 5000 INJECTION INTRAVENOUS; SUBCUTANEOUS at 06:26

## 2019-06-16 RX ADMIN — CHLORHEXIDINE GLUCONATE 1 APPLICATION(S): 213 SOLUTION TOPICAL at 06:27

## 2019-06-16 RX ADMIN — Medication 20 MILLIGRAM(S): at 06:26

## 2019-06-16 RX ADMIN — PANTOPRAZOLE SODIUM 40 MILLIGRAM(S): 20 TABLET, DELAYED RELEASE ORAL at 06:25

## 2019-06-16 RX ADMIN — WARFARIN SODIUM 3 MILLIGRAM(S): 2.5 TABLET ORAL at 17:20

## 2019-06-16 RX ADMIN — PANTOPRAZOLE SODIUM 40 MILLIGRAM(S): 20 TABLET, DELAYED RELEASE ORAL at 17:20

## 2019-06-16 RX ADMIN — Medication 20 MILLIGRAM(S): at 17:20

## 2019-06-16 NOTE — PROGRESS NOTE ADULT - ATTENDING COMMENTS
Patient more awake today. Encouraged oob. Continue gentle diuresis as per cardiology. BIPAP at night

## 2019-06-16 NOTE — PROGRESS NOTE ADULT - PROBLEM SELECTOR PLAN 5
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 SS passed for Mechanical Diet.

## 2019-06-16 NOTE — PROGRESS NOTE ADULT - PROBLEM SELECTOR PLAN 2
- peds cards note appreciated  - cont lasix and diuresis  - DVT study negative   -Heparin bridge to coumadin  - holding dig 0.25 daily in setting of ARF  - nasal cannula 5L during day  - BIPAP PRN and while sleeping  - cont to monitor - Pediatric Cardiology following   - BIPAP QHS (while asleep) and NC during day continued.   - Lasix changed from 20mg IV BID to Lasix 20mg PO BID today (6/16).

## 2019-06-16 NOTE — PROGRESS NOTE ADULT - PROBLEM SELECTOR PLAN 1
- hemoptysis s/p bronch 5/31 showing RUL bleeding  - s/p IR embolization 5/31 of R-sided aortopulmonary collaterals, R bronchial artery, multiple R thoracic intercostal arteries  - s/p bronch 6/1 with endobronchial blocker placed for re-bleeding from RUL  - s/p re-bronch 6/4 with removal of blocker, suctioned out blood clots from RUL and mucus plugs from TONJA  - s/p re-bronch 6/5, no active bleeding, suctioned out blood clots from RUL  - no further bleeding at this time  - increase heparin gtt to full dose - cont to monitor for bleeding  - Coumadin started, will bridge with Heparin until INR therapeutic - Hemoptysis s/p Bronch 5/31 showing RUL bleeding  - s/p IR Embolization 5/31 of R aortopulmonary collaterals, R bronchial artery and multiple R thoracic intercostal arteries  - s/p Bronch 6/1 with endobronchial blocker placed for re-bleeding from RUL  - s/p Re-Bronch 6/4 with removal of blocker, suctioned out blood clots from RUL and mucus plugs from TONJA  - s/p Re-Bronch 6/5 showing no active bleeding and suctioned out residual blood clots from RUL  - No further bleeding at this time

## 2019-06-16 NOTE — PROGRESS NOTE ADULT - PROBLEM SELECTOR PLAN 3
- tolerating nasal cannula 5L when awake, pulse ox acceptable above 85%   - BIPAP HS - pt spoken to about wearing while sleeping, both he and mother agreed  - digoxin on hold - As above.

## 2019-06-16 NOTE — PROGRESS NOTE ADULT - ASSESSMENT
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 Mr. 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 The patient presented to [facility] for recurrent episodes of Hemoptysis with Acute Hypoxic Respiratory Distress. Mr. Cortez was placed on oxygen in the ED and MICU called. Patient admitted to MICU and s/p Bronchoscopy on 5/31 showing RUL Posterior segmental bleeding. The patient was subsequently intubated on 5/31 for airway protection given hemoptysis in setting of hypoxia and respiratory distress. IR consulted and embolization of multiple vessel performed. Sedation/ Versed weaned off and Pressors switched from Levophed ot Phenylephrine on 6/1. The patient's INR with history of Coumadin use was noted to be elevated and the patient was multiple doses of FFPs were given. In the setting of SupraINR and Hemoptysis EndoBronchial Block placed on 6/1 to control bleeding. Mr. Cortez's hospital course was further complicated with fever spike on 6/1 and started on Zosyn and Vancomycin empirically; blood cultures drawn and noted to be negative. Duplex of LE was noted to be negative for acute findings and noted with chronic partial DVT to the mid-distal R Femoral Vein noted. Given acute bleed, AC held for now and respiratory stabilization and hemostasis taken as primary responsibility. HH remained stable and repeat Bronchoscopy performed on 6/4 with removal of EndoBronchial Block and suctioning of RUL Blood Clots and TONJA Mucous Plug. Fever spike again noted on 6/5 likely post procedural. BAL cultures noted to be negative as well. During ICU, the patient was also noted with abdominal distention, evaluated by US ABDOMEN noted with nodular liver but normal LFTs. Abdominal distention noted with gas passing and (+) BMs. Sedation weaned off on 6/6and Vancomycin dc'ed as no MRSA coverage warranted. On 6/7, Mr. Cortez was extubated to NIPPV with BiPAP. On BiPAP, the patient was noted with agitation and mild sedation provided with Precedex earned as tolerated SpO2 remained stable and BiPAP weaned to VentiMASK and then NC. Patient seen by SS and mechanical soft diet started. Abdominal distention remained. AXR noted with distended loops but no SBO and patient tolerated diet well. Mr. Cortez was then transferred to the RCU for continued monitoring and management.

## 2019-06-16 NOTE — PROGRESS NOTE ADULT - ASSESSMENT
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  Bleeding improved once coagulopathy reversed and now no recurrence of bleeding noted since heparin was started earlier this week.  Ongoing issues include respiratory insufficiency, volume overload, and renal insufficiency.    #Fontan physiology with hemoptysis s/p IR coiling.  Patients with Fontan completion with a history of prior thrombosis are at increased risk of morbidity and mortality.  As a a result, Dr. Rojas (primary [facility]), myself, Kang and his family discussed the risk/benefits of anticoagulation.    - continue heparin drip and coumadin, goal INR 2.     #Respiratory insufficiency- multifactorial from poor respiratory mechanics (critical illness muscle weakness, abnormal chest wall anatomy/restrictive lung disease) and volume overload. He is improved clinically today using bipap both from aeration on exam and mental status.    - switch lasix to 20 mg PO BID, please obtain daily weights and keep ins/outs  - bipap when asleep and napping; out of bed and ambulating  - may required bipap/cpap/oxygen when at home  at least for the short term while he builds strength from this prolonged hospitalization    #MELLO. Multifactorial likely plateau from prerenal etiology vs new baseline from acute insults this admission.  - back down lasix to 20 mg po bid  - follow daily chemistry

## 2019-06-16 NOTE — PROGRESS NOTE ADULT - PROBLEM SELECTOR PLAN 6
- SCDs - MELLO likely pre-renal in setting of poor PO intake/hemoptysis vs possible PATRICA vs vanco   - making good urine  - electrolytes stable  - creatinine improving, cont to monitor   - renal note appreciated

## 2019-06-16 NOTE — PROGRESS NOTE ADULT - SUBJECTIVE AND OBJECTIVE BOX
Adult Congenital Heart Disease  Follow Up Note    S:  Remained on bipap for most of the day yesterday. Feels more rested this morning.  No epistaxis or hemoptysis.    O:  ICU Vital Signs Last 24 Hrs  T(C): 36.8 (16 Jun 2019 06:22), Max: 36.8 (16 Jun 2019 06:22)  T(F): 98.3 (16 Jun 2019 06:22), Max: 98.3 (16 Jun 2019 06:22)  HR: 85 (16 Jun 2019 07:25) (84 - 93)  BP: 118/77 (16 Jun 2019 06:22) (112/72 - 119/80)  BP(mean): --  ABP: --  ABP(mean): --  RR: 20 (16 Jun 2019 06:22) (20 - 20)  SpO2: 88% (16 Jun 2019 07:25) (87% - 95%)    laying in bed, looks more rested today versus yesterday, eating breakfast  mmm  s1 single s2 3/6 HSM at [facility]. + pectus  improved aeration today- fewer crackles, diminished at bases  abdomen softer  warm lower extremities, trace edema, RLE chronic venous stasis changes    06-16    141  |  101  |  35<H>  ----------------------------<  98  3.7   |  25  |  1.62<H>    Ca    9.2      16 Jun 2019 05:26                          10.7   12.68 )-----------( 601      ( 16 Jun 2019 05:26 )             33.3     PT/INR - ( 16 Jun 2019 05:20 )   PT: 15.2 SEC;   INR: 1.32          PTT - ( 16 Jun 2019 05:20 )  PTT:79.6 SEC    cxr- cardiomegaly, likely small right pleural effusion, ?left effusion, some congestion

## 2019-06-16 NOTE — PROGRESS NOTE ADULT - PROBLEM SELECTOR PLAN 4
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   - SUBINR now second to reversal in MICU for Hemoptysis.   - Heparin GTTs/ Coumadin bridge continued.   - INR slowly increasing.    - QD Coumadin dosing.

## 2019-06-16 NOTE — PROGRESS NOTE ADULT - SUBJECTIVE AND OBJECTIVE BOX
CHIEF COMPLAINT:    Interval Events:    REVIEW OF SYSTEMS:  Constitutional:   Eyes:  ENT:  CV:  Resp:  GI:  :  MSK:  Integumentary:  Neurological:  Psychiatric:  Endocrine:  Hematologic/Lymphatic:  Allergic/Immunologic:  [ ] All other systems negative  [ ] Unable to assess ROS because ________    OBJECTIVE:  ICU Vital Signs Last 24 Hrs  T(C): 36.8 (16 Jun 2019 06:22), Max: 36.8 (16 Jun 2019 06:22)  T(F): 98.3 (16 Jun 2019 06:22), Max: 98.3 (16 Jun 2019 06:22)  HR: 85 (16 Jun 2019 07:25) (84 - 93)  BP: 118/77 (16 Jun 2019 06:22) (112/72 - 119/80)  BP(mean): --  ABP: --  ABP(mean): --  RR: 20 (16 Jun 2019 06:22) (20 - 20)  SpO2: 88% (16 Jun 2019 07:25) (87% - 95%)        CAPILLARY BLOOD GLUCOSE          PHYSICAL EXAM:  General:   HEENT:   Lymph Nodes:  Neck:   Respiratory:   Cardiovascular:   Abdomen:   Extremities:   Skin:   Neurological:  Psychiatry:    HOSPITAL MEDICATIONS:  MEDICATIONS  (STANDING):  chlorhexidine 4% Liquid 1 Application(s) Topical <User Schedule>  docusate sodium Liquid 100 milliGRAM(s) Oral three times a day  furosemide   Injectable 20 milliGRAM(s) IV Push two times a day  heparin  Infusion. 1150 Unit(s)/Hr (11.5 mL/Hr) IV Continuous <Continuous>  pantoprazole  Injectable 40 milliGRAM(s) IV Push two times a day  polyethylene glycol 3350 17 Gram(s) Oral two times a day  senna Syrup 10 milliLiter(s) Oral at bedtime  warfarin 3 milliGRAM(s) Oral once    MEDICATIONS  (PRN):  acetaminophen    Suspension .. 650 milliGRAM(s) Oral every 6 hours PRN Temp greater or equal to 38C (100.4F)  ALPRAZolam 0.25 milliGRAM(s) Oral every 8 hours PRN Anxiety  bisacodyl Suppository 10 milliGRAM(s) Rectal daily PRN Constipation      LABS:                        10.7   12.68 )-----------( 601      ( 16 Jun 2019 05:26 )             33.3     06-16    141  |  101  |  35<H>  ----------------------------<  98  3.7   |  25  |  1.62<H>    Ca    9.2      16 Jun 2019 05:26      PT/INR - ( 16 Jun 2019 05:20 )   PT: 15.2 SEC;   INR: 1.32          PTT - ( 16 Jun 2019 05:20 )  PTT:79.6 SEC          MICROBIOLOGY:     RADIOLOGY:  [ ] Reviewed and interpreted by me    PULMONARY FUNCTION TESTS:    EKG: CHIEF COMPLAINT: Patient is a 24y old  Male who presents with a chief complaint of Hemoptysis (16 Jun 2019 09:39)    Interval Events: SOB and Respiratory s/s improved with NIPPV use.     REVIEW OF SYSTEMS:  General/ Constitutional: No fever, chills   Cardiovascular: No chest pain or palpitations   Respiratory: No SOB/ HAGER, cough or wheezing   GI: No melena, hematochezia, N/V/D/C or abdominal pain   [x ] All other systems negative    OBJECTIVE:  ICU Vital Signs Last 24 Hrs  T(C): 36.8 (16 Jun 2019 06:22), Max: 36.8 (16 Jun 2019 06:22)  T(F): 98.3 (16 Jun 2019 06:22), Max: 98.3 (16 Jun 2019 06:22)  HR: 85 (16 Jun 2019 07:25) (84 - 93)  BP: 118/77 (16 Jun 2019 06:22) (112/72 - 119/80)  BP(mean): --  ABP: --  ABP(mean): --  RR: 20 (16 Jun 2019 06:22) (20 - 20)  SpO2: 88% (16 Jun 2019 07:25) (87% - 95%)    CAPILLARY BLOOD GLUCOSE    PHYSICAL EXAM:  General: NAD, appears comfortable. Sitting up in chair on exam.   Cardio: RRR, S1/S2, no murmurs  Pulm: CTA BL, no wheezes  GI: Nondistended, BS present in all four quadrants, soft, nontender, no rebound tenderness or guarding     HOSPITAL MEDICATIONS:  MEDICATIONS  (STANDING):  chlorhexidine 4% Liquid 1 Application(s) Topical <User Schedule>  docusate sodium Liquid 100 milliGRAM(s) Oral three times a day  furosemide   Injectable 20 milliGRAM(s) IV Push two times a day  heparin  Infusion. 1150 Unit(s)/Hr (11.5 mL/Hr) IV Continuous <Continuous>  pantoprazole  Injectable 40 milliGRAM(s) IV Push two times a day  polyethylene glycol 3350 17 Gram(s) Oral two times a day  senna Syrup 10 milliLiter(s) Oral at bedtime  warfarin 3 milliGRAM(s) Oral once    MEDICATIONS  (PRN):  acetaminophen    Suspension .. 650 milliGRAM(s) Oral every 6 hours PRN Temp greater or equal to 38C (100.4F)  ALPRAZolam 0.25 milliGRAM(s) Oral every 8 hours PRN Anxiety  bisacodyl Suppository 10 milliGRAM(s) Rectal daily PRN Constipation    LABS:                        10.7   12.68 )-----------( 601      ( 16 Jun 2019 05:26 )             33.3     06-16    141  |  101  |  35<H>  ----------------------------<  98  3.7   |  25  |  1.62<H>    Ca    9.2      16 Jun 2019 05:26    PT/INR - ( 16 Jun 2019 05:20 )   PT: 15.2 SEC;   INR: 1.32        PTT - ( 16 Jun 2019 05:20 )  PTT:79.6 SEC    MICROBIOLOGY:     RADIOLOGY:  [ ] Reviewed and interpreted by me    PULMONARY FUNCTION TESTS:    EKG:

## 2019-06-17 LAB
ANION GAP SERPL CALC-SCNC: 16 MMO/L — HIGH (ref 7–14)
APTT BLD: 82.6 SEC — HIGH (ref 27.5–36.3)
BUN SERPL-MCNC: 31 MG/DL — HIGH (ref 7–23)
CALCIUM SERPL-MCNC: 9.7 MG/DL — SIGNIFICANT CHANGE UP (ref 8.4–10.5)
CHLORIDE SERPL-SCNC: 101 MMOL/L — SIGNIFICANT CHANGE UP (ref 98–107)
CO2 SERPL-SCNC: 25 MMOL/L — SIGNIFICANT CHANGE UP (ref 22–31)
CREAT SERPL-MCNC: 1.42 MG/DL — HIGH (ref 0.5–1.3)
GLUCOSE SERPL-MCNC: 101 MG/DL — HIGH (ref 70–99)
HCT VFR BLD CALC: 33.9 % — LOW (ref 39–50)
HGB BLD-MCNC: 10.7 G/DL — LOW (ref 13–17)
INR BLD: 1.32 — HIGH (ref 0.88–1.17)
MAGNESIUM SERPL-MCNC: 1.9 MG/DL — SIGNIFICANT CHANGE UP (ref 1.6–2.6)
MCHC RBC-ENTMCNC: 24.7 PG — LOW (ref 27–34)
MCHC RBC-ENTMCNC: 31.6 % — LOW (ref 32–36)
MCV RBC AUTO: 78.1 FL — LOW (ref 80–100)
NRBC # FLD: 0.02 K/UL — SIGNIFICANT CHANGE UP (ref 0–0)
PHOSPHATE SERPL-MCNC: 3.4 MG/DL — SIGNIFICANT CHANGE UP (ref 2.5–4.5)
PLATELET # BLD AUTO: 553 K/UL — HIGH (ref 150–400)
PMV BLD: 13.7 FL — HIGH (ref 7–13)
POTASSIUM SERPL-MCNC: 3.5 MMOL/L — SIGNIFICANT CHANGE UP (ref 3.5–5.3)
POTASSIUM SERPL-SCNC: 3.5 MMOL/L — SIGNIFICANT CHANGE UP (ref 3.5–5.3)
PROTHROM AB SERPL-ACNC: 14.8 SEC — HIGH (ref 9.8–13.1)
RBC # BLD: 4.34 M/UL — SIGNIFICANT CHANGE UP (ref 4.2–5.8)
RBC # FLD: 23.6 % — HIGH (ref 10.3–14.5)
SODIUM SERPL-SCNC: 142 MMOL/L — SIGNIFICANT CHANGE UP (ref 135–145)
WBC # BLD: 10.77 K/UL — HIGH (ref 3.8–10.5)
WBC # FLD AUTO: 10.77 K/UL — HIGH (ref 3.8–10.5)

## 2019-06-17 PROCEDURE — 99233 SBSQ HOSP IP/OBS HIGH 50: CPT | Mod: GC

## 2019-06-17 PROCEDURE — 99233 SBSQ HOSP IP/OBS HIGH 50: CPT

## 2019-06-17 RX ORDER — SENNA PLUS 8.6 MG/1
2 TABLET ORAL AT BEDTIME
Refills: 0 | Status: DISCONTINUED | OUTPATIENT
Start: 2019-06-17 | End: 2019-06-22

## 2019-06-17 RX ORDER — WARFARIN SODIUM 2.5 MG/1
5 TABLET ORAL ONCE
Refills: 0 | Status: COMPLETED | OUTPATIENT
Start: 2019-06-17 | End: 2019-06-17

## 2019-06-17 RX ORDER — ACETAMINOPHEN 500 MG
650 TABLET ORAL EVERY 6 HOURS
Refills: 0 | Status: DISCONTINUED | OUTPATIENT
Start: 2019-06-17 | End: 2019-06-20

## 2019-06-17 RX ORDER — WARFARIN SODIUM 2.5 MG/1
3 TABLET ORAL ONCE
Refills: 0 | Status: DISCONTINUED | OUTPATIENT
Start: 2019-06-17 | End: 2019-06-17

## 2019-06-17 RX ORDER — DOCUSATE SODIUM 100 MG
100 CAPSULE ORAL THREE TIMES A DAY
Refills: 0 | Status: DISCONTINUED | OUTPATIENT
Start: 2019-06-17 | End: 2019-06-22

## 2019-06-17 RX ADMIN — Medication 20 MILLIGRAM(S): at 17:18

## 2019-06-17 RX ADMIN — HEPARIN SODIUM 1850 UNIT(S)/HR: 5000 INJECTION INTRAVENOUS; SUBCUTANEOUS at 06:47

## 2019-06-17 RX ADMIN — PANTOPRAZOLE SODIUM 40 MILLIGRAM(S): 20 TABLET, DELAYED RELEASE ORAL at 17:18

## 2019-06-17 RX ADMIN — CHLORHEXIDINE GLUCONATE 1 APPLICATION(S): 213 SOLUTION TOPICAL at 06:34

## 2019-06-17 RX ADMIN — Medication 100 MILLIGRAM(S): at 14:15

## 2019-06-17 RX ADMIN — PANTOPRAZOLE SODIUM 40 MILLIGRAM(S): 20 TABLET, DELAYED RELEASE ORAL at 06:36

## 2019-06-17 RX ADMIN — WARFARIN SODIUM 5 MILLIGRAM(S): 2.5 TABLET ORAL at 17:18

## 2019-06-17 RX ADMIN — Medication 20 MILLIGRAM(S): at 06:32

## 2019-06-17 NOTE — PROGRESS NOTE ADULT - SUBJECTIVE AND OBJECTIVE BOX
PULMONARY PROGRESS NOTE    Chart and meds reviewed.  Patient seen and examined.    CC:    Interval History:    All 10 systems reviewed and found to be negative with the exception of what has been described above.    MEDICATIONS  (STANDING):  chlorhexidine 4% Liquid 1 Application(s) Topical <User Schedule>  docusate sodium Liquid 100 milliGRAM(s) Oral three times a day  furosemide    Tablet 20 milliGRAM(s) Oral two times a day  heparin  Infusion. 1150 Unit(s)/Hr (11.5 mL/Hr) IV Continuous <Continuous>  pantoprazole    Tablet 40 milliGRAM(s) Oral two times a day  polyethylene glycol 3350 17 Gram(s) Oral two times a day  senna Syrup 10 milliLiter(s) Oral at bedtime  warfarin 3 milliGRAM(s) Oral once    MEDICATIONS  (PRN):  acetaminophen    Suspension .. 650 milliGRAM(s) Oral every 6 hours PRN Temp greater or equal to 38C (100.4F)  ALPRAZolam 0.25 milliGRAM(s) Oral every 8 hours PRN Anxiety  bisacodyl Suppository 10 milliGRAM(s) Rectal daily PRN Constipation      VITALS SIGNS:  T(F): 98.5 (06-17-19 @ 06:25), Max: 98.5 (06-17-19 @ 06:25)  HR: 89 (06-17-19 @ 06:39) (80 - 91)  BP: 93/72 (06-17-19 @ 06:25) (93/72 - 112/71)  RR: 18 (06-17-19 @ 06:25) (18 - 18)  SpO2: 90% (06-17-19 @ 06:39) (88% - 92%)  Wt(kg): --    I&O's Summary    16 Jun 2019 07:01  -  17 Jun 2019 07:00  --------------------------------------------------------  IN: 798 mL / OUT: 1100 mL / NET: -302 mL        CAPILLARY BLOOD GLUCOSE            LABS:                            10.7   10.77 )-----------( 553      ( 17 Jun 2019 05:52 )             33.9     06-17    142  |  101  |  31<H>  ----------------------------<  101<H>  3.5   |  25  |  1.42<H>    Ca    9.7      17 Jun 2019 05:52  Phos  3.4     06-17  Mg     1.9     06-17            PT/INR - ( 17 Jun 2019 05:52 )   PT: 14.8 SEC;   INR: 1.32          PTT - ( 17 Jun 2019 05:52 )  PTT:82.6 SEC                                  CULTURES: PULMONARY PROGRESS NOTE    Chart and meds reviewed.  Patient seen and examined.    CC: sob     Interval History: no events overnight       MEDICATIONS  (STANDING):  chlorhexidine 4% Liquid 1 Application(s) Topical <User Schedule>  docusate sodium Liquid 100 milliGRAM(s) Oral three times a day  furosemide    Tablet 20 milliGRAM(s) Oral two times a day  heparin  Infusion. 1150 Unit(s)/Hr (11.5 mL/Hr) IV Continuous <Continuous>  pantoprazole    Tablet 40 milliGRAM(s) Oral two times a day  polyethylene glycol 3350 17 Gram(s) Oral two times a day  senna Syrup 10 milliLiter(s) Oral at bedtime  warfarin 3 milliGRAM(s) Oral once    MEDICATIONS  (PRN):  acetaminophen    Suspension .. 650 milliGRAM(s) Oral every 6 hours PRN Temp greater or equal to 38C (100.4F)  ALPRAZolam 0.25 milliGRAM(s) Oral every 8 hours PRN Anxiety  bisacodyl Suppository 10 milliGRAM(s) Rectal daily PRN Constipation      VITALS SIGNS:  T(F): 98.5 (06-17-19 @ 06:25), Max: 98.5 (06-17-19 @ 06:25)  HR: 89 (06-17-19 @ 06:39) (80 - 91)  BP: 93/72 (06-17-19 @ 06:25) (93/72 - 112/71)  RR: 18 (06-17-19 @ 06:25) (18 - 18)  SpO2: 90% (06-17-19 @ 06:39) (88% - 92%)  Wt(kg): --    I&O's Summary    16 Jun 2019 07:01  -  17 Jun 2019 07:00  --------------------------------------------------------  IN: 798 mL / OUT: 1100 mL / NET: -302 mL        CAPILLARY BLOOD GLUCOSE            LABS:                            10.7   10.77 )-----------( 553      ( 17 Jun 2019 05:52 )             33.9     06-17    142  |  101  |  31<H>  ----------------------------<  101<H>  3.5   |  25  |  1.42<H>    Ca    9.7      17 Jun 2019 05:52  Phos  3.4     06-17  Mg     1.9     06-17            PT/INR - ( 17 Jun 2019 05:52 )   PT: 14.8 SEC;   INR: 1.32          PTT - ( 17 Jun 2019 05:52 )  PTT:82.6 SEC                                  CULTURES:

## 2019-06-17 NOTE — PROGRESS NOTE ADULT - PROBLEM SELECTOR PLAN 5
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 SS passed for Mechanical Diet.

## 2019-06-17 NOTE — PROGRESS NOTE ADULT - ASSESSMENT
Mr. 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 The patient presented to [facility] for recurrent episodes of Hemoptysis with Acute Hypoxic Respiratory Distress. Mr. Cortez was placed on oxygen in the ED and MICU called. Patient admitted to MICU and s/p Bronchoscopy on 5/31 showing RUL Posterior segmental bleeding. The patient was subsequently intubated on 5/31 for airway protection given hemoptysis in setting of hypoxia and respiratory distress. IR consulted and embolization of multiple vessel performed. Sedation/ Versed weaned off and Pressors switched from Levophed ot Phenylephrine on 6/1. The patient's INR with history of Coumadin use was noted to be elevated and the patient was multiple doses of FFPs were given. In the setting of SupraINR and Hemoptysis EndoBronchial Block placed on 6/1 to control bleeding. Mr. Cortez's hospital course was further complicated with fever spike on 6/1 and started on Zosyn and Vancomycin empirically; blood cultures drawn and noted to be negative. Duplex of LE was noted to be negative for acute findings and noted with chronic partial DVT to the mid-distal R Femoral Vein noted. Given acute bleed, AC held for now and respiratory stabilization and hemostasis taken as primary responsibility. HH remained stable and repeat Bronchoscopy performed on 6/4 with removal of EndoBronchial Block and suctioning of RUL Blood Clots and TONJA Mucous Plug. Fever spike again noted on 6/5 likely post procedural. BAL cultures noted to be negative as well. During ICU, the patient was also noted with abdominal distention, evaluated by US ABDOMEN noted with nodular liver but normal LFTs. Abdominal distention noted with gas passing and (+) BMs. Sedation weaned off on 6/6and Vancomycin dc'ed as no MRSA coverage warranted. On 6/7, Mr. Cortez was extubated to NIPPV with BiPAP. On BiPAP, the patient was noted with agitation and mild sedation provided with Precedex earned as tolerated SpO2 remained stable and BiPAP weaned to VentiMASK and then NC. Patient seen by SS and mechanical soft diet started. Abdominal distention remained. AXR noted with distended loops but no SBO and patient tolerated diet well. Mr. Cortez was then transferred to the RCU for continued monitoring and management.

## 2019-06-17 NOTE — PROGRESS NOTE ADULT - PROBLEM SELECTOR PLAN 1
- Hemoptysis s/p Bronch 5/31 showing RUL bleeding  - s/p IR Embolization 5/31 of R aortopulmonary collaterals, R bronchial artery and multiple R thoracic intercostal arteries  - s/p Bronch 6/1 with endobronchial blocker placed for re-bleeding from RUL  - s/p Re-Bronch 6/4 with removal of blocker, suctioned out blood clots from RUL and mucus plugs from TONJA  - s/p Re-Bronch 6/5 showing no active bleeding and suctioned out residual blood clots from RUL  - No further bleeding at this time

## 2019-06-17 NOTE — PROGRESS NOTE ADULT - ATTENDING COMMENTS
Will continue diuresis as per cardiology -- 20mg po BID lasix  More alert and awake today, has been tolerating some ambulation and OOB  AC resumed, heparin ggt with coumadin -- INR still subtherapeutic   Will trial off bilevel

## 2019-06-17 NOTE — PROGRESS NOTE ADULT - PROBLEM SELECTOR PLAN 6
- MELLO likely pre-renal in setting of poor PO intake/hemoptysis vs possible PATRICA vs vanco   - making good urine  - electrolytes stable  - creatinine improving, cont to monitor   - renal note appreciated - MELLO likely pre-renal in setting of poor PO intake/hemoptysis vs possible PATRICA vs vanco   - making good urine  - electrolytes stable  - creatinine improving, cont to monitor   - renal note appreciated  - lasix 20mg BID PO

## 2019-06-17 NOTE — CHART NOTE - NSCHARTNOTEFT_GEN_A_CORE
Source: Patient , Family , EMR    Diet : Mechanical Soft    Patient reports improved PO intake , tolerating diet, pt. presented alert & oriented . Pt. w/ R lateral thoracic trunk stage 2 pressure ulcer & sacrococcygeum DTI . Noted wt. currently stable .     Current Weight: - 65 kg on 6/14/19 ; BMI - 21.1 ; 6/9- 66.8 KG ; UBW - 112 KG ; Admit wt. - 117 kg ;     Pertinent Medications: MEDICATIONS  (STANDING):  chlorhexidine 4% Liquid 1 Application(s) Topical <User Schedule>  docusate sodium Liquid 100 milliGRAM(s) Oral three times a day  furosemide    Tablet 20 milliGRAM(s) Oral two times a day  heparin  Infusion. 1150 Unit(s)/Hr (11.5 mL/Hr) IV Continuous <Continuous>  pantoprazole    Tablet 40 milliGRAM(s) Oral two times a day  polyethylene glycol 3350 17 Gram(s) Oral two times a day  senna Syrup 10 milliLiter(s) Oral at bedtime  warfarin 5 milliGRAM(s) Oral once    MEDICATIONS  (PRN):  acetaminophen    Suspension .. 650 milliGRAM(s) Oral every 6 hours PRN Temp greater or equal to 38C (100.4F)  ALPRAZolam 0.25 milliGRAM(s) Oral every 8 hours PRN Anxiety  bisacodyl Suppository 10 milliGRAM(s) Rectal daily PRN Constipation    Pertinent Labs:  06-17 Na142 mmol/L Glu 101 mg/dL<H> K+ 3.5 mmol/L Cr  1.42 mg/dL<H> BUN 31 mg/dL<H> 06-17 Phos 3.4 mg/dL 06-12 Alb 3.5 g/dL      Recommend po diet as per order .     Monitoring and Evaluation:  PO intake , Tolerance to diet prescription , weights & follow up per protocol

## 2019-06-17 NOTE — PROGRESS NOTE ADULT - PROBLEM SELECTOR PLAN 2
- Pediatric Cardiology following   - BIPAP QHS (while asleep) and NC during day continued.   - Lasix changed from 20mg IV BID to Lasix 20mg PO BID today (6/16).

## 2019-06-17 NOTE — PROGRESS NOTE PEDS - SUBJECTIVE AND OBJECTIVE BOX
Adult Congenital Heart Disease Progress Note    S:  Remained on BiPap intermittently overnight. Feels more rested this morning. Received 3 mg of coumadin overnight. No singnificant epistaxis. No hemoptysis.  Parent reports he can walk without a walker     O:  ICU Vital Signs Last 24 Hrs  T(C): 36.5 (2019 13:02), Max: 36.9 (2019 22:13)  T(F): 97.7 (2019 13:02), Max: 98.5 (2019 06:25)  HR: 92 (2019 17:13) (81 - 93)  BP: 120/68 (2019 17:13) (93/72 - 120/68)  RR: 18 (2019 17:13) (18 - 18)  SpO2: 92% (2019 15:18) (90% - 92%)  General:  sitting up in bed ,  CVS: S1,  single S2 3/6 HSM at LMSB. + pectus  RS: improved aeration today- fewer crackles, diminished at bases  Abd: abdomen softer  EXT: warm lower extremities, trace edema, RLE chronic venous stasis changes    LABORATORY TESTS:                          10.7  CBC:   10.77 )-----------( 553   (19 @ 05:52)                          33.9               142   |  101   |  31                 Ca: 9.7    BMP:   ----------------------------< 101    M.9   (19 @ 05:52)             3.5    |  25    | 1.42               Ph: 3.4      LFT:     TPro: 7.0 / Alb: 3.5 / TBili: 2.5 / DBili: x / AST: 33 / ALT: 22 / AlkPhos: 123   (19 @ 09:53)    COAG: PT: 14.8 / PTT: 82.6 / INR: 1.32   (19 @ 05:52)     CARDIAC MARKERS:             High-Sensitivity Troponin: x   (19 @ 06:40)             CK: x / CKMB: x   (19 @ 06:40)             Pro-BNP: 5185   (06-13-19 @ 06:40)  CARDIAC MARKERS:             High-Sensitivity Troponin: x   (19 @ 01:00)             CK: 253 / CKMB: x   (19 @ 01:00)             Pro-BNP: x   (19 @ 01:00)      CXR 19: - cardiomegaly, likely small right pleural effusion, ?left effusion, some congestion

## 2019-06-17 NOTE — PROGRESS NOTE PEDS - ASSESSMENT
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  Bleeding improved once coagulopathy reversed and now no recurrence of bleeding noted since heparin was started earlier this week.  Ongoing issues include respiratory insufficiency, volume overload, and renal insufficiency.    #Fontan physiology with hemoptysis s/p IR coiling.  Patients with Fontan completion with a history of prior thrombosis are at increased risk of morbidity and mortality.  As a a result, Dr. Rojas (primary [facility]). Kang Mendiola and his family discussed the risk/benefits of anticoagulation.    - continue heparin drip and coumadin, goal INR 2.     #Respiratory insufficiency- multifactorial from poor respiratory mechanics (critical illness muscle weakness, abnormal chest wall anatomy/restrictive lung disease) and volume overload. He is improved clinically today using bipap both from aeration on exam and mental status.    - Continue lasix to 20 mg PO BID, please obtain daily weights and keep ins/outs  - Bipap or CPAP when asleep and napping; out of bed and ambulating  - may required bipap/cpap/oxygen when at home  at least for the short term while he builds strength from this prolonged hospitalization.  -Goal saturations >85% in RA are acceptable    #MELLO. Multifactorial likely plateau from prerenal etiology vs new baseline from acute insults this admission. Improving.  - follow daily chemistry

## 2019-06-18 LAB
ANION GAP SERPL CALC-SCNC: 16 MMO/L — HIGH (ref 7–14)
APTT BLD: 61.3 SEC — HIGH (ref 27.5–36.3)
BUN SERPL-MCNC: 26 MG/DL — HIGH (ref 7–23)
CALCIUM SERPL-MCNC: 9.4 MG/DL — SIGNIFICANT CHANGE UP (ref 8.4–10.5)
CHLORIDE SERPL-SCNC: 100 MMOL/L — SIGNIFICANT CHANGE UP (ref 98–107)
CO2 SERPL-SCNC: 23 MMOL/L — SIGNIFICANT CHANGE UP (ref 22–31)
CREAT SERPL-MCNC: 1.45 MG/DL — HIGH (ref 0.5–1.3)
GLUCOSE SERPL-MCNC: 111 MG/DL — HIGH (ref 70–99)
HCT VFR BLD CALC: 35.2 % — LOW (ref 39–50)
HGB BLD-MCNC: 11.1 G/DL — LOW (ref 13–17)
INR BLD: 1.26 — HIGH (ref 0.88–1.17)
MAGNESIUM SERPL-MCNC: 1.9 MG/DL — SIGNIFICANT CHANGE UP (ref 1.6–2.6)
MCHC RBC-ENTMCNC: 25.2 PG — LOW (ref 27–34)
MCHC RBC-ENTMCNC: 31.5 % — LOW (ref 32–36)
MCV RBC AUTO: 79.8 FL — LOW (ref 80–100)
NRBC # FLD: 0.02 K/UL — SIGNIFICANT CHANGE UP (ref 0–0)
PHOSPHATE SERPL-MCNC: 3 MG/DL — SIGNIFICANT CHANGE UP (ref 2.5–4.5)
PLATELET # BLD AUTO: 619 K/UL — HIGH (ref 150–400)
PMV BLD: 13.5 FL — HIGH (ref 7–13)
POTASSIUM SERPL-MCNC: 3.7 MMOL/L — SIGNIFICANT CHANGE UP (ref 3.5–5.3)
POTASSIUM SERPL-SCNC: 3.7 MMOL/L — SIGNIFICANT CHANGE UP (ref 3.5–5.3)
PROTHROM AB SERPL-ACNC: 14.5 SEC — HIGH (ref 9.8–13.1)
RBC # BLD: 4.41 M/UL — SIGNIFICANT CHANGE UP (ref 4.2–5.8)
RBC # FLD: 24 % — HIGH (ref 10.3–14.5)
SODIUM SERPL-SCNC: 139 MMOL/L — SIGNIFICANT CHANGE UP (ref 135–145)
WBC # BLD: 12.78 K/UL — HIGH (ref 3.8–10.5)
WBC # FLD AUTO: 12.78 K/UL — HIGH (ref 3.8–10.5)

## 2019-06-18 PROCEDURE — 99233 SBSQ HOSP IP/OBS HIGH 50: CPT | Mod: GC

## 2019-06-18 RX ORDER — WARFARIN SODIUM 2.5 MG/1
7.5 TABLET ORAL ONCE
Refills: 0 | Status: COMPLETED | OUTPATIENT
Start: 2019-06-18 | End: 2019-06-18

## 2019-06-18 RX ORDER — WARFARIN SODIUM 2.5 MG/1
5 TABLET ORAL ONCE
Refills: 0 | Status: DISCONTINUED | OUTPATIENT
Start: 2019-06-18 | End: 2019-06-18

## 2019-06-18 RX ADMIN — HEPARIN SODIUM 1850 UNIT(S)/HR: 5000 INJECTION INTRAVENOUS; SUBCUTANEOUS at 05:28

## 2019-06-18 RX ADMIN — WARFARIN SODIUM 7.5 MILLIGRAM(S): 2.5 TABLET ORAL at 18:50

## 2019-06-18 RX ADMIN — Medication 20 MILLIGRAM(S): at 05:25

## 2019-06-18 RX ADMIN — PANTOPRAZOLE SODIUM 40 MILLIGRAM(S): 20 TABLET, DELAYED RELEASE ORAL at 18:50

## 2019-06-18 RX ADMIN — CHLORHEXIDINE GLUCONATE 1 APPLICATION(S): 213 SOLUTION TOPICAL at 06:46

## 2019-06-18 RX ADMIN — PANTOPRAZOLE SODIUM 40 MILLIGRAM(S): 20 TABLET, DELAYED RELEASE ORAL at 05:25

## 2019-06-18 RX ADMIN — Medication 20 MILLIGRAM(S): at 18:50

## 2019-06-18 NOTE — PROGRESS NOTE ADULT - ATTENDING COMMENTS
Will continue diuresis as per cardiology -- 20mg po BID lasix  More alert and awake today, has been tolerating some ambulation and OOB  AC resumed, heparin ggt with coumadin -- INR still subtherapeutic

## 2019-06-18 NOTE — PROGRESS NOTE ADULT - PROBLEM SELECTOR PLAN 4
- US in MICU with no acute RLE DVT. 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   - SUBINR now second to reversal in MICU for Hemoptysis.   - Heparin GTTs/ Coumadin bridge continued.   - INR slowly increasing.    - QD Coumadin dosing. 5mg given 6/17 - US in MICU with no acute RLE DVT. 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   - SUBINR now second to reversal in MICU for Hemoptysis.   - Heparin GTTs/ Coumadin bridge continued.   - INR slowly increasing.    - QD Coumadin dosing. 5mg given 6/17 and 6/18 - US in MICU with no acute RLE DVT. 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   - SUBINR now second to reversal in MICU for Hemoptysis.   - Heparin GTTs/ Coumadin bridge continued.   - INR slowly increasing.    - QD Coumadin dosing. 5mg given 6/17; 7.5mg given 6/18

## 2019-06-18 NOTE — PROGRESS NOTE ADULT - ASSESSMENT
Mr. 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 The patient presented to [facility] for recurrent episodes of Hemoptysis with Acute Hypoxic Respiratory Distress. Mr. Cortez was placed on oxygen in the ED and MICU called. Patient admitted to MICU and s/p Bronchoscopy on 5/31 showing RUL Posterior segmental bleeding. The patient was subsequently intubated on 5/31 for airway protection given hemoptysis in setting of hypoxia and respiratory distress. IR consulted and embolization of multiple vessel performed. Sedation/ Versed weaned off and Pressors switched from Levophed ot Phenylephrine on 6/1. The patient's INR with history of Coumadin use was noted to be elevated and the patient was multiple doses of FFPs were given. In the setting of SupraINR and Hemoptysis EndoBronchial Block placed on 6/1 to control bleeding. Mr. Cortez's hospital course was further complicated with fever spike on 6/1 and started on Zosyn and Vancomycin empirically; blood cultures drawn and noted to be negative. Duplex of LE was noted to be negative for acute findings and noted with chronic partial DVT to the mid-distal R Femoral Vein noted. Given acute bleed, AC held for now and respiratory stabilization and hemostasis taken as primary responsibility. HH remained stable and repeat Bronchoscopy performed on 6/4 with removal of EndoBronchial Block and suctioning of RUL Blood Clots and TONJA Mucous Plug. Fever spike again noted on 6/5 likely post procedural. BAL cultures noted to be negative as well. During ICU, the patient was also noted with abdominal distention, evaluated by US ABDOMEN noted with nodular liver but normal LFTs. Abdominal distention noted with gas passing and (+) BMs. Sedation weaned off on 6/6and Vancomycin dc'ed as no MRSA coverage warranted. On 6/7, Mr. Cortez was extubated to NIPPV with BiPAP. On BiPAP, the patient was noted with agitation and mild sedation provided with Precedex earned as tolerated SpO2 remained stable and BiPAP weaned to VentiMASK and then NC. Patient seen by SS and mechanical soft diet started. Abdominal distention remained. AXR noted with distended loops but no SBO and patient tolerated diet well. Mr. Cortez was then transferred to the RCU for continued monitoring and management.

## 2019-06-18 NOTE — PROGRESS NOTE ADULT - PROBLEM SELECTOR PLAN 2
- Pediatric Cardiology following   - BIPAP QHS (while asleep) and NC during day continued.   - Lasix changed from 20mg IV BID to Lasix 20mg PO BID today (6/16). - Pediatric Cardiology following   - BIPAP QHS (while asleep) and NC during day continued. rec to continue at home for short time   - Lasix changed from 20mg IV BID to Lasix 20mg PO BID today (6/16).

## 2019-06-18 NOTE — PROGRESS NOTE ADULT - SUBJECTIVE AND OBJECTIVE BOX
PULMONARY PROGRESS NOTE    Chart and meds reviewed.  Patient seen and examined.    CC:    Interval History:    All 10 systems reviewed and found to be negative with the exception of what has been described above.    MEDICATIONS  (STANDING):  chlorhexidine 4% Liquid 1 Application(s) Topical <User Schedule>  docusate sodium 100 milliGRAM(s) Oral three times a day  furosemide    Tablet 20 milliGRAM(s) Oral two times a day  heparin  Infusion. 1150 Unit(s)/Hr (11.5 mL/Hr) IV Continuous <Continuous>  pantoprazole    Tablet 40 milliGRAM(s) Oral two times a day  polyethylene glycol 3350 17 Gram(s) Oral two times a day  senna 2 Tablet(s) Oral at bedtime  warfarin 5 milliGRAM(s) Oral once    MEDICATIONS  (PRN):  acetaminophen   Tablet .. 650 milliGRAM(s) Oral every 6 hours PRN Temp greater or equal to 38C (100.4F), Mild Pain (1 - 3)  ALPRAZolam 0.25 milliGRAM(s) Oral every 8 hours PRN Anxiety  bisacodyl Suppository 10 milliGRAM(s) Rectal daily PRN Constipation      VITALS SIGNS:  T(F): 99 (06-18-19 @ 05:18), Max: 99 (06-18-19 @ 05:18)  HR: 88 (06-18-19 @ 07:01) (86 - 93)  BP: 121/70 (06-18-19 @ 05:18) (119/71 - 122/66)  RR: 18 (06-18-19 @ 05:18) (18 - 18)  SpO2: 96% (06-18-19 @ 07:01) (85% - 96%)  Wt(kg): --    I&O's Summary    17 Jun 2019 07:01  -  18 Jun 2019 07:00  --------------------------------------------------------  IN: 695 mL / OUT: 200 mL / NET: 495 mL        CAPILLARY BLOOD GLUCOSE            LABS:                            11.1   12.78 )-----------( 619      ( 18 Jun 2019 04:00 )             35.2     06-18    139  |  100  |  26<H>  ----------------------------<  111<H>  3.7   |  23  |  1.45<H>    Ca    9.4      18 Jun 2019 04:00  Phos  3.0     06-18  Mg     1.9     06-18            PT/INR - ( 18 Jun 2019 04:00 )   PT: 14.5 SEC;   INR: 1.26          PTT - ( 18 Jun 2019 04:00 )  PTT:61.3 SEC                                  CULTURES: PULMONARY PROGRESS NOTE    Chart and meds reviewed.  Patient seen and examined.    CC: hemoptysis     Interval History: no events overnight     All 10 systems reviewed and found to be negative with the exception of what has been described above.    MEDICATIONS  (STANDING):  chlorhexidine 4% Liquid 1 Application(s) Topical <User Schedule>  docusate sodium 100 milliGRAM(s) Oral three times a day  furosemide    Tablet 20 milliGRAM(s) Oral two times a day  heparin  Infusion. 1150 Unit(s)/Hr (11.5 mL/Hr) IV Continuous <Continuous>  pantoprazole    Tablet 40 milliGRAM(s) Oral two times a day  polyethylene glycol 3350 17 Gram(s) Oral two times a day  senna 2 Tablet(s) Oral at bedtime  warfarin 5 milliGRAM(s) Oral once    MEDICATIONS  (PRN):  acetaminophen   Tablet .. 650 milliGRAM(s) Oral every 6 hours PRN Temp greater or equal to 38C (100.4F), Mild Pain (1 - 3)  ALPRAZolam 0.25 milliGRAM(s) Oral every 8 hours PRN Anxiety  bisacodyl Suppository 10 milliGRAM(s) Rectal daily PRN Constipation      VITALS SIGNS:  T(F): 99 (06-18-19 @ 05:18), Max: 99 (06-18-19 @ 05:18)  HR: 88 (06-18-19 @ 07:01) (86 - 93)  BP: 121/70 (06-18-19 @ 05:18) (119/71 - 122/66)  RR: 18 (06-18-19 @ 05:18) (18 - 18)  SpO2: 96% (06-18-19 @ 07:01) (85% - 96%)  Wt(kg): --    I&O's Summary    17 Jun 2019 07:01  -  18 Jun 2019 07:00  --------------------------------------------------------  IN: 695 mL / OUT: 200 mL / NET: 495 mL        CAPILLARY BLOOD GLUCOSE            LABS:                            11.1   12.78 )-----------( 619      ( 18 Jun 2019 04:00 )             35.2     06-18    139  |  100  |  26<H>  ----------------------------<  111<H>  3.7   |  23  |  1.45<H>    Ca    9.4      18 Jun 2019 04:00  Phos  3.0     06-18  Mg     1.9     06-18            PT/INR - ( 18 Jun 2019 04:00 )   PT: 14.5 SEC;   INR: 1.26          PTT - ( 18 Jun 2019 04:00 )  PTT:61.3 SEC                                  CULTURES:

## 2019-06-18 NOTE — PROGRESS NOTE ADULT - PROBLEM SELECTOR PLAN 6
- MELLO likely pre-renal in setting of poor PO intake/hemoptysis vs possible PATRICA vs vanco   - making good urine  - electrolytes stable  - creatinine improving, cont to monitor   - renal note appreciated  - lasix 20mg BID PO

## 2019-06-19 LAB
ANION GAP SERPL CALC-SCNC: 15 MMO/L — HIGH (ref 7–14)
APTT BLD: 73.9 SEC — HIGH (ref 27.5–36.3)
BUN SERPL-MCNC: 27 MG/DL — HIGH (ref 7–23)
CALCIUM SERPL-MCNC: 9.6 MG/DL — SIGNIFICANT CHANGE UP (ref 8.4–10.5)
CHLORIDE SERPL-SCNC: 100 MMOL/L — SIGNIFICANT CHANGE UP (ref 98–107)
CO2 SERPL-SCNC: 25 MMOL/L — SIGNIFICANT CHANGE UP (ref 22–31)
CREAT SERPL-MCNC: 1.32 MG/DL — HIGH (ref 0.5–1.3)
GLUCOSE SERPL-MCNC: 114 MG/DL — HIGH (ref 70–99)
INR BLD: 1.51 — HIGH (ref 0.88–1.17)
MAGNESIUM SERPL-MCNC: 1.9 MG/DL — SIGNIFICANT CHANGE UP (ref 1.6–2.6)
PHOSPHATE SERPL-MCNC: 3.3 MG/DL — SIGNIFICANT CHANGE UP (ref 2.5–4.5)
POTASSIUM SERPL-MCNC: 3.7 MMOL/L — SIGNIFICANT CHANGE UP (ref 3.5–5.3)
POTASSIUM SERPL-SCNC: 3.7 MMOL/L — SIGNIFICANT CHANGE UP (ref 3.5–5.3)
PROTHROM AB SERPL-ACNC: 17 SEC — HIGH (ref 9.8–13.1)
SODIUM SERPL-SCNC: 140 MMOL/L — SIGNIFICANT CHANGE UP (ref 135–145)

## 2019-06-19 PROCEDURE — 99233 SBSQ HOSP IP/OBS HIGH 50: CPT | Mod: GC

## 2019-06-19 PROCEDURE — 99233 SBSQ HOSP IP/OBS HIGH 50: CPT

## 2019-06-19 RX ORDER — WARFARIN SODIUM 2.5 MG/1
7.5 TABLET ORAL ONCE
Refills: 0 | Status: COMPLETED | OUTPATIENT
Start: 2019-06-19 | End: 2019-06-19

## 2019-06-19 RX ORDER — WARFARIN SODIUM 2.5 MG/1
5 TABLET ORAL ONCE
Refills: 0 | Status: DISCONTINUED | OUTPATIENT
Start: 2019-06-19 | End: 2019-06-19

## 2019-06-19 RX ADMIN — PANTOPRAZOLE SODIUM 40 MILLIGRAM(S): 20 TABLET, DELAYED RELEASE ORAL at 06:38

## 2019-06-19 RX ADMIN — HEPARIN SODIUM 1850 UNIT(S)/HR: 5000 INJECTION INTRAVENOUS; SUBCUTANEOUS at 08:47

## 2019-06-19 RX ADMIN — PANTOPRAZOLE SODIUM 40 MILLIGRAM(S): 20 TABLET, DELAYED RELEASE ORAL at 17:13

## 2019-06-19 RX ADMIN — WARFARIN SODIUM 7.5 MILLIGRAM(S): 2.5 TABLET ORAL at 17:13

## 2019-06-19 RX ADMIN — Medication 20 MILLIGRAM(S): at 17:13

## 2019-06-19 RX ADMIN — CHLORHEXIDINE GLUCONATE 1 APPLICATION(S): 213 SOLUTION TOPICAL at 06:39

## 2019-06-19 RX ADMIN — Medication 20 MILLIGRAM(S): at 06:38

## 2019-06-19 NOTE — PROGRESS NOTE ADULT - PROBLEM SELECTOR PLAN 6
- MELLO likely pre-renal in setting of poor PO intake/hemoptysis vs possible PATRICA vs vanco   - making good urine  - electrolytes stable  - creatinine improving, cont to monitor   - renal note appreciated  - lasix 20mg BID PO - MELLO likely Pre-renal in setting of Poor PO intake/ Hemoptysis vs Possible PATRICA vs Vancomycin Toxicity.   - Patient making good urine and CRE improving.

## 2019-06-19 NOTE — PROGRESS NOTE ADULT - PROBLEM SELECTOR PLAN 2
- Pediatric Cardiology following   - BIPAP QHS (while asleep) and NC during day continued. rec to continue at home for short time   - Lasix changed from 20mg IV BID to Lasix 20mg PO BID today (6/16). - Pediatric Cardiology following   - BIPAP QHS (while asleep) and NC during day continued. BiPAP/ NC regimen recommended to continue at home for short time   - Lasix changed from 20mg IV BID to Lasix 20mg PO BID today (6/16).

## 2019-06-19 NOTE — CHART NOTE - NSCHARTNOTESELECT_GEN_ALL_CORE
hematology/Event Note
Bronchoscopy Note
Event Note
Event Note/Bronchoscopy
Follow  up/Nutrition Services
IR pre-procedure note/Event Note
Nutrition Services
Nutrition Services/Follow  up
POCUS
Pre-Bronch Screening Note
Pre-Bronchoscopy Note
Transfer Note
US guided PIV

## 2019-06-19 NOTE — PROGRESS NOTE ADULT - ASSESSMENT
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 Presented with hemoptysis now coiling with IR on 5/31 into 6/1 with no recurrence of bleeding once coagulopathy at presentation treated. No bleeding on therapeutic heparin drip while awaiting bridge with coumadin.  Anticoagulation restarted given the risk of thrombosis with Fontan physiology and extensive clot burdern in 2018 when off of Aspirin and not on anticoagulation.  MELLO continues to improve    - d/w pulmonary/critical care- continue heparin bridge until INR 2, will consider a dose of Lovenox to cover him at the time of discharge  - decrease lasix to once daily dosing  - hold digoxin for now    d/w dylan and his mother.

## 2019-06-19 NOTE — PROGRESS NOTE ADULT - PROBLEM SELECTOR PLAN 4
- US in MICU with no acute RLE DVT. 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   - SUBINR now second to reversal in MICU for Hemoptysis.   - Heparin GTTs/ Coumadin bridge continued.   - INR slowly increasing.    - QD Coumadin dosing. 5mg given 6/17; 7.5mg given 6/18 - US in MICU with no acute RLE DVT. 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   - SUBINR now second to reversal in MICU for Hemoptysis.   - Heparin GTTs/ Coumadin bridge continued.   - INR slowly increasing.    - QD Coumadin dosing.

## 2019-06-19 NOTE — PROGRESS NOTE ADULT - ASSESSMENT
Mr. Cortez is a 23 YO M with Congenital Heart Disease/ Heterotacy s/p Multiple Cardiac Surgies and Fontan's Procedure, RLE DVT roughly 1 year ago on Coumadin and Incorrect PE Diagnosis on 2014 s/p Eliquis x 1 week before developing intermittent hemoptysis x 5 years of which resolved. The patient presented to [facility] for recurrent episodes of Hemoptysis with Acute Hypoxic Respiratory Distress. Mr. Cortez was placed on oxygen in the ED and MICU called. Patient admitted to MICU and s/p Bronchoscopy on 5/31 showing RUL Posterior segmental bleeding. The patient was subsequently intubated on 5/31 for airway protection given hemoptysis in setting of hypoxia and respiratory distress. IR consulted and embolization of multiple vessel performed. Sedation/ Versed weaned off and Pressors switched from Levophed ot Phenylephrine on 6/1. The patient's INR with history of Coumadin use was noted to be elevated and the patient was multiple doses of FFPs were given. In the setting of SupraINR and Hemoptysis EndoBronchial Block placed on 6/1 to control bleeding. Mr. Cortez's hospital course was further complicated with fever spike on 6/1 and started on Zosyn and Vancomycin empirically; blood cultures drawn and noted to be negative. Duplex of LE was noted to be negative for acute findings and noted with chronic partial DVT to the mid-distal R Femoral Vein noted. Given acute bleed, AC held for now and respiratory stabilization and hemostasis taken as primary responsibility. HH remained stable and repeat Bronchoscopy performed on 6/4 with removal of EndoBronchial Block and suctioning of RUL Blood Clots and TONJA Mucous Plug. Fever spike again noted on 6/5 likely post procedural. BAL cultures noted to be negative as well. During ICU, the patient was also noted with abdominal distention, evaluated by US ABDOMEN noted with nodular liver but normal LFTs. Abdominal distention noted with gas passing and (+) BMs. Sedation weaned off on 6/6and Vancomycin dc'ed as no MRSA coverage warranted. On 6/7, Mr. Cortez was extubated to NIPPV with BiPAP. On BiPAP, the patient was noted with agitation and mild sedation provided with Precedex earned as tolerated SpO2 remained stable and BiPAP weaned to VentiMASK and then NC. Patient seen by SS and mechanical soft diet started. Abdominal distention remained. AXR noted with distended loops but no SBO and patient tolerated diet well. Mr. Cortez was then transferred to the RCU for continued monitoring and management. Mr. Cortez is a 23 YO M with Congenital Heart Disease/ Heterotacy s/p Multiple Cardiac Surgies and Fontan's Procedure, RLE DVT roughly 1 year ago on Coumadin and Incorrect PE Diagnosis on 2014 s/p Eliquis x 1 week before developing intermittent hemoptysis x 5 years of which resolved. The patient presented to [facility] for recurrent episodes of Hemoptysis with Acute Hypoxic Respiratory Distress. Mr. Cortez was placed on oxygen in the ED and MICU called. Patient admitted to MICU and s/p Bronchoscopy on 5/31 showing RUL Posterior segmental bleeding. The patient was subsequently intubated on 5/31 for airway protection given hemoptysis in setting of hypoxia and respiratory distress. IR consulted and embolization of multiple vessel performed. Sedation/ Versed weaned off and Pressors switched from Levophed ot Phenylephrine on 6/1. The patient's INR with history of Coumadin use was noted to be elevated and the patient was multiple doses of FFPs were given. In the setting of SupraINR and Hemoptysis EndoBronchial Block placed on 6/1 to control bleeding. Mr. Cortez's hospital course was further complicated with fever spike on 6/1 and started on Zosyn and Vancomycin empirically; blood cultures drawn and noted to be negative. Duplex of LE was noted to be negative for acute findings and noted with chronic partial DVT to the mid-distal R Femoral Vein noted. Given acute bleed, AC held for now and respiratory stabilization and hemostasis taken as primary responsibility. HH remained stable and repeat Bronchoscopy performed on 6/4 with removal of EndoBronchial Block and suctioning of RUL Blood Clots and TONJA Mucous Plug. Fever spike again noted on 6/5 likely post procedural. BAL cultures noted to be negative as well. During ICU, the patient was also noted with abdominal distention, evaluated by US ABDOMEN noted with nodular liver but normal LFTs. Abdominal distention noted with gas passing and (+) BMs. Sedation weaned off on 6/6and Vancomycin dc'ed as no MRSA coverage warranted. On 6/7, Mr. Cortez was extubated to NIPPV with BiPAP. On BiPAP, the patient was noted with agitation and mild sedation provided with Precedex earned as tolerated SpO2 remained stable and BiPAP weaned to VentiMASK and then NC. Patient seen by SS and mechanical soft diet started. Abdominal distention remained. AXR noted with distended loops but no SBO and patient tolerated diet well. Mr. Cortez was then transferred to the RCU for continued monitoring and management.     # DISPO: Pending therapeutic INR prior to discharge.

## 2019-06-19 NOTE — PROGRESS NOTE ADULT - SUBJECTIVE AND OBJECTIVE BOX
ACHD Follow Up Note    S: Denies chest pain or palpitations. Able to walk around the unit without a walker. Eager to go home.    O:  ICU Vital Signs Last 24 Hrs  T(C): 36.8 (19 Jun 2019 06:34), Max: 36.8 (18 Jun 2019 21:44)  T(F): 98.2 (19 Jun 2019 06:34), Max: 98.3 (18 Jun 2019 21:44)  HR: 96 (19 Jun 2019 10:48) (88 - 100)  BP: 103/65 (19 Jun 2019 06:34) (103/65 - 121/74)  BP(mean): --  ABP: --  ABP(mean): --  RR: 19 (19 Jun 2019 06:34) (16 - 20)  SpO2: 89% (19 Jun 2019 10:48) (85% - 94%)    standing after walking, comfortable  mmm  s1 single s2 II/VI HSM at LMSB, + pectus  clear lungs, decreased at bases  soft but mildy distended abdomen  chronic RLE venous stasis changes and varicosities, wearing compression stocking on right    06-19    140  |  100  |  27<H>  ----------------------------<  114<H>  3.7   |  25  |  1.32<H>    Ca    9.6      19 Jun 2019 06:50  Phos  3.3     06-19  Mg     1.9     06-19      PT/INR - ( 19 Jun 2019 06:50 )   PT: 17.0 SEC;   INR: 1.51          PTT - ( 19 Jun 2019 06:50 )  PTT:73.9 SEC

## 2019-06-19 NOTE — CHART NOTE - NSCHARTNOTEFT_GEN_A_CORE
MEDICINE PA NOTE     Patient noted with ambulation O2 trial. At rest on RA patient noted with SpO2 of 90 and with ambulation SpO2 on RA drops to 86. NC 5-6L placed on and the patient noted with improvement of SpO2 to 94. As of such, patient qualifies/ will need home oxygen therapy. Will continue to monitor.     Dana Boothe PA-C   Department of Medicine   [facility]   Pager [phone]

## 2019-06-19 NOTE — PROGRESS NOTE ADULT - SUBJECTIVE AND OBJECTIVE BOX
CHIEF COMPLAINT:    Interval Events:    REVIEW OF SYSTEMS:  Constitutional:   Eyes:  ENT:  CV:  Resp:  GI:  :  MSK:  Integumentary:  Neurological:  Psychiatric:  Endocrine:  Hematologic/Lymphatic:  Allergic/Immunologic:  [ ] All other systems negative  [ ] Unable to assess ROS because ________    OBJECTIVE:  ICU Vital Signs Last 24 Hrs  T(C): 36.8 (19 Jun 2019 06:34), Max: 36.8 (18 Jun 2019 21:44)  T(F): 98.2 (19 Jun 2019 06:34), Max: 98.3 (18 Jun 2019 21:44)  HR: 95 (19 Jun 2019 06:36) (88 - 100)  BP: 103/65 (19 Jun 2019 06:34) (103/65 - 121/74)  BP(mean): --  ABP: --  ABP(mean): --  RR: 19 (19 Jun 2019 06:34) (16 - 20)  SpO2: 88% (19 Jun 2019 06:36) (85% - 94%)        06-18 @ 07:01  -  06-19 @ 07:00  --------------------------------------------------------  IN: 670 mL / OUT: 0 mL / NET: 670 mL      CAPILLARY BLOOD GLUCOSE          PHYSICAL EXAM:  General:   HEENT:   Lymph Nodes:  Neck:   Respiratory:   Cardiovascular:   Abdomen:   Extremities:   Skin:   Neurological:  Psychiatry:    HOSPITAL MEDICATIONS:  MEDICATIONS  (STANDING):  chlorhexidine 4% Liquid 1 Application(s) Topical <User Schedule>  docusate sodium 100 milliGRAM(s) Oral three times a day  furosemide    Tablet 20 milliGRAM(s) Oral two times a day  heparin  Infusion. 1150 Unit(s)/Hr (11.5 mL/Hr) IV Continuous <Continuous>  pantoprazole    Tablet 40 milliGRAM(s) Oral two times a day  polyethylene glycol 3350 17 Gram(s) Oral two times a day  senna 2 Tablet(s) Oral at bedtime    MEDICATIONS  (PRN):  acetaminophen   Tablet .. 650 milliGRAM(s) Oral every 6 hours PRN Temp greater or equal to 38C (100.4F), Mild Pain (1 - 3)  ALPRAZolam 0.25 milliGRAM(s) Oral every 8 hours PRN Anxiety  bisacodyl Suppository 10 milliGRAM(s) Rectal daily PRN Constipation      LABS:                        11.1   12.78 )-----------( 619      ( 18 Jun 2019 04:00 )             35.2     06-18    139  |  100  |  26<H>  ----------------------------<  111<H>  3.7   |  23  |  1.45<H>    Ca    9.4      18 Jun 2019 04:00  Phos  3.0     06-18  Mg     1.9     06-18      PT/INR - ( 19 Jun 2019 06:50 )   PT: 17.0 SEC;   INR: 1.51          PTT - ( 19 Jun 2019 06:50 )  PTT:73.9 SEC          MICROBIOLOGY:     RADIOLOGY:  [ ] Reviewed and interpreted by me    PULMONARY FUNCTION TESTS:    EKG: CHIEF COMPLAINT: Patient is a 24y old  Male who presents with a chief complaint of Hemoptysis (19 Jun 2019 11:43)    Interval Events: Feels well. Able to ambulate with mild difficulty. Alternates between RA and O2 PRN. Pending INR prior to DC.     REVIEW OF SYSTEMS:  General/ Constitutional: No fever, chills  Cardiovascular: No chest pain or palpitations   Respiratory: No SOB/ HAGER, cough or wheezing   GI: No melena, hematochezia, N/V/D/C or abdominal pain   [x ] All other systems negative    OBJECTIVE:  ICU Vital Signs Last 24 Hrs  T(C): 36.8 (19 Jun 2019 06:34), Max: 36.8 (18 Jun 2019 21:44)  T(F): 98.2 (19 Jun 2019 06:34), Max: 98.3 (18 Jun 2019 21:44)  HR: 95 (19 Jun 2019 06:36) (88 - 100)  BP: 103/65 (19 Jun 2019 06:34) (103/65 - 121/74)  BP(mean): --  ABP: --  ABP(mean): --  RR: 19 (19 Jun 2019 06:34) (16 - 20)  SpO2: 88% (19 Jun 2019 06:36) (85% - 94%)    06-18 @ 07:01  -  06-19 @ 07:00  --------------------------------------------------------  IN: 670 mL / OUT: 0 mL / NET: 670 mL    CAPILLARY BLOOD GLUCOSE    PHYSICAL EXAM:  General: NAD, (+) cachetic. Patient seen by bedside with Mother.   Cardio: RRR, S1/S2, no murmurs  Pulm: CTA BL, no wheezes  GI: Nondistended, BS present in all four quadrants, soft, nontender, no rebound tenderness or guarding     HOSPITAL MEDICATIONS:  MEDICATIONS  (STANDING):  chlorhexidine 4% Liquid 1 Application(s) Topical <User Schedule>  docusate sodium 100 milliGRAM(s) Oral three times a day  furosemide    Tablet 20 milliGRAM(s) Oral two times a day  heparin  Infusion. 1150 Unit(s)/Hr (11.5 mL/Hr) IV Continuous <Continuous>  pantoprazole    Tablet 40 milliGRAM(s) Oral two times a day  polyethylene glycol 3350 17 Gram(s) Oral two times a day  senna 2 Tablet(s) Oral at bedtime    MEDICATIONS  (PRN):  acetaminophen   Tablet .. 650 milliGRAM(s) Oral every 6 hours PRN Temp greater or equal to 38C (100.4F), Mild Pain (1 - 3)  ALPRAZolam 0.25 milliGRAM(s) Oral every 8 hours PRN Anxiety  bisacodyl Suppository 10 milliGRAM(s) Rectal daily PRN Constipation    LABS:                        11.1   12.78 )-----------( 619      ( 18 Jun 2019 04:00 )             35.2     06-18    139  |  100  |  26<H>  ----------------------------<  111<H>  3.7   |  23  |  1.45<H>    Ca    9.4      18 Jun 2019 04:00  Phos  3.0     06-18  Mg     1.9     06-18    PT/INR - ( 19 Jun 2019 06:50 )   PT: 17.0 SEC;   INR: 1.51       PTT - ( 19 Jun 2019 06:50 )  PTT:73.9 SEC    MICROBIOLOGY:     RADIOLOGY:  [ ] Reviewed and interpreted by me    PULMONARY FUNCTION TESTS:    EKG:

## 2019-06-20 LAB
ANION GAP SERPL CALC-SCNC: 12 MMO/L — SIGNIFICANT CHANGE UP (ref 7–14)
APTT BLD: 126 SEC — CRITICAL HIGH (ref 27.5–36.3)
APTT BLD: 92.9 SEC — HIGH (ref 27.5–36.3)
APTT BLD: 95.8 SEC — HIGH (ref 27.5–36.3)
BASOPHILS # BLD AUTO: 0.18 K/UL — SIGNIFICANT CHANGE UP (ref 0–0.2)
BASOPHILS NFR BLD AUTO: 1.3 % — SIGNIFICANT CHANGE UP (ref 0–2)
BUN SERPL-MCNC: 27 MG/DL — HIGH (ref 7–23)
CALCIUM SERPL-MCNC: 9.7 MG/DL — SIGNIFICANT CHANGE UP (ref 8.4–10.5)
CHLORIDE SERPL-SCNC: 100 MMOL/L — SIGNIFICANT CHANGE UP (ref 98–107)
CO2 SERPL-SCNC: 25 MMOL/L — SIGNIFICANT CHANGE UP (ref 22–31)
CREAT SERPL-MCNC: 1.31 MG/DL — HIGH (ref 0.5–1.3)
EOSINOPHIL # BLD AUTO: 0.32 K/UL — SIGNIFICANT CHANGE UP (ref 0–0.5)
EOSINOPHIL NFR BLD AUTO: 2.3 % — SIGNIFICANT CHANGE UP (ref 0–6)
GLUCOSE SERPL-MCNC: 133 MG/DL — HIGH (ref 70–99)
HCT VFR BLD CALC: 35 % — LOW (ref 39–50)
HGB BLD-MCNC: 11.1 G/DL — LOW (ref 13–17)
IMM GRANULOCYTES NFR BLD AUTO: 0.8 % — SIGNIFICANT CHANGE UP (ref 0–1.5)
INR BLD: 1.67 — HIGH (ref 0.88–1.17)
LYMPHOCYTES # BLD AUTO: 1.78 K/UL — SIGNIFICANT CHANGE UP (ref 1–3.3)
LYMPHOCYTES # BLD AUTO: 12.5 % — LOW (ref 13–44)
MAGNESIUM SERPL-MCNC: 1.9 MG/DL — SIGNIFICANT CHANGE UP (ref 1.6–2.6)
MCHC RBC-ENTMCNC: 24.8 PG — LOW (ref 27–34)
MCHC RBC-ENTMCNC: 31.7 % — LOW (ref 32–36)
MCV RBC AUTO: 78.3 FL — LOW (ref 80–100)
MONOCYTES # BLD AUTO: 3 K/UL — HIGH (ref 0–0.9)
MONOCYTES NFR BLD AUTO: 21.1 % — HIGH (ref 2–14)
NEUTROPHILS # BLD AUTO: 8.8 K/UL — HIGH (ref 1.8–7.4)
NEUTROPHILS NFR BLD AUTO: 62 % — SIGNIFICANT CHANGE UP (ref 43–77)
NRBC # FLD: 0 K/UL — SIGNIFICANT CHANGE UP (ref 0–0)
PHOSPHATE SERPL-MCNC: 3.4 MG/DL — SIGNIFICANT CHANGE UP (ref 2.5–4.5)
PLATELET # BLD AUTO: 545 K/UL — HIGH (ref 150–400)
PMV BLD: 12.9 FL — SIGNIFICANT CHANGE UP (ref 7–13)
POTASSIUM SERPL-MCNC: 3.8 MMOL/L — SIGNIFICANT CHANGE UP (ref 3.5–5.3)
POTASSIUM SERPL-SCNC: 3.8 MMOL/L — SIGNIFICANT CHANGE UP (ref 3.5–5.3)
PROTHROM AB SERPL-ACNC: 19.4 SEC — HIGH (ref 9.8–13.1)
RBC # BLD: 4.47 M/UL — SIGNIFICANT CHANGE UP (ref 4.2–5.8)
RBC # FLD: 23.9 % — HIGH (ref 10.3–14.5)
REVIEW TO FOLLOW: YES — SIGNIFICANT CHANGE UP
SODIUM SERPL-SCNC: 137 MMOL/L — SIGNIFICANT CHANGE UP (ref 135–145)
WBC # BLD: 14.19 K/UL — HIGH (ref 3.8–10.5)
WBC # FLD AUTO: 14.19 K/UL — HIGH (ref 3.8–10.5)

## 2019-06-20 PROCEDURE — 99233 SBSQ HOSP IP/OBS HIGH 50: CPT | Mod: GC

## 2019-06-20 PROCEDURE — 99233 SBSQ HOSP IP/OBS HIGH 50: CPT

## 2019-06-20 RX ORDER — ALPRAZOLAM 0.25 MG
0.25 TABLET ORAL EVERY 8 HOURS
Refills: 0 | Status: DISCONTINUED | OUTPATIENT
Start: 2019-06-20 | End: 2019-06-22

## 2019-06-20 RX ORDER — FUROSEMIDE 40 MG
20 TABLET ORAL DAILY
Refills: 0 | Status: DISCONTINUED | OUTPATIENT
Start: 2019-06-21 | End: 2019-06-22

## 2019-06-20 RX ORDER — WARFARIN SODIUM 2.5 MG/1
7.5 TABLET ORAL ONCE
Refills: 0 | Status: DISCONTINUED | OUTPATIENT
Start: 2019-06-20 | End: 2019-06-20

## 2019-06-20 RX ORDER — WARFARIN SODIUM 2.5 MG/1
6 TABLET ORAL ONCE
Refills: 0 | Status: COMPLETED | OUTPATIENT
Start: 2019-06-20 | End: 2019-06-20

## 2019-06-20 RX ORDER — ACETAMINOPHEN 500 MG
650 TABLET ORAL EVERY 6 HOURS
Refills: 0 | Status: DISCONTINUED | OUTPATIENT
Start: 2019-06-20 | End: 2019-06-22

## 2019-06-20 RX ADMIN — HEPARIN SODIUM 1850 UNIT(S)/HR: 5000 INJECTION INTRAVENOUS; SUBCUTANEOUS at 15:49

## 2019-06-20 RX ADMIN — HEPARIN SODIUM 1850 UNIT(S)/HR: 5000 INJECTION INTRAVENOUS; SUBCUTANEOUS at 08:33

## 2019-06-20 RX ADMIN — WARFARIN SODIUM 6 MILLIGRAM(S): 2.5 TABLET ORAL at 17:31

## 2019-06-20 RX ADMIN — PANTOPRAZOLE SODIUM 40 MILLIGRAM(S): 20 TABLET, DELAYED RELEASE ORAL at 17:31

## 2019-06-20 RX ADMIN — Medication 20 MILLIGRAM(S): at 06:34

## 2019-06-20 RX ADMIN — PANTOPRAZOLE SODIUM 40 MILLIGRAM(S): 20 TABLET, DELAYED RELEASE ORAL at 06:34

## 2019-06-20 RX ADMIN — CHLORHEXIDINE GLUCONATE 1 APPLICATION(S): 213 SOLUTION TOPICAL at 06:34

## 2019-06-20 NOTE — PROGRESS NOTE ADULT - PROBLEM SELECTOR PLAN 6
- MELLO likely Pre-renal in setting of Poor PO intake/ Hemoptysis vs Possible PATRICA vs Vancomycin Toxicity.   - Patient making good urine and CRE improving.

## 2019-06-20 NOTE — PROGRESS NOTE ADULT - PROBLEM SELECTOR PLAN 4
- US in MICU with no acute RLE DVT. 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   - SUBINR now second to reversal in MICU for Hemoptysis.   - Heparin GTTs/ Coumadin bridge continued.   - INR slowly increasing.    - QD Coumadin dosing.

## 2019-06-20 NOTE — PROGRESS NOTE ADULT - ATTENDING COMMENTS
Will continue diuresis as per cardiology -- 20mg po lasix daily  More alert and awake today, has been tolerating some ambulation and OOB  AC resumed, heparin ggt with coumadin -- INR still subtherapeutic  Plan for discharge once INR becomes therapeutic  Saturating 85% on RA at rest -- will need home O2 upon discharge

## 2019-06-20 NOTE — PROGRESS NOTE ADULT - PROBLEM SELECTOR PLAN 2
- Pediatric Cardiology following   - BIPAP QHS (while asleep) and NC during day continued. BiPAP/ NC regimen recommended to continue at home for short time   - Lasix changed from 20mg IV BID to Lasix 20mg PO BID today (6/16). - Pediatric Cardiology following   - BIPAP QHS (while asleep) and NC PRN during day continued. BiPAP/ NC regimen recommended to continue at home for short time   - Lasix changed from 20mg IV BID to Lasix 20mg PO BID today (6/16); changed to 20mg PO daily   - RA oxygen saturation 85% 2/2 restrictive thoracic syndrome

## 2019-06-20 NOTE — PROGRESS NOTE ADULT - SUBJECTIVE AND OBJECTIVE BOX
PULMONARY PROGRESS NOTE    Chart and meds reviewed.  Patient seen and examined.    CC:    Interval History:    All 10 systems reviewed and found to be negative with the exception of what has been described above.    MEDICATIONS  (STANDING):  chlorhexidine 4% Liquid 1 Application(s) Topical <User Schedule>  docusate sodium 100 milliGRAM(s) Oral three times a day  furosemide    Tablet 20 milliGRAM(s) Oral two times a day  heparin  Infusion. 1150 Unit(s)/Hr (11.5 mL/Hr) IV Continuous <Continuous>  pantoprazole    Tablet 40 milliGRAM(s) Oral two times a day  polyethylene glycol 3350 17 Gram(s) Oral two times a day  senna 2 Tablet(s) Oral at bedtime  warfarin 7.5 milliGRAM(s) Oral once    MEDICATIONS  (PRN):  acetaminophen   Tablet .. 650 milliGRAM(s) Oral every 6 hours PRN Temp greater or equal to 38C (100.4F), Mild Pain (1 - 3)  ALPRAZolam 0.25 milliGRAM(s) Oral every 8 hours PRN Anxiety  bisacodyl Suppository 10 milliGRAM(s) Rectal daily PRN Constipation      VITALS SIGNS:  T(F): 97.8 (06-20-19 @ 06:32), Max: 98.3 (06-19-19 @ 14:00)  HR: 94 (06-20-19 @ 06:42) (89 - 101)  BP: 116/72 (06-20-19 @ 06:32) (110/63 - 116/72)  RR: 18 (06-20-19 @ 06:32) (17 - 20)  SpO2: 91% (06-20-19 @ 06:42) (86% - 93%)  Wt(kg): --    I&O's Summary      CAPILLARY BLOOD GLUCOSE            LABS:                            11.1   14.19 )-----------( 545      ( 20 Jun 2019 05:15 )             35.0     06-20    137  |  100  |  27<H>  ----------------------------<  133<H>  3.8   |  25  |  1.31<H>    Ca    9.7      20 Jun 2019 05:15  Phos  3.4     06-20  Mg     1.9     06-20            PT/INR - ( 20 Jun 2019 05:15 )   PT: 19.4 SEC;   INR: 1.67          PTT - ( 20 Jun 2019 05:15 )  PTT:126.0 SEC                                  CULTURES: PULMONARY PROGRESS NOTE    Chart and meds reviewed.  Patient seen and examined.    CC: hemoptysis     Interval History: no events overnight     MEDICATIONS  (STANDING):  chlorhexidine 4% Liquid 1 Application(s) Topical <User Schedule>  docusate sodium 100 milliGRAM(s) Oral three times a day  furosemide    Tablet 20 milliGRAM(s) Oral two times a day  heparin  Infusion. 1150 Unit(s)/Hr (11.5 mL/Hr) IV Continuous <Continuous>  pantoprazole    Tablet 40 milliGRAM(s) Oral two times a day  polyethylene glycol 3350 17 Gram(s) Oral two times a day  senna 2 Tablet(s) Oral at bedtime  warfarin 7.5 milliGRAM(s) Oral once    MEDICATIONS  (PRN):  acetaminophen   Tablet .. 650 milliGRAM(s) Oral every 6 hours PRN Temp greater or equal to 38C (100.4F), Mild Pain (1 - 3)  ALPRAZolam 0.25 milliGRAM(s) Oral every 8 hours PRN Anxiety  bisacodyl Suppository 10 milliGRAM(s) Rectal daily PRN Constipation      VITALS SIGNS:  T(F): 97.8 (06-20-19 @ 06:32), Max: 98.3 (06-19-19 @ 14:00)  HR: 94 (06-20-19 @ 06:42) (89 - 101)  BP: 116/72 (06-20-19 @ 06:32) (110/63 - 116/72)  RR: 18 (06-20-19 @ 06:32) (17 - 20)  SpO2: 91% (06-20-19 @ 06:42) (86% - 93%)  Wt(kg): --    I&O's Summary      CAPILLARY BLOOD GLUCOSE            LABS:                            11.1   14.19 )-----------( 545      ( 20 Jun 2019 05:15 )             35.0     06-20    137  |  100  |  27<H>  ----------------------------<  133<H>  3.8   |  25  |  1.31<H>    Ca    9.7      20 Jun 2019 05:15  Phos  3.4     06-20  Mg     1.9     06-20            PT/INR - ( 20 Jun 2019 05:15 )   PT: 19.4 SEC;   INR: 1.67          PTT - ( 20 Jun 2019 05:15 )  PTT:126.0 SEC                                  CULTURES:

## 2019-06-20 NOTE — PROGRESS NOTE ADULT - ASSESSMENT
Mr. 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 The patient presented to [facility] for recurrent episodes of Hemoptysis with Acute Hypoxic Respiratory Distress. Mr. Cortez was placed on oxygen in the ED and MICU called. Patient admitted to MICU and s/p Bronchoscopy on 5/31 showing RUL Posterior segmental bleeding. The patient was subsequently intubated on 5/31 for airway protection given hemoptysis in setting of hypoxia and respiratory distress. IR consulted and embolization of multiple vessel performed. Sedation/ Versed weaned off and Pressors switched from Levophed ot Phenylephrine on 6/1. The patient's INR with history of Coumadin use was noted to be elevated and the patient was multiple doses of FFPs were given. In the setting of SupraINR and Hemoptysis EndoBronchial Block placed on 6/1 to control bleeding. Mr. Cortez's hospital course was further complicated with fever spike on 6/1 and started on Zosyn and Vancomycin empirically; blood cultures drawn and noted to be negative. Duplex of LE was noted to be negative for acute findings and noted with chronic partial DVT to the mid-distal R Femoral Vein noted. Given acute bleed, AC held for now and respiratory stabilization and hemostasis taken as primary responsibility. HH remained stable and repeat Bronchoscopy performed on 6/4 with removal of EndoBronchial Block and suctioning of RUL Blood Clots and TONJA Mucous Plug. Fever spike again noted on 6/5 likely post procedural. BAL cultures noted to be negative as well. During ICU, the patient was also noted with abdominal distention, evaluated by US ABDOMEN noted with nodular liver but normal LFTs. Abdominal distention noted with gas passing and (+) BMs. Sedation weaned off on 6/6and Vancomycin dc'ed as no MRSA coverage warranted. On 6/7, Mr. Cortez was extubated to NIPPV with BiPAP. On BiPAP, the patient was noted with agitation and mild sedation provided with Precedex earned as tolerated SpO2 remained stable and BiPAP weaned to VentiMASK and then NC. Patient seen by SS and mechanical soft diet started. Abdominal distention remained. AXR noted with distended loops but no SBO and patient tolerated diet well. Mr. Cortez was then transferred to the RCU for continued monitoring and management.     # DISPO: Pending therapeutic INR prior to discharge.

## 2019-06-20 NOTE — PROGRESS NOTE PEDS - SUBJECTIVE AND OBJECTIVE BOX
[facility] Follow Up Note    S: Denies chest pain or palpitations. Eager to go home.    O:  ICU Vital Signs Last 24 Hrs  T(C): 36.6 (2019 06:32), Max: 36.8 (2019 14:00)  T(F): 97.8 (2019 06:32), Max: 98.3 (2019 14:00)  HR: 97 (2019 11:15) (89 - 101)  BP: 116/72 (2019 06:32) (110/63 - 116/72)  RR: 18 (2019 06:32) (17 - 20)  SpO2: 92% (2019 11:15) (86% - 93%)  comfortable in chair  s1 single s2 II/VI HSM at LMSB, + pectus  clear lungs, decreased at bases  soft but mildy distended abdomen  chronic RLE venous stasis changes and varicosities    LABORATORY TESTS:                          11.1  CBC:   14.19 )-----------( 545   (19 @ 05:15)                          35.0               137   |  100   |  27                 Ca: 9.7    BMP:   ----------------------------< 133    M.9   (19 @ 05:15)             3.8    |  25    | 1.31               Ph: 3.4      LFT:     TPro: 7.0 / Alb: 3.5 / TBili: 2.5 / DBili: x / AST: 33 / ALT: 22 / AlkPhos: 123   (19 @ 09:53)    COAG: PT: x / PTT: 92.9 / INR: x   (19 @ 06:45)     CARDIAC MARKERS:             High-Sensitivity Troponin: x   (19 @ 06:40)             CK: x / CKMB: x   (19 @ 06:40)             Pro-BNP: 5185   (19 @ 06:40)

## 2019-06-20 NOTE — PROGRESS NOTE PEDS - ASSESSMENT
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 Presented with hemoptysis now coiling with IR on 5/31 into 6/1 with no recurrence of bleeding once coagulopathy at presentation treated. No bleeding on therapeutic heparin drip while awaiting bridge with coumadin.  Anticoagulation restarted given the risk of thrombosis with Fontan physiology and extensive clot burdern in 2018 when off of Aspirin and not on anticoagulation.  MELLO continues to improve    - d/w pulmonary/critical care- continue heparin bridge until INR 2, will consider a dose of Lovenox to cover him at the time of discharge  - decrease lasix to once daily dosing  - hold home medication of digoxin for now given MELLO    d/w dylan and his mother.

## 2019-06-21 ENCOUNTER — TRANSCRIPTION ENCOUNTER (OUTPATIENT)
Age: 24
End: 2019-06-21

## 2019-06-21 LAB
ANION GAP SERPL CALC-SCNC: 16 MMO/L — HIGH (ref 7–14)
APTT BLD: 125.4 SEC — CRITICAL HIGH (ref 27.5–36.3)
APTT BLD: 129.1 SEC — CRITICAL HIGH (ref 27.5–36.3)
APTT BLD: 38 SEC — HIGH (ref 27.5–36.3)
APTT BLD: 69.8 SEC — HIGH (ref 27.5–36.3)
BUN SERPL-MCNC: 26 MG/DL — HIGH (ref 7–23)
CALCIUM SERPL-MCNC: 9.3 MG/DL — SIGNIFICANT CHANGE UP (ref 8.4–10.5)
CHLORIDE SERPL-SCNC: 99 MMOL/L — SIGNIFICANT CHANGE UP (ref 98–107)
CO2 SERPL-SCNC: 22 MMOL/L — SIGNIFICANT CHANGE UP (ref 22–31)
CREAT SERPL-MCNC: 1.31 MG/DL — HIGH (ref 0.5–1.3)
GLUCOSE SERPL-MCNC: 99 MG/DL — SIGNIFICANT CHANGE UP (ref 70–99)
INR BLD: 1.62 — HIGH (ref 0.88–1.17)
INR BLD: 1.83 — HIGH (ref 0.88–1.17)
POTASSIUM SERPL-MCNC: 4.4 MMOL/L — SIGNIFICANT CHANGE UP (ref 3.5–5.3)
POTASSIUM SERPL-SCNC: 4.4 MMOL/L — SIGNIFICANT CHANGE UP (ref 3.5–5.3)
PROTHROM AB SERPL-ACNC: 18.8 SEC — HIGH (ref 9.8–13.1)
PROTHROM AB SERPL-ACNC: 20.7 SEC — HIGH (ref 9.8–13.1)
SODIUM SERPL-SCNC: 137 MMOL/L — SIGNIFICANT CHANGE UP (ref 135–145)

## 2019-06-21 PROCEDURE — 99233 SBSQ HOSP IP/OBS HIGH 50: CPT

## 2019-06-21 PROCEDURE — 99233 SBSQ HOSP IP/OBS HIGH 50: CPT | Mod: GC

## 2019-06-21 RX ORDER — WARFARIN SODIUM 2.5 MG/1
5 TABLET ORAL ONCE
Refills: 0 | Status: DISCONTINUED | OUTPATIENT
Start: 2019-06-21 | End: 2019-06-21

## 2019-06-21 RX ORDER — SENNA PLUS 8.6 MG/1
2 TABLET ORAL
Qty: 60 | Refills: 0
Start: 2019-06-21 | End: 2019-07-20

## 2019-06-21 RX ORDER — FUROSEMIDE 40 MG
1 TABLET ORAL
Qty: 30 | Refills: 0
Start: 2019-06-21 | End: 2019-07-20

## 2019-06-21 RX ORDER — PANTOPRAZOLE SODIUM 20 MG/1
1 TABLET, DELAYED RELEASE ORAL
Qty: 60 | Refills: 0
Start: 2019-06-21 | End: 2019-07-20

## 2019-06-21 RX ORDER — POLYETHYLENE GLYCOL 3350 17 G/17G
17 POWDER, FOR SOLUTION ORAL
Qty: 500 | Refills: 0
Start: 2019-06-21 | End: 2019-07-20

## 2019-06-21 RX ORDER — WARFARIN SODIUM 2.5 MG/1
1 TABLET ORAL
Qty: 30 | Refills: 0
Start: 2019-06-21 | End: 2019-07-20

## 2019-06-21 RX ORDER — WARFARIN SODIUM 2.5 MG/1
6 TABLET ORAL ONCE
Refills: 0 | Status: COMPLETED | OUTPATIENT
Start: 2019-06-21 | End: 2019-06-21

## 2019-06-21 RX ORDER — DOCUSATE SODIUM 100 MG
1 CAPSULE ORAL
Qty: 90 | Refills: 0
Start: 2019-06-21 | End: 2019-07-20

## 2019-06-21 RX ADMIN — HEPARIN SODIUM 0 UNIT(S)/HR: 5000 INJECTION INTRAVENOUS; SUBCUTANEOUS at 14:39

## 2019-06-21 RX ADMIN — WARFARIN SODIUM 6 MILLIGRAM(S): 2.5 TABLET ORAL at 17:55

## 2019-06-21 RX ADMIN — HEPARIN SODIUM 1750 UNIT(S)/HR: 5000 INJECTION INTRAVENOUS; SUBCUTANEOUS at 07:34

## 2019-06-21 RX ADMIN — HEPARIN SODIUM 1550 UNIT(S)/HR: 5000 INJECTION INTRAVENOUS; SUBCUTANEOUS at 23:14

## 2019-06-21 RX ADMIN — CHLORHEXIDINE GLUCONATE 1 APPLICATION(S): 213 SOLUTION TOPICAL at 07:34

## 2019-06-21 RX ADMIN — PANTOPRAZOLE SODIUM 40 MILLIGRAM(S): 20 TABLET, DELAYED RELEASE ORAL at 07:34

## 2019-06-21 RX ADMIN — HEPARIN SODIUM 1550 UNIT(S)/HR: 5000 INJECTION INTRAVENOUS; SUBCUTANEOUS at 16:11

## 2019-06-21 RX ADMIN — Medication 20 MILLIGRAM(S): at 07:34

## 2019-06-21 NOTE — PROGRESS NOTE PEDS - PROVIDER SPECIALTY LIST PEDS
Cardiology

## 2019-06-21 NOTE — PROGRESS NOTE PEDS - PROBLEM SELECTOR PROBLEM 2
Hemoptysis
Single ventricle
Hemoptysis
Heterotaxy syndrome with asplenia
Hemoptysis
TAPVR (total anomalous pulmonary venous return)

## 2019-06-21 NOTE — PROGRESS NOTE ADULT - NEUROLOGICAL
Alert & oriented; no sensory, motor or coordination deficits, normal reflexes
detailed exam
Alert & oriented; no sensory, motor or coordination deficits, normal reflexes
Alert & oriented; no sensory, motor or coordination deficits, normal reflexes
detailed exam
Alert & oriented; no sensory, motor or coordination deficits, normal reflexes
Alert & oriented; no sensory, motor or coordination deficits, normal reflexes

## 2019-06-21 NOTE — DISCHARGE NOTE NURSING/CASE MANAGEMENT/SOCIAL WORK - NSDCPNINST_GEN_ALL_CORE
Please administer vaccines as taught. Dispose of sharps in container and discard as instructed. Please follow with lab work as instructed and make and keep all appointment. Lovenox teaching taught and Mom verbalized understanding.  JULIET JosephN, RN-BC.

## 2019-06-21 NOTE — PROGRESS NOTE ADULT - CONSTITUTIONAL DETAILS
no distress
respiratory distress
no distress/well-groomed/well-developed
well-developed/no distress
no distress/well-nourished/well-groomed/well-developed
well-groomed/well-nourished/well-developed/no distress
respiratory distress

## 2019-06-21 NOTE — PROGRESS NOTE PEDS - SUBJECTIVE AND OBJECTIVE BOX
ACHD Follow Up Note    S: Denies chest pain or palpitations. Eager to go home.    O:  Vital Signs Last 24 Hrs  T(C): 37.4 (21 Jun 2019 07:32), Max: 37.4 (20 Jun 2019 21:57)  T(F): 99.3 (21 Jun 2019 07:32), Max: 99.3 (20 Jun 2019 21:57)  HR: 95 (21 Jun 2019 07:32) (89 - 100)  BP: 119/77 (21 Jun 2019 07:32) (110/71 - 119/77)  BP(mean): 70 (20 Jun 2019 14:42) (70 - 70)  RR: 18 (21 Jun 2019 07:32) (18 - 18)  SpO2: 89% (21 Jun 2019 07:32) (88% - 99%)  comfortable in chair  s1 single s2 II/VI HSM at LMSB, + pectus  clear lungs, decreased at bases  soft but mildy distended abdomen  chronic RLE venous stasis changes and varicosities      LABORATORY TESTS:                          11.1  CBC:   14.19 )-----------( 545   (06-20-19 @ 05:15)                          35.0               137   |  99    |  26                 Ca: 9.3    BMP:   ----------------------------< 99     Mg: x     (06-21-19 @ 05:35)             4.4    |  22    | 1.31               Ph: x        LFT:     TPro: 7.0 / Alb: 3.5 / TBili: 2.5 / DBili: x / AST: 33 / ALT: 22 / AlkPhos: 123   (06-12-19 @ 09:53)    COAG: PT: 20.7 / PTT: 125.4 / INR: 1.83   (06-21-19 @ 05:35)     CARDIAC MARKERS:             High-Sensitivity Troponin: x   (06-13-19 @ 06:40)             CK: x / CKMB: x   (06-13-19 @ 06:40)             Pro-BNP: 5185   (06-13-19 @ 06:40) [facility] Follow Up Note    S: Denies chest pain or palpitations. No hemoptysis.    O:  Vital Signs Last 24 Hrs  T(C): 37.4 (21 Jun 2019 07:32), Max: 37.4 (20 Jun 2019 21:57)  T(F): 99.3 (21 Jun 2019 07:32), Max: 99.3 (20 Jun 2019 21:57)  HR: 95 (21 Jun 2019 07:32) (89 - 100)  BP: 119/77 (21 Jun 2019 07:32) (110/71 - 119/77)  BP(mean): 70 (20 Jun 2019 14:42) (70 - 70)  RR: 18 (21 Jun 2019 07:32) (18 - 18)  SpO2: 89% (21 Jun 2019 07:32) (88% - 99%)    comfortable in chair  s1 single s2 II/VI HSM at LMSB, + pectus  clear lungs, decreased at bases  soft but mildy distended abdomen  chronic RLE venous stasis changes and varicosities      LABORATORY TESTS:                          11.1  CBC:   14.19 )-----------( 545   (06-20-19 @ 05:15)                          35.0               137   |  99    |  26                 Ca: 9.3    BMP:   ----------------------------< 99     Mg: x     (06-21-19 @ 05:35)             4.4    |  22    | 1.31               Ph: x        LFT:     TPro: 7.0 / Alb: 3.5 / TBili: 2.5 / DBili: x / AST: 33 / ALT: 22 / AlkPhos: 123   (06-12-19 @ 09:53)    COAG: PT: 20.7 / PTT: 125.4 / INR: 1.83   (06-21-19 @ 05:35)     CARDIAC MARKERS:             High-Sensitivity Troponin: x   (06-13-19 @ 06:40)             CK: x / CKMB: x   (06-13-19 @ 06:40)             Pro-BNP: 5185   (06-13-19 @ 06:40)

## 2019-06-21 NOTE — PROGRESS NOTE PEDS - REASON FOR ADMISSION
S/P Fontan
S/P Fontan
Hemoptysis
S/P Fontan
Hemoptysis
Hemoptysis
S/P Fontan
Hemoptysis

## 2019-06-21 NOTE — PROGRESS NOTE ADULT - ASSESSMENT
Mr. 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 The patient presented to [facility] for recurrent episodes of Hemoptysis with Acute Hypoxic Respiratory Distress. Mr. Cortez was placed on oxygen in the ED and MICU called. Patient admitted to MICU and s/p Bronchoscopy on 5/31 showing RUL Posterior segmental bleeding. The patient was subsequently intubated on 5/31 for airway protection given hemoptysis in setting of hypoxia and respiratory distress. IR consulted and embolization of multiple vessel performed. Sedation/ Versed weaned off and Pressors switched from Levophed ot Phenylephrine on 6/1. The patient's INR with history of Coumadin use was noted to be elevated and the patient was multiple doses of FFPs were given. In the setting of SupraINR and Hemoptysis EndoBronchial Block placed on 6/1 to control bleeding. Mr. Cortez's hospital course was further complicated with fever spike on 6/1 and started on Zosyn and Vancomycin empirically; blood cultures drawn and noted to be negative. Duplex of LE was noted to be negative for acute findings and noted with chronic partial DVT to the mid-distal R Femoral Vein noted. Given acute bleed, AC held for now and respiratory stabilization and hemostasis taken as primary responsibility. HH remained stable and repeat Bronchoscopy performed on 6/4 with removal of EndoBronchial Block and suctioning of RUL Blood Clots and TONJA Mucous Plug. Fever spike again noted on 6/5 likely post procedural. BAL cultures noted to be negative as well. During ICU, the patient was also noted with abdominal distention, evaluated by US ABDOMEN noted with nodular liver but normal LFTs. Abdominal distention noted with gas passing and (+) BMs. Sedation weaned off on 6/6and Vancomycin dc'ed as no MRSA coverage warranted. On 6/7, Mr. Cortez was extubated to NIPPV with BiPAP. On BiPAP, the patient was noted with agitation and mild sedation provided with Precedex earned as tolerated SpO2 remained stable and BiPAP weaned to VentiMASK and then NC. Patient seen by SS and mechanical soft diet started. Abdominal distention remained. AXR noted with distended loops but no SBO and patient tolerated diet well. Mr. Cortez was then transferred to the RCU for continued monitoring and management.     # DISPO: Pending therapeutic INR prior to discharge.

## 2019-06-21 NOTE — DISCHARGE NOTE NURSING/CASE MANAGEMENT/SOCIAL WORK - NSDCDMETYPESERV_GEN_ALL_CORE_FT
A Home Oxygen Concentrator w/Home Fill Portable System was delivered to your home.   A  Portable Oxygen Tank will be delivered to your Hospital Room for Transport Home.

## 2019-06-21 NOTE — PROGRESS NOTE PEDS - PROBLEM SELECTOR PROBLEM 3
Heterotaxy syndrome with asplenia
Tricuspid valve insufficiency, unspecified etiology
Heterotaxy syndrome with asplenia
Single ventricle
Heterotaxy syndrome with asplenia
Heterotaxy syndrome with asplenia

## 2019-06-21 NOTE — PROVIDER CONTACT NOTE (CRITICAL VALUE NOTIFICATION) - RECOMMENDATIONS
Follow Nomogram   Decrease heparin drip my 1mL/hr  Drip now running at 17.5mL/hr  Repeat aPTT @1305
ADS aware

## 2019-06-21 NOTE — DISCHARGE NOTE NURSING/CASE MANAGEMENT/SOCIAL WORK - NSDCDPATPORTLINK_GEN_ALL_CORE
You can access the [facility] Portal, offered by [facility], by registering with the following website: [email or web address]inai [facility] System

## 2019-06-21 NOTE — PROGRESS NOTE PEDS - NSHPATTENDINGPLANDISCUSS_GEN_ALL_CORE
MICU team and family at bedside
peds, patient, family
MICU
patient, mother, pulmonary team
pulmonary team
MICU, parents at bedside, primary [facility] cardiologist (Dr. Rojas)
mother, MICU, congenital interventional team
patient, parents, pulmonary
patient, parents, pulmonary team
MICU, family
dylan, critical care
Pulmonary, patient, family, Dr. Rojas (primary [facility])

## 2019-06-21 NOTE — PROGRESS NOTE PEDS - ASSESSMENT
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 Presented with hemoptysis now coiling with IR on 5/31 into 6/1 with no recurrence of bleeding once coagulopathy at presentation treated. No bleeding on therapeutic heparin drip while awaiting bridge with coumadin.  Anticoagulation restarted given the risk of thrombosis with Fontan physiology and extensive clot burdern in 2018 when off of Aspirin and not on anticoagulation.  MELLO continues to improve    - d/w pulmonary/critical care- continue heparin bridge until INR 2, will consider a dose of Lovenox to cover him at the time of discharge  - decrease lasix to once daily dosing  - hold home medication of digoxin for now given MELLO    d/w dylan and his mother. 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 Presented with hemoptysis now coiling with IR on 5/31 into 6/1 with no recurrence of bleeding once coagulopathy at presentation treated. No bleeding on therapeutic heparin drip while awaiting bridge with coumadin.  Anticoagulation restarted given the risk of thrombosis with Fontan physiology and extensive clot burdern in 2018 when off of Aspirin and not on anticoagulation.  MELLO improving    - d/w pulmonary/critical care- continue heparin bridge until INR 2, will consider a dose of Lovenox to cover him at the time of discharge  - continue coumadin 6 mg once daily  - continue Lasix once daily  - hold home medication of digoxin for now given MELLO  - if he goes home, we will contact him on monday to establish follow up and INR check

## 2019-06-21 NOTE — PROGRESS NOTE PEDS - PROBLEM SELECTOR PROBLEM 4
Tricuspid regurgitation
Tricuspid regurgitation
Single ventricle
Tricuspid regurgitation
TAPVR (total anomalous pulmonary venous return)
Single ventricle
Tricuspid regurgitation
Single ventricle

## 2019-06-21 NOTE — PROGRESS NOTE PEDS - PROBLEM SELECTOR PROBLEM 1
DVT (deep venous thrombosis)
DVT (deep venous thrombosis)
Tricuspid valve insufficiency, unspecified etiology
Heterotaxy syndrome with asplenia
Tricuspid valve insufficiency, unspecified etiology
Hemoptysis
Tricuspid valve insufficiency, unspecified etiology
Spleen absent

## 2019-06-21 NOTE — PROGRESS NOTE ADULT - PROBLEM SELECTOR PLAN 2
- Pediatric Cardiology following   - BIPAP QHS (while asleep) and NC PRN during day continued. BiPAP/ NC regimen recommended to continue at home for short time   - Lasix changed from 20mg IV BID to Lasix 20mg PO BID today (6/16); changed to 20mg PO daily   - RA oxygen saturation 85% 2/2 restrictive thoracic syndrome

## 2019-06-21 NOTE — PROGRESS NOTE ADULT - CVS HE PE MLT D E PC
regular rate and rhythm
no murmur/regular rate and rhythm/no rub
regular rate and rhythm
regular rate and rhythm
no murmur/no rub/regular rate and rhythm
regular rate and rhythm/no rub/no murmur
no murmur/no rub/regular rate and rhythm
regular rate and rhythm/no murmur/no rub
regular rate and rhythm

## 2019-06-21 NOTE — DISCHARGE NOTE NURSING/CASE MANAGEMENT/SOCIAL WORK - NSSCTYPOFSERV_GEN_ALL_CORE
Visiting Nurse Services. The 1st Visiting Nurse Visit is for Sunday 6/23/19. The Nurse will call to arrange the Visit time.

## 2019-06-21 NOTE — PROGRESS NOTE PEDS - PROBLEM SELECTOR PROBLEM 5
Single ventricle
TAPVR (total anomalous pulmonary venous return)

## 2019-06-21 NOTE — PROGRESS NOTE ADULT - NSHPATTENDINGPLANDISCUSS_GEN_ALL_CORE
charge nurse, bedside nurse, dylan and his mother
patient, mother, RCU PA
patient
patient and his mother at bedside
team
Fellow, ICU team
Fellow, ICU team
MICU
MICU team
MICU
team
MICU
MICU team/RN
CORNELIO, father, dylan
[facility]
patient, patients mother, primary RCU team at bedside
patient, team

## 2019-06-21 NOTE — PROGRESS NOTE ADULT - CARDIOVASCULAR DETAILS
tachycardia
tachycardia
positive S2/positive S1
positive S2/positive S1
positive S1/positive S2
positive S1/positive S2

## 2019-06-21 NOTE — PROGRESS NOTE ADULT - RS GEN PE MLT RESP DETAILS PC
Medication refill phone from [facility].     Medication: bumetanide (bumex) 0.5 mg tablet    Dosage/instructions: take 1 tablet by mouth daily   Last refill date: 10/15/2018    Last Appointment visit: 10/08/2018  Next Appointment date: 04/08/2019      Refill sent to pharmacy per protocol. Patient was seen 10/08/2018. Per MD patient need to be seen 6 months. Called patient and advised that the request for medications has been completed and sent to the pharmacy. Encounter Closed.      
good air movement/diminished breath sounds, R/breath sounds equal/respirations non-labored/diminished breath sounds, L
diminished breath sounds, R/airway patent/diminished breath sounds, L/good air movement
airway patent
airway patent/breath sounds equal
breath sounds equal/normal/airway patent/respirations non-labored/clear to auscultation bilaterally/good air movement
breath sounds equal/good air movement/normal/clear to auscultation bilaterally/respirations non-labored/airway patent
respirations non-labored/normal/good air movement/no wheezes/breath sounds equal/airway patent
breath sounds equal/respirations non-labored/normal/airway patent/good air movement/clear to auscultation bilaterally
few rhonchi/airway patent/breath sounds equal

## 2019-06-21 NOTE — PROGRESS NOTE ADULT - SUBJECTIVE AND OBJECTIVE BOX
PULMONARY PROGRESS NOTE    Chart and meds reviewed.  Patient seen and examined.    CC: hemopytsis    Interval History: no events overnight     MEDICATIONS  (STANDING):  chlorhexidine 4% Liquid 1 Application(s) Topical <User Schedule>  docusate sodium 100 milliGRAM(s) Oral three times a day  furosemide    Tablet 20 milliGRAM(s) Oral daily  heparin  Infusion. 1150 Unit(s)/Hr (11.5 mL/Hr) IV Continuous <Continuous>  pantoprazole    Tablet 40 milliGRAM(s) Oral two times a day  polyethylene glycol 3350 17 Gram(s) Oral two times a day  senna 2 Tablet(s) Oral at bedtime  warfarin 5 milliGRAM(s) Oral once    MEDICATIONS  (PRN):  acetaminophen   Tablet .. 650 milliGRAM(s) Oral every 6 hours PRN Temp greater or equal to 38.5C (101.3F), Mild Pain (1 - 3), Moderate Pain (4 - 6)  ALPRAZolam 0.25 milliGRAM(s) Oral every 8 hours PRN Anxiety  bisacodyl Suppository 10 milliGRAM(s) Rectal daily PRN Constipation      VITALS SIGNS:  T(F): 99.3 (06-20-19 @ 21:57), Max: 99.3 (06-20-19 @ 21:57)  HR: 100 (06-20-19 @ 21:57) (89 - 100)  BP: 110/71 (06-20-19 @ 21:57) (110/71 - 117/-)  RR: 18 (06-20-19 @ 21:57) (18 - 18)  SpO2: 89% (06-20-19 @ 21:57) (88% - 99%)  Wt(kg): --    I&O's Summary      CAPILLARY BLOOD GLUCOSE            LABS:                            11.1   14.19 )-----------( 545      ( 20 Jun 2019 05:15 )             35.0     06-21    137  |  99  |  26<H>  ----------------------------<  99  4.4   |  22  |  1.31<H>    Ca    9.3      21 Jun 2019 05:35  Phos  3.4     06-20  Mg     1.9     06-20            PT/INR - ( 21 Jun 2019 05:35 )   PT: 20.7 SEC;   INR: 1.83          PTT - ( 21 Jun 2019 05:35 )  PTT:125.4 SEC                                  CULTURES:

## 2019-06-21 NOTE — PROGRESS NOTE ADULT - GASTROINTESTINAL DETAILS
soft/no distention/normal/nontender
no distention/soft/nontender
soft/nontender/no distention
nontender/soft
no distention/soft/nontender/normal/no masses palpable/bowel sounds normal
no masses palpable/bowel sounds normal/normal/no distention/nontender/soft
soft/nontender/no distention/bowel sounds normal/no masses palpable/normal
soft/normal/no masses palpable/bowel sounds normal/nontender
nontender

## 2019-06-22 VITALS
OXYGEN SATURATION: 93 % | DIASTOLIC BLOOD PRESSURE: 76 MMHG | SYSTOLIC BLOOD PRESSURE: 114 MMHG | HEART RATE: 93 BPM | TEMPERATURE: 98 F | RESPIRATION RATE: 18 BRPM

## 2019-06-22 LAB
APTT BLD: 53.6 SEC — HIGH (ref 27.5–36.3)
INR BLD: 1.76 — HIGH (ref 0.88–1.17)
PROTHROM AB SERPL-ACNC: 20.4 SEC — HIGH (ref 9.8–13.1)

## 2019-06-22 PROCEDURE — 99239 HOSP IP/OBS DSCHRG MGMT >30: CPT | Mod: GC

## 2019-06-22 RX ORDER — ENOXAPARIN SODIUM 100 MG/ML
80 INJECTION SUBCUTANEOUS DAILY
Refills: 0 | Status: DISCONTINUED | OUTPATIENT
Start: 2019-06-22 | End: 2019-06-22

## 2019-06-22 RX ORDER — ENOXAPARIN SODIUM 100 MG/ML
80 INJECTION SUBCUTANEOUS
Qty: 7 | Refills: 0
Start: 2019-06-22 | End: 2019-06-28

## 2019-06-22 RX ORDER — WARFARIN SODIUM 2.5 MG/1
7 TABLET ORAL ONCE
Refills: 0 | Status: DISCONTINUED | OUTPATIENT
Start: 2019-06-22 | End: 2019-06-22

## 2019-06-22 RX ORDER — PANTOPRAZOLE SODIUM 20 MG/1
1 TABLET, DELAYED RELEASE ORAL
Qty: 60 | Refills: 0
Start: 2019-06-22 | End: 2019-07-21

## 2019-06-22 RX ORDER — FUROSEMIDE 40 MG
1 TABLET ORAL
Qty: 30 | Refills: 0
Start: 2019-06-22 | End: 2019-07-21

## 2019-06-22 RX ORDER — WARFARIN SODIUM 2.5 MG/1
1 TABLET ORAL
Qty: 10 | Refills: 0
Start: 2019-06-22 | End: 2019-07-01

## 2019-06-22 RX ADMIN — CHLORHEXIDINE GLUCONATE 1 APPLICATION(S): 213 SOLUTION TOPICAL at 07:18

## 2019-06-22 RX ADMIN — ENOXAPARIN SODIUM 80 MILLIGRAM(S): 100 INJECTION SUBCUTANEOUS at 13:02

## 2019-06-22 RX ADMIN — HEPARIN SODIUM 1650 UNIT(S)/HR: 5000 INJECTION INTRAVENOUS; SUBCUTANEOUS at 07:18

## 2019-06-22 RX ADMIN — Medication 20 MILLIGRAM(S): at 05:30

## 2019-06-22 RX ADMIN — PANTOPRAZOLE SODIUM 40 MILLIGRAM(S): 20 TABLET, DELAYED RELEASE ORAL at 05:37

## 2019-06-22 NOTE — PROGRESS NOTE ADULT - PROBLEM SELECTOR PROBLEM 2
Heterotaxy syndrome with asplenia
Hemoptysis
Heterotaxy syndrome with asplenia

## 2019-06-22 NOTE — PROGRESS NOTE ADULT - REASON FOR ADMISSION
hematemesis
hemoptysis

## 2019-06-22 NOTE — PROGRESS NOTE ADULT - PROBLEM SELECTOR PROBLEM 3
Single ventricle
Single ventricle
Heterotaxy syndrome with asplenia
Single ventricle

## 2019-06-22 NOTE — PROGRESS NOTE ADULT - ATTENDING COMMENTS
Patient seen and examined with mother and RCU team at bedside.  Will continue diuresis as per cardiology -- 20mg po lasix daily  Ambulating and OOB, supplemental O2  AC resumed,  INR still subtherapeutic - will c/w Coumadin 6 mg and Lovenox bridge  Plan for discharge today - INR check on 6/24    I was physically present for the key portions of the evaluation and management (E/M) service provided.  I agree with the above history, physical, and plan which I have reviewed and edited where appropriate.     Plan discussed with patient, mother and team.

## 2019-06-22 NOTE — PROGRESS NOTE ADULT - PROBLEM SELECTOR PROBLEM 1
Hemoptysis
MELLO (acute kidney injury)
MELLO (acute kidney injury)
DVT (deep venous thrombosis)
Hemoptysis
MELLO (acute kidney injury)
Hemoptysis

## 2019-06-22 NOTE — PROGRESS NOTE ADULT - PROVIDER SPECIALTY LIST ADULT
Cardiology
Internal Medicine
Intervent Radiology
MICU
Nephrology
Nephrology
Pulmonology
Thoracic Surgery
Thoracic Surgery
Nephrology
Nephrology
Pulmonology

## 2019-06-22 NOTE — PROGRESS NOTE ADULT - SUBJECTIVE AND OBJECTIVE BOX
CHIEF COMPLAINT:    Interval Events:      OBJECTIVE:  ICU Vital Signs Last 24 Hrs  T(C): 37.2 (22 Jun 2019 05:28), Max: 37.4 (21 Jun 2019 07:32)  T(F): 99 (22 Jun 2019 05:28), Max: 99.3 (21 Jun 2019 07:32)  HR: 95 (22 Jun 2019 05:28) (94 - 100)  BP: 118/75 (22 Jun 2019 05:28) (111/73 - 119/77)    RR: 20 (22 Jun 2019 05:28) (18 - 20)  SpO2: 87% (22 Jun 2019 05:28) (87% - 89%)    HOSPITAL MEDICATIONS:  MEDICATIONS  (STANDING):  chlorhexidine 4% Liquid 1 Application(s) Topical <User Schedule>  docusate sodium 100 milliGRAM(s) Oral three times a day  furosemide    Tablet 20 milliGRAM(s) Oral daily  heparin  Infusion. 1150 Unit(s)/Hr (11.5 mL/Hr) IV Continuous <Continuous>  pantoprazole    Tablet 40 milliGRAM(s) Oral two times a day  polyethylene glycol 3350 17 Gram(s) Oral two times a day  senna 2 Tablet(s) Oral at bedtime  warfarin 7 milliGRAM(s) Oral once    MEDICATIONS  (PRN):  acetaminophen   Tablet .. 650 milliGRAM(s) Oral every 6 hours PRN Temp greater or equal to 38.5C (101.3F), Mild Pain (1 - 3), Moderate Pain (4 - 6)  ALPRAZolam 0.25 milliGRAM(s) Oral every 8 hours PRN Anxiety  bisacodyl Suppository 10 milliGRAM(s) Rectal daily PRN Constipation      LABS:    06-21    137  |  99  |  26<H>  ----------------------------<  99  4.4   |  22  |  1.31<H>    Ca    9.3      21 Jun 2019 05:35      PT/INR - ( 22 Jun 2019 05:12 )   PT: 20.4 SEC;   INR: 1.76          PTT - ( 22 Jun 2019 05:12 )  PTT:53.6 SEC

## 2019-06-22 NOTE — PROGRESS NOTE ADULT - ASSESSMENT
Mr. 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 The patient presented to [facility] for recurrent episodes of Hemoptysis with Acute Hypoxic Respiratory Distress. Mr. Cortez was placed on oxygen in the ED and MICU called. Patient admitted to MICU and s/p Bronchoscopy on 5/31 showing RUL Posterior segmental bleeding. The patient was subsequently intubated on 5/31 for airway protection given hemoptysis in setting of hypoxia and respiratory distress. IR consulted and embolization of multiple vessel performed. Sedation/ Versed weaned off and Pressors switched from Levophed ot Phenylephrine on 6/1. The patient's INR with history of Coumadin use was noted to be elevated and the patient was multiple doses of FFPs were given. In the setting of SupraINR and Hemoptysis EndoBronchial Block placed on 6/1 to control bleeding. Mr. Cortez's hospital course was further complicated with fever spike on 6/1 and started on Zosyn and Vancomycin empirically; blood cultures drawn and noted to be negative. Duplex of LE was noted to be negative for acute findings and noted with chronic partial DVT to the mid-distal R Femoral Vein noted. Given acute bleed, AC held for now and respiratory stabilization and hemostasis taken as primary responsibility. HH remained stable and repeat Bronchoscopy performed on 6/4 with removal of EndoBronchial Block and suctioning of RUL Blood Clots and TONJA Mucous Plug. Fever spike again noted on 6/5 likely post procedural. BAL cultures noted to be negative as well. During ICU, the patient was also noted with abdominal distention, evaluated by US ABDOMEN noted with nodular liver but normal LFTs. Abdominal distention noted with gas passing and (+) BMs. Sedation weaned off on 6/6and Vancomycin dc'ed as no MRSA coverage warranted. On 6/7, Mr. Cortez was extubated to NIPPV with BiPAP. On BiPAP, the patient was noted with agitation and mild sedation provided with Precedex earned as tolerated SpO2 remained stable and BiPAP weaned to VentiMASK and then NC. Patient seen by SS and mechanical soft diet started. Abdominal distention remained. AXR noted with distended loops but no SBO and patient tolerated diet well. Mr. Cortez was then transferred to the RCU for continued monitoring and management.     # DISPO: Pending therapeutic INR prior to discharge.

## 2019-06-24 LAB
ANION GAP SERPL CALC-SCNC: 15 MMOL/L
BASOPHILS # BLD AUTO: 0.17 K/UL
BASOPHILS NFR BLD AUTO: 1.8 %
BUN SERPL-MCNC: 24 MG/DL
CALCIUM SERPL-MCNC: 9.5 MG/DL
CHLORIDE SERPL-SCNC: 99 MMOL/L
CO2 SERPL-SCNC: 24 MMOL/L
CREAT SERPL-MCNC: 1.25 MG/DL
EOSINOPHIL # BLD AUTO: 0.33 K/UL
EOSINOPHIL NFR BLD AUTO: 3.4 %
GLUCOSE SERPL-MCNC: 149 MG/DL
HCT VFR BLD CALC: 37.2 %
HGB BLD-MCNC: 11.6 G/DL
IMM GRANULOCYTES NFR BLD AUTO: 0.5 %
INR PPP: 2.03 RATIO
LYMPHOCYTES # BLD AUTO: 1.43 K/UL
LYMPHOCYTES NFR BLD AUTO: 14.8 %
MAN DIFF?: NORMAL
MCHC RBC-ENTMCNC: 25.3 PG
MCHC RBC-ENTMCNC: 31.2 GM/DL
MCV RBC AUTO: 81 FL
MONOCYTES # BLD AUTO: 1.28 K/UL
MONOCYTES NFR BLD AUTO: 13.3 %
NEUTROPHILS # BLD AUTO: 6.4 K/UL
NEUTROPHILS NFR BLD AUTO: 66.2 %
PLATELET # BLD AUTO: 259 K/UL
POTASSIUM SERPL-SCNC: 4.3 MMOL/L
PT BLD: 23.6 SEC
RBC # BLD: 4.59 M/UL
RBC # FLD: 23.9 %
SODIUM SERPL-SCNC: 138 MMOL/L
WBC # FLD AUTO: 9.66 K/UL

## 2019-06-28 ENCOUNTER — LABORATORY RESULT (OUTPATIENT)
Age: 24
End: 2019-06-28

## 2019-07-03 LAB — FUNGUS SPEC QL CULT: SIGNIFICANT CHANGE UP

## 2019-07-10 ENCOUNTER — RX RENEWAL (OUTPATIENT)
Age: 24
End: 2019-07-10

## 2019-07-12 ENCOUNTER — APPOINTMENT (OUTPATIENT)
Dept: PULMONOLOGY | Facility: CLINIC | Age: 24
End: 2019-07-12
Payer: COMMERCIAL

## 2019-07-12 ENCOUNTER — LABORATORY RESULT (OUTPATIENT)
Age: 24
End: 2019-07-12

## 2019-07-12 VITALS
DIASTOLIC BLOOD PRESSURE: 77 MMHG | SYSTOLIC BLOOD PRESSURE: 131 MMHG | OXYGEN SATURATION: 91 % | BODY MASS INDEX: 16.29 KG/M2 | RESPIRATION RATE: 16 BRPM | HEIGHT: 69 IN | TEMPERATURE: 98 F | HEART RATE: 99 BPM | WEIGHT: 110 LBS

## 2019-07-12 DIAGNOSIS — I82.411 ACUTE EMBOLISM AND THROMBOSIS OF RIGHT FEMORAL VEIN: ICD-10-CM

## 2019-07-12 DIAGNOSIS — R05 COUGH: ICD-10-CM

## 2019-07-12 DIAGNOSIS — R04.2 HEMOPTYSIS: ICD-10-CM

## 2019-07-12 PROCEDURE — 99215 OFFICE O/P EST HI 40 MIN: CPT

## 2019-07-12 NOTE — REVIEW OF SYSTEMS
[Fatigue] : fatigue [As Noted in HPI] : as noted in HPI [Abdominal Pain] : abdominal pain [Hepatic Disease] : hepatic disease [Negative] : Neurologic

## 2019-07-12 NOTE — HISTORY OF PRESENT ILLNESS
[FreeTextEntry1] : 24-year-old male recently discharged from the hospital having been admitted with massive hemoptysis secondary to bronchial artery hypertrophy and bleeding. 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 He has undergone a number of cardiac procedures including Fontan,Narinder, Taussig Chetna. He has had deep vein thrombosis for which he was maintained on Coumadin. Hemoptysis was controlled with endobronchial block followed by bronchial artery, mediastinal artery and internal mammary artery embolization. He is chronically hypoxemic. He has had bilateral small pleural effusions as well as ascites.\par Currently he has a positional cough, worsened in the supine position. Despite taking Flonase for approximately a week cough persisted but it has slowly been getting better. His appetite is impaired. His weight has been relatively stable. He denies any peripheral edema.

## 2019-07-12 NOTE — PHYSICAL EXAM
[Normal Conjunctiva] : the conjunctiva exhibited no abnormalities [Normal Oropharynx] : normal oropharynx [Neck Appearance] : the appearance of the neck was normal [] : no respiratory distress [Respiration, Rhythm And Depth] : normal respiratory rhythm and effort [Auscultation Breath Sounds / Voice Sounds] : lungs were clear to auscultation bilaterally [Abnormal Walk] : normal gait [Nail Clubbing] : no clubbing of the fingernails [Cyanosis, Localized] : no localized cyanosis [No Focal Deficits] : no focal deficits [Affect] : the affect was normal [Mood] : the mood was normal [FreeTextEntry1] : bell-shaped chest; pectus carinatum

## 2019-07-12 NOTE — ASSESSMENT
[FreeTextEntry1] : I believe the current cough as a result of multiple pulmonary manipulations. He required endotracheal intubation with endobronchial obstruction and resulting atelectasis. The positional nature also suggested upper airway origin. The cough is slowly improving. He feels improved with Robitussin-DM. I suggested that he stay away from nasal spray for now to see if the cough would improve but if not he should return to using it 2 sprays each nostril twice a day. Should this not work, I will reimage his lung. Also if the cough began to seriously affect his well being, I would add a codeine cough medicine.

## 2019-07-15 ENCOUNTER — RESULT REVIEW (OUTPATIENT)
Age: 24
End: 2019-07-15

## 2019-07-15 LAB
INR PPP: 1.9
PT BLD: 22.5

## 2019-07-17 ENCOUNTER — LABORATORY RESULT (OUTPATIENT)
Age: 24
End: 2019-07-17

## 2019-07-19 ENCOUNTER — APPOINTMENT (OUTPATIENT)
Dept: PULMONOLOGY | Facility: CLINIC | Age: 24
End: 2019-07-19

## 2019-07-20 ENCOUNTER — RESULT CHARGE (OUTPATIENT)
Age: 24
End: 2019-07-20

## 2019-07-22 ENCOUNTER — APPOINTMENT (OUTPATIENT)
Dept: PEDIATRIC CARDIOLOGY | Facility: CLINIC | Age: 24
End: 2019-07-22
Payer: COMMERCIAL

## 2019-07-22 VITALS
HEIGHT: 69.29 IN | OXYGEN SATURATION: 83 % | RESPIRATION RATE: 20 BRPM | SYSTOLIC BLOOD PRESSURE: 117 MMHG | HEART RATE: 98 BPM | BODY MASS INDEX: 16.3 KG/M2 | WEIGHT: 111.33 LBS | DIASTOLIC BLOOD PRESSURE: 68 MMHG

## 2019-07-22 PROCEDURE — 93325 DOPPLER ECHO COLOR FLOW MAPG: CPT

## 2019-07-22 PROCEDURE — 93303 ECHO TRANSTHORACIC: CPT

## 2019-07-22 PROCEDURE — 93000 ELECTROCARDIOGRAM COMPLETE: CPT

## 2019-07-22 PROCEDURE — 99214 OFFICE O/P EST MOD 30 MIN: CPT | Mod: 25

## 2019-07-22 PROCEDURE — 93320 DOPPLER ECHO COMPLETE: CPT

## 2019-07-22 RX ORDER — CHLORHEXIDINE GLUCONATE 4 %
325 (65 FE) LIQUID (ML) TOPICAL DAILY
Refills: 0 | Status: DISCONTINUED | COMMUNITY
End: 2019-07-22

## 2019-07-25 LAB
ALBUMIN SERPL ELPH-MCNC: 3.7 G/DL
ALP BLD-CCNC: 239 U/L
ALT SERPL-CCNC: 21 U/L
ANION GAP SERPL CALC-SCNC: 15 MMOL/L
AST SERPL-CCNC: 27 U/L
BASOPHILS # BLD AUTO: 0.16 K/UL
BASOPHILS NFR BLD AUTO: 2.4 %
BILIRUB SERPL-MCNC: 1.5 MG/DL
BUN SERPL-MCNC: 16 MG/DL
CALCIUM SERPL-MCNC: 9.6 MG/DL
CHLORIDE SERPL-SCNC: 102 MMOL/L
CO2 SERPL-SCNC: 21 MMOL/L
CREAT SERPL-MCNC: 1.01 MG/DL
EOSINOPHIL # BLD AUTO: 0.64 K/UL
EOSINOPHIL NFR BLD AUTO: 9.4 %
GLUCOSE SERPL-MCNC: 80 MG/DL
HCT VFR BLD CALC: 40 %
HGB BLD-MCNC: 12.4 G/DL
IMM GRANULOCYTES NFR BLD AUTO: 0.3 %
INR PPP: 2.49 RATIO
LYMPHOCYTES # BLD AUTO: 1.74 K/UL
LYMPHOCYTES NFR BLD AUTO: 25.7 %
MAN DIFF?: NORMAL
MCHC RBC-ENTMCNC: 24.7 PG
MCHC RBC-ENTMCNC: 31 GM/DL
MCV RBC AUTO: 79.5 FL
MONOCYTES # BLD AUTO: 0.71 K/UL
MONOCYTES NFR BLD AUTO: 10.5 %
NEUTROPHILS # BLD AUTO: 3.51 K/UL
NEUTROPHILS NFR BLD AUTO: 51.7 %
NT-PROBNP SERPL-MCNC: 756 PG/ML
PLATELET # BLD AUTO: 428 K/UL
POTASSIUM SERPL-SCNC: 4.3 MMOL/L
PREALB SERPL NEPH-MCNC: 9 MG/DL
PROT SERPL-MCNC: 7.7 G/DL
PT BLD: 28.9 SEC
RBC # BLD: 5.03 M/UL
RBC # FLD: 20 %
SODIUM SERPL-SCNC: 138 MMOL/L
WBC # FLD AUTO: 6.78 K/UL

## 2019-07-29 NOTE — DISCUSSION/SUMMARY
[Needs SBE Prophylaxis] : [unfilled]  needs bacterial endocarditis prophylaxis. SBE prophylaxis is indicated for dental and invasive ENT procedures. (Circulation. 2007; 116: 0676-0308) [FreeTextEntry1] : In summary,  Kang has recovered from his hospitalization in the context of his hemoptysis and his MELLO. He continues to have significant ascites in his abdomen and scrotum which is uncomfortable and slow to resolve. As we have previously discussed at prior visits this may be partially due to elevated Fontan pressures. We discussed with Klever and his mother that an overwhelming majority of patients with Fontan physiology develop liver dysfunction and we would like him to follow up with a hepatologist. If his liver displays normal synthetic function then we would advocate that Kang undergo cardiac catheterization to evaluate his Fontan pressures to optimize his medical management. In the interim, we have ordered a comprehensive set of labs and will increase his Lasix to twice daily to help facilitate elimination of some of his ascites.\par \par He should continue his Coumadin and INR checks. We will see him back once we have the consult from the hepatologist or sooner if clinically indicated. [Participate only in Mild PE activities] : [unfilled] may participate ONLY IN MILD physical education activities such as [place] games, golf, and badminton.

## 2019-07-29 NOTE — CONSULT LETTER
[] : : ~~ [Name] : Name: [unfilled] [Dear  ___:] : Dear Dr. [unfilled]: [Consult] : I had the pleasure of evaluating your patient, [unfilled]. My full evaluation follows. [Consult - Multiple Provider] : Thank you very much for allowing us to participate in the care of this patient. If you have any questions, please do not hesitate to contact us. [Sincerely,] : Sincerely, [DrJose ___] : Dr. MALDONADO [DrJose  ___] : Dr. MALDONADO [FreeTextEntry4] : Jace Hall MD [FreeTextEntry5] : [address] [FreeTextEntry9] : [birth year] [FreeTextEntry6] : [place] [address] [FreeTextEntry1] : [birth year] [de-identified] : Mega Rojas M.D., TAYLOR\par Professor and Executive \par Department of Pediatrics\par [facility]\par Director, Adult Congenital Heart Disease Program\par [facility]\par \par \par \par Pauline Morrell, MSN, CPNP-AC, PC\par Pediatric Cardiology, Adult Congenital Cardiology\par [facility]\par

## 2019-07-29 NOTE — REVIEW OF SYSTEMS
[Feeling Poorly] : feeling poorly (malaise) [Pallor] : pale [Change in Vision] : change in vision [Cyanosis] : cyanosis [Exercise Intolerance] : persistence of exercise intolerance [Edema] : edema [Shortness Of Breath] : expressed as feeling short of breath [Cough] : cough [Abdominal Pain] : abdominal pain [Decrease In Appetite] : decreased appetite [Wound problems] : wound problems [Sleep Disturbances] : ~T sleep disturbances [Anxiety] : anxiety [Fever] : no fever [Eye Discharge] : no eye discharge [Redness] : no redness [Nasal Stuffiness] : no nasal congestion [Sore Throat] : no sore throat [Earache] : no earache [Loss Of Hearing] : no hearing loss [Diaphoresis] : not diaphoretic [Palpitations] : no palpitations [Tachypnea] : not tachypneic [Wheezing] : no wheezing [Vomiting] : no vomiting [Diarrhea] : no diarrhea [Fainting (Syncope)] : no fainting [Seizure] : no seizures [Dizziness] : no dizziness [Joint Pains] : no arthralgias [Headache] : no headache [Rash] : no rash [Joint Swelling] : no joint swelling [Swollen Glands] : no lymphadenopathy [Easy Bleeding] : no ~M tendency for easy bleeding [Nosebleeds] : no epistaxis [Depression] : no depression [Hyperactive] : no hyperactive behavior [Dec Urine Output] : no oliguria [Heat/Cold Intolerance] : no temperature intolerance

## 2019-07-29 NOTE — REASON FOR VISIT
[F/U - Hospitalization] : follow-up of a recent hospitalization for [Patient] : patient [Mother] : mother [FreeTextEntry3] : complex CHD ,Fontan,venous stasis (RLE) and recent admission for hemoptysis and embolization

## 2019-07-29 NOTE — CARDIOLOGY SUMMARY
[Today's Date] : [unfilled] [FreeTextEntry1] : Sinus rhythm with a prolonged IN interval\par Left axis deviation (left anterior hemiblock)\par Incomplete left bundle branch block

## 2019-07-29 NOTE — HISTORY OF PRESENT ILLNESS
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 He ultimately underwent takedown of the Narinder shunt and underwent a fenestrated Fontan procedure which was subsequently managed by attempted cath lab closure of the fenestration. As a result he had thrombosis of the right femoral vein and subsequent development of venous stasis in that leg and ultimately significant color change and varicosities.\par \par Kang was recently discharged from the hospital following an episode of massive hempotysis. He ultimately underwent an endobronchial block, followed by bronchial artery, mediastinal artery and internal mammary artery embolization. He suffered MELLO during his hospitalization which has since resolved. He has persistent painful abdominal and scrotal ascites which is the reason for his visit today.\par \par His cardiovascular review of systems is unremarkable. Specifically, there are no complaints of chest pain, palpitations, shortness of breath, peripheral edema, dizziness or syncope. He does report severe abdominal and scrotal swelling.\par \par He remains on Coumadin with therapeutic INR's.\par \par \par \par \par

## 2019-07-29 NOTE — PHYSICAL EXAM
[General Appearance - Alert] : alert [Cachectic] : cachexia was observed [Chronically Ill Appearing] : chronically ill appearing [Sclera] : the conjunctiva were normal [Examination Of The Oral Cavity] : mucous membranes were moist and pink [] : no respiratory distress [Pectus Excavatum] : a pectus excavatum was noted [Deformity] : a chest deformity was noted [Sternotomy] : sternotomy [Well-Healed] : well-healed [Unremarkable] : unremarkable [Arterial Pulses] : normal upper and lower extremity pulses with no pulse delay [Precordial Heave] : There was a pericordial heave. [___ +] : [unfilled]U+ pitting edema to the right ankle [Systolic] : systolic [III] : a grade 3/6   [Apical] : apex [Carotids] : the murmur was transmitted to the carotid arteries [Diastolic] : diastolic [I] : a grade 1/4  [LLSB] : LLSB  [Rumbling] : rumbling [Musculoskeletal Exam: Normal Movement Of All Extremities] : normal movements of all extremities [Nail Clubbing] : clubbing of the fingers [Cyanosis Of Fingers] : cyanosis of the fingers [Abnormal Walk] : normal gait [Demonstrated Behavior - Infant Nonreactive To Parents] : interactive [FreeTextEntry1] : shoddy anterior cervical nodes

## 2019-08-06 NOTE — PATIENT PROFILE ADULT. - NSNUTRITIONRISK_GI_A_CORE
[FreeTextEntry1] : Ms. Deshpande is a 31-year-old woman last seen by me on 12/20/2018 for her headaches and migraines.  She was sent for an mri cervical spine which showed moderate to severe spinal stenosis at c5/c6.  She does not have neck pain currently and still gets muscle twitching throughout randomly.  Sometimes spasms in extremities.  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
No indicators present

## 2019-08-21 ENCOUNTER — LABORATORY RESULT (OUTPATIENT)
Age: 24
End: 2019-08-21

## 2019-08-28 ENCOUNTER — APPOINTMENT (OUTPATIENT)
Dept: HEPATOLOGY | Facility: CLINIC | Age: 24
End: 2019-08-28
Payer: COMMERCIAL

## 2019-08-28 ENCOUNTER — LABORATORY RESULT (OUTPATIENT)
Age: 24
End: 2019-08-28

## 2019-08-28 VITALS
HEART RATE: 85 BPM | WEIGHT: 111 LBS | OXYGEN SATURATION: 79 % | SYSTOLIC BLOOD PRESSURE: 107 MMHG | HEIGHT: 69 IN | RESPIRATION RATE: 14 BRPM | BODY MASS INDEX: 16.44 KG/M2 | DIASTOLIC BLOOD PRESSURE: 70 MMHG

## 2019-08-28 PROCEDURE — 99214 OFFICE O/P EST MOD 30 MIN: CPT

## 2019-08-28 PROCEDURE — 99204 OFFICE O/P NEW MOD 45 MIN: CPT

## 2019-08-28 NOTE — REVIEW OF SYSTEMS
[Feeling Poorly] : feeling poorly [Feeling Tired] : feeling tired [Recent Weight Gain (___ Lbs)] : recent [unfilled] ~Ulb weight gain [Recent Weight Loss (___ Lbs)] : recent [unfilled] ~Ulb weight loss [Palpitations] : palpitations [Lower Ext Edema] : lower extremity edema [SOB on Exertion] : shortness of breath during exertion [Negative] : Endocrine [Fever] : no fever [Chills] : no chills [FreeTextEntry8] : scrotal swelling [FreeTextEntry9] : sarcopenia

## 2019-08-28 NOTE — PHYSICAL EXAM
[Abdominal  Ascites] : ascites [Ascites Fluid Wave] : positive ascites fluid wave [Ascites Shifting Dullness] : shifting dullness if ascites [Liver Palpable ___ Finger Breadths Below Costal Margin] : Liver edge was palpable [unfilled] finger breadths below costal margin [Liver Size (___ Cm)] : Liver size [unfilled] cm [Non-Tender] : non-tender [Scrotal Edema] : Scrotum is edematous [General Appearance - Alert] : alert [General Appearance - In No Acute Distress] : in no acute distress [Sclera] : the sclera and conjunctiva were normal [Outer Ear] : the ears and nose were normal in appearance [Auscultation Breath Sounds / Voice Sounds] : lungs were clear to auscultation bilaterally [Edema] : there was no peripheral edema [Bowel Sounds] : normal bowel sounds [Abdomen Soft] : soft [Supraclavicular Lymph Nodes Enlarged Bilaterally] : supraclavicular [No CVA Tenderness] : no ~M costovertebral angle tenderness [No Spinal Tenderness] : no spinal tenderness [Skin Color & Pigmentation] : normal skin color and pigmentation [Scleral Icterus] : No Scleral Icterus [Hepatojugular Reflux] : patient did not have a sustained hepatojugular reflux [Spider Angioma] : No spider angioma(s) were observed [Abdominal Bruit] : no abdominal bruit [Ascites Tense] : ascites is not tense [Splenomegaly] : no splenomegaly [Caput Medusae] : no caput medusae observed [Jaundice] : No jaundice [Asterixis] : no asterixis observed [Palmar Erythema] : no Palmar Erythema [FreeTextEntry4] : firm liver with ascites [FreeTextEntry1] : cyanotic

## 2019-08-28 NOTE — HISTORY OF PRESENT ILLNESS
[de-identified] : This patient has history of heterotaxy and a Fontan procedure performed at age 2.  The patient has a splenic and has not been aware of pre-existing liver abnormalities.  The patient has had multiple episodes of hemoptysis in the past and in May of 2019 underwent a pulmonary procedure to embolize bleeding sites in his lungs.  During that hospitalization he received significant amounts of fluid and developed edema and ascites.  He had been treated with diuretics with weight loss of about 20 pounds of fluid, but was left with residual ascites.\par \par The patient has history in the past of a right leg venous thrombosis, but has not had history of hepatitis, alcohol use, or other known liver injury.  Laboratory testing on July 22, 2019 showed a bilirubin of 1.5, alkaline phosphatase of 239, AST 27, ALT 21.  The patient has limited exercise tolerance which has been his normal condition.

## 2019-08-28 NOTE — ASSESSMENT
[FreeTextEntry1] : This patientwith a Fontan procedure has moderate ascites with a firm slightly enlarged liver.  The patient will be assessed for multiple etiologies for liver disease and also MRI with MR Elastography to attempt to assess degree of hepatic fibrosis.  The patient is on furosemide.  I will check electrolytes and renal function and have advised that the patient begin a sodium restricted diet to further reduce fluid overload.  Further evaluation, including paracentesis or liver biopsy with hepatic wedge pressures may be considered after MR imaging.  Clinical improvement, may be expected with continued diuresis and sodium restriction.

## 2019-08-30 LAB
AFP-TM SERPL-MCNC: 3.2 NG/ML
ALBUMIN SERPL ELPH-MCNC: 4.3 G/DL
ALP BLD-CCNC: 207 U/L
ALT SERPL-CCNC: 27 U/L
ANA SER IF-ACNC: NEGATIVE
ANION GAP SERPL CALC-SCNC: 15 MMOL/L
AST SERPL-CCNC: 49 U/L
BASOPHILS # BLD AUTO: 0.23 K/UL
BASOPHILS NFR BLD AUTO: 4.2 %
BILIRUB DIRECT SERPL-MCNC: 1 MG/DL
BILIRUB SERPL-MCNC: 1.8 MG/DL
BUN SERPL-MCNC: 20 MG/DL
CALCIUM SERPL-MCNC: 9.7 MG/DL
CHLORIDE SERPL-SCNC: 103 MMOL/L
CHOLEST SERPL-MCNC: 105 MG/DL
CHOLEST/HDLC SERPL: 3.4 RATIO
CO2 SERPL-SCNC: 23 MMOL/L
CREAT SERPL-MCNC: 1.04 MG/DL
DEPRECATED KAPPA LC FREE/LAMBDA SER: 1.54 RATIO
EOSINOPHIL # BLD AUTO: 0.78 K/UL
EOSINOPHIL NFR BLD AUTO: 14.2 %
ESTIMATED AVERAGE GLUCOSE: 128 MG/DL
GLUCOSE SERPL-MCNC: 90 MG/DL
HBA1C MFR BLD HPLC: 6.1 %
HBV CORE IGG+IGM SER QL: NONREACTIVE
HBV SURFACE AB SER QL: NONREACTIVE
HBV SURFACE AG SER QL: NONREACTIVE
HCT VFR BLD CALC: 43.2 %
HCV AB SER QL: NONREACTIVE
HCV S/CO RATIO: 0.11 S/CO
HDLC SERPL-MCNC: 31 MG/DL
HEPATITIS A IGG ANTIBODY: NONREACTIVE
HGB BLD-MCNC: 12.7 G/DL
IGA SER QL IEP: 367 MG/DL
IGG SER QL IEP: 1932 MG/DL
IGM SER QL IEP: 100 MG/DL
IMM GRANULOCYTES NFR BLD AUTO: 0.2 %
INR PPP: 3.09 RATIO
KAPPA LC CSF-MCNC: 2.13 MG/DL
KAPPA LC SERPL-MCNC: 3.27 MG/DL
LDLC SERPL CALC-MCNC: 60 MG/DL
LKM AB SER QL IF: <20.1 UNITS
LPL SERPL-CCNC: 18 U/L
LYMPHOCYTES # BLD AUTO: 1.38 K/UL
LYMPHOCYTES NFR BLD AUTO: 25.2 %
MAN DIFF?: NORMAL
MCHC RBC-ENTMCNC: 22.2 PG
MCHC RBC-ENTMCNC: 29.4 GM/DL
MCV RBC AUTO: 75.5 FL
MONOCYTES # BLD AUTO: 0.66 K/UL
MONOCYTES NFR BLD AUTO: 12 %
NEUTROPHILS # BLD AUTO: 2.42 K/UL
NEUTROPHILS NFR BLD AUTO: 44.2 %
PLATELET # BLD AUTO: 298 K/UL
POTASSIUM SERPL-SCNC: 4.6 MMOL/L
PROT SERPL-MCNC: 8.1 G/DL
PT BLD: 36.8 SEC
RBC # BLD: 5.72 M/UL
RBC # FLD: 20.3 %
SMOOTH MUSCLE AB SER QL IF: NORMAL
SODIUM SERPL-SCNC: 141 MMOL/L
TRIGL SERPL-MCNC: 68 MG/DL
WBC # FLD AUTO: 5.48 K/UL

## 2019-09-02 PROBLEM — Z09 FOLLOW UP: Status: ACTIVE | Noted: 2018-04-12

## 2019-09-11 ENCOUNTER — FORM ENCOUNTER (OUTPATIENT)
Age: 24
End: 2019-09-11

## 2019-09-12 ENCOUNTER — OUTPATIENT (OUTPATIENT)
Dept: OUTPATIENT SERVICES | Facility: HOSPITAL | Age: 24
LOS: 1 days | End: 2019-09-12
Payer: COMMERCIAL

## 2019-09-12 ENCOUNTER — APPOINTMENT (OUTPATIENT)
Dept: MRI IMAGING | Facility: CLINIC | Age: 24
End: 2019-09-12
Payer: COMMERCIAL

## 2019-09-12 DIAGNOSIS — R18.8 OTHER ASCITES: ICD-10-CM

## 2019-09-12 DIAGNOSIS — Z98.89 OTHER SPECIFIED POSTPROCEDURAL STATES: Chronic | ICD-10-CM

## 2019-09-12 PROCEDURE — 76391 MR ELASTOGRAPHY: CPT

## 2019-09-12 PROCEDURE — A9585: CPT

## 2019-09-12 PROCEDURE — 76391 MR ELASTOGRAPHY: CPT | Mod: 26

## 2019-09-12 PROCEDURE — 74183 MRI ABD W/O CNTR FLWD CNTR: CPT

## 2019-09-12 PROCEDURE — 74183 MRI ABD W/O CNTR FLWD CNTR: CPT | Mod: 26

## 2019-09-15 ENCOUNTER — MOBILE ON CALL (OUTPATIENT)
Age: 24
End: 2019-09-15

## 2019-09-24 ENCOUNTER — TRANSCRIPTION ENCOUNTER (OUTPATIENT)
Age: 24
End: 2019-09-24

## 2019-09-24 ENCOUNTER — OTHER (OUTPATIENT)
Age: 24
End: 2019-09-24

## 2019-09-24 ENCOUNTER — RX RENEWAL (OUTPATIENT)
Age: 24
End: 2019-09-24

## 2019-09-26 ENCOUNTER — LABORATORY RESULT (OUTPATIENT)
Age: 24
End: 2019-09-26

## 2019-10-31 ENCOUNTER — OTHER (OUTPATIENT)
Age: 24
End: 2019-10-31

## 2019-11-11 LAB
ALBUMIN SERPL ELPH-MCNC: 4.4 G/DL
ALP BLD-CCNC: 210 U/L
ALT SERPL-CCNC: 27 U/L
ANION GAP SERPL CALC-SCNC: 15 MMOL/L
AST SERPL-CCNC: 34 U/L
BILIRUB SERPL-MCNC: 2.9 MG/DL
BUN SERPL-MCNC: 26 MG/DL
CALCIUM SERPL-MCNC: 9.9 MG/DL
CHLORIDE SERPL-SCNC: 98 MMOL/L
CO2 SERPL-SCNC: 22 MMOL/L
CREAT SERPL-MCNC: 0.98 MG/DL
GLUCOSE SERPL-MCNC: 168 MG/DL
INR PPP: 1.65 RATIO
POTASSIUM SERPL-SCNC: 5 MMOL/L
PROT SERPL-MCNC: 7.9 G/DL
PT BLD: 19.3 SEC
SODIUM SERPL-SCNC: 135 MMOL/L

## 2019-11-19 ENCOUNTER — LABORATORY RESULT (OUTPATIENT)
Age: 24
End: 2019-11-19

## 2019-11-25 ENCOUNTER — RX RENEWAL (OUTPATIENT)
Age: 24
End: 2019-11-25

## 2019-12-19 ENCOUNTER — LABORATORY RESULT (OUTPATIENT)
Age: 24
End: 2019-12-19

## 2019-12-30 NOTE — DISCHARGE NOTE ADULT - AN EXTRA PILL TO ‘CATCH UP.’ NEVER TAKE A DOUBLE DOSE. NOTIFY YOUR DOCTOR THAT YOU MISSED A DOSE. TAKE WARFARIN/COUMADIN IN THE EVENING AT THE SAME TIME.
Statement Selected

## 2020-01-25 ENCOUNTER — RESULT CHARGE (OUTPATIENT)
Age: 25
End: 2020-01-25

## 2020-01-26 ENCOUNTER — RESULT CHARGE (OUTPATIENT)
Age: 25
End: 2020-01-26

## 2020-01-27 ENCOUNTER — APPOINTMENT (OUTPATIENT)
Dept: PEDIATRIC CARDIOLOGY | Facility: CLINIC | Age: 25
End: 2020-01-27
Payer: COMMERCIAL

## 2020-01-27 VITALS
HEIGHT: 69.29 IN | BODY MASS INDEX: 16.88 KG/M2 | SYSTOLIC BLOOD PRESSURE: 118 MMHG | OXYGEN SATURATION: 87 % | DIASTOLIC BLOOD PRESSURE: 72 MMHG | HEART RATE: 87 BPM | WEIGHT: 115.3 LBS | RESPIRATION RATE: 16 BRPM

## 2020-01-27 LAB
INR PPP: 1.8 RATIO
POCT-PROTHROMBIN TIME: 21.9 SECS

## 2020-01-27 PROCEDURE — 93320 DOPPLER ECHO COMPLETE: CPT

## 2020-01-27 PROCEDURE — 99214 OFFICE O/P EST MOD 30 MIN: CPT | Mod: 25

## 2020-01-27 PROCEDURE — 93325 DOPPLER ECHO COLOR FLOW MAPG: CPT

## 2020-01-27 PROCEDURE — 93000 ELECTROCARDIOGRAM COMPLETE: CPT

## 2020-01-27 PROCEDURE — 93303 ECHO TRANSTHORACIC: CPT

## 2020-01-29 ENCOUNTER — APPOINTMENT (OUTPATIENT)
Dept: HEPATOLOGY | Facility: CLINIC | Age: 25
End: 2020-01-29
Payer: COMMERCIAL

## 2020-01-29 ENCOUNTER — LABORATORY RESULT (OUTPATIENT)
Age: 25
End: 2020-01-29

## 2020-01-29 VITALS
OXYGEN SATURATION: 87 % | WEIGHT: 115 LBS | SYSTOLIC BLOOD PRESSURE: 109 MMHG | DIASTOLIC BLOOD PRESSURE: 71 MMHG | HEART RATE: 90 BPM | BODY MASS INDEX: 17.03 KG/M2 | HEIGHT: 69 IN

## 2020-01-29 DIAGNOSIS — Q26.2 TOTAL ANOMALOUS PULMONARY VENOUS CONNECTION: ICD-10-CM

## 2020-01-29 PROCEDURE — 99215 OFFICE O/P EST HI 40 MIN: CPT | Mod: 25

## 2020-01-29 PROCEDURE — 36415 COLL VENOUS BLD VENIPUNCTURE: CPT

## 2020-01-30 LAB
AFP-TM SERPL-MCNC: 3.6 NG/ML
ALBUMIN SERPL ELPH-MCNC: 4.6 G/DL
ALP BLD-CCNC: 166 U/L
ALT SERPL-CCNC: 38 U/L
ANION GAP SERPL CALC-SCNC: 14 MMOL/L
AST SERPL-CCNC: 42 U/L
BASOPHILS # BLD AUTO: 0.23 K/UL
BASOPHILS NFR BLD AUTO: 4 %
BILIRUB SERPL-MCNC: 2.8 MG/DL
BUN SERPL-MCNC: 16 MG/DL
CALCIUM SERPL-MCNC: 9.7 MG/DL
CHLORIDE SERPL-SCNC: 101 MMOL/L
CO2 SERPL-SCNC: 25 MMOL/L
CREAT SERPL-MCNC: 0.95 MG/DL
EOSINOPHIL # BLD AUTO: 0.72 K/UL
EOSINOPHIL NFR BLD AUTO: 12.5 %
GLUCOSE SERPL-MCNC: 88 MG/DL
HCT VFR BLD CALC: 46 %
HGB BLD-MCNC: 13.7 G/DL
IMM GRANULOCYTES NFR BLD AUTO: 0.2 %
INR PPP: 1.39 RATIO
LYMPHOCYTES # BLD AUTO: 1.28 K/UL
LYMPHOCYTES NFR BLD AUTO: 22.1 %
MAN DIFF?: NORMAL
MCHC RBC-ENTMCNC: 21.6 PG
MCHC RBC-ENTMCNC: 29.8 GM/DL
MCV RBC AUTO: 72.4 FL
MONOCYTES # BLD AUTO: 0.96 K/UL
MONOCYTES NFR BLD AUTO: 16.6 %
NEUTROPHILS # BLD AUTO: 2.58 K/UL
NEUTROPHILS NFR BLD AUTO: 44.6 %
PLATELET # BLD AUTO: 247 K/UL
POTASSIUM SERPL-SCNC: 4.1 MMOL/L
PROT SERPL-MCNC: 7.9 G/DL
PT BLD: 16.1 SEC
RBC # BLD: 6.35 M/UL
RBC # FLD: 25.2 %
SODIUM SERPL-SCNC: 140 MMOL/L
WBC # FLD AUTO: 5.78 K/UL

## 2020-01-31 NOTE — DISCUSSION/SUMMARY
[Needs SBE Prophylaxis] : [unfilled]  needs bacterial endocarditis prophylaxis. SBE prophylaxis is indicated for dental and invasive ENT procedures. (Circulation. 2007; 116: 8149-2234) [May participate in all age-appropriate activities] : [unfilled] May participate in all age-appropriate activities. [Influenza vaccine is recommended] : Influenza vaccine is recommended [FreeTextEntry1] : In summary Kang is a 24 year old male with complex single ventricle physiology who has undergone Fontan palliation. He most recently was hospitalized in June 2019 for hemoptysis requiring bronchial artery, mediastinal artery and internal mammary embolization. He was also found to have a significant right femoral DVT. When we saw him last in July he still had significant abdominal ascites that has completely resolved. We would make no changes to his diuretic regimen at this time. \par \par His echocardiogram today remains unchanged with moderate AV valve regurgitation and depressed systolic RV function. We discussed with him that we would like to evaluate his hemodynamics at some point in the near future with a cardiac catheterization. This will allow us to obtain accurate Fontan pressures and allow us to better manage him medically moving forward. We would like to see him back in 6 months or sooner if clinically indicated.

## 2020-01-31 NOTE — CARDIOLOGY SUMMARY
[Today's Date] : [unfilled] [FreeTextEntry1] : sinus rhythm with a prolonged pr interval\par left axis deviation\par incomplete left bundle branch block

## 2020-01-31 NOTE — HISTORY OF PRESENT ILLNESS
[FreeTextEntry1] : Kang was seen today at the Adult Congenital Program at [facility]. 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 He ultimately underwent takedown of the Narinder shunt and underwent a fenestrated Fontan procedure. He had a failed attempt at fenestration closure in the cath lab resulting in thrombosis of the right femoral vein and subsequent development of venous stasis in that leg and ultimately significant color change and varicosities.\par \par In June 2019 Kang had an episode of massive hempotysis. He ultimately underwent an endobronchial block, followed by bronchial artery, mediastinal artery and internal mammary artery embolization. He suffered MELLO during his hospitalization which has since resolved. Additionally he had significant abdominal ascities that has completely resolved with increased diuretics and the addition of Aldactone.\par \par He underwent a routine liver evaluation and MRI in August 2019. His liver MRI elastography demonstrated stage 4 fibrosis, not uncommon in patients with failing Fontan physiology. \par \par His cardiovascular review of systems is unremarkable. Specifically, there are no complaints of chest pain, palpitations, shortness of breath, peripheral edema, dizziness or syncope. He does report severe abdominal and scrotal swelling.\par \par He remains on Coumadin with therapeutic INR's.\par

## 2020-01-31 NOTE — CONSULT LETTER
[Name] : Name: [unfilled] [] : : ~~ [Dear  ___:] : Dear Dr. [unfilled]: [Consult] : I had the pleasure of evaluating your patient, [unfilled]. My full evaluation follows. [Consult - Multiple Provider] : Thank you very much for allowing us to participate in the care of this patient. If you have any questions, please do not hesitate to contact us. [Sincerely,] : Sincerely, [DrJose  ___] : Dr. MALDONADO [DrJose ___] : Dr. MALDONADO [FreeTextEntry9] : [birth year] [FreeTextEntry4] : Jace Hall MD [FreeTextEntry5] : [address] [FreeTextEntry6] : [place] [address] [FreeTextEntry1] : [birth year] [de-identified] : Pauline Morrell, MSN, CPNP-AC, PC\par Pediatric Cardiology, Adult Congenital Cardiology\par [facility]\par \par Mega Rojas M.D., TAYLOR\par Professor and Executive \par Department of Pediatrics\par [facility]\par Director, Adult Congenital Heart Disease Program\par [facility]\par \par \par \par Pauline Morrell, MSN, CPNP-AC, PC\par Pediatric Cardiology, Adult Congenital Cardiology\par [facility]\par

## 2020-01-31 NOTE — PHYSICAL EXAM
[General Appearance - Alert] : alert [General Appearance - Well-Appearing] : well appearing [Cachectic] : cachexia was observed [Facies] : the head and face were normal in appearance [Sclera] : the sclera were normal [Examination Of The Oral Cavity] : mucous membranes were moist and pink [Auscultation Breath Sounds / Voice Sounds] : breath sounds clear to auscultation bilaterally [Pectus Excavatum] : a pectus excavatum was noted [Sternotomy] : sternotomy [Well-Healed] : well-healed [Unremarkable] : unremarkable [Heart Rate And Rhythm] : normal heart rate and rhythm [Normal S1] : normal  [S2 Single] : was single [Systolic] : systolic [III] : a grade 3/6   [Apical] : apex [Carotids] : the murmur was transmitted to the carotid arteries [Diastolic] : diastolic [I] : a grade 1/4  [LLSB] : LLSB  [Nondistended] : nondistended [Abdomen Tenderness] : non-tender [Nail Clubbing] : no clubbing  or cyanosis of the fingers [Musculoskeletal Exam: Normal Movement Of All Extremities] : normal movements of all extremities [Musculoskeletal - Swelling] : no joint swelling or joint tenderness [Abnormal Walk] : normal gait [Skin Turgor] : normal turgor [Demonstrated Behavior - Infant Nonreactive To Parents] : interactive [Mood] : mood and affect were appropriate for age [FreeTextEntry1] : right ankle varicosities and discoloration

## 2020-02-02 ENCOUNTER — RESULT CHARGE (OUTPATIENT)
Age: 25
End: 2020-02-02

## 2020-02-09 ENCOUNTER — RESULT CHARGE (OUTPATIENT)
Age: 25
End: 2020-02-09

## 2020-02-14 NOTE — ASSESSMENT
[FreeTextEntry1] : This patient has had from time procedure and has continued cardiac dysfunction with increased right-sided pressures.  The patient's liver has evidence of fibrosis on MR Elastography and on physical exam.  I will obtain fibroscan test today to compare to results of MR Elastography the patient will have laboratory testing today and if appropriate.  I may recommend reducing furosemide 40 mg daily and continue spironolactone.  The use of furosemide twice daily has significantly impact the patient's quality of life regarding the frequency of urination .  He may be able to control his fluid balance with once a day dosing now that his extra fluid has been removed.  MRI did not reveal evidence of a suspicious liver lesion however, because of the nodularity of his liver.  I will repeat MRI in 6 months with Eovist contrast as suggested.

## 2020-02-14 NOTE — PHYSICAL EXAM
[Liver Size (___ Cm)] : Liver size [unfilled] cm [Liver Palpable ___ Finger Breadths Below Costal Margin] : Liver edge was palpable [unfilled] finger breadths below costal margin [Non-Tender] : non-tender [General Appearance - Alert] : alert [Sclera] : the sclera and conjunctiva were normal [General Appearance - In No Acute Distress] : in no acute distress [Outer Ear] : the ears and nose were normal in appearance [Auscultation Breath Sounds / Voice Sounds] : lungs were clear to auscultation bilaterally [Edema] : there was no peripheral edema [Abdomen Tenderness] : non-tender [Bowel Sounds] : normal bowel sounds [Abdomen Soft] : soft [Abdomen Mass (___ Cm)] : no abdominal mass palpated [Supraclavicular Lymph Nodes Enlarged Bilaterally] : supraclavicular [No CVA Tenderness] : no ~M costovertebral angle tenderness [No Spinal Tenderness] : no spinal tenderness [Skin Color & Pigmentation] : normal skin color and pigmentation [Oriented To Time, Place, And Person] : oriented to person, place, and time [Scleral Icterus] : No Scleral Icterus [Hepatojugular Reflux] : patient did not have a sustained hepatojugular reflux [Spider Angioma] : No spider angioma(s) were observed [Abdominal Bruit] : no abdominal bruit [Abdominal  Ascites] : no ascites [Ascites Fluid Wave] : no ascites fluid wave [Ascites Tense] : ascites is not tense [Ascites Shifting Dullness] : no shifting dullness of ascites [Splenomegaly] : no splenomegaly [Caput Medusae] : no caput medusae observed [Scrotal Edema] : Scrotum is not edematous [Asterixis] : no asterixis observed [Jaundice] : No jaundice [Palmar Erythema] : no Palmar Erythema [FreeTextEntry4] : firm liver without ascites\par spleen absent [FreeTextEntry1] : cyanotic

## 2020-02-14 NOTE — HISTORY OF PRESENT ILLNESS
[Severe] : severe [Unchanged] : unchanged [Fatigue] : fatigue stable [Malaise] : denies malaise [Muscle Aches, Generalized (Myalgias)] : denies myalgias [Diffuse Joint Pain (Arthralgias)] : denies arthralgias [Fever] : denies fever [Urine Tests Nonspecific Abnormal Findings Biliuria] : denies dark urine [Yellow Skin Or Eyes (Jaundice)] : denies jaundice [Abdominal Pain] : denies abdominal pain [Light Colored Bowel Movement (Acholic Stools)] : denies pale stools [Insomnia] : denies insomnia [Skin: Rash] : denies rash [Shortness Of Breath] : shortness of breath stable [Itching (Pruritus)] : denies pruritus [Needlestick Exposure] : no needlestick exposure [Infected Sexual Partner] : no infected sexual partner [IV Drug Use] : no IV drug use [Tattoo] : no tattoos [Body Piercing] : no body piercing [Transfusion before 1992] : no transfusion before 1992 [Hemodialysis] : no hemodialysis [Transplant before 1992] : no transplant before 1992 [Incarceration] : no incarceration [Alcohol Abuse] : no alcohol abuse [Autoimmune Disorder] : no autoimmune disorder [Household Contact to HBV] : no household contact to HBV [Travel to Endemic Area] : no travel to an endemic area [Occupational Exposure] : no occupational exposure [Wt Gain ___ Lbs] : no recent weight gain [Cocaine Use] : no cocaine use [Wt Loss ___ Lbs] : no recent weight loss [de-identified] : This patient has history of heterotaxy and a Fontan procedure performed at age 2.  The patient has had a complicated surgical course and most recently had an episode of hemoptysis in May 2019 undergoing embolization of bleeding sites.  The patient had developed fluid overload following his hospitalization and developed ascites.  The patient was treated with furosemide 40 mg twice daily and spironolactone with sodium restriction and fluid overload resolved.  The patient's ascites, resolved, and the patient had no peripheral edema.\par \par The patient also has history of a femoral vein injury in his right leg with venous thrombosis and he is now on anticoagulation.  The patient has had MR Elastography, showing a nodular liver, however, nodules do not have characteristics of hepatocellular carcinoma.  MR Elastography suggests hepatic fibrosis.  However, hepatic stiffness may also relate to hepatic congestion.  \par \par The patient relates urinating frequently following doses of furosemide and laboratory testing.  In November of 2019 showed mild elevation of BUN with a normal creatinine.  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

## 2020-02-14 NOTE — REVIEW OF SYSTEMS
[Feeling Tired] : feeling tired [SOB on Exertion] : shortness of breath during exertion [Palpitations] : palpitations [Lower Ext Edema] : lower extremity edema [Negative] : Heme/Lymph [Fever] : no fever [Chills] : no chills [Feeling Poorly] : not feeling poorly [Recent Weight Gain (___ Lbs)] : no recent weight gain [Recent Weight Loss (___ Lbs)] : no recent weight loss [FreeTextEntry9] : sarcopenia

## 2020-02-16 ENCOUNTER — RESULT CHARGE (OUTPATIENT)
Age: 25
End: 2020-02-16

## 2020-02-23 ENCOUNTER — RESULT CHARGE (OUTPATIENT)
Age: 25
End: 2020-02-23

## 2020-02-28 ENCOUNTER — LABORATORY RESULT (OUTPATIENT)
Age: 25
End: 2020-02-28

## 2020-03-01 ENCOUNTER — RESULT CHARGE (OUTPATIENT)
Age: 25
End: 2020-03-01

## 2020-03-08 ENCOUNTER — RESULT CHARGE (OUTPATIENT)
Age: 25
End: 2020-03-08

## 2020-03-15 ENCOUNTER — RESULT CHARGE (OUTPATIENT)
Age: 25
End: 2020-03-15

## 2020-03-16 ENCOUNTER — APPOINTMENT (OUTPATIENT)
Dept: HEPATOLOGY | Facility: CLINIC | Age: 25
End: 2020-03-16

## 2020-03-22 ENCOUNTER — RESULT CHARGE (OUTPATIENT)
Age: 25
End: 2020-03-22

## 2020-03-29 ENCOUNTER — RESULT CHARGE (OUTPATIENT)
Age: 25
End: 2020-03-29

## 2020-03-30 ENCOUNTER — LABORATORY RESULT (OUTPATIENT)
Age: 25
End: 2020-03-30

## 2020-04-02 RX ORDER — WARFARIN 4 MG/1
4 TABLET ORAL
Qty: 270 | Refills: 3 | Status: ACTIVE | COMMUNITY
Start: 2020-04-02 | End: 1900-01-01

## 2020-04-03 ENCOUNTER — LABORATORY RESULT (OUTPATIENT)
Age: 25
End: 2020-04-03

## 2020-04-05 ENCOUNTER — RESULT CHARGE (OUTPATIENT)
Age: 25
End: 2020-04-05

## 2020-04-12 ENCOUNTER — RESULT CHARGE (OUTPATIENT)
Age: 25
End: 2020-04-12

## 2020-04-19 ENCOUNTER — RESULT CHARGE (OUTPATIENT)
Age: 25
End: 2020-04-19

## 2020-04-24 ENCOUNTER — APPOINTMENT (OUTPATIENT)
Dept: HEPATOLOGY | Facility: CLINIC | Age: 25
End: 2020-04-24

## 2020-04-26 ENCOUNTER — RESULT CHARGE (OUTPATIENT)
Age: 25
End: 2020-04-26

## 2020-05-03 ENCOUNTER — RESULT CHARGE (OUTPATIENT)
Age: 25
End: 2020-05-03

## 2020-05-04 ENCOUNTER — LABORATORY RESULT (OUTPATIENT)
Age: 25
End: 2020-05-04

## 2020-05-10 ENCOUNTER — RESULT CHARGE (OUTPATIENT)
Age: 25
End: 2020-05-10

## 2020-05-17 ENCOUNTER — RESULT CHARGE (OUTPATIENT)
Age: 25
End: 2020-05-17

## 2020-05-24 ENCOUNTER — RESULT CHARGE (OUTPATIENT)
Age: 25
End: 2020-05-24

## 2020-05-31 ENCOUNTER — RESULT CHARGE (OUTPATIENT)
Age: 25
End: 2020-05-31

## 2020-06-07 ENCOUNTER — RESULT CHARGE (OUTPATIENT)
Age: 25
End: 2020-06-07

## 2020-06-10 ENCOUNTER — LABORATORY RESULT (OUTPATIENT)
Age: 25
End: 2020-06-10

## 2020-06-14 ENCOUNTER — RESULT CHARGE (OUTPATIENT)
Age: 25
End: 2020-06-14

## 2020-06-21 ENCOUNTER — RESULT CHARGE (OUTPATIENT)
Age: 25
End: 2020-06-21

## 2020-06-28 ENCOUNTER — RESULT CHARGE (OUTPATIENT)
Age: 25
End: 2020-06-28

## 2020-07-05 ENCOUNTER — RESULT CHARGE (OUTPATIENT)
Age: 25
End: 2020-07-05

## 2020-07-12 ENCOUNTER — RESULT CHARGE (OUTPATIENT)
Age: 25
End: 2020-07-12

## 2020-07-17 ENCOUNTER — LABORATORY RESULT (OUTPATIENT)
Age: 25
End: 2020-07-17

## 2020-07-19 ENCOUNTER — RESULT CHARGE (OUTPATIENT)
Age: 25
End: 2020-07-19

## 2020-07-26 ENCOUNTER — RESULT CHARGE (OUTPATIENT)
Age: 25
End: 2020-07-26

## 2020-08-02 ENCOUNTER — RESULT CHARGE (OUTPATIENT)
Age: 25
End: 2020-08-02

## 2020-08-09 ENCOUNTER — RESULT CHARGE (OUTPATIENT)
Age: 25
End: 2020-08-09

## 2020-08-16 ENCOUNTER — RESULT CHARGE (OUTPATIENT)
Age: 25
End: 2020-08-16

## 2020-08-23 ENCOUNTER — RESULT CHARGE (OUTPATIENT)
Age: 25
End: 2020-08-23

## 2020-08-28 ENCOUNTER — LABORATORY RESULT (OUTPATIENT)
Age: 25
End: 2020-08-28

## 2020-08-30 ENCOUNTER — RESULT CHARGE (OUTPATIENT)
Age: 25
End: 2020-08-30

## 2020-09-06 ENCOUNTER — RESULT CHARGE (OUTPATIENT)
Age: 25
End: 2020-09-06

## 2020-09-13 ENCOUNTER — RESULT CHARGE (OUTPATIENT)
Age: 25
End: 2020-09-13

## 2020-09-20 ENCOUNTER — RESULT CHARGE (OUTPATIENT)
Age: 25
End: 2020-09-20

## 2020-09-25 ENCOUNTER — LABORATORY RESULT (OUTPATIENT)
Age: 25
End: 2020-09-25

## 2020-09-27 ENCOUNTER — RESULT CHARGE (OUTPATIENT)
Age: 25
End: 2020-09-27

## 2020-10-04 ENCOUNTER — RESULT CHARGE (OUTPATIENT)
Age: 25
End: 2020-10-04

## 2020-10-11 ENCOUNTER — RESULT CHARGE (OUTPATIENT)
Age: 25
End: 2020-10-11

## 2020-10-18 ENCOUNTER — RESULT CHARGE (OUTPATIENT)
Age: 25
End: 2020-10-18

## 2020-10-22 ENCOUNTER — RESULT CHARGE (OUTPATIENT)
Age: 25
End: 2020-10-22

## 2020-10-23 DIAGNOSIS — Q20.8 OTHER CONGENITAL MALFORMATIONS OF CARDIAC CHAMBERS AND CONNECTIONS: ICD-10-CM

## 2020-10-25 ENCOUNTER — RESULT CHARGE (OUTPATIENT)
Age: 25
End: 2020-10-25

## 2020-10-26 ENCOUNTER — APPOINTMENT (OUTPATIENT)
Dept: PEDIATRIC CARDIOLOGY | Facility: CLINIC | Age: 25
End: 2020-10-26
Payer: COMMERCIAL

## 2020-10-26 ENCOUNTER — LABORATORY RESULT (OUTPATIENT)
Age: 25
End: 2020-10-26

## 2020-10-26 VITALS
HEIGHT: 68.5 IN | WEIGHT: 116.4 LBS | RESPIRATION RATE: 18 BRPM | HEART RATE: 97 BPM | DIASTOLIC BLOOD PRESSURE: 71 MMHG | SYSTOLIC BLOOD PRESSURE: 128 MMHG | BODY MASS INDEX: 17.44 KG/M2 | OXYGEN SATURATION: 87 %

## 2020-10-26 DIAGNOSIS — Z79.01 LONG TERM (CURRENT) USE OF ANTICOAGULANTS: ICD-10-CM

## 2020-10-26 PROCEDURE — 99215 OFFICE O/P EST HI 40 MIN: CPT | Mod: 25

## 2020-10-26 PROCEDURE — 99072 ADDL SUPL MATRL&STAF TM PHE: CPT

## 2020-10-26 PROCEDURE — 93000 ELECTROCARDIOGRAM COMPLETE: CPT

## 2020-10-26 RX ORDER — FUROSEMIDE 40 MG/1
40 TABLET ORAL
Qty: 180 | Refills: 3 | Status: DISCONTINUED | COMMUNITY
Start: 1900-01-01 | End: 2020-10-26

## 2020-10-26 RX ORDER — SPIRONOLACTONE 50 MG/1
50 TABLET ORAL
Qty: 90 | Refills: 3 | Status: DISCONTINUED | COMMUNITY
Start: 2019-09-24 | End: 2020-10-26

## 2020-10-26 NOTE — REASON FOR VISIT
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

## 2020-10-27 LAB
ALBUMIN SERPL ELPH-MCNC: 4.5 G/DL
ALP BLD-CCNC: 150 U/L
ALT SERPL-CCNC: 27 U/L
ANION GAP SERPL CALC-SCNC: 12 MMOL/L
AST SERPL-CCNC: 27 U/L
BASOPHILS # BLD AUTO: 0.19 K/UL
BASOPHILS NFR BLD AUTO: 2.7 %
BILIRUB SERPL-MCNC: 1.5 MG/DL
BUN SERPL-MCNC: 14 MG/DL
CALCIUM SERPL-MCNC: 10.4 MG/DL
CHLORIDE SERPL-SCNC: 103 MMOL/L
CO2 SERPL-SCNC: 26 MMOL/L
CREAT SERPL-MCNC: 0.85 MG/DL
CRP SERPL-MCNC: <0.1 MG/DL
EOSINOPHIL # BLD AUTO: 0.96 K/UL
EOSINOPHIL NFR BLD AUTO: 13.5 %
ERYTHROCYTE [SEDIMENTATION RATE] IN BLOOD BY WESTERGREN METHOD: 5 MM/HR
GLUCOSE SERPL-MCNC: 102 MG/DL
HCT VFR BLD CALC: 47.8 %
HGB BLD-MCNC: 15.5 G/DL
IMM GRANULOCYTES NFR BLD AUTO: 0.1 %
INR PPP: 4.06 RATIO
LYMPHOCYTES # BLD AUTO: 2.04 K/UL
LYMPHOCYTES NFR BLD AUTO: 28.7 %
MAN DIFF?: NORMAL
MCHC RBC-ENTMCNC: 26.5 PG
MCHC RBC-ENTMCNC: 32.4 GM/DL
MCV RBC AUTO: 81.7 FL
MONOCYTES # BLD AUTO: 1.24 K/UL
MONOCYTES NFR BLD AUTO: 17.4 %
NEUTROPHILS # BLD AUTO: 2.68 K/UL
NEUTROPHILS NFR BLD AUTO: 37.6 %
PLATELET # BLD AUTO: 212 K/UL
POTASSIUM SERPL-SCNC: 4.2 MMOL/L
PROT SERPL-MCNC: 7.2 G/DL
PT BLD: 44.8 SEC
RBC # BLD: 5.85 M/UL
RBC # FLD: 22.3 %
SODIUM SERPL-SCNC: 140 MMOL/L
WBC # FLD AUTO: 7.12 K/UL

## 2020-10-28 NOTE — REVIEW OF SYSTEMS
[Feeling Poorly] : not feeling poorly (malaise) [Cyanosis] : no cyanosis [Edema] : no edema [Diaphoresis] : not diaphoretic [Chest Pain] : no chest pain or discomfort [Exercise Intolerance] : no persistence of exercise intolerance [Palpitations] : no palpitations [Orthopnea] : no orthopnea [Fast HR] : no tachycardia [Cough] : no cough [Shortness Of Breath] : not expressed as feeling short of breath [Abdominal Pain] : no abdominal pain [Fainting (Syncope)] : no fainting [Dizziness] : no dizziness [Wound problems] : no wound problems [Easy Bruising] : no tendency for easy bruising [Easy Bleeding] : no ~M tendency for easy bleeding [Heat/Cold Intolerance] : no temperature intolerance

## 2020-10-28 NOTE — CONSULT LETTER
[Today's Date] : [unfilled] [Name] : Name: [unfilled] [] : : ~~ [Today's Date:] : [unfilled] [Dear  ___:] : Dear Dr. [unfilled]: [Consult] : I had the pleasure of evaluating your patient, [unfilled]. My full evaluation follows. [Sincerely,] : Sincerely, [Consult - Multiple Provider] : Thank you very much for allowing us to participate in the care of this patient. If you have any questions, please do not hesitate to contact us. [FreeTextEntry4] : Jace Hall MD [de-identified] : Pauline Morrell, MSN, CPNP-AC, PC\par Pediatric Cardiology, Adult Congenital Cardiology\par [facility]\par \par Mega Rojas MD, TAYLOR\par Medical Director, Adult Congenital Heart Program\par Professor of Pediatrics\par [employer] [facility] [employer] [facility] [employer] [facility]\par

## 2020-10-28 NOTE — HISTORY OF PRESENT ILLNESS
[FreeTextEntry1] : We had the pleasure of seeing Kang Cortez at the  [facility] of [facility] for followup on October 26, 2020. 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 He ultimately underwent takedown of the Narinder shunt and underwent a fenestrated Fontan procedure. He had a failed attempt at fenestration closure in the cath lab resulting in thrombosis of the right femoral vein and subsequent development of venous stasis in that leg and ultimately significant color change and varicosities.\par \par In June 2019 Kang had an episode of massive hempotysis. He ultimately underwent an endobronchial block, followed by bronchial artery, mediastinal artery and internal mammary artery embolization. He suffered MELLO during his hospitalization which has since resolved.  He underwent a routine liver evaluation and MRI in August 2019. His liver MRI elastography demonstrated stage 4 fibrosis, not uncommon in patients with failing Fontan physiology.  He has had significant intermittent abdominal ascites which has been managed with diuretics.  As his abdominal ascites improved, he discontinued his Lasix following discussion with Dr. White.\par \par He has been intermittently non adherent with his Coumadin, occasionally "missing" doses evidenced by subtherapeutic INR's.  Kang has expressed some frustration with his perceived quality of life related to diuretic therapy, Coumadin and his compression stocking for his varicose veins.\par \par From a symptomatic perspective, his cardiovascular review of systems is unremarkable. Specifically, there are no complaints of chest pain, palpitations, shortness of breath, peripheral edema, dizziness or syncope. \par \par

## 2020-10-28 NOTE — DISCUSSION/SUMMARY
[Needs SBE Prophylaxis] : [unfilled]  needs bacterial endocarditis prophylaxis. SBE prophylaxis is indicated for dental and invasive ENT procedures. (Circulation. 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  He was hospitalized in June 2019 for hemoptysis requiring bronchial artery, mediastinal artery and internal mammary embolization. In addition, he was also found to have a significant right femoral DVT. He had significant abdominal ascites that has since resolved and he is now off all of his diuretics.\par \par His function and his AV valve regurgitation appear unchanged on his echocardiogram today. There appears to be an area of echogenic focus at the IVC-Fontan junction. It is unclear if it has been present on previous study's as the images obtained today are clear. There is no obstruction of the Fontan conduit. We discussed these findings with Kang (and his mother who was in the room with him). Especially in light of the fact that Kang is asking if there are any other options to Coumadin.  He has asked multiple times for an alternative anticoagulant medication. We have discussed in detail with him and did again today that because of his Fontan physiology and the history of his significant DVT that the NOAC's are not a good option for him. He also expressed frustration at the compression stocking that he is wearing and asked about treatment therapy for that as well. We had a flori discussion with him about what he wants for his future. he has declined to undergo a cardiac cath to obtain hemodynamics so that we may optimize his medical therapies. When asked about heart transplant in the future, he does not appear to want to consider that or discuss it at this time. He denies being depressed.\par \par We have communicated to him that we would like to obtain some lab work and have him come back in 4 moths to follow up with a repeat echo. We will make adjustments to his Coumadin depending on what the results of his INR is. We have communicated that we are here to answer any questions he may have or are available to speak with him at any time. [Participate only in Mild PE activities] : [unfilled] may participate ONLY IN MILD physical education activities such as Jamul games, golf, and badminton.

## 2020-10-28 NOTE — PHYSICAL EXAM
[General Appearance - Alert] : alert [Facies] : the head and face were normal in appearance [Appearance Of Head] : the head was normocephalic [Sclera] : the conjunctiva were normal [Outer Ear] : the ears and nose were normal in appearance [] : no respiratory distress [Auscultation Breath Sounds / Voice Sounds] : breath sounds clear to auscultation bilaterally [Pectus Excavatum] : a pectus excavatum was noted [Sternotomy] : sternotomy [Well-Healed] : well-healed [Unremarkable] : unremarkable [Normal S1] : normal  [S2 Single] : was single [Systolic] : systolic [III] : a grade 3/6   [Apical] : apex [Carotids] : the murmur was transmitted to the carotid arteries [Diastolic] : diastolic [I] : a grade 1/4  [LLSB] : LLSB  [Nondistended] : nondistended [Abdomen Tenderness] : non-tender [Nail Clubbing] : clubbing of the fingers [Cyanosis Of Fingers] : cyanosis of the fingers [Abnormal Walk] : normal gait [Skin Turgor] : normal turgor [Demonstrated Behavior - Infant Nonreactive To Parents] : interactive [Toes Cyanosis] : no cyanosis of the toes

## 2020-10-28 NOTE — CARDIOLOGY SUMMARY
[Today's Date] : [unfilled] [FreeTextEntry1] : Sinus rhythm with prolonged TX interval, left anterior fascicular block [FreeTextEntry2] : Summary:\par 1. Atrial situs Inversus; L-Ventricular loop; D-Malposition of the great arteries.\par 2. Heterotaxy syndrome.\par 3. 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 Double outlet right ventricle.\par 5. Mitral valve atresia.\par 6. Pulmonary valve atresia.\par 7. Status post extracardiac fenestrated Fontan conduit.\par 8. Status post device closure of Fontan fenestration.\par 9. Low velocity color Doppler was demonstrated in the left Narinder shunt as well as the Fontan conduit.\par 10. There was an echogenic, well circumscribed focus noted at the IVC-Fontan junction (0.9 x 0.9 cm)\par which was unobstructive (image 9).\par 11. Redundant chordae noted on the tricuspid valve and mild tricuspid valve prolapse.\par 12. There are multiple jets of tricuspid regurgitation which is cumulatively severe.\par 13. Laminar color flow noted in the bilateral proximal branch pulmonary arteries.\par 14. Dilated right ventricle.\par 15. There is adequate systolic excursion of the right ventricular free wall compared to the hypokinetic\par septal wall.\par 16. Moderate to severely hypoplastic left ventricle.\par 17. Qualitatively depressed left ventricular systolic function.\par 18. One pulmonary vein from each side is visualized entering the atria, with no evidence of obstruction.\par 19. No pericardial effusion.\par 20. Study was limited by poor acoustic windows

## 2020-11-25 ENCOUNTER — LABORATORY RESULT (OUTPATIENT)
Age: 25
End: 2020-11-25

## 2020-12-11 NOTE — PROGRESS NOTE ADULT - SUBJECTIVE AND OBJECTIVE BOX
Mg Hayward, PGY1  Pager [phone]    INCOMPLETE NOTE - IN PROGRESS    Patient is a 24y old  Male who presents with a chief complaint of     SUBJECTIVE/INTERVAL EVENTS: Patient seen and examined at bedside. KIMMY o/n.    MEDICATIONS  (STANDING):  chlorhexidine 4% Liquid 1 Application(s) Topical <User Schedule>  digoxin  Injectable 0.25 milliGRAM(s) IV Push daily  fentaNYL   Infusion. 1 MICROgram(s)/kG/Hr (5.3 mL/Hr) IV Continuous <Continuous>  fluticasone propionate 50 MICROgram(s)/spray Nasal Spray 1 Spray(s) Both Nostrils two times a day  midazolam Infusion 0.02 mG/kG/Hr (1.06 mL/Hr) IV Continuous <Continuous>  norepinephrine Infusion 0.05 MICROgram(s)/kG/Min (4.969 mL/Hr) IV Continuous <Continuous>  pantoprazole  Injectable 40 milliGRAM(s) IV Push two times a day    MEDICATIONS  (PRN):  LORazepam   Injectable 2 milliGRAM(s) IV Push every 4 hours PRN Agitation      VITAL SIGNS:  T(F): 103.4 (06-01-19 @ 02:00), Max: 103.4 (06-01-19 @ 02:00)  HR: 89 (06-01-19 @ 03:30) (72 - 100)  BP: 99/63 (06-01-19 @ 03:30) (77/43 - 146/78)  RR: 13 (06-01-19 @ 03:30) (10 - 22)  SpO2: 94% (06-01-19 @ 03:30) (73% - 98%)    I&O's Summary    31 May 2019 07:01  -  01 Jun 2019 06:50  --------------------------------------------------------  IN: 1447.8 mL / OUT: 500 mL / NET: 947.8 mL      Daily Height in cm: 175.26 (31 May 2019 20:00)    Daily     PHYSICAL EXAM:  Gen: Alert, well-developed, NAD  HEENT: NCAT, PERRL, EOMI, conjunctiva clear, sclera anicteric, no erythema or exudates in the oropharynx, mmm  Neck: Supple, no JVD  CV: RRR, S1S2, no m/r/g  Resp: CTAB, normal respiratory effort  Abd: Soft, nontender, nondistended, normal bowel sounds  Ext: + peripheral pulses, no edema, clubbing or cyanosis  Neuro: AOx3, no focal deficits  SKIN: No rashes or lesions    LABS:                        12.6   17.76 )-----------( 171      ( 01 Jun 2019 04:24 )             41.4     Hgb Trend: 12.6<--, 12.2<--, 12.7<--, 13.1<--, 14.2<--  06-01    136  |  106  |  24<H>  ----------------------------<  83  6.2<HH>   |  19<L>  |  1.26    Ca    7.6<L>      01 Jun 2019 04:24  Phos  4.1     06-01  Mg     1.8     06-01    TPro  6.0  /  Alb  3.5  /  TBili  2.4<H>  /  DBili  x   /  AST  25  /  ALT  13  /  AlkPhos  97  06-01    Creatinine Trend: 1.26<--, 0.86<--, 0.82<--, 0.90<--  LIVER FUNCTIONS - ( 01 Jun 2019 04:24 )  Alb: 3.5 g/dL / Pro: 6.0 g/dL / ALK PHOS: 97 u/L / ALT: 13 u/L / AST: 25 u/L / GGT: x           PT/INR - ( 01 Jun 2019 04:24 )   PT: 19.3 SEC;   INR: 1.67          PTT - ( 01 Jun 2019 04:24 )  PTT:37.1 SEC        ABG - ( 01 Jun 2019 05:45 )  pH, Arterial: 7.25  pH, Blood: x     /  pCO2: 53    /  pO2: 117   / HCO3: 20    / Base Excess: -3.8  /  SaO2: 97.2              CAPILLARY BLOOD GLUCOSE          RADIOLOGY & ADDITIONAL TESTS: Reviewed    Imaging Personally Reviewed:    Consultant(s) Notes Reviewed:      Care Discussed with Consultants/Other Providers: Mg Yesy, PGY1   Pager [phone]    INTERVAL HPI/OVERNIGHT EVENTS: s/p IR embolization yesterday night. Febrile, given tylenol.    SUBJECTIVE: Patient seen and examined at bedside. Sedated.    OBJECTIVE:    VITAL SIGNS:  ICU Vital Signs Last 24 Hrs  T(C): 36.4 (01 Jun 2019 16:00), Max: 39.7 (01 Jun 2019 02:00)  T(F): 97.5 (01 Jun 2019 16:00), Max: 103.4 (01 Jun 2019 02:00)  HR: 87 (01 Jun 2019 16:00) (72 - 105)  BP: 117/61 (01 Jun 2019 16:00) (77/43 - 146/78)  BP(mean): 78 (01 Jun 2019 16:00) (53 - 103)  ABP: --  ABP(mean): --  RR: 43 (01 Jun 2019 16:00) (12 - 43)  SpO2: 96% (01 Jun 2019 16:00) (82% - 98%)    Mode: AC/ CMV (Assist Control/ Continuous Mandatory Ventilation), RR (machine): 20, TV (machine): 450, FiO2: 50, PEEP: 5, MAP: 12, PIP: 26    05-31 @ 07:01 - 06-01 @ 07:00  --------------------------------------------------------  IN: 1555.3 mL / OUT: 750 mL / NET: 805.3 mL    06-01 @ 07:01 - 06-01 @ 16:17  --------------------------------------------------------  IN: 100 mL / OUT: 500 mL / NET: -400 mL      CAPILLARY BLOOD GLUCOSE      POCT Blood Glucose.: 72 mg/dL (01 Jun 2019 12:09)      PHYSICAL EXAM:    General: NAD  HEENT: NCAT, PERRL, clear conjunctiva, mmm  Neck: supple, no JVD  Respiratory: CTAB  Cardiovascular: RRR, S1S2, no murmurs, rubs, or gallops  Abdomen: soft, nontender, nondistended, normal bowel sounds  Extremities: chronic RLE mottled appearance a/w RLE venous insufficiency after iatrogenic femoral vein injury  Neurological: sedated, nonfocal    MEDICATIONS:  MEDICATIONS  (STANDING):  azithromycin  IVPB      azithromycin  IVPB 500 milliGRAM(s) IV Intermittent once  chlorhexidine 4% Liquid 1 Application(s) Topical <User Schedule>  dextrose 50% Injectable 50 milliLiter(s) IV Push once  fentaNYL   Infusion. 1 MICROgram(s)/kG/Hr (5.3 mL/Hr) IV Continuous <Continuous>  magnesium sulfate  IVPB 2 Gram(s) IV Intermittent once  pantoprazole  Injectable 40 milliGRAM(s) IV Push two times a day  phenylephrine    Infusion 0.2 MICROgram(s)/kG/Min (1.988 mL/Hr) IV Continuous <Continuous>  piperacillin/tazobactam IVPB. 3.375 Gram(s) IV Intermittent every 8 hours  sodium chloride 0.9%. 1000 milliLiter(s) (100 mL/Hr) IV Continuous <Continuous>  sodium polystyrene sulfonate Suspension 30 Gram(s) Oral once  vancomycin  IVPB 1000 milliGRAM(s) IV Intermittent every 12 hours    MEDICATIONS  (PRN):  LORazepam   Injectable 2 milliGRAM(s) IV Push every 4 hours PRN Agitation      ALLERGIES:  Allergies    No Known Allergies    Intolerances        LABS:                        13.3   18.91 )-----------( 178      ( 01 Jun 2019 13:15 )             44.4     06-01    141  |  107  |  19  ----------------------------<  68<L>  4.3   |  20<L>  |  1.01    Ca    8.9      01 Jun 2019 13:15  Phos  3.9     06-01  Mg     2.5     06-01    TPro  6.2  /  Alb  3.5  /  TBili  2.4<H>  /  DBili  x   /  AST  42<H>  /  ALT  13  /  AlkPhos  100  06-01    PT/INR - ( 01 Jun 2019 13:15 )   PT: 20.5 SEC;   INR: 1.81          PTT - ( 01 Jun 2019 13:15 )  PTT:45.1 SEC      RADIOLOGY & ADDITIONAL TESTS: Reviewed. normal...

## 2021-01-04 LAB — INR PPP: 3.3 RATIO

## 2021-01-28 ENCOUNTER — LABORATORY RESULT (OUTPATIENT)
Age: 26
End: 2021-01-28

## 2021-02-26 ENCOUNTER — LABORATORY RESULT (OUTPATIENT)
Age: 26
End: 2021-02-26

## 2021-03-04 ENCOUNTER — RESULT CHARGE (OUTPATIENT)
Age: 26
End: 2021-03-04

## 2021-03-05 DIAGNOSIS — Q26.9 CONGENITAL MALFORMATION OF GREAT VEIN, UNSPECIFIED: ICD-10-CM

## 2021-03-05 NOTE — H&P ADULT - ASSESSMENT
Pharmacy wants to discuss directions for clonazepam. Prior provider said max 1 12.   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 Pending bronchoscopy     #Neuro: no active issues.     #CV: Heterotaxy s/p multiple cardiac surgeries including Fontan's: hemodynamically stable.   -Adult congenital cardiology, CT surgery, and ENT aware of patient being at [facility].   -Plan for bronch today and possible angiogram next week   -hold Coumadin. KCentra overnight. INR 1.1 now.   -LE dopplers pending     #Pulm: Hemoptysis: concern for bleeding from aortopulmonary collaterals vs. GI source. Pt has 5 year hx of hemoptysis with evaluation by ENT yesterday showing no tracheal or upper airway bleeding.   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 Gastritis and duodenal erosions were also seen possibly 2/2 ASA gastropathy.   SpO2 at baseline high 80's low 90's.   Multiple bronchoscopies in the past all unremarkable   -CT angio chest, CT angio neck showed patchy RUL opacity which may represent known hemorrhage.   -Will bronch today in ICU--if able to localize bleed, will call IR for embolization   -morphine 0.5 IVP prn q4h for pain.  -Hycodan cough suppressant q6h for now.  -c/w Atrovent q6h.  -c/w Flonase.    #GI: Cervical esophageal varices: possible varices seen on prior upper endoscopy 3/2018. GI evaluation pending. No active bleeding at this time, though reportedly had small volume hemoptysis prior   -no endoscopy offered by GI at [facility]   -protonix 40mg IVP BID for now. NPO for now     #Renal: no active issues    #ID: no active issues    #Endocrine: no active issues    #Heme: no active issues    #DVT ppx: SCD's    #Lines: PIV's    #GOC: full code    Kip George MD  [facility]

## 2021-03-08 ENCOUNTER — APPOINTMENT (OUTPATIENT)
Dept: PEDIATRIC CARDIOLOGY | Facility: CLINIC | Age: 26
End: 2021-03-08
Payer: COMMERCIAL

## 2021-03-08 VITALS
SYSTOLIC BLOOD PRESSURE: 117 MMHG | OXYGEN SATURATION: 85 % | HEIGHT: 68.9 IN | WEIGHT: 116.84 LBS | RESPIRATION RATE: 18 BRPM | DIASTOLIC BLOOD PRESSURE: 76 MMHG | BODY MASS INDEX: 17.31 KG/M2 | HEART RATE: 82 BPM

## 2021-03-08 PROCEDURE — 93320 DOPPLER ECHO COMPLETE: CPT

## 2021-03-08 PROCEDURE — 93303 ECHO TRANSTHORACIC: CPT

## 2021-03-08 PROCEDURE — 99072 ADDL SUPL MATRL&STAF TM PHE: CPT

## 2021-03-08 PROCEDURE — 93000 ELECTROCARDIOGRAM COMPLETE: CPT

## 2021-03-08 PROCEDURE — 99214 OFFICE O/P EST MOD 30 MIN: CPT

## 2021-03-08 PROCEDURE — 93325 DOPPLER ECHO COLOR FLOW MAPG: CPT

## 2021-03-10 NOTE — REASON FOR VISIT
[Follow-Up] : a follow-up visit for [Patient] : patient [Mother] : mother [FreeTextEntry3] : Heterotaxy Syndrome, Single Ventricle, s/p Fontan

## 2021-03-10 NOTE — PHYSICAL EXAM
[General Appearance - Alert] : alert [Facies] : the head and face were normal in appearance [Appearance Of Head] : the head was normocephalic [Sclera] : the conjunctiva were normal [Nasal Cavity] : the nasal mucosa was normal [Auscultation Breath Sounds / Voice Sounds] : breath sounds clear to auscultation bilaterally [No Cough] : no cough [Pectus Excavatum] : a pectus excavatum was noted [Sternotomy] : sternotomy [Well-Healed] : well-healed [Unremarkable] : unremarkable [Apical Impulse] : quiet precordium with normal apical impulse [Heart Rate And Rhythm] : normal heart rate and rhythm [Arterial Pulses] : normal upper and lower extremity pulses with no pulse delay [Normal S1] : normal  [S2 Single] : was single [Systolic] : systolic [V] : a grade 5/6   [Back] : the murmur was transmitted to the back [Diastolic] : diastolic [I] : a grade 1/4  [LLSB] : LLSB  [Rumbling] : rumbling [Nail Clubbing] : clubbing of the fingers [Cyanosis Of Fingers] : cyanosis of the fingers [FreeTextEntry1] : Liver is midline and 1.5 cm below costal margin

## 2021-03-10 NOTE — CONSULT LETTER
[Today's Date] : [unfilled] [Name] : Name: [unfilled] [] : : ~~ [Today's Date:] : [unfilled] [Dear  ___:] : Dear Dr. [unfilled]: [Consult] : I had the pleasure of evaluating your patient, [unfilled]. My full evaluation follows. [Consult - Multiple Provider] : Thank you very much for allowing us to participate in the care of this patient. If you have any questions, please do not hesitate to contact us. [Sincerely,] : Sincerely, [FreeTextEntry4] : Jace Hall, DO [FreeTextEntry9] : [phone] [de-identified] : Pauline Morrell, MSN, CPNP-AC, PC\par Pediatric Cardiology, Adult Congenital Cardiology\par [facility]\par [facility]\par \par \par Mega Rojas MD, TAYLOR\par Medical Director, Adult Congenital Heart Program\par Professor of Pediatrics\par [employer] [employer] at [facility] [place]\par

## 2021-03-10 NOTE — HISTORY OF PRESENT ILLNESS
[FreeTextEntry1] : We had the pleasure of seeing Kang Cortez at the [facility] of [facility]  on March 8, 2021. 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 He ultimately underwent takedown of the Narinder shunt and underwent a fenestrated Fontan procedure. He had a failed attempt at fenestration closure in the cath lab resulting in thrombosis of the right femoral vein and subsequent development of venous stasis in that leg and ultimately significant color change and varicosities.\par \par In June 2019 Kang had an episode of massive hemoptysis. He ultimately underwent an endobronchial block, followed by bronchial artery, mediastinal artery and internal mammary artery embolization. He suffered MELLO during his hospitalization which has since resolved. He underwent a routine liver evaluation and MRI in August 2019. His liver MRI elastography demonstrated stage 4 fibrosis, not uncommon in patients with failing Fontan physiology. He has had significant intermittent abdominal ascites which has resolved and his diuretics have been discontinued. \par \par He has been on Coumadin, and recently much more adherent with his medication and INR testing. He is currently not working due to COVID but would like to find a job. He appears positive and optimistic at today's visit than he has been at prior visits.\par \par His cardiovascular review of systems is essentially unremarkable. There are no complaints of chest pain, palpitations,  peripheral edema, dizziness or syncope. He does however say that he sometimes gets "winded" when he walks for a long time.

## 2021-03-10 NOTE — REVIEW OF SYSTEMS
[Cyanosis] : cyanosis [Feeling Poorly] : not feeling poorly (malaise) [Fever] : no fever [Edema] : no edema [Chest Pain] : no chest pain or discomfort [Exercise Intolerance] : no persistence of exercise intolerance [Palpitations] : no palpitations [Orthopnea] : no orthopnea [Cough] : no cough [Shortness Of Breath] : not expressed as feeling short of breath [Abdominal Pain] : no abdominal pain [Fainting (Syncope)] : no fainting [Headache] : no headache [Dizziness] : no dizziness [Wound problems] : no wound problems [Easy Bruising] : no tendency for easy bruising [Easy Bleeding] : no ~M tendency for easy bleeding [Nosebleeds] : no epistaxis [Sleep Disturbances] : ~T no sleep disturbances [Depression] : no depression [Anxiety] : no anxiety [Heat/Cold Intolerance] : no temperature intolerance

## 2021-03-10 NOTE — CARDIOLOGY SUMMARY
[Today's Date] : [unfilled] [FreeTextEntry1] : left atrial rhythm with a prolonged pr interval, ventricular rate 83 bpm\par LBBB [FreeTextEntry2] : Summary:\par 1. Study was limited by poor acoustic windows.\par 2. Heterotaxy syndrome.\par 3. Atrial situs Inversus; L-Ventricular loop; D-Malposition of the great arteries.\par 4. Double outlet right ventricle.\par 5. Mitral valve atresia.\par 6. Moderate to severely hypoplastic left ventricle.\par 7. Pulmonary valve atresia.\par 8. Dilated right ventricle.\par 9. Status post extracardiac fenestrated Fontan conduit.\par 10. Status post device closure of Fontan fenestration.\par 11. There was an echogenic, well circumscribed focus noted at the IVC-Fontan junction (clip 10) which\par was unobstructive and unchanged.\par 12. Low velocity color Doppler was demonstrated in the left Narinder shunt as well as the Fontan conduit.\par 13. Laminar color flow noted in the bilateral proximal branch pulmonary arteries.\par 14. S/p mixed total anomalous pulmonary venous connection repair.\par 15. One pulmonary vein from each side is visualized entering the atria, with no evidence of obstruction.\par 16. There is adequate systolic excursion of the right ventricular free wall compared to the hypokinetic\par septal wall.\par 17. Redundant chordae noted on the tricuspid valve and mild tricuspid valve prolapse.\par 18. There are multiple jets of tricuspid regurgitation which is cumulatively severe.\par 19. Qualitatively depressed left ventricular systolic function.\par 20. No pericardial effusion.\par 21. Compared to the previous echocardiogram of 10/26/2020; no significant change.

## 2021-03-10 NOTE — DISCUSSION/SUMMARY
[Needs SBE Prophylaxis] : [unfilled]  needs bacterial endocarditis prophylaxis. SBE prophylaxis is indicated for dental and invasive ENT procedures. (Circulation. 2007; 116: 8888-5911) [FreeTextEntry1] : In summary, Kang is a 25 year old male with complex single ventricle physiology with Fontan palliation. He was hospitalized in June 2019 for hemoptysis requiring bronchial artery, mediastinal artery and internal mammary embolization. In addition, he was also found to have a significant right femoral DVT. He has had no additional episodes of hemoptysis since the embolization.\par \par His abdominal ascites has resolved and he remains off diuretics. He is in extremely good spirits today as his ascites was an issue both emotionally for body image as well as comfort. There is no indication to reconsider diuretics at this time.\par \par His function and his AV valve regurgitation appear unchanged on his echocardiogram today. There appears to be a consistent area of echogenic foci at the IVC-Fontan junction, however it remains non-obstructive. There is no obstruction of the Fontan conduit. \par \par Kang is pretty deconditioned as he does no exercise. It is difficult to determine if he complaints of being "winded" are merely related to the fact that he is so deconditioned. We have asked him to start exercising and if this doesn't improve we will put him on the treadmill and obtain an exercise test.\par \par Other than that we are extremely  pleased with how well he looks and how he is doing today. He should continue his Coumadin with a goal INR of 2-3. We will see him again in 6-7 months and he will not need an echo at that visit.

## 2021-03-29 ENCOUNTER — LABORATORY RESULT (OUTPATIENT)
Age: 26
End: 2021-03-29

## 2021-04-26 ENCOUNTER — LABORATORY RESULT (OUTPATIENT)
Age: 26
End: 2021-04-26

## 2021-05-21 ENCOUNTER — NON-APPOINTMENT (OUTPATIENT)
Age: 26
End: 2021-05-21

## 2021-06-07 ENCOUNTER — LABORATORY RESULT (OUTPATIENT)
Age: 26
End: 2021-06-07

## 2021-06-18 ENCOUNTER — APPOINTMENT (OUTPATIENT)
Dept: PULMONOLOGY | Facility: CLINIC | Age: 26
End: 2021-06-18

## 2021-07-03 ENCOUNTER — APPOINTMENT (OUTPATIENT)
Dept: DISASTER EMERGENCY | Facility: OTHER | Age: 26
End: 2021-07-03
Payer: COMMERCIAL

## 2021-07-03 PROCEDURE — 0001A: CPT

## 2021-07-19 ENCOUNTER — LABORATORY RESULT (OUTPATIENT)
Age: 26
End: 2021-07-19

## 2021-07-27 ENCOUNTER — APPOINTMENT (OUTPATIENT)
Dept: DISASTER EMERGENCY | Facility: OTHER | Age: 26
End: 2021-07-27
Payer: COMMERCIAL

## 2021-07-27 PROCEDURE — 0002A: CPT

## 2021-08-02 ENCOUNTER — LABORATORY RESULT (OUTPATIENT)
Age: 26
End: 2021-08-02

## 2021-09-07 ENCOUNTER — LABORATORY RESULT (OUTPATIENT)
Age: 26
End: 2021-09-07

## 2021-11-04 ENCOUNTER — RESULT CHARGE (OUTPATIENT)
Age: 26
End: 2021-11-04

## 2021-11-04 ENCOUNTER — LABORATORY RESULT (OUTPATIENT)
Age: 26
End: 2021-11-04

## 2021-11-08 ENCOUNTER — APPOINTMENT (OUTPATIENT)
Dept: PEDIATRIC CARDIOLOGY | Facility: CLINIC | Age: 26
End: 2021-11-08
Payer: COMMERCIAL

## 2021-11-08 VITALS
DIASTOLIC BLOOD PRESSURE: 74 MMHG | SYSTOLIC BLOOD PRESSURE: 108 MMHG | HEART RATE: 100 BPM | RESPIRATION RATE: 18 BRPM | OXYGEN SATURATION: 83 % | BODY MASS INDEX: 17.89 KG/M2 | WEIGHT: 120.81 LBS | HEIGHT: 68.9 IN

## 2021-11-08 PROCEDURE — 93325 DOPPLER ECHO COLOR FLOW MAPG: CPT

## 2021-11-08 PROCEDURE — 93320 DOPPLER ECHO COMPLETE: CPT

## 2021-11-08 PROCEDURE — 99214 OFFICE O/P EST MOD 30 MIN: CPT

## 2021-11-08 PROCEDURE — 93000 ELECTROCARDIOGRAM COMPLETE: CPT

## 2021-11-08 PROCEDURE — 93303 ECHO TRANSTHORACIC: CPT

## 2021-11-09 NOTE — DISCUSSION/SUMMARY
[Needs SBE Prophylaxis] : [unfilled]  needs bacterial endocarditis prophylaxis. SBE prophylaxis is indicated for dental and invasive ENT procedures. (Circulation. 2007; 116: 3654-9865) [FreeTextEntry1] : In summary, Kang is a 26 year old male with complex single ventricle physiology with Fontan palliation. He was hospitalized in June 2019 for hemoptysis requiring bronchial artery, mediastinal artery and internal mammary embolization. In addition, he was also found to have a significant right femoral DVT. He has had no additional episodes of hemoptysis since the embolization. His abdominal ascites has resolved and he remains off diuretics. \par \par His systolic function remains depressed and there continues to be severe AV valve regurgitation on his echocardiogram today. There appears to be a consistent area of non obstructive echogenic foci at the IVC-Fontan junction. There is no obstruction of the Fontan conduit. \par \par We placed a Holter today as a routine screen since he has not worn one since 2017. We also discussed obtaining a cardiac CT to better image his anatomy as his echo images are difficult to obtain and we would like better pictures of his Fontan conduit, his pulmonary arteries, pulmonary veins and screen for the presence of any veno venous or aortopulmonary collaterals as Kang is more desaturated today.  He should continue his Coumadin with a goal INR of 2-3. Kang informs us he will need to have his wisdom teeth removed. We will need to adjust his Coumadin accordingly. When he has identified an oral surgeon we will work with him to provide him with instructions on how to adjust his dosing. We will see him again in 6-7  but will speak once we have the results of his testing.\par

## 2021-11-09 NOTE — REVIEW OF SYSTEMS
[Cyanosis] : cyanosis [Exercise Intolerance] : persistence of exercise intolerance [Heat/Cold Intolerance] : temperature intolerance [Feeling Poorly] : not feeling poorly (malaise) [Fever] : no fever [Edema] : no edema [Palpitations] : no palpitations [Orthopnea] : no orthopnea [Fast HR] : no tachycardia [Cough] : no cough [Shortness Of Breath] : not expressed as feeling short of breath [Abdominal Pain] : no abdominal pain [Fainting (Syncope)] : no fainting [Headache] : no headache [Dizziness] : no dizziness [Easy Bruising] : no tendency for easy bruising [Easy Bleeding] : no ~M tendency for easy bleeding [Depression] : no depression [Anxiety] : no anxiety [Failure To Thrive] : no failure to thrive [Jitteriness] : no jitteriness

## 2021-11-09 NOTE — HISTORY OF PRESENT ILLNESS
[FreeTextEntry1] : We had the pleasure of seeing Kang Cortez at the [facility] of [facility] on March 8, 2021. 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 He ultimately underwent takedown of the Narinder shunt and underwent a fenestrated Fontan procedure. He had a failed attempt at fenestration closure in the cath lab resulting in thrombosis of the right femoral vein and subsequent development of venous stasis in that leg and ultimately significant color change and varicosities.\par \par In June 2019 Kang had an episode of massive hemoptysis. He ultimately underwent an endobronchial block, followed by bronchial artery, mediastinal artery and internal mammary artery embolization. He suffered MELLO during his hospitalization which has since resolved. He underwent a routine liver evaluation and MRI in August 2019. His liver MRI elastography demonstrated stage 4 fibrosis, not uncommon in patients with failing Fontan physiology. He has had significant intermittent abdominal ascites which has resolved and his diuretics have been discontinued. \par \par He remains on Coumadin with therapeutic INR’s. He has been vaccinated against COVID.\par \par His cardiovascular review of systems is essentially unremarkable. There are no complaints of chest pain, palpitations, peripheral edema, dizziness or syncope\par

## 2021-11-09 NOTE — CONSULT LETTER
[Today's Date] : [unfilled] [Name] : Name: [unfilled] [] : : ~~ [Today's Date:] : [unfilled] [Dear  ___:] : Dear Dr. [unfilled]: [Consult] : I had the pleasure of evaluating your patient, [unfilled]. My full evaluation follows. [Consult - Multiple Provider] : Thank you very much for allowing us to participate in the care of this patient. If you have any questions, please do not hesitate to contact us. [Sincerely,] : Sincerely, [FreeTextEntry4] : Jace Hall, DO [FreeTextEntry5] : [address],  [FreeTextEntry6] : [place] [address] [FreeTextEntry7] : [phone] [de-identified] : Pauline Morrell, MSN, CPNP-AC, PC\par Pediatric Cardiology, Adult Congenital Cardiology\par [facility]\par [facility]\par \par Mega Rojas MD, TAYLOR\par Medical Director, Adult Congenital Heart Program\par Professor of Pediatrics\par [employer] [employer] at [facility]\par

## 2021-11-09 NOTE — PHYSICAL EXAM
[General Appearance - Alert] : alert [Facies] : the head and face were normal in appearance [Sclera] : the sclera were normal [Nasal Cavity] : the nasal mucosa was normal [Auscultation Breath Sounds / Voice Sounds] : breath sounds clear to auscultation bilaterally [No Cough] : no cough [Pectus Excavatum] : a pectus excavatum was noted [Sternotomy] : sternotomy [Well-Healed] : well-healed [Heart Rate And Rhythm] : normal heart rate and rhythm [Displaced to Left] : displaced to the left [Normal S1] : normal  [S2 Single] : was single [Systolic] : systolic [IV] : a grade 4/6   [Back] : the murmur was transmitted to the back [Diastolic] : diastolic [I] : a grade 1/4  [LLSB] : LLSB  [Rumbling] : rumbling [Liver Enlarged] : enlarged [Palpable___cm below the RCM] : palpable [unfilled] cm below the right costal margin [Nail Clubbing] : clubbing of the fingers [Cyanosis Of Fingers] : cyanosis of the fingers [Skin Turgor] : normal turgor [Demonstrated Behavior - Infant Nonreactive To Parents] : interactive [FreeTextEntry1] : thrill at LLSB

## 2021-11-09 NOTE — CARDIOLOGY SUMMARY
[Today's Date] : [unfilled] [FreeTextEntry2] : Summary:\par 1. Study was limited by poor acoustic windows.\par 2. 26 year old young man, born with heterotaxy (I, L, D), right atrioventricular valve atresia, bilateral\par superior vena cavae, pulmonary valve atresia and total anomalous pulmonary venous return, s/p\par classic Chetna Taussig shunt and TAPVR repair at birth, s/p takedown of BT shunt and bilateral\par bidirectional Narinder anastomosis and re-repair of the pulmonary vein to left atrial anastomosis for\par stenosis, s/p fenestrated Fontan procedure.\par 3. Heterotaxy syndrome.\par 4. Atrial situs Inversus; L-Ventricular loop; D-Malposition of the great arteries.\par 5. Double outlet right ventricle.\par 6. Pulmonary valve atresia.\par 7. Trivial aortic valve regurgitation.\par 8. S/p mixed total anomalous pulmonary venous connection repair.\par 9. One pulmonary vein from each side is visualized entering the atria, with no evidence of obstruction.\par 10. There are multiple jets of tricuspid regurgitation which is cumulatively severe. Lack of coaptation in addition to redundancy of the valve leaflets is noted, likely accounting for the multiple jets of\par regurgitation.\par 11. Mitral valve atresia.\par 12. Moderate to severely hypoplastic left ventricle.\par 13. Qualitatively severely depressed left ventricular systolic function.\par 14. There is adequate systolic excursion of the right ventricular free and posterior walls with severe hypokinesia of the septal wall of the systemic right ventricle.\par 15. Dilated right ventricle.\par 16. Status post extracardiac fenestrated Fontan conduit.\par 17. Low velocity color Doppler was demonstrated in the left Narinder shunt on color flow mapping, inadequate imaging of the junction to the pulmonary artery. The right Narinder shunt could not be evaluated. In the setting of limited imaging of the inferior limb of the Fontan baffle, there is an echogenic, well circumscribed focus (~0.9 x 0.9 cm) noted at the IVC-Fontan junction (clip 6) which is unobstructive and unchanged. Smoke is noted in the Fontan limb. The fenestration (history of unsuccessful closure of the fenestration) was not demonstrated on this study. Suggest alternative imaging to assess for patency of the Fontan circuit.\par 18. In the setting of limited imaging of only the proximal branch pulmonary arteries at the bifurcation, there is laminar flow in these visualized portions.\par 19. No pericardial effusion.

## 2021-11-09 NOTE — REASON FOR VISIT
[Follow-Up] : a follow-up visit for [Patient] : patient [Medical Records] : medical records [FreeTextEntry3] : Heterotaxy Syndrome, Single Ventricle, s/p Fontan

## 2021-11-10 ENCOUNTER — APPOINTMENT (OUTPATIENT)
Dept: PEDIATRIC CARDIOLOGY | Facility: CLINIC | Age: 26
End: 2021-11-10
Payer: COMMERCIAL

## 2021-11-10 PROCEDURE — XXXXX: CPT

## 2021-12-10 ENCOUNTER — LABORATORY RESULT (OUTPATIENT)
Age: 26
End: 2021-12-10

## 2021-12-20 RX ORDER — WARFARIN 1 MG/1
1 TABLET ORAL
Qty: 180 | Refills: 3 | Status: ACTIVE | COMMUNITY
Start: 2018-04-05 | End: 1900-01-01

## 2022-01-12 ENCOUNTER — LABORATORY RESULT (OUTPATIENT)
Age: 27
End: 2022-01-12

## 2022-02-10 ENCOUNTER — LABORATORY RESULT (OUTPATIENT)
Age: 27
End: 2022-02-10

## 2022-03-09 ENCOUNTER — LABORATORY RESULT (OUTPATIENT)
Age: 27
End: 2022-03-09

## 2022-04-04 ENCOUNTER — APPOINTMENT (OUTPATIENT)
Dept: RADIOLOGY | Facility: CLINIC | Age: 27
End: 2022-04-04
Payer: COMMERCIAL

## 2022-04-04 ENCOUNTER — OUTPATIENT (OUTPATIENT)
Dept: OUTPATIENT SERVICES | Facility: HOSPITAL | Age: 27
LOS: 1 days | End: 2022-04-04
Payer: COMMERCIAL

## 2022-04-04 DIAGNOSIS — Z00.8 ENCOUNTER FOR OTHER GENERAL EXAMINATION: ICD-10-CM

## 2022-04-04 DIAGNOSIS — Z98.89 OTHER SPECIFIED POSTPROCEDURAL STATES: Chronic | ICD-10-CM

## 2022-04-04 PROCEDURE — 71046 X-RAY EXAM CHEST 2 VIEWS: CPT

## 2022-04-04 PROCEDURE — 71046 X-RAY EXAM CHEST 2 VIEWS: CPT | Mod: 26

## 2022-04-06 ENCOUNTER — APPOINTMENT (OUTPATIENT)
Dept: PULMONOLOGY | Facility: CLINIC | Age: 27
End: 2022-04-06

## 2022-04-19 ENCOUNTER — LABORATORY RESULT (OUTPATIENT)
Age: 27
End: 2022-04-19

## 2022-04-20 ENCOUNTER — LABORATORY RESULT (OUTPATIENT)
Age: 27
End: 2022-04-20

## 2022-04-21 ENCOUNTER — NON-APPOINTMENT (OUTPATIENT)
Age: 27
End: 2022-04-21

## 2022-04-22 RX ORDER — WARFARIN 5 MG/1
5 TABLET ORAL
Qty: 90 | Refills: 3 | Status: ACTIVE | COMMUNITY
Start: 2018-04-05 | End: 1900-01-01

## 2022-04-26 ENCOUNTER — LABORATORY RESULT (OUTPATIENT)
Age: 27
End: 2022-04-26

## 2022-05-13 ENCOUNTER — APPOINTMENT (OUTPATIENT)
Dept: PEDIATRIC CARDIOLOGY | Facility: CLINIC | Age: 27
End: 2022-05-13
Payer: COMMERCIAL

## 2022-05-13 VITALS
OXYGEN SATURATION: 86 % | HEIGHT: 68.9 IN | WEIGHT: 134.48 LBS | DIASTOLIC BLOOD PRESSURE: 72 MMHG | RESPIRATION RATE: 22 BRPM | SYSTOLIC BLOOD PRESSURE: 105 MMHG | HEART RATE: 108 BPM | BODY MASS INDEX: 19.92 KG/M2

## 2022-05-13 PROCEDURE — 93000 ELECTROCARDIOGRAM COMPLETE: CPT

## 2022-05-13 PROCEDURE — 93325 DOPPLER ECHO COLOR FLOW MAPG: CPT

## 2022-05-13 PROCEDURE — 93303 ECHO TRANSTHORACIC: CPT

## 2022-05-13 PROCEDURE — 93320 DOPPLER ECHO COMPLETE: CPT

## 2022-05-13 PROCEDURE — 99214 OFFICE O/P EST MOD 30 MIN: CPT

## 2022-05-13 NOTE — REASON FOR VISIT
[Follow-Up] : a follow-up visit for [Patient] : patient [Mother] : mother [FreeTextEntry3] : heterotaxy,AV valve atresia,pulmonary valve atresia and TAPVR and Fontan

## 2022-05-13 NOTE — HISTORY OF PRESENT ILLNESS
[FreeTextEntry1] : Previous Note:\par \par We had the pleasure of seeing Kang Cortez at the Adult [facility] of [facility] on March 8, 2021. 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 He ultimately underwent takedown of the Narinder shunt and underwent a fenestrated Fontan procedure. He had a failed attempt at fenestration closure in the cath lab resulting in thrombosis of the right femoral vein and subsequent development of venous stasis in that leg and ultimately significant color change and varicosities.\par \par In June 2019 Kang had an episode of massive hemoptysis. He ultimately underwent an endobronchial block, followed by bronchial artery, mediastinal artery and internal mammary artery embolization. He suffered MELLO during his hospitalization which has since resolved. He underwent a routine liver evaluation and MRI in August 2019. His liver MRI elastography demonstrated stage 4 fibrosis, not uncommon in patients with failing Fontan physiology. He has had significant intermittent abdominal ascites which has resolved and his diuretics have been discontinued. \par \par He remains on Coumadin with therapeutic INR’s. He has been vaccinated against COVID.\par \par Interval Medical History:\par I saw Kang today, May 13, 2022.  He has multiple complaints that are noted in the Review of Symptoms.  He knows his weight is up despite a poor appetite and he has noted lower leg edema of late.  He says he can "feel the fluids shifting when I lay down at night".  He feels more short of breath than usual.He still has a dry, chronic cough but no hemoptysis.  Perhaps the worst part of his current symptomatology is fatigue which is worse than it had been in the past.  In addition, he feels more thirst on a daily basis than in the past..  He is scheduled for a CT of his heart on June 1, rescheduled from a previous appointment as the system was down at that time.\par \par His most recent liver function tests were elevated c/w his studies form 2020\par AST then 27 now 42\par Alt then 27 and now 37 (as well)\par Alk phos then 150, now 154\par CO2 then 26, now 20\par \par From his Hemogram\par Hgb then 15.5, now 19.3\par Hct then 47.8, now 57.5\par RDW then 22.3 and now 23.6\par Red Cell indices now show no macrocytosis and stable MCHC and MCH\par \par BUN/Creat then 14/1.8 and now 17/1.08\par Total Bili was 1.5, now 2.9

## 2022-05-13 NOTE — CARDIOLOGY SUMMARY
[Today's Date] : [unfilled] [FreeTextEntry1] : Low right atrial rhythm\par Left axis deviation\par Left bundle branch block\par Study unchanged form that of 8-Nov-2021 [FreeTextEntry2] : See attached echo report

## 2022-05-13 NOTE — CONSULT LETTER
[Today's Date] : [unfilled] [Name] : Name: [unfilled] [] : : ~~ [Today's Date:] : [unfilled] [Dear  ___:] : Dear Dr. [unfilled]: [Consult] : I had the pleasure of evaluating your patient, [unfilled]. My full evaluation follows. [Consult - Single Provider] : Thank you very much for allowing me to participate in the care of this patient. If you have any questions, please do not hesitate to contact me. [Sincerely,] : Sincerely, [FreeTextEntry4] :  [FreeTextEntry5] : [address],  [FreeTextEntry6] : [place] [address] [FreeTextEntry7] : [phone] [de-identified] : \par

## 2022-05-13 NOTE — DISCUSSION/SUMMARY
[FreeTextEntry1] : Kang has complex congenital heart disease I.L,D s/p Fontan.  He presents today with a myriad of symptoms most definitively abdominal ascites and weight gain (fluid) despite poor appetite.  My concern is that his liver function tests are more elevated than his last set in 2020.  His tricuspid valve regurgitation is worse on today's echo but the heart function is unchanged.  There appears to be reasonable flow in the Fontan circuit and he has no jugular venous distension.  His tachycardia and increased thirst during the day, both of which are relatively new in the last 2 weeks suggests to me that his systemic output may be starting to fail and that there is a deleterious effect on the liver as part of the worsening symptoms he is complaining about.\par \par I would like for him to see Dr. Zak Carmen  (Hepatology) (also seen in 2020 by him) this coming Monday and get his opinion on the degree of liver dysfunction/failure.  Given the tricuspid valve continuing to fail, he may be approaching the need for a possible heart transplant as there is little we can do to advance his medical management.  If he does, indeed need to be evaluated for a heart transplant, he may require a combined heart/liver transplant.  I cannot answer that question without additional input at this time.

## 2022-05-13 NOTE — REVIEW OF SYSTEMS
[Feeling Poorly] : feeling poorly (malaise) [Change in Vision] : change in vision [Cough] : cough [Shortness Of Breath] : expressed as feeling short of breath [Abdominal Pain] : abdominal pain [Decrease In Appetite] : decreased appetite [FreeTextEntry1] : thigh and feet swelling,unable to lay flat

## 2022-05-16 DIAGNOSIS — Z01.818 ENCOUNTER FOR OTHER PREPROCEDURAL EXAMINATION: ICD-10-CM

## 2022-05-16 DIAGNOSIS — Q89.01 ASPLENIA (CONGENITAL): ICD-10-CM

## 2022-05-17 ENCOUNTER — APPOINTMENT (OUTPATIENT)
Dept: HEPATOLOGY | Facility: CLINIC | Age: 27
End: 2022-05-17
Payer: COMMERCIAL

## 2022-05-17 VITALS
WEIGHT: 128 LBS | HEART RATE: 78 BPM | SYSTOLIC BLOOD PRESSURE: 114 MMHG | OXYGEN SATURATION: 85 % | HEIGHT: 69 IN | DIASTOLIC BLOOD PRESSURE: 70 MMHG | TEMPERATURE: 97.3 F | BODY MASS INDEX: 18.96 KG/M2

## 2022-05-17 DIAGNOSIS — R74.8 ABNORMAL LEVELS OF OTHER SERUM ENZYMES: ICD-10-CM

## 2022-05-17 DIAGNOSIS — R18.8 OTHER ASCITES: ICD-10-CM

## 2022-05-17 PROCEDURE — 99214 OFFICE O/P EST MOD 30 MIN: CPT

## 2022-05-17 RX ORDER — AZITHROMYCIN 250 MG/1
250 TABLET, FILM COATED ORAL
Qty: 1 | Refills: 1 | Status: DISCONTINUED | COMMUNITY
Start: 2022-04-05 | End: 2022-05-17

## 2022-05-17 RX ORDER — METOPROLOL TARTRATE 25 MG/1
25 TABLET, FILM COATED ORAL
Qty: 2 | Refills: 0 | Status: DISCONTINUED | COMMUNITY
Start: 2021-11-08 | End: 2022-05-17

## 2022-05-17 RX ORDER — FLUTICASONE PROPIONATE 50 UG/1
50 SPRAY, METERED NASAL TWICE DAILY
Qty: 1 | Refills: 3 | Status: DISCONTINUED | COMMUNITY
Start: 2022-04-05 | End: 2022-05-17

## 2022-05-17 NOTE — ASSESSMENT
[FreeTextEntry1] : Kang Cortez 27 yoM with history of heterotaxy, AV valve atresia,pulmonary valve atresia and TAPVR and a Fontan procedure performed at age 2 here for follow up.  \par \par #Elevated liver enzymes\par -BW 4/4/22 ALT 37, AST 42, , Albumin 4.2, TB 2.9, plts 180,000, INR 2.1 (on warfarin). Liver chemistries stable with TB mildly increased but not worse than in the past. \par -BW 4/4 MELD Na 16 but on warfarin\par \par #Ascites/abdominal pain\par -New onset since 2019, small amount on exam today\par -Recommended MRI w/wo contrast to assess ascitic volume and for potential cause of ascites including PVT \par -Not on diuretics. He is currently following a low sodium diet. Dr. Rojas office has recommended paracentesis however I recommended hold off until after MRI. If he has moderate to severe ascites can move forward with paracentesis. I recommended starting diuretics. Due to my concern with intravascular volume depletion, spoke with Pauline Morrell NP (cardiology) and her office will discuss starting diuretics with pt. \par \par #MR Elastography 2019 suggests cirrhosis.  However, hepatic stiffness may also relate to hepatic congestion. Will repeat MRE. \par \par Spoke with Dr. White regarding above pt's care. \par \par Plan:\par BW, MRI and MRE, will discuss results via phone.

## 2022-05-17 NOTE — HISTORY OF PRESENT ILLNESS
[Severe] : severe [Unchanged] : unchanged [Fatigue] : fatigue stable [Malaise] : denies malaise [Fever] : denies fever [Diffuse Joint Pain (Arthralgias)] : denies arthralgias [Muscle Aches, Generalized (Myalgias)] : denies myalgias [Yellow Skin Or Eyes (Jaundice)] : denies jaundice [Abdominal Pain] : denies abdominal pain [Urine Tests Nonspecific Abnormal Findings Biliuria] : denies dark urine [Light Colored Bowel Movement (Acholic Stools)] : denies pale stools [Insomnia] : denies insomnia [Skin: Rash] : denies rash [Itching (Pruritus)] : denies pruritus [Shortness Of Breath] : shortness of breath stable [Needlestick Exposure] : no needlestick exposure [Infected Sexual Partner] : no infected sexual partner [IV Drug Use] : no IV drug use [Tattoo] : no tattoos [Body Piercing] : no body piercing [Hemodialysis] : no hemodialysis [Transfusion before 1992] : no transfusion before 1992 [Transplant before 1992] : no transplant before 1992 [Incarceration] : no incarceration [Alcohol Abuse] : no alcohol abuse [Autoimmune Disorder] : no autoimmune disorder [Household Contact to HBV] : no household contact to HBV [Travel to Endemic Area] : no travel to an endemic area [Occupational Exposure] : no occupational exposure [Cocaine Use] : no cocaine use [Wt Gain ___ Lbs] : no recent weight gain [Wt Loss ___ Lbs] : no recent weight loss [de-identified] : Kang Cortez 27 yoM with history of heterotaxy, AV valve atresia,pulmonary valve atresia and TAPVR and a Fontan procedure performed at age 2 here for follow up.  He was last seen by Dr. Zak White on 1/29/20. He has an appt next week for heart transplant evaluation. His main complaint today is increased fatigue, increased abdominal girth and swelling of both legs started 1 week ago. He has mild abdominal pain and pain on his left lower back/side especially in the morning. No fever/chills. Denies nausea, vomiting, melena, hematochezia, or hematemesis. Has decreased appetite. His mother states that pt has been recommended by Dr. Rojas office to get a paracentesis but has not been scheduled. Not on diuretics. He is currently following a low sodium diet. \par \par Has been vaccinated for Covid. No covid infection. \par \par Pertinent Hx:\par \par In 2019 had an episode of hemoptysis and undergoing embolization of bleeding sites. He developed fluid overload following his hospitalization and developed ascites.  The patient was treated with furosemide 40 mg twice daily and spironolactone with sodium restriction and fluid overload resolved.  The patient's ascites resolved and the patient had no peripheral edema since that time. \par \par The patient also has history of a femoral vein injury in his right leg with venous thrombosis and he is now on anticoagulation.  The patient has had MR Elastography, showing a nodular liver, however, nodules do not have characteristics of hepatocellular carcinoma.  MR Elastography suggests cirrhosis.  However, hepatic stiffness may also relate to hepatic congestion.  \par \par Patient does not smoke or drink alcohol. No recreational drug use. \par \par Past BW was neg for Hep B and C, neg ASMA, neg LKM, neg ROBERTO\par BW 4/4/22 ALT 37, AST 42, , Albumin 4.2, TB 2.9, plts 180,000

## 2022-05-17 NOTE — PHYSICAL EXAM
[Abdominal  Ascites] : ascites [Liver Size (___ Cm)] : Liver size [unfilled] cm [Liver Palpable ___ Finger Breadths Below Costal Margin] : Liver edge was palpable [unfilled] finger breadths below costal margin [Non-Tender] : non-tender [General Appearance - Alert] : alert [General Appearance - In No Acute Distress] : in no acute distress [General Appearance - Well Nourished] : well nourished [Sclera] : the sclera and conjunctiva were normal [Outer Ear] : the ears and nose were normal in appearance [Respiration, Rhythm And Depth] : normal respiratory rhythm and effort [Auscultation Breath Sounds / Voice Sounds] : lungs were clear to auscultation bilaterally [Bowel Sounds] : normal bowel sounds [Abdomen Soft] : soft [Abdomen Mass (___ Cm)] : no abdominal mass palpated [Abdomen Tenderness] : non-tender [] : no rash [Skin Lesions] : no skin lesions [Oriented To Time, Place, And Person] : oriented to person, place, and time [Scleral Icterus] : No Scleral Icterus [Hepatojugular Reflux] : patient did not have a sustained hepatojugular reflux [Spider Angioma] : No spider angioma(s) were observed [Abdominal Bruit] : no abdominal bruit [Ascites Fluid Wave] : no ascites fluid wave [Ascites Tense] : ascites is not tense [Splenomegaly] : no splenomegaly [Caput Medusae] : no caput medusae observed [Asterixis] : no asterixis observed [Jaundice] : No jaundice [Palmar Erythema] : no Palmar Erythema [FreeTextEntry1] : clubbing of the fingers, cyanosis of the fingers and cyanosis of the toes was noted. \par clubbing of the fingers, cyanosis of the fingers and cyanosis of the toes was noted. \par clubbing of the fingers, cyanosis of the fingers and cyanosis of the toes was noted. \par clubbing of the fingers

## 2022-05-17 NOTE — REVIEW OF SYSTEMS
[Feeling Tired] : feeling tired [Palpitations] : palpitations [Lower Ext Edema] : lower extremity edema [Shortness Of Breath] : shortness of breath [Cough] : cough [As Noted in HPI] : as noted in HPI [Negative] : Genitourinary [Fever] : no fever [Chills] : no chills [Recent Weight Gain (___ Lbs)] : no recent weight gain [Recent Weight Loss (___ Lbs)] : no recent weight loss [FreeTextEntry1] : thigh and feet swelling,unable to lay flat.

## 2022-05-18 ENCOUNTER — NON-APPOINTMENT (OUTPATIENT)
Age: 27
End: 2022-05-18

## 2022-05-18 LAB
ALBUMIN SERPL ELPH-MCNC: 3.3 G/DL
ALP BLD-CCNC: 185 U/L
ALT SERPL-CCNC: 38 U/L
ANION GAP SERPL CALC-SCNC: 11 MMOL/L
AST SERPL-CCNC: 45 U/L
BILIRUB DIRECT SERPL-MCNC: 2.2 MG/DL
BILIRUB SERPL-MCNC: 3.6 MG/DL
BUN SERPL-MCNC: 16 MG/DL
CALCIUM SERPL-MCNC: 9 MG/DL
CHLORIDE SERPL-SCNC: 106 MMOL/L
CO2 SERPL-SCNC: 24 MMOL/L
CREAT SERPL-MCNC: 1.27 MG/DL
EGFR: 79 ML/MIN/1.73M2
GGT SERPL-CCNC: 223 U/L
HBV SURFACE AB SER QL: NONREACTIVE
HEPATITIS A IGG ANTIBODY: NONREACTIVE
INR PPP: 6.19 RATIO
MITOCHONDRIA AB SER IF-ACNC: NORMAL
POTASSIUM SERPL-SCNC: 5 MMOL/L
PROT SERPL-MCNC: 5.9 G/DL
PT BLD: 74.4 SEC
SMOOTH MUSCLE AB SER QL IF: NORMAL
SODIUM SERPL-SCNC: 142 MMOL/L

## 2022-05-19 LAB
HBV SURFACE AG SER QL: NONREACTIVE
HCV RNA SERPL NAA+PROBE-LOG IU: NOT DETECTED LOGIU/ML
HEPC RNA INTERP: NOT DETECTED
LKM AB SER QL IF: <20.1 UNITS
SOLUBLE LIVER IGG SER IA-ACNC: 1.3

## 2022-05-20 ENCOUNTER — LABORATORY RESULT (OUTPATIENT)
Age: 27
End: 2022-05-20

## 2022-05-25 ENCOUNTER — LABORATORY RESULT (OUTPATIENT)
Age: 27
End: 2022-05-25

## 2022-06-01 ENCOUNTER — LABORATORY RESULT (OUTPATIENT)
Age: 27
End: 2022-06-01

## 2022-06-03 LAB
INR PPP: 3.35 RATIO
PT BLD: 39.3 SEC

## 2022-06-14 ENCOUNTER — OUTPATIENT (OUTPATIENT)
Dept: OUTPATIENT SERVICES | Facility: HOSPITAL | Age: 27
LOS: 1 days | End: 2022-06-14
Payer: COMMERCIAL

## 2022-06-14 ENCOUNTER — APPOINTMENT (OUTPATIENT)
Dept: MRI IMAGING | Facility: IMAGING CENTER | Age: 27
End: 2022-06-14
Payer: COMMERCIAL

## 2022-06-14 DIAGNOSIS — R74.8 ABNORMAL LEVELS OF OTHER SERUM ENZYMES: ICD-10-CM

## 2022-06-14 DIAGNOSIS — K74.00 HEPATIC FIBROSIS, UNSPECIFIED: ICD-10-CM

## 2022-06-14 DIAGNOSIS — R18.8 OTHER ASCITES: ICD-10-CM

## 2022-06-14 DIAGNOSIS — Z98.89 OTHER SPECIFIED POSTPROCEDURAL STATES: Chronic | ICD-10-CM

## 2022-06-14 PROCEDURE — 76391 MR ELASTOGRAPHY: CPT | Mod: 26

## 2022-06-14 PROCEDURE — A9585: CPT

## 2022-06-14 PROCEDURE — 74183 MRI ABD W/O CNTR FLWD CNTR: CPT | Mod: 26

## 2022-06-14 PROCEDURE — 74183 MRI ABD W/O CNTR FLWD CNTR: CPT

## 2022-06-14 PROCEDURE — 76391 MR ELASTOGRAPHY: CPT

## 2022-06-16 ENCOUNTER — LABORATORY RESULT (OUTPATIENT)
Age: 27
End: 2022-06-16

## 2022-06-20 DIAGNOSIS — R93.5 ABNORMAL FINDINGS ON DIAGNOSTIC IMAGING OF OTHER ABDOMINAL REGIONS, INCLUDING RETROPERITONEUM: ICD-10-CM

## 2022-06-21 RX ORDER — AMPICILLIN 500 MG/1
500 CAPSULE ORAL
Qty: 4 | Refills: 1 | Status: ACTIVE | COMMUNITY
Start: 2022-06-21 | End: 1900-01-01

## 2022-06-23 ENCOUNTER — APPOINTMENT (OUTPATIENT)
Dept: MRI IMAGING | Facility: CLINIC | Age: 27
End: 2022-06-23

## 2022-06-23 PROCEDURE — 74183 MRI ABD W/O CNTR FLWD CNTR: CPT

## 2022-06-23 PROCEDURE — A9581: CPT

## 2022-07-05 ENCOUNTER — NON-APPOINTMENT (OUTPATIENT)
Age: 27
End: 2022-07-05

## 2022-07-11 NOTE — SWALLOW BEDSIDE ASSESSMENT ADULT - ASR SWALLOW LABIAL MOBILITY
Spondylolysis and Spondylolisthesis: Exercises  Introduction  Here are some examples of exercises for you to try. The exercises may be suggested for a condition or for rehabilitation. Start each exercise slowly. Ease off the exercises if you start to have pain. You will be told when to start these exercises and which ones will work best for you. How to do the exercises  Single knee-to-chest    1. Lie on your back with your knees bent and your feet flat on the floor. You can put a small pillow under your head and neck if it is more comfortable. 2. Bring one knee to your chest, keeping the other foot flat on the floor. 3. Keep your lower back pressed to the floor. Hold for 15 to 30 seconds. 4. Relax, and lower the knee to the starting position. 5. Repeat with the other leg. Repeat 2 to 4 times with each leg. 6. To get more stretch, put your other leg flat on the floor while pulling your knee to your chest.  Double knee-to-chest    1. Lie on your back with your knees bent and your feet flat on the floor. You can put a small pillow under your head and neck if it is more comfortable. 2. Bring both knees to your chest.  3. Keep your lower back pressed to the floor. Hold for 15 to 30 seconds. 4. Relax, and lower your knees to the starting position. 5. Repeat 2 to 4 times. Alternate arm and leg (bird dog) exercise    Do this exercise slowly. Try to keep your body straight at all times. 1. Start on the floor, on your hands and knees. 2. Tighten your belly muscles by pulling your belly button in toward your spine. Be sure you continue to breathe normally and do not hold your breath. 3. Raise one arm off the floor, and hold it straight out in front of you. Be careful not to let your shoulder drop down, because that will twist your trunk. 4. Hold for about 6 seconds, then lower your arm and switch to your other arm. 5. Repeat 8 to 12 times on each arm.   6. When you can do this exercise with ease and no pain, repeat steps 1 through 5. But this time do it with one leg raised off the floor, holding your leg straight out behind you. Be careful not to let your hip drop down, because that will twist your trunk. 7. When holding your leg straight out becomes easier, try raising your opposite arm at the same time, and repeat steps 1 through 5. Bridging    1. Lie on your back with both knees bent. Your knees should be bent about 90 degrees. 2. Then push your feet into the floor, squeeze your buttocks, and lift your hips off the floor until your shoulders, hips, and knees are all in a straight line. 3. Hold for about 6 seconds as you continue to breathe normally, and then slowly lower your hips back down to the floor and rest for up to 10 seconds. 4. Repeat 8 to 12 times. Curl-ups    1. Lie on the floor on your back with your knees bent at a 90-degree angle. Your feet should be flat on the floor, about 12 inches from your buttocks. 2. Cross your arms over your chest. If this bothers your neck, try putting your hands behind your neck (not your head), with your elbows spread apart. 3. Slowly tighten your belly muscles and raise your shoulder blades off the floor. 4. Keep your head in line with your body, and do not press your chin to your chest.  5. Hold this position for 1 or 2 seconds, then slowly lower yourself back down to the floor. 6. Repeat 8 to 12 times. Plank    Do this exercise slowly. Try to keep your body straight at all times, and do not let one hip drop lower than the other. 1. Lie on your stomach, resting your upper body on your forearms. 2. Tighten your belly muscles by pulling your belly button in toward your spine. 3. Keeping your knees on the floor, press down with your forearms to lift your upper body off the floor. 4. Hold for about 6 seconds, then lower your body to the floor. Rest for up to 10 seconds. 5. Repeat 8 to 12 times.   6. Over time, work up to holding for 15 to 30 seconds each time.  7. If this exercise is easy to do with your knees on the floor, try doing this exercise with your knees and legs straight, supported by your toes on the floor. Follow-up care is a key part of your treatment and safety. Be sure to make and go to all appointments, and call your doctor if you are having problems. It's also a good idea to know your test results and keep a list of the medicines you take. Where can you learn more? Go to http://www.durán.com/  Enter M245 in the search box to learn more about \"Spondylolysis and Spondylolisthesis: Exercises. \"  Current as of: July 1, 2021               Content Version: 13.2  © 2006-2022 [employer]. Care instructions adapted under license by [employer] (which disclaims liability or warranty for this information). If you have questions about a medical condition or this instruction, always ask your healthcare professional. [address] any warranty or liability for your use of this information. within functional limits

## 2022-07-12 ENCOUNTER — NON-APPOINTMENT (OUTPATIENT)
Age: 27
End: 2022-07-12

## 2022-07-14 ENCOUNTER — NON-APPOINTMENT (OUTPATIENT)
Age: 27
End: 2022-07-14

## 2022-07-19 ENCOUNTER — LABORATORY RESULT (OUTPATIENT)
Age: 27
End: 2022-07-19

## 2022-07-25 LAB
ALBUMIN SERPL ELPH-MCNC: 3.2 G/DL
ALP BLD-CCNC: 165 U/L
ALT SERPL-CCNC: 29 U/L
ANION GAP SERPL CALC-SCNC: 11 MMOL/L
AST SERPL-CCNC: 48 U/L
BILIRUB SERPL-MCNC: 2.7 MG/DL
BUN SERPL-MCNC: 16 MG/DL
CALCIUM SERPL-MCNC: 9.2 MG/DL
CHLORIDE SERPL-SCNC: 105 MMOL/L
CO2 SERPL-SCNC: 26 MMOL/L
CREAT SERPL-MCNC: 1.16 MG/DL
EGFR: 88 ML/MIN/1.73M2
GLUCOSE SERPL-MCNC: 111 MG/DL
POTASSIUM SERPL-SCNC: 4.3 MMOL/L
PROT SERPL-MCNC: 5.7 G/DL
SODIUM SERPL-SCNC: 141 MMOL/L

## 2022-07-28 ENCOUNTER — NON-APPOINTMENT (OUTPATIENT)
Age: 27
End: 2022-07-28

## 2022-08-05 ENCOUNTER — NON-APPOINTMENT (OUTPATIENT)
Age: 27
End: 2022-08-05

## 2022-08-24 ENCOUNTER — NON-APPOINTMENT (OUTPATIENT)
Age: 27
End: 2022-08-24

## 2022-09-14 RX ORDER — FUROSEMIDE 40 MG/1
40 TABLET ORAL DAILY
Qty: 90 | Refills: 3 | Status: ACTIVE | COMMUNITY
Start: 2022-05-18 | End: 1900-01-01

## 2022-09-15 ENCOUNTER — LABORATORY RESULT (OUTPATIENT)
Age: 27
End: 2022-09-15

## 2022-09-27 ENCOUNTER — NON-APPOINTMENT (OUTPATIENT)
Age: 27
End: 2022-09-27

## 2022-09-30 DIAGNOSIS — M62.50 MUSCLE WASTING AND ATROPHY, NOT ELSEWHERE CLASSIFIED, UNSPECIFIED SITE: ICD-10-CM

## 2022-10-06 ENCOUNTER — APPOINTMENT (OUTPATIENT)
Dept: CARDIOLOGY | Facility: CLINIC | Age: 27
End: 2022-10-06

## 2022-10-06 ENCOUNTER — NON-APPOINTMENT (OUTPATIENT)
Age: 27
End: 2022-10-06

## 2022-10-06 NOTE — REASON FOR VISIT
[Home] : at home, [unfilled] , at the time of the visit. [Medical Office: ([facility])___] : at the medical office located in  [FreeTextEntry1] : Cardiac Rehab Intake. ITP will be completed at session #1.  Clinical Indication: Pulmonary Valve Atresia; Pre-transplant patient

## 2022-10-06 NOTE — HISTORY OF PRESENT ILLNESS
[Low sodium diet] : low sodium diet [None] : none [Sedentary] : sedentary [Cardiac Rehabilitation] : Cardiac Rehabilitation [Walking] : walking [Assess in ___ weeks] : assess in [unfilled] weeks [1 - 3 sets] : 1 - 3 sets [Perform aerobic activity for ___ consecutive minutes] : perform aerobic activity for [unfilled] consecutive minutes [To start resistance training] : to start resistance training [Welcome packet provided] : welcome packet provided [Yes, per exercise prescription, policy] : Yes, per exercise prescription, policy [Patient will include healthy emotional coping practices in everyday life, such as: ____] : Patient will include healthy emotional coping practices in everyday life, such as [unfilled] [Preparation/Planning] : Stage of change: preparation/planning [Heart Failure] : Heart Failure [Self] : self [Action] : Stage of change: action [Height: ___] : Height: [unfilled] [Weight: ___ lbs] : Weight: [unfilled] lbs [Does patient monitor weight at home?] : Does patient monitor weight at home? Yes [Frequency Checked: ____] : Frequency checked: [unfilled] [Monitoring of weight at home and at cardiac rehabilitation] : Monitoring of weight at home and at cardiac rehabilitation [Individualized exercise program as developed at cardiac rehabilitation] : Individualized exercise program as developed at cardiac rehabilitation [Dietary strategies, including heart healthy eating, balanced with physical activity] : Dietary strategies, including heart healthy eating, balanced with physical activity [Yes, continue with Weight Management plan] : Yes, continue with weight management plan [FreeTextEntry2] : Dr. Bob, [facility] Heart Failure Team [___ Days per week] : [unfilled] days per week [>= 31 minutes per session] : >= 31 minutes per session [Treadmill] : treadmill [Recumbent bike] : recumbent bike [BioStep] : BioStep [Stretching/ROM exercises and balance exercises daily] : Stretching/ROM exercises and balance exercises daily [Will assess for musculoskeletal and other restrictions] : will assess for musculoskeletal and other restrictions [FreeTextEntry1] : Mega Rojas [FreeTextEntry5] : Ting Virgen ([facility]) [de-identified] : Pt. 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 Yes [Meditation] : meditation [Self-reports of improved psychosocial well-being] : Self-reports of improved psychosocial well-being [Mindfulness] : mindfulness [Yes, continue with psychosocial plan] : Yes, continue with psychosocial plan [In progress] : in progress [FreeTextEntry9] : Pt's emotional well being will remain intact. [Family] : family [Patient will include healthy diet pattern approaches to healthy eating] : Patient will include healthy diet pattern approaches to healthy eating [Patient will commit to reducing or discontinuing alcohol use] : Patient will commit to reducing or discontinuing alcohol use [MyPlate.gov] : MyPlate.gov [Yes, continue with nutrition plan] : Yes, continue with nutrition plan [Met] : met [FreeTextEntry6] : Occasional fast food; ie: [employer] [FreeTextEntry8] : Increase whole grain intake to 2-3 times per week [FreeTextEntry7] : States he monitors sodium intake.

## 2022-10-18 LAB
INR PPP: 1.72 RATIO
PT BLD: 20.2 SEC

## 2022-10-20 ENCOUNTER — APPOINTMENT (OUTPATIENT)
Dept: CARDIOLOGY | Facility: CLINIC | Age: 27
End: 2022-10-20

## 2022-10-20 PROCEDURE — 93798 PHYS/QHP OP CAR RHAB W/ECG: CPT

## 2022-10-20 NOTE — PHYSICAL EXAM
[No Acute Distress] : no acute distress [Well-Appearing] : well-appearing [No Respiratory Distress] : no respiratory distress  [No Accessory Muscle Use] : no accessory muscle use [Clear to Auscultation] : lungs were clear to auscultation bilaterally [Normal Rate] : normal rate  [Regular Rhythm] : with a regular rhythm [Normal S1, S2] : normal S1 and S2 [Soft] : abdomen soft [Non Tender] : non-tender [Non-distended] : non-distended [de-identified] : holosytolic mumur LSB [de-identified] : no LE edema; non-tender calves; cyanosis of fingers; fingers warm and mobile.

## 2022-10-20 NOTE — PHYSICAL EXAM
[No Acute Distress] : no acute distress [Well-Appearing] : well-appearing [No Respiratory Distress] : no respiratory distress  [No Accessory Muscle Use] : no accessory muscle use [Clear to Auscultation] : lungs were clear to auscultation bilaterally [Normal Rate] : normal rate  [Regular Rhythm] : with a regular rhythm [Normal S1, S2] : normal S1 and S2 [Soft] : abdomen soft [Non Tender] : non-tender [Non-distended] : non-distended [de-identified] : holosytolic mumur LSB [de-identified] : no LE edema; non-tender calves; cyanosis of fingers; fingers warm and mobile.

## 2022-10-20 NOTE — REVIEW OF SYSTEMS
[Shortness Of Breath] : shortness of breath [Dyspnea on Exertion] : dyspnea on exertion [FreeTextEntry6] : SpO2:  83-85% [FreeTextEntry1] : Right leg DVT on Coumadin, left leg varicous vein

## 2022-10-20 NOTE — REASON FOR VISIT
[Initial Assessment] : an initial assessment [FreeTextEntry1] :  Clinical indication: Valve Atresia, pre- transplant.  Assessment will be completed at first in-person visit.

## 2022-10-20 NOTE — PLAN
[FreeTextEntry1] : ITP finalized-to confer with Dr. Ramires\par Cardiac Rehab Phase 2 - low intensities with close monitoring as program begins.\par Will monitor. \par See session report for details.\par \par \par

## 2022-10-20 NOTE — ASSESSMENT
[FreeTextEntry1] : Patient is a 27 year old male with h/o complex congential heart disease, with 3 heart surgeries. He was referred for cardiac rehabilitation. He is currently being evaluated for heart and possibly liver transplant at [facility].\par Today, he is no acute distress, conversant, and looking forward to starting the program. His Sp02 is chronically in the mid-80s; he is no acute distress.  [High] : high

## 2022-10-20 NOTE — ASSESSMENT
[High] : high [FreeTextEntry1] : Patient is a 27 year old male with h/o complex congential heart disease, with 3 heart surgeries. He was referred for cardiac rehabilitation. He is currently being evaluated for heart and possibly liver transplant at [facility].\par Today, he is no acute distress, conversant, and looking forward to starting the program. His Sp02 is chronically in the mid-80s; he is no acute distress.

## 2022-10-20 NOTE — HISTORY OF PRESENT ILLNESS
[Low sodium diet] : low sodium diet [Height: ___] : Height: [unfilled] [Weight: ___ lbs] : Weight: [unfilled] lbs [Body Mass Index (BMI): ___] : BMI: [unfilled] [Does patient monitor weight at home?] : Does patient monitor weight at home? Yes [Frequency Checked: ____] : Frequency checked: [unfilled] [Monitoring of weight at home and at cardiac rehabilitation] : Monitoring of weight at home and at cardiac rehabilitation [Individualized exercise program as developed at cardiac rehabilitation] : Individualized exercise program as developed at cardiac rehabilitation [Dietary strategies, including heart healthy eating, balanced with physical activity] : Dietary strategies, including heart healthy eating, balanced with physical activity [Yes, continue with Weight Management plan] : Yes, continue with weight management plan [FreeTextEntry2] : Dr. Bob, [facility] Heart Failure Team [FreeTextEntry3] : Patient will gain weight safely. [Preparation/Planning] : Stage of change: preparation/planning [None] : none [Sedentary] : sedentary [Angina with exercise] : no angina with exercise [Fall Risk] : no fall risk [BP: ____ mmHg] : BP: [unfilled] mmHg [HR: ____ bpm] : BP: [unfilled] bpm [O2sat: ____ %] : O2sat: [unfilled] % [RPE: ____] : RPE: [unfilled]  [METS: ____] : METS: [unfilled]  [Cardiac Rehabilitation] : Cardiac Rehabilitation [___ Days per week] : [unfilled] days per week [>= 31 minutes per session] : >= 31 minutes per session [Target RPE: ___] : Target RPE: [unfilled] [Treadmill] : treadmill [Recumbent bike] : recumbent bike [BioStep] : BioStep [Walking] : walking [Assess in ___ weeks] : assess in [unfilled] weeks [1 - 3 sets] : 1 - 3 sets [Stretching/ROM exercises and balance exercises daily] : Stretching/ROM exercises and balance exercises daily [Will assess for musculoskeletal and other restrictions] : will assess for musculoskeletal and other restrictions [Perform aerobic activity for ___ consecutive minutes] : perform aerobic activity for [unfilled] consecutive minutes [To start resistance training] : to start resistance training [Equipment Orientation/Safety] : equipment orientation/safety [Exercise Benefits/Guidelines education] : exercise benefits/guidelines education [Individualized Exercise Rx] : individualized exercise Rx [Signs/Symptoms to report] : signs/symptoms to report [Hemodynamic responses] : hemodynamic responses [Patient verbalizes understanding of exercise education all questions answered] : Patient verbalizes understanding of exercise education all questions answered [Return demonstration] : return demonstration [Yes, per exercise prescription, policy] : Yes, per exercise prescription, policy [FreeTextEntry5] : Ting Virgen ([facility]) [de-identified] : METS:\par 10/20/22: RB2.6; TM 2.1- patient Sp02 on TM 81%; on RB 98:; at discharge 82$; pt denied complaints during exercise\par \par 10/6:intake: Pt. 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.States due to congenital disorder, baseline SpO2 is 83-85%  [PHQ-9: ___] : PHQ-9: [unfilled] [[facility] COOP Score Total: ___] : [facility] COOP score total: [unfilled] [Feelings Score: ___] : Feelings Score: [unfilled] [Physical Fitness Score: ____] : Physical Fitness Score: [unfilled] [Daily Activities Score: ___] : Daily Activities Score: [unfilled] [Social Activities Score: ___] : Social Activities Score: [unfilled] [Pain Score: ___] : Pain Score: [unfilled] [Overall Health Score: ___] : Overall Health Score: [unfilled] [Change in Health Score: ___] : Change in Health Score: [unfilled] [Quality of Life Score: ___] : Quality of Life Score: [unfilled] [Social Support Score: ___] : Social Support Score: [unfilled] [Does patient have advance directive?] : Does patient have advance directive? Yes [Patient will include healthy emotional coping practices in everyday life, such as: ____] : Patient will include healthy emotional coping practices in everyday life, such as [unfilled] [Meditation] : meditation [Self-reports of improved psychosocial well-being] : Self-reports of improved psychosocial well-being [Mindfulness] : mindfulness [Yes, continue with psychosocial plan] : Yes, continue with psychosocial plan [FreeTextEntry9] : Pt's emotional well being will remain intact. [Action] : Stage of change: Action [Rate your plate score: ____] : Rate your plate score: [unfilled] [Heart Failure] : Heart Failure [Other: ___] : [unfilled] [Self] : self [Family] : family [Patient will include healthy diet pattern approaches to healthy eating] : Patient will include healthy diet pattern approaches to healthy eating [Patient will commit to reducing or discontinuing alcohol use] : Patient will commit to reducing or discontinuing alcohol use [MyPlate.gov] : MyPlate.gov [Discuss an overview of healthy eating plan] : Discuss an overview of healthy eating plan [Teach back utilized] : teach back utilized [Yes, continue with nutrition plan] : Yes, continue with nutrition plan [In progress] : in progress [FreeTextEntry6] : Occasional fast food; ie: [employer] [FreeTextEntry8] : Increase whole grain intake to 2-3 times per week [FreeTextEntry7] : intake:States he monitors sodium intake. \par 10/20/22 goals: eat more fruits and vegetables; try to eat more fish; eat more meatless meals [FreeTextEntry1] : 28 yo M with hx of stage D complex congenital disease, failing RV  and Fontan, severe AV valve regurgitation. Who has completed combined heart liver transplant evaluation.  Pt. is referred to cardiac rehab while he awaits heart transplantation.  [Driven by: ____] : Patient will be driven by [unfilled] [Fully functional (bathing, dressing, toileting, transferring, walking, feeding)] : Fully functional (bathing, dressing, toileting, transferring, walking, feeding)

## 2022-10-25 ENCOUNTER — APPOINTMENT (OUTPATIENT)
Dept: CARDIOLOGY | Facility: CLINIC | Age: 27
End: 2022-10-25

## 2022-10-25 PROCEDURE — 93798 PHYS/QHP OP CAR RHAB W/ECG: CPT

## 2022-10-27 ENCOUNTER — APPOINTMENT (OUTPATIENT)
Dept: CARDIOLOGY | Facility: CLINIC | Age: 27
End: 2022-10-27

## 2022-10-27 PROCEDURE — 93798 PHYS/QHP OP CAR RHAB W/ECG: CPT

## 2022-10-28 NOTE — PHYSICAL EXAM
[General Appearance - Alert] : alert [General Appearance - In No Acute Distress] : in no acute distress [General Appearance - Well Nourished] : well nourished [Sclera] : the conjunctiva were normal [Outer Ear] : the ears and nose were normal in appearance [Respiration, Rhythm And Depth] : normal respiratory rhythm and effort [Auscultation Breath Sounds / Voice Sounds] : breath sounds clear to auscultation bilaterally [No Cough] : no cough [Stridor] : no stridor was observed [Chest Surgical / Traumatic Scar] : chest incision well healed [Chest Palpation Tender Sternum] : no chest wall tenderness JULIANE cardioversion/yes [No Sternal Instability] : no sternal instability [Pectus Excavatum] : a pectus excavatum was noted [Heart Sounds Gallop] : no gallops [Heart Sounds Pericardial Friction Rub] : no pericardial rub [Heart Sounds Click] : no clicks [Displaced to Left] : displaced to the left [Hyperdynamic] : There was a hyperdynamic precordium [Tachycardic ___] : the heart rate was tachycardic at [unfilled] bpm [Regular] : the rhythm was regular [___ +] : bilateral [unfilled]U+ pitting edema to the ankles [IV] : a grade 4/6   [LMSB] : LMSB  [Holosystolic] : holosystolic [High] : high pitched [Harsh] : harsh [Early] : early [Back] : the murmur was transmitted to the back [Bowel Sounds] : normal bowel sounds [Abdomen Tenderness] : non-tender [Distended] : distended [Liver Enlarged] : enlarged [Palpable___cm below the RCM] : palpable [unfilled] cm below the right costal margin [Spleen Tender] : not tender [Musculoskeletal Exam: Normal Movement Of All Extremities] : normal movements of all extremities [Nail Clubbing] : clubbing of the fingers [Cyanosis Of Fingers] : cyanosis of the fingers [Toes Cyanosis] : cyanosis of the toes was noted [Delayed Developmental Milestones] : normal neurologic development for age [Cervical Lymph Nodes Enlarged Anterior] : The anterior cervical nodes were normal [Cervical Lymph Nodes Enlarged Posterior] : The posterior cervical nodes were normal [] : no rash [Skin Lesions] : no lesions [Anxious] : anxious [Depressed] : depressed

## 2022-11-01 ENCOUNTER — APPOINTMENT (OUTPATIENT)
Dept: CARDIOLOGY | Facility: CLINIC | Age: 27
End: 2022-11-01

## 2022-11-01 PROCEDURE — 93798 PHYS/QHP OP CAR RHAB W/ECG: CPT

## 2022-11-03 ENCOUNTER — APPOINTMENT (OUTPATIENT)
Dept: CARDIOLOGY | Facility: CLINIC | Age: 27
End: 2022-11-03

## 2022-11-03 PROCEDURE — 93798 PHYS/QHP OP CAR RHAB W/ECG: CPT

## 2022-11-08 ENCOUNTER — APPOINTMENT (OUTPATIENT)
Dept: CARDIOLOGY | Facility: CLINIC | Age: 27
End: 2022-11-08

## 2022-11-08 PROCEDURE — 93798 PHYS/QHP OP CAR RHAB W/ECG: CPT

## 2022-11-10 ENCOUNTER — APPOINTMENT (OUTPATIENT)
Dept: CARDIOLOGY | Facility: CLINIC | Age: 27
End: 2022-11-10

## 2022-11-10 PROCEDURE — 93798 PHYS/QHP OP CAR RHAB W/ECG: CPT

## 2022-11-14 ENCOUNTER — APPOINTMENT (OUTPATIENT)
Dept: CARDIOLOGY | Facility: CLINIC | Age: 27
End: 2022-11-14

## 2022-11-14 PROCEDURE — 93798 PHYS/QHP OP CAR RHAB W/ECG: CPT

## 2022-11-19 ENCOUNTER — NON-APPOINTMENT (OUTPATIENT)
Age: 27
End: 2022-11-19

## 2022-11-22 ENCOUNTER — APPOINTMENT (OUTPATIENT)
Dept: CARDIOLOGY | Facility: CLINIC | Age: 27
End: 2022-11-22

## 2022-11-22 PROCEDURE — 93798 PHYS/QHP OP CAR RHAB W/ECG: CPT

## 2022-11-28 NOTE — PHYSICAL THERAPY INITIAL EVALUATION ADULT - PLANNED THERAPY INTERVENTIONS, PT EVAL
Subjective   Shaun Schilling is a 44 y.o. male.     History of Present Illness     The patient comes in today for annual physical.  Patient diagnosed with rectal adenocarcinoma in December 2021 and s/p surgery and chemo.  He is doing well denies fatigue, abdominal pain, nausea, vomiting, urinary symptoms,, headache, cough,chest pain, palpitations, dizziness and SOB. The patient admits to dietary compliance is  fair  and exercising occasionally.  He is a non-smoker.      The following portions of the patient's history were reviewed and updated as appropriate: past medical history, past social history, past surgical history and problem list.    Review of Systems   Constitutional: Negative for activity change, appetite change, fatigue and fever.   HENT: Negative for ear pain and sore throat.    Respiratory: Negative for cough, shortness of breath and wheezing.    Cardiovascular: Negative for chest pain and palpitations.   Gastrointestinal: Negative for abdominal pain, blood in stool, diarrhea, nausea, vomiting and indigestion.   Neurological: Negative for headache.   Psychiatric/Behavioral: Negative for sleep disturbance and depressed mood. The patient is not nervous/anxious.        Objective   Physical Exam  Vitals reviewed.   Constitutional:       Appearance: He is well-developed.   Neck:      Thyroid: No thyromegaly.   Cardiovascular:      Heart sounds: Normal heart sounds.   Pulmonary:      Effort: Pulmonary effort is normal.      Breath sounds: Normal breath sounds. No wheezing.   Abdominal:      General: Bowel sounds are normal.      Palpations: Abdomen is soft.      Tenderness: There is no abdominal tenderness.   Musculoskeletal:         General: Normal range of motion.      Cervical back: Normal range of motion and neck supple.   Neurological:      Mental Status: He is alert and oriented to person, place, and time.   Psychiatric:         Mood and Affect: Mood normal.       Vitals:    11/28/22 1254   BP:  141/87   Pulse: 95   Resp: 16   Temp: 97.7 °F (36.5 °C)   SpO2: 98%   Body mass index is 29.89 kg/m².  BMI is >= 25 and <30. (Overweight) The following options were offered after discussion;: exercise counseling/recommendations and nutrition counseling/recommendations     Current Outpatient Medications on File Prior to Visit   Medication Sig Dispense Refill   • dicyclomine (BENTYL) 20 MG tablet Take 1 tablet by mouth Every 6 (Six) Hours As Needed (Stomach cramping). 30 tablet 0   • loperamide (IMODIUM) 2 MG capsule Take 1 capsule by mouth 4 (Four) Times a Day As Needed for Diarrhea. 15 capsule 0   • TRINTELLIX 20 MG tablet Take 1 tablet by mouth Daily.     • Vraylar 3 MG capsule capsule Take 3 mg by mouth every night at bedtime.       No current facility-administered medications on file prior to visit.           Assessment & Plan   Problems Addressed this Visit        Health Encounters    Encounter for well adult exam without abnormal findings - Primary     Discussed healthy diet and  importance of regular exercise and recommend starting or continuing a regular exercise program for good health.  Stressed importance of moderation in sodium/caffeine intake, saturated fat and cholesterol, caloric balance, sufficient intake of fresh fruits and vegetables.         Relevant Orders    Lipid panel   Diagnoses       Codes Comments    Encounter for well adult exam without abnormal findings    -  Primary ICD-10-CM: Z00.00  ICD-9-CM: V70.0                   balance training/gait training/strengthening/transfer training/bed mobility training

## 2022-11-29 ENCOUNTER — APPOINTMENT (OUTPATIENT)
Dept: CARDIOLOGY | Facility: CLINIC | Age: 27
End: 2022-11-29

## 2022-11-29 PROCEDURE — 93798 PHYS/QHP OP CAR RHAB W/ECG: CPT

## 2022-12-01 ENCOUNTER — APPOINTMENT (OUTPATIENT)
Dept: CARDIOLOGY | Facility: CLINIC | Age: 27
End: 2022-12-01

## 2022-12-01 PROCEDURE — 93798 PHYS/QHP OP CAR RHAB W/ECG: CPT

## 2022-12-06 ENCOUNTER — APPOINTMENT (OUTPATIENT)
Dept: CARDIOLOGY | Facility: CLINIC | Age: 27
End: 2022-12-06

## 2022-12-06 PROCEDURE — 93798 PHYS/QHP OP CAR RHAB W/ECG: CPT

## 2022-12-07 ENCOUNTER — LABORATORY RESULT (OUTPATIENT)
Age: 27
End: 2022-12-07

## 2022-12-08 ENCOUNTER — APPOINTMENT (OUTPATIENT)
Dept: CARDIOLOGY | Facility: CLINIC | Age: 27
End: 2022-12-08

## 2022-12-08 PROCEDURE — 93798 PHYS/QHP OP CAR RHAB W/ECG: CPT

## 2022-12-13 ENCOUNTER — APPOINTMENT (OUTPATIENT)
Dept: CARDIOLOGY | Facility: CLINIC | Age: 27
End: 2022-12-13

## 2022-12-13 PROCEDURE — 93798 PHYS/QHP OP CAR RHAB W/ECG: CPT

## 2022-12-19 ENCOUNTER — NON-APPOINTMENT (OUTPATIENT)
Age: 27
End: 2022-12-19

## 2022-12-20 ENCOUNTER — APPOINTMENT (OUTPATIENT)
Dept: CARDIOLOGY | Facility: CLINIC | Age: 27
End: 2022-12-20

## 2022-12-20 PROCEDURE — 93798 PHYS/QHP OP CAR RHAB W/ECG: CPT

## 2022-12-21 ENCOUNTER — APPOINTMENT (OUTPATIENT)
Dept: CARDIOLOGY | Facility: CLINIC | Age: 27
End: 2022-12-21

## 2022-12-21 PROCEDURE — 93798 PHYS/QHP OP CAR RHAB W/ECG: CPT

## 2022-12-27 ENCOUNTER — APPOINTMENT (OUTPATIENT)
Dept: CARDIOLOGY | Facility: CLINIC | Age: 27
End: 2022-12-27

## 2022-12-27 PROCEDURE — 93798 PHYS/QHP OP CAR RHAB W/ECG: CPT

## 2022-12-29 ENCOUNTER — APPOINTMENT (OUTPATIENT)
Dept: CARDIOLOGY | Facility: CLINIC | Age: 27
End: 2022-12-29
Payer: COMMERCIAL

## 2022-12-29 PROCEDURE — 93798 PHYS/QHP OP CAR RHAB W/ECG: CPT

## 2023-01-03 ENCOUNTER — APPOINTMENT (OUTPATIENT)
Dept: CARDIOLOGY | Facility: CLINIC | Age: 28
End: 2023-01-03
Payer: COMMERCIAL

## 2023-01-03 PROCEDURE — 93798 PHYS/QHP OP CAR RHAB W/ECG: CPT

## 2023-01-05 ENCOUNTER — APPOINTMENT (OUTPATIENT)
Dept: CARDIOLOGY | Facility: CLINIC | Age: 28
End: 2023-01-05
Payer: COMMERCIAL

## 2023-01-05 PROCEDURE — 93798 PHYS/QHP OP CAR RHAB W/ECG: CPT

## 2023-01-10 ENCOUNTER — APPOINTMENT (OUTPATIENT)
Dept: CARDIOLOGY | Facility: CLINIC | Age: 28
End: 2023-01-10
Payer: COMMERCIAL

## 2023-01-10 PROCEDURE — 93798 PHYS/QHP OP CAR RHAB W/ECG: CPT

## 2023-01-12 ENCOUNTER — APPOINTMENT (OUTPATIENT)
Dept: CARDIOLOGY | Facility: CLINIC | Age: 28
End: 2023-01-12
Payer: COMMERCIAL

## 2023-01-12 ENCOUNTER — APPOINTMENT (OUTPATIENT)
Dept: HEPATOLOGY | Facility: CLINIC | Age: 28
End: 2023-01-12

## 2023-01-12 PROCEDURE — 93798 PHYS/QHP OP CAR RHAB W/ECG: CPT

## 2023-01-17 ENCOUNTER — APPOINTMENT (OUTPATIENT)
Dept: CARDIOLOGY | Facility: CLINIC | Age: 28
End: 2023-01-17
Payer: COMMERCIAL

## 2023-01-17 PROCEDURE — 93798 PHYS/QHP OP CAR RHAB W/ECG: CPT

## 2023-01-18 ENCOUNTER — NON-APPOINTMENT (OUTPATIENT)
Age: 28
End: 2023-01-18

## 2023-01-19 ENCOUNTER — APPOINTMENT (OUTPATIENT)
Dept: CARDIOLOGY | Facility: CLINIC | Age: 28
End: 2023-01-19
Payer: COMMERCIAL

## 2023-01-19 PROCEDURE — 93798 PHYS/QHP OP CAR RHAB W/ECG: CPT

## 2023-01-24 ENCOUNTER — APPOINTMENT (OUTPATIENT)
Dept: CARDIOLOGY | Facility: CLINIC | Age: 28
End: 2023-01-24
Payer: COMMERCIAL

## 2023-01-24 PROCEDURE — 93798 PHYS/QHP OP CAR RHAB W/ECG: CPT

## 2023-01-26 ENCOUNTER — APPOINTMENT (OUTPATIENT)
Dept: CARDIOLOGY | Facility: CLINIC | Age: 28
End: 2023-01-26
Payer: COMMERCIAL

## 2023-01-26 PROCEDURE — 93798 PHYS/QHP OP CAR RHAB W/ECG: CPT

## 2023-01-30 RX ORDER — WARFARIN 6 MG/1
6 TABLET ORAL
Qty: 90 | Refills: 3 | Status: ACTIVE | COMMUNITY
Start: 2021-03-29 | End: 1900-01-01

## 2023-01-31 ENCOUNTER — APPOINTMENT (OUTPATIENT)
Dept: CARDIOLOGY | Facility: CLINIC | Age: 28
End: 2023-01-31
Payer: COMMERCIAL

## 2023-01-31 PROCEDURE — 93798 PHYS/QHP OP CAR RHAB W/ECG: CPT

## 2023-02-02 ENCOUNTER — APPOINTMENT (OUTPATIENT)
Dept: CARDIOLOGY | Facility: CLINIC | Age: 28
End: 2023-02-02
Payer: COMMERCIAL

## 2023-02-02 PROCEDURE — 93798 PHYS/QHP OP CAR RHAB W/ECG: CPT

## 2023-02-07 ENCOUNTER — APPOINTMENT (OUTPATIENT)
Dept: CARDIOLOGY | Facility: CLINIC | Age: 28
End: 2023-02-07
Payer: COMMERCIAL

## 2023-02-07 PROCEDURE — 93798 PHYS/QHP OP CAR RHAB W/ECG: CPT

## 2023-02-09 ENCOUNTER — APPOINTMENT (OUTPATIENT)
Dept: CARDIOLOGY | Facility: CLINIC | Age: 28
End: 2023-02-09
Payer: COMMERCIAL

## 2023-02-09 PROCEDURE — 93798 PHYS/QHP OP CAR RHAB W/ECG: CPT

## 2023-02-11 ENCOUNTER — APPOINTMENT (OUTPATIENT)
Dept: RADIOLOGY | Facility: CLINIC | Age: 28
End: 2023-02-11
Payer: COMMERCIAL

## 2023-02-11 PROCEDURE — 77080 DXA BONE DENSITY AXIAL: CPT

## 2023-02-14 ENCOUNTER — APPOINTMENT (OUTPATIENT)
Dept: CARDIOLOGY | Facility: CLINIC | Age: 28
End: 2023-02-14
Payer: COMMERCIAL

## 2023-02-14 PROCEDURE — 93798 PHYS/QHP OP CAR RHAB W/ECG: CPT

## 2023-02-16 ENCOUNTER — APPOINTMENT (OUTPATIENT)
Dept: CARDIOLOGY | Facility: CLINIC | Age: 28
End: 2023-02-16
Payer: COMMERCIAL

## 2023-02-16 PROCEDURE — 93798 PHYS/QHP OP CAR RHAB W/ECG: CPT

## 2023-02-17 ENCOUNTER — NON-APPOINTMENT (OUTPATIENT)
Age: 28
End: 2023-02-17

## 2023-02-21 ENCOUNTER — APPOINTMENT (OUTPATIENT)
Dept: CARDIOLOGY | Facility: CLINIC | Age: 28
End: 2023-02-21
Payer: COMMERCIAL

## 2023-02-21 LAB
INR PPP: 1.8 RATIO
POCT-PROTHROMBIN TIME: 20.7 SECS

## 2023-02-21 PROCEDURE — 93798 PHYS/QHP OP CAR RHAB W/ECG: CPT

## 2023-02-23 ENCOUNTER — APPOINTMENT (OUTPATIENT)
Dept: CARDIOLOGY | Facility: CLINIC | Age: 28
End: 2023-02-23
Payer: COMMERCIAL

## 2023-02-23 PROCEDURE — 93798 PHYS/QHP OP CAR RHAB W/ECG: CPT

## 2023-02-28 ENCOUNTER — APPOINTMENT (OUTPATIENT)
Dept: CARDIOLOGY | Facility: CLINIC | Age: 28
End: 2023-02-28
Payer: COMMERCIAL

## 2023-02-28 PROCEDURE — 93798 PHYS/QHP OP CAR RHAB W/ECG: CPT

## 2023-03-02 ENCOUNTER — APPOINTMENT (OUTPATIENT)
Dept: CARDIOLOGY | Facility: CLINIC | Age: 28
End: 2023-03-02
Payer: COMMERCIAL

## 2023-03-02 ENCOUNTER — NON-APPOINTMENT (OUTPATIENT)
Age: 28
End: 2023-03-02

## 2023-03-02 PROCEDURE — 93797 PHYS/QHP OP CAR RHAB WO ECG: CPT

## 2023-03-03 ENCOUNTER — NON-APPOINTMENT (OUTPATIENT)
Age: 28
End: 2023-03-03

## 2023-03-07 ENCOUNTER — APPOINTMENT (OUTPATIENT)
Dept: CARDIOLOGY | Facility: CLINIC | Age: 28
End: 2023-03-07

## 2023-03-09 NOTE — HISTORY OF PRESENT ILLNESS
[Height: ___] : Height: [unfilled] [Weight: ___ lbs] : Weight: [unfilled] lbs [Body Mass Index (BMI): ___] : BMI: [unfilled] [Does patient monitor weight at home?] : Does patient monitor weight at home? Yes [Frequency Checked: ____] : Frequency checked: [unfilled] [Monitoring of weight at home and at cardiac rehabilitation] : Monitoring of weight at home and at cardiac rehabilitation [Individualized exercise program as developed at cardiac rehabilitation] : Individualized exercise program as developed at cardiac rehabilitation [Dietary strategies, including heart healthy eating, balanced with physical activity] : Dietary strategies, including heart healthy eating, balanced with physical activity [Calories and healthy weight management] : Calories and healthy weight management [Yes, continue with Weight Management plan] : Yes, continue with weight management plan [None] : none [Sedentary] : sedentary [BP: ____ mmHg] : BP: [unfilled] mmHg [HR: ____ bpm] : BP: [unfilled] bpm [O2sat: ____ %] : O2sat: [unfilled] % [RPE: ____] : RPE: [unfilled]  [METS: ____] : METS: [unfilled]  [Cardiac Rehabilitation] : Cardiac Rehabilitation [___ Days per week] : [unfilled] days per week [>= 31 minutes per session] : >= 31 minutes per session [Target RPE: ___] : Target RPE: [unfilled] [Treadmill] : treadmill [Recumbent bike] : recumbent bike [BioStep] : BioStep [Walking] : walking [Assess in ___ weeks] : assess in [unfilled] weeks [1 - 3 sets] : 1 - 3 sets [Stretching/ROM exercises and balance exercises daily] : Stretching/ROM exercises and balance exercises daily [Will assess for musculoskeletal and other restrictions] : will assess for musculoskeletal and other restrictions [Perform aerobic activity for ___ consecutive minutes] : perform aerobic activity for [unfilled] consecutive minutes [To start resistance training] : to start resistance training [To increase my time spent in physical activity and/or aerobic exercise] : to increase my time spent in physical activity and/or aerobic exercise [Exercise Benefits/Guidelines education] : exercise benefits/guidelines education [Individualized Exercise Rx] : individualized exercise Rx [Signs/Symptoms to report] : signs/symptoms to report [Hemodynamic responses] : hemodynamic responses [Home exercise] : home exercise [Patient verbalizes understanding of exercise education all questions answered] : Patient verbalizes understanding of exercise education all questions answered [Return demonstration] : return demonstration [Yes, per exercise prescription, policy] : Yes, per exercise prescription, policy [[facility] Total: ___] : [facility] COOP score total: [unfilled] [Daily Activities Score: ___] : Daily Activities Score: [unfilled] [Social Activities Score: ___] : Social Activities Score: [unfilled] [Overall Health Score: ___] : Overall Health Score: [unfilled] [Does patient have advance directive?] : Does patient have advance directive? Yes [Patient will include healthy emotional coping practices in everyday life, such as: ____] : Patient will include healthy emotional coping practices in everyday life, such as [unfilled] [Meditation] : meditation [Self-reports of improved psychosocial well-being] : Self-reports of improved psychosocial well-being [Mindfulness] : mindfulness [Yes, continue with psychosocial plan] : Yes, continue with psychosocial plan [Action] : Stage of change: Action [Heart Failure] : Heart Failure [Other: ___] : [unfilled] [Self] : self [Family] : family [Patient will include healthy diet pattern approaches to healthy eating] : Patient will include healthy diet pattern approaches to healthy eating [Patient will commit to reducing or discontinuing alcohol use] : Patient will commit to reducing or discontinuing alcohol use [MyPlate.gov] : MyPlate.gov [Discuss an overview of healthy eating plan] : Discuss an overview of healthy eating plan [CareNotes written material provided] : CareNotes written material provided [Teach back utilized] : teach back utilized [Yes, continue with nutrition plan] : Yes, continue with nutrition plan [In progress] : in progress [Feelings Score: ___] : Feelings Score: [unfilled] [Physical Fitness Score: ____] : Physical Fitness Score: [unfilled] [Pain Score: ___] : Pain Score: [unfilled] [Change in Health Score: ___] : Change in Health Score: [unfilled] [Quality of Life Score: ___] : Quality of Life Score: [unfilled] [Social Support Score: ___] : Social Support Score: [unfilled] [Discuss overview of emotional health supportive modalities] : Discuss overview of emotional health supportive modalities [Relaxation breathing techniques] : relaxation breathing techniques [Met] : met [Rate your plate score: ____] : Rate your plate score: [unfilled] [Discharge instructions as per guidelines] : discharge instructions as per guidelines [PHQ-9: ___] : PHQ-9: [unfilled] [FreeTextEntry2] : Dr. Bob, [facility] Heart Failure Team [FreeTextEntry3] : Patient will gain weight safely. [Angina with exercise] : no angina with exercise [Fall Risk] : no fall risk [FreeTextEntry1] : Mega Rojas [FreeTextEntry5] : Ting Virgen ([facility]) [de-identified] : METS:\par 3/2/23: RB 4.9; TM 4.5; compared to session #3,  pt has made 114.3% improvement in METS on TM and 46.9% on RB; encouragement and congratulations given by team; pt identifies the following goals for exercise: continue exercise at home, treadmill, free weights and steps; plans to ride bike and walk outside as spring/warmer weather approaches.\par 2/16/23:  RB 4.6; TM 4.1: SpO2 on RB 85%; at discharge 86%; pt. offered no complaints.\par 1/18/23:  RB 4.6; TM 3.9: SpO2 on RB 90%; at discharge 82%; pt. offered no complaints.\par 12/19/22: RB 4.3; TM 3.6: SpO2 on RB 82%; at discharge 81%; pt. offered no complaints.\par 11/19/22: RB 3.7; TM 3.0- patient Sp02 on ; on RB 76:; at discharge 79%; pt denied complaints during exercise\par 10/20/22: RB 2.6; TM 2.1- patient Sp02 on TM 81%; on RB 98:; at discharge 82%; pt denied complaints during exercise\par 10/6:intake: Pt. 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 reports feeling much better since physically since starting CR.\par 1/18/23: Pt. states he looks forward to each CR session.\par 2/1//23: Pt. remains in good spirits, states he looks forward to be listed for heart and liver transplant.\par 3/2/23: Pt states he sees heart transplant physician on 3/6/23 and will keep us informed. He endorse feeling well emotionally, stays positive [FreeTextEntry6] : Occasional fast food; ie: [employer] [FreeTextEntry8] : Increase whole grain intake to 2-3 times per week\par DISCHARGE: Discussion with patient and team on nutrient dense foods that will support healthy weight, such as protein supplements. Patient states he does have the Ensure pudding which he sometimes enjoys. \par  [FreeTextEntry7] : intake:States he monitors sodium intake. \par 10/20/22 goals: eat more fruits and vegetables; try to eat more fish; eat more meatless meals\par At discharge, patient identifies the following healthy eating goals: use lower fat dressing; eat more fruit and fish; he states he aims to follow some of the heart healthy recipes he was provided here at cardiac rehab. - discussed nutrient dense foods/calories to support healthy weight gain

## 2023-03-09 NOTE — REASON FOR VISIT
[Discharge] : a discharge assessment [FreeTextEntry1] : Cardiac Rehab Intake. ITP to be reviewed and signed by Dr. Ramires.  Clinical Indication: Pulmonary Valve Atresia; Pre-transplant patient

## 2023-03-20 LAB
INR PPP: 2.32
PT BLD: 27.4

## 2023-03-23 RX ORDER — AMOXICILLIN 500 MG/1
500 CAPSULE ORAL
Qty: 4 | Refills: 4 | Status: ACTIVE | COMMUNITY
Start: 2022-06-27 | End: 1900-01-01

## 2023-04-04 ENCOUNTER — APPOINTMENT (OUTPATIENT)
Dept: ENDOCRINOLOGY | Facility: CLINIC | Age: 28
End: 2023-04-04
Payer: COMMERCIAL

## 2023-04-04 VITALS
DIASTOLIC BLOOD PRESSURE: 62 MMHG | HEART RATE: 83 BPM | BODY MASS INDEX: 17.2 KG/M2 | OXYGEN SATURATION: 88 % | WEIGHT: 116.13 LBS | HEIGHT: 69 IN | SYSTOLIC BLOOD PRESSURE: 94 MMHG

## 2023-04-04 DIAGNOSIS — Q20.1 DOUBLE OUTLET RIGHT VENTRICLE: ICD-10-CM

## 2023-04-04 DIAGNOSIS — Z87.39 PERSONAL HISTORY OF OTHER DISEASES OF THE MUSCULOSKELETAL SYSTEM AND CONNECTIVE TISSUE: ICD-10-CM

## 2023-04-04 PROCEDURE — 36415 COLL VENOUS BLD VENIPUNCTURE: CPT

## 2023-04-04 PROCEDURE — 99204 OFFICE O/P NEW MOD 45 MIN: CPT | Mod: 25

## 2023-04-10 NOTE — ED ADULT NURSE NOTE - NS ED NURSE RECORD ANOTHER VITAL SIGN
Yes, record another set of vital signs Pseudoobstruction of colon Pseudoobstruction of colon Pseudoobstruction of colon Pseudoobstruction of colon Pseudoobstruction of colon Pseudoobstruction of colon

## 2023-04-14 LAB
25(OH)D3 SERPL-MCNC: 17.3 NG/ML
ALBUMIN SERPL ELPH-MCNC: 3.8 G/DL
ALP BLD-CCNC: 131 U/L
ALP BONE SERPL-MCNC: 19.9 UG/L
ALT SERPL-CCNC: 38 U/L
ANION GAP SERPL CALC-SCNC: 21 MMOL/L
AST SERPL-CCNC: 58 U/L
BILIRUB SERPL-MCNC: 1.7 MG/DL
BUN SERPL-MCNC: 17 MG/DL
CALCIUM SERPL-MCNC: 9.7 MG/DL
CALCIUM SERPL-MCNC: 9.7 MG/DL
CHLORIDE SERPL-SCNC: 100 MMOL/L
CO2 SERPL-SCNC: 18 MMOL/L
COLLAGEN CTX SERPL-MCNC: 244 PG/ML
CREAT SERPL-MCNC: 0.96 MG/DL
EGFR: 110 ML/MIN/1.73M2
GLUCOSE SERPL-MCNC: 85 MG/DL
MAGNESIUM SERPL-MCNC: 2.1 MG/DL
OSTEOCALCIN SERPL-MCNC: 9.4 NG/ML
PARATHYROID HORMONE INTACT: 108 PG/ML
PHOSPHATE SERPL-MCNC: 2.8 MG/DL
POTASSIUM SERPL-SCNC: 4.3 MMOL/L
PROT SERPL-MCNC: 6.2 G/DL
SODIUM SERPL-SCNC: 139 MMOL/L
T3FREE SERPL-MCNC: 2.18 PG/ML
T4 FREE SERPL-MCNC: 1.2 NG/DL
TSH SERPL-ACNC: 2.35 UIU/ML

## 2023-04-16 PROBLEM — Z87.39 HISTORY OF OSTEOPOROSIS: Status: RESOLVED | Noted: 2023-04-04 | Resolved: 2023-04-16

## 2023-04-16 NOTE — PHYSICAL EXAM
Vidhi Galvan, NP Adair Halsted, LPN   Caller: Unspecified (Today,  2:37 PM)             Labs are normal.  Rx for wellbutrin sent.  Follow up 4 weeks.       Patient informed of note [Alert] : alert [Well Nourished] : well nourished [No Acute Distress] : no acute distress [Well Developed] : well developed [Normal Sclera/Conjunctiva] : normal sclera/conjunctiva [EOMI] : extra ocular movement intact [No Proptosis] : no proptosis [Normal Oropharynx] : the oropharynx was normal [Thyroid Not Enlarged] : the thyroid was not enlarged [No Thyroid Nodules] : no palpable thyroid nodules [No Respiratory Distress] : no respiratory distress [No Accessory Muscle Use] : no accessory muscle use [Clear to Auscultation] : lungs were clear to auscultation bilaterally [Normal S1, S2] : normal S1 and S2 [Normal Rate] : heart rate was normal [Regular Rhythm] : with a regular rhythm [Pedal Pulses Normal] : the pedal pulses are present [No Edema] : no peripheral edema [Normal Bowel Sounds] : normal bowel sounds [Not Tender] : non-tender [Not Distended] : not distended [Soft] : abdomen soft [Normal Anterior Cervical Nodes] : no anterior cervical lymphadenopathy [Normal Posterior Cervical Nodes] : no posterior cervical lymphadenopathy [No Spinal Tenderness] : no spinal tenderness [Spine Straight] : spine straight [No Stigmata of Cushings Syndrome] : no stigmata of Cushings Syndrome [Normal Gait] : normal gait [Normal Strength/Tone] : muscle strength and tone were normal [No Rash] : no rash [Normal Reflexes] : deep tendon reflexes were 2+ and symmetric [No Tremors] : no tremors [Oriented x3] : oriented to person, place, and time [Acanthosis Nigricans] : no acanthosis nigricans

## 2023-04-16 NOTE — HISTORY OF PRESENT ILLNESS
[FreeTextEntry1] : Mr. SALOMON  is a 27 year old  male  who presents for initial endocrine evaluation. He presents with regard to a history of identified apparent low bone msass on Dexa scan.. Nara Vazquez NP had recommended to do a bone density scan given that patient will need to be on long term medications after transplant. Dexa scan was done on 02/11/2023\par \par Spine T score -3.2\par Femoral Neck -2.9\par Total Hip -2.0 \par \par He denies any fractures in the past, denies hx of celiac disease,denies any hx of IBD,  denies long term corticosteroid use, PPI use or smoking history, Denies any family history of osteoporosis, denies kidney stone development. Denies excessive alcohol usage.  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 no over the counter medications, no herbal \par \par Surgeries: 3 open heart surgeries as an infant \par \par Hospitalizations: none \par \par \par Social history: no smoking\par no alcohol use

## 2023-04-17 LAB
INR PPP: 3
PT BLD: 35.9

## 2023-04-18 ENCOUNTER — NON-APPOINTMENT (OUTPATIENT)
Age: 28
End: 2023-04-18

## 2023-05-22 LAB — INR PPP: 3.3 RATIO

## 2023-06-13 ENCOUNTER — NON-APPOINTMENT (OUTPATIENT)
Age: 28
End: 2023-06-13

## 2023-06-13 LAB
CAU: 10 MG/DL
CREAT 24H UR-MCNC: 1.1 G/24 H
CREAT 24H UR-MCNC: 1.1 G/24 H
CREAT ?TM UR-MCNC: 87 MG/DL
CREAT ?TM UR-MCNC: 87 MG/DL
PROT ?TM UR-MCNC: 24 HR
PROT ?TM UR-MCNC: 24 HR
SPECIMEN VOL 24H UR: 125 MG/24 H
SPECIMEN VOL 24H UR: 1250 ML
SPECIMEN VOL 24H UR: 1250 ML

## 2023-07-11 ENCOUNTER — APPOINTMENT (OUTPATIENT)
Dept: ENDOCRINOLOGY | Facility: CLINIC | Age: 28
End: 2023-07-11

## 2023-07-17 ENCOUNTER — LABORATORY RESULT (OUTPATIENT)
Age: 28
End: 2023-07-17

## 2023-10-05 LAB — INR PPP: 3.2 RATIO

## 2023-10-24 LAB
INR PPP: 3.06
PT BLD: 33.5

## 2023-11-17 ENCOUNTER — APPOINTMENT (OUTPATIENT)
Dept: MRI IMAGING | Facility: CLINIC | Age: 28
End: 2023-11-17
Payer: COMMERCIAL

## 2023-11-17 PROCEDURE — A9585: CPT | Mod: JW

## 2023-11-17 PROCEDURE — 74183 MRI ABD W/O CNTR FLWD CNTR: CPT

## 2023-11-24 ENCOUNTER — TRANSCRIPTION ENCOUNTER (OUTPATIENT)
Age: 28
End: 2023-11-24

## 2023-11-27 ENCOUNTER — APPOINTMENT (OUTPATIENT)
Dept: ENDOCRINOLOGY | Facility: CLINIC | Age: 28
End: 2023-11-27
Payer: COMMERCIAL

## 2023-11-27 VITALS
HEIGHT: 69 IN | SYSTOLIC BLOOD PRESSURE: 110 MMHG | BODY MASS INDEX: 17.12 KG/M2 | OXYGEN SATURATION: 90 % | HEART RATE: 85 BPM | WEIGHT: 115.56 LBS | DIASTOLIC BLOOD PRESSURE: 62 MMHG

## 2023-11-27 DIAGNOSIS — E55.9 VITAMIN D DEFICIENCY, UNSPECIFIED: ICD-10-CM

## 2023-11-27 DIAGNOSIS — M81.0 AGE-RELATED OSTEOPOROSIS W/OUT CURRENT PATHOLOGICAL FRACTURE: ICD-10-CM

## 2023-11-27 DIAGNOSIS — Q20.6: ICD-10-CM

## 2023-11-27 DIAGNOSIS — M85.80 OTHER SPECIFIED DISORDERS OF BONE DENSITY AND STRUCTURE, UNSPECIFIED SITE: ICD-10-CM

## 2023-11-27 DIAGNOSIS — K74.00 HEPATIC FIBROSIS, UNSPECIFIED: ICD-10-CM

## 2023-11-27 DIAGNOSIS — Q89.01: ICD-10-CM

## 2023-11-27 PROCEDURE — 99214 OFFICE O/P EST MOD 30 MIN: CPT

## 2023-11-28 ENCOUNTER — LABORATORY RESULT (OUTPATIENT)
Age: 28
End: 2023-11-28

## 2023-11-30 LAB
25(OH)D3 SERPL-MCNC: 49.3 NG/ML
ALBUMIN SERPL ELPH-MCNC: 3.9 G/DL
ALP BLD-CCNC: 121 U/L
ALT SERPL-CCNC: 25 U/L
ANION GAP SERPL CALC-SCNC: 16 MMOL/L
AST SERPL-CCNC: 28 U/L
BASOPHILS # BLD AUTO: 0.23 K/UL
BASOPHILS NFR BLD AUTO: 4.5 %
BILIRUB SERPL-MCNC: 1.2 MG/DL
BUN SERPL-MCNC: 18 MG/DL
CALCIUM SERPL-MCNC: 9.8 MG/DL
CALCIUM SERPL-MCNC: 9.8 MG/DL
CHLORIDE SERPL-SCNC: 105 MMOL/L
CO2 SERPL-SCNC: 19 MMOL/L
CREAT SERPL-MCNC: 1.11 MG/DL
EGFR: 92 ML/MIN/1.73M2
EOSINOPHIL # BLD AUTO: 0.88 K/UL
EOSINOPHIL NFR BLD AUTO: 17.2 %
ESTRADIOL SERPL-MCNC: 83 PG/ML
FSH SERPL-MCNC: 3.2 IU/L
GLUCOSE SERPL-MCNC: 78 MG/DL
HCT VFR BLD CALC: 45.7 %
HGB BLD-MCNC: 13.7 G/DL
IMM GRANULOCYTES NFR BLD AUTO: 0.2 %
LH SERPL-ACNC: 14.9 IU/L
LYMPHOCYTES # BLD AUTO: 1.09 K/UL
LYMPHOCYTES NFR BLD AUTO: 21.3 %
MAGNESIUM SERPL-MCNC: 1.9 MG/DL
MAN DIFF?: NORMAL
MCHC RBC-ENTMCNC: 23.2 PG
MCHC RBC-ENTMCNC: 30 GM/DL
MCV RBC AUTO: 77.5 FL
MONOCYTES # BLD AUTO: 0.88 K/UL
MONOCYTES NFR BLD AUTO: 17.2 %
NEUTROPHILS # BLD AUTO: 2.03 K/UL
NEUTROPHILS NFR BLD AUTO: 39.6 %
PARATHYROID HORMONE INTACT: 64 PG/ML
PHOSPHATE SERPL-MCNC: 4 MG/DL
PLATELET # BLD AUTO: 294 K/UL
POTASSIUM SERPL-SCNC: 4.7 MMOL/L
PROT SERPL-MCNC: 6.3 G/DL
RBC # BLD: 5.9 M/UL
RBC # FLD: 22.8 %
SHBG SERPL-SCNC: 186 NMOL/L
SODIUM SERPL-SCNC: 140 MMOL/L
TESTOST FREE SERPL-MCNC: 16.5 PG/ML
TESTOST SERPL-MCNC: >1500 NG/DL
WBC # FLD AUTO: 5.12 K/UL

## 2023-12-04 LAB
ALP BONE SERPL-MCNC: 15.2 UG/L
COLLAGEN CTX SERPL-MCNC: 214 PG/ML
OSTEOCALCIN SERPL-MCNC: 18.3 NG/ML

## 2023-12-10 PROBLEM — M81.0 OSTEOPOROSIS, UNSPECIFIED OSTEOPOROSIS TYPE, UNSPECIFIED PATHOLOGICAL FRACTURE PRESENCE: Status: ACTIVE | Noted: 2023-11-27

## 2024-01-08 NOTE — PROGRESS NOTE ADULT - PROBLEM SELECTOR PLAN 6
- MELLO likely Pre-renal in setting of Poor PO intake/ Hemoptysis vs Possible PATRICA vs Vancomycin Toxicity.   - Patient making good urine and CRE improving. d/c by provider

## 2024-01-17 NOTE — PATIENT PROFILE ADULT. - NSSUBSTANCEUSE_GEN_ALL_CORE_SD
I can do trochanteric bursa injections if pain is on lateral hip.  I don't do any posterior hip injections.  
never used

## 2024-01-19 NOTE — PATIENT PROFILE ADULT - NSPRONUTRITIONRISK_GEN_A_NUR
Patient escorted to 1 room in stable condition to be triaged. Name, birthdate and allergies verified with patient.      BMI less than 18

## 2024-04-05 NOTE — ED ADULT NURSE NOTE - AS O2 DELIVERY
[place] [facility]    I called Diana Santiago about her second stage mobilization of her left upper arm fistula.  She had an excellent vein at the time of surgery.    She reports that she is doing well.  She has soreness in the arm as expected.  No issues with her hand.    The team at [facility] removed her Merly yesterday.  Wound is healing well.  Palpable thrill is noted.    She will follow-up with us in approximately 2 weeks.  Hold off on tourniquet fistula exercises until that time.  Otherwise, no specific restrictions to activities that we discussed.      Kevin Royal MD    room air

## 2024-04-21 NOTE — H&P ADULT - NSHPSOURCEINFORD_GEN_ALL_CORE
The patient is a 3y1m Male complaining of fever.
Patient/Mother/Father
Heather Garcia  Internal Medicine  [address] [place] [id number]  Phone: [phone]  Fax: [phone]  Follow Up Time: 1-3 Days  
Heather Garcia  Internal Medicine  [address] [place] [id number]  Phone: [phone]  Fax: [phone]  Follow Up Time: 1-3 Days  
Heather Garcia  Internal Medicine  [address] [place] [id number]  Phone: [phone]  Fax: [phone]  Follow Up Time: 1-3 Days

## 2024-08-09 NOTE — PROGRESS NOTE ADULT - PROBLEM SELECTOR PLAN 4
- US in MICU with no acute RLE DVT. 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   - SUBINR now second to reversal in MICU for Hemoptysis.   - Heparin GTTs/ Coumadin bridge continued.   - INR slowly increasing.    - QD Coumadin dosing. 3mg given 6/17 - US in MICU with no acute RLE DVT. 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   - SUBINR now second to reversal in MICU for Hemoptysis.   - Heparin GTTs/ Coumadin bridge continued.   - INR slowly increasing.    - QD Coumadin dosing. 5mg given 6/17 done

## 2024-10-21 NOTE — H&P ADULT - PROBLEM SELECTOR PLAN 4
Lab Results   Component Value Date    EGFRNORACEVR 41 10/03/2024    EGFRNORACEVR 59 10/12/2023   Referral to nephrology today  Follow renoprotective measures including Renal Diet (reduce intake of nuts, peanut butter, milk, cheese, dried beans, peas) and Low Sodium Diet (less than 2 grams per day).  Avoid NSAIDs (Aleve, Mobic, Celebrex, Ibuprofen, Advil, Toradol and Diclofenac). May take Tylenol as needed for headache/pain.  Stay well hydrated. Avoid alcohol and soda. Limit tea and coffee.     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

## 2025-04-17 NOTE — ED ADULT NURSE NOTE - NSSISCREENINGQ1_ED_A_ED
5:30pm  Attempted to confirm combo with Ozempic hold (last dose 4-17-25) for 4-25-25 via . No answer, no option for voicemail.   
Attempted to confirm combo with Ozempic hold (last dose 4-17-25) for 4-25-25 via . No answer, no option for voicemail. Will try again later  
No

## 2025-04-24 ENCOUNTER — APPOINTMENT (OUTPATIENT)
Dept: ULTRASOUND IMAGING | Facility: CLINIC | Age: 30
End: 2025-04-24
Payer: COMMERCIAL

## 2025-04-24 PROCEDURE — 93975 VASCULAR STUDY: CPT

## 2025-04-24 PROCEDURE — 76700 US EXAM ABDOM COMPLETE: CPT | Mod: 59

## 2025-08-12 ENCOUNTER — OUTPATIENT (OUTPATIENT)
Dept: OUTPATIENT SERVICES | Facility: HOSPITAL | Age: 30
LOS: 1 days | End: 2025-08-12
Payer: COMMERCIAL

## 2025-08-12 ENCOUNTER — APPOINTMENT (OUTPATIENT)
Dept: RADIOLOGY | Facility: CLINIC | Age: 30
End: 2025-08-12
Payer: COMMERCIAL

## 2025-08-12 DIAGNOSIS — Z29.89 ENCOUNTER FOR OTHER SPECIFIED PROPHYLACTIC MEASURES: ICD-10-CM

## 2025-08-12 DIAGNOSIS — Z00.8 ENCOUNTER FOR OTHER GENERAL EXAMINATION: ICD-10-CM

## 2025-08-12 DIAGNOSIS — Z94.1 HEART TRANSPLANT STATUS: ICD-10-CM

## 2025-08-12 DIAGNOSIS — Z98.89 OTHER SPECIFIED POSTPROCEDURAL STATES: Chronic | ICD-10-CM

## 2025-08-12 PROCEDURE — 77080 DXA BONE DENSITY AXIAL: CPT

## 2025-08-14 ENCOUNTER — APPOINTMENT (OUTPATIENT)
Dept: DERMATOLOGY | Facility: CLINIC | Age: 30
End: 2025-08-14
Payer: COMMERCIAL

## 2025-08-14 VITALS — WEIGHT: 128 LBS | BODY MASS INDEX: 18.9 KG/M2

## 2025-08-14 DIAGNOSIS — D22.9 MELANOCYTIC NEVI, UNSPECIFIED: ICD-10-CM

## 2025-08-14 DIAGNOSIS — Z12.83 ENCOUNTER FOR SCREENING FOR MALIGNANT NEOPLASM OF SKIN: ICD-10-CM

## 2025-08-14 DIAGNOSIS — L73.9 FOLLICULAR DISORDER, UNSPECIFIED: ICD-10-CM

## 2025-08-14 PROCEDURE — 77080 DXA BONE DENSITY AXIAL: CPT | Mod: 26

## 2025-08-14 PROCEDURE — 99204 OFFICE O/P NEW MOD 45 MIN: CPT
